# Patient Record
Sex: FEMALE | Race: BLACK OR AFRICAN AMERICAN | NOT HISPANIC OR LATINO | Employment: OTHER | URBAN - METROPOLITAN AREA
[De-identification: names, ages, dates, MRNs, and addresses within clinical notes are randomized per-mention and may not be internally consistent; named-entity substitution may affect disease eponyms.]

---

## 2017-02-10 ENCOUNTER — HOSPITAL ENCOUNTER (EMERGENCY)
Facility: HOSPITAL | Age: 42
Discharge: HOME | End: 2017-02-11
Attending: EMERGENCY MEDICINE

## 2017-02-10 ENCOUNTER — APPOINTMENT (EMERGENCY)
Dept: CT IMAGING | Facility: HOSPITAL | Age: 42
End: 2017-02-10

## 2017-02-10 DIAGNOSIS — I10 UNCONTROLLED HYPERTENSION: Primary | ICD-10-CM

## 2017-02-10 DIAGNOSIS — E03.9 HYPOTHYROID: ICD-10-CM

## 2017-02-10 LAB
ALBUMIN SERPL BCP-MCNC: 3.4 G/DL (ref 3.5–5)
ALP SERPL-CCNC: 131 U/L (ref 46–116)
ALT SERPL W P-5'-P-CCNC: 40 U/L (ref 12–78)
ANION GAP SERPL CALCULATED.3IONS-SCNC: 8 MMOL/L (ref 4–13)
AST SERPL W P-5'-P-CCNC: 55 U/L (ref 5–45)
BASOPHILS # BLD AUTO: 0.03 THOUSANDS/ΜL (ref 0–0.1)
BASOPHILS NFR BLD AUTO: 0 % (ref 0–1)
BILIRUB SERPL-MCNC: 0.3 MG/DL (ref 0.2–1)
BUN SERPL-MCNC: 8 MG/DL (ref 5–25)
CALCIUM SERPL-MCNC: 9.3 MG/DL (ref 8.3–10.1)
CHLORIDE SERPL-SCNC: 105 MMOL/L (ref 100–108)
CO2 SERPL-SCNC: 26 MMOL/L (ref 21–32)
CREAT SERPL-MCNC: 0.8 MG/DL (ref 0.6–1.3)
EOSINOPHIL # BLD AUTO: 0.18 THOUSAND/ΜL (ref 0–0.61)
EOSINOPHIL NFR BLD AUTO: 1 % (ref 0–6)
ERYTHROCYTE [DISTWIDTH] IN BLOOD BY AUTOMATED COUNT: 13.6 % (ref 11.6–15.1)
GFR SERPL CREATININE-BSD FRML MDRD: >60 ML/MIN/1.73SQ M
GLUCOSE SERPL-MCNC: 96 MG/DL (ref 65–140)
HCT VFR BLD AUTO: 43.1 % (ref 34.8–46.1)
HGB BLD-MCNC: 13.8 G/DL (ref 11.5–15.4)
INR PPP: 0.97 (ref 0.86–1.16)
LYMPHOCYTES # BLD AUTO: 2.24 THOUSANDS/ΜL (ref 0.6–4.47)
LYMPHOCYTES NFR BLD AUTO: 15 % (ref 14–44)
MCH RBC QN AUTO: 27.3 PG (ref 26.8–34.3)
MCHC RBC AUTO-ENTMCNC: 32 G/DL (ref 31.4–37.4)
MCV RBC AUTO: 85 FL (ref 82–98)
MONOCYTES # BLD AUTO: 0.58 THOUSAND/ΜL (ref 0.17–1.22)
MONOCYTES NFR BLD AUTO: 4 % (ref 4–12)
NEUTROPHILS # BLD AUTO: 11.55 THOUSANDS/ΜL (ref 1.85–7.62)
NEUTS SEG NFR BLD AUTO: 79 % (ref 43–75)
NRBC BLD AUTO-RTO: 0 /100 WBCS
PLATELET # BLD AUTO: 359 THOUSANDS/UL (ref 149–390)
PMV BLD AUTO: 10.5 FL (ref 8.9–12.7)
POTASSIUM SERPL-SCNC: 4 MMOL/L (ref 3.5–5.3)
PROT SERPL-MCNC: 8.4 G/DL (ref 6.4–8.2)
PROTHROMBIN TIME: 12.8 SECONDS (ref 12–14.3)
RBC # BLD AUTO: 5.05 MILLION/UL (ref 3.81–5.12)
SODIUM SERPL-SCNC: 139 MMOL/L (ref 136–145)
SPECIMEN SOURCE: NORMAL
TROPONIN I BLD-MCNC: 0.02 NG/ML (ref 0–0.08)
TSH SERPL DL<=0.05 MIU/L-ACNC: 67.31 UIU/ML (ref 0.36–3.74)
WBC # BLD AUTO: 14.66 THOUSAND/UL (ref 4.31–10.16)

## 2017-02-10 PROCEDURE — 85610 PROTHROMBIN TIME: CPT | Performed by: EMERGENCY MEDICINE

## 2017-02-10 PROCEDURE — 84484 ASSAY OF TROPONIN QUANT: CPT

## 2017-02-10 PROCEDURE — 93005 ELECTROCARDIOGRAM TRACING: CPT | Performed by: EMERGENCY MEDICINE

## 2017-02-10 PROCEDURE — 84439 ASSAY OF FREE THYROXINE: CPT | Performed by: EMERGENCY MEDICINE

## 2017-02-10 PROCEDURE — 80053 COMPREHEN METABOLIC PANEL: CPT | Performed by: EMERGENCY MEDICINE

## 2017-02-10 PROCEDURE — 70496 CT ANGIOGRAPHY HEAD: CPT

## 2017-02-10 PROCEDURE — 85025 COMPLETE CBC W/AUTO DIFF WBC: CPT | Performed by: EMERGENCY MEDICINE

## 2017-02-10 PROCEDURE — 84443 ASSAY THYROID STIM HORMONE: CPT | Performed by: EMERGENCY MEDICINE

## 2017-02-10 PROCEDURE — 36415 COLL VENOUS BLD VENIPUNCTURE: CPT | Performed by: EMERGENCY MEDICINE

## 2017-02-10 PROCEDURE — 70498 CT ANGIOGRAPHY NECK: CPT

## 2017-02-10 RX ORDER — DIPHENHYDRAMINE HYDROCHLORIDE 50 MG/ML
INJECTION INTRAMUSCULAR; INTRAVENOUS
Status: COMPLETED
Start: 2017-02-10 | End: 2017-02-11

## 2017-02-10 RX ORDER — LABETALOL HYDROCHLORIDE 5 MG/ML
10 INJECTION, SOLUTION INTRAVENOUS ONCE
Status: DISCONTINUED | OUTPATIENT
Start: 2017-02-11 | End: 2017-02-11 | Stop reason: HOSPADM

## 2017-02-10 RX ORDER — DIAZEPAM 5 MG/ML
INJECTION, SOLUTION INTRAMUSCULAR; INTRAVENOUS
Status: COMPLETED
Start: 2017-02-10 | End: 2017-02-11

## 2017-02-10 RX ORDER — KETOROLAC TROMETHAMINE 30 MG/ML
INJECTION, SOLUTION INTRAMUSCULAR; INTRAVENOUS
Status: DISCONTINUED
Start: 2017-02-10 | End: 2017-02-10 | Stop reason: WASHOUT

## 2017-02-10 RX ORDER — METOCLOPRAMIDE HYDROCHLORIDE 5 MG/ML
INJECTION INTRAMUSCULAR; INTRAVENOUS
Status: COMPLETED
Start: 2017-02-10 | End: 2017-02-11

## 2017-02-10 RX ADMIN — IOHEXOL 85 ML: 350 INJECTION, SOLUTION INTRAVENOUS at 23:35

## 2017-02-11 VITALS
RESPIRATION RATE: 20 BRPM | HEIGHT: 67 IN | TEMPERATURE: 98.7 F | SYSTOLIC BLOOD PRESSURE: 188 MMHG | DIASTOLIC BLOOD PRESSURE: 77 MMHG | HEART RATE: 87 BPM | BODY MASS INDEX: 32.96 KG/M2 | OXYGEN SATURATION: 100 % | WEIGHT: 210 LBS

## 2017-02-11 LAB — T4 FREE SERPL-MCNC: 0.7 NG/DL (ref 0.76–1.46)

## 2017-02-11 PROCEDURE — 99284 EMERGENCY DEPT VISIT MOD MDM: CPT

## 2017-02-11 PROCEDURE — 96375 TX/PRO/DX INJ NEW DRUG ADDON: CPT

## 2017-02-11 PROCEDURE — 96374 THER/PROPH/DIAG INJ IV PUSH: CPT

## 2017-02-11 RX ORDER — DIAZEPAM 5 MG/ML
5 INJECTION, SOLUTION INTRAMUSCULAR; INTRAVENOUS ONCE
Status: COMPLETED | OUTPATIENT
Start: 2017-02-11 | End: 2017-02-11

## 2017-02-11 RX ORDER — AMLODIPINE BESYLATE 5 MG/1
5 TABLET ORAL ONCE
Status: COMPLETED | OUTPATIENT
Start: 2017-02-11 | End: 2017-02-11

## 2017-02-11 RX ORDER — LEVOTHYROXINE SODIUM 0.1 MG/1
100 TABLET ORAL DAILY
Qty: 30 TABLET | Refills: 0 | Status: SHIPPED | OUTPATIENT
Start: 2017-02-11 | End: 2019-09-29 | Stop reason: ALTCHOICE

## 2017-02-11 RX ORDER — METOCLOPRAMIDE HYDROCHLORIDE 5 MG/ML
10 INJECTION INTRAMUSCULAR; INTRAVENOUS ONCE
Status: COMPLETED | OUTPATIENT
Start: 2017-02-11 | End: 2017-02-11

## 2017-02-11 RX ORDER — AMLODIPINE BESYLATE 5 MG/1
5 TABLET ORAL DAILY
Qty: 30 TABLET | Refills: 0 | Status: SHIPPED | OUTPATIENT
Start: 2017-02-11 | End: 2017-03-13

## 2017-02-11 RX ORDER — DIPHENHYDRAMINE HYDROCHLORIDE 50 MG/ML
50 INJECTION INTRAMUSCULAR; INTRAVENOUS ONCE
Status: COMPLETED | OUTPATIENT
Start: 2017-02-11 | End: 2017-02-11

## 2017-02-11 RX ADMIN — DIAZEPAM 5 MG: 5 INJECTION, SOLUTION INTRAMUSCULAR; INTRAVENOUS at 00:07

## 2017-02-11 RX ADMIN — METOCLOPRAMIDE HYDROCHLORIDE 10 MG: 5 INJECTION INTRAMUSCULAR; INTRAVENOUS at 00:05

## 2017-02-11 RX ADMIN — AMLODIPINE BESYLATE 5 MG: 5 TABLET ORAL at 01:46

## 2017-02-11 RX ADMIN — METOCLOPRAMIDE 10 MG: 5 INJECTION, SOLUTION INTRAMUSCULAR; INTRAVENOUS at 00:05

## 2017-02-11 RX ADMIN — DIPHENHYDRAMINE HYDROCHLORIDE 50 MG: 50 INJECTION INTRAMUSCULAR; INTRAVENOUS at 00:05

## 2017-02-11 RX ADMIN — DIPHENHYDRAMINE HYDROCHLORIDE 50 MG: 50 INJECTION, SOLUTION INTRAMUSCULAR; INTRAVENOUS at 00:05

## 2017-02-13 LAB
ATRIAL RATE: 91 BPM
P AXIS: 61 DEGREES
PR INTERVAL: 192 MS
QRS AXIS: 11 DEGREES
QRSD INTERVAL: 84 MS
QT INTERVAL: 378 MS
QTC INTERVAL: 464 MS
T WAVE AXIS: 58 DEGREES
VENTRICULAR RATE: 91 BPM

## 2017-06-06 ENCOUNTER — APPOINTMENT (EMERGENCY)
Dept: CT IMAGING | Facility: HOSPITAL | Age: 42
End: 2017-06-06

## 2017-06-06 ENCOUNTER — HOSPITAL ENCOUNTER (EMERGENCY)
Facility: HOSPITAL | Age: 42
Discharge: HOME | End: 2017-06-06
Attending: EMERGENCY MEDICINE | Admitting: EMERGENCY MEDICINE

## 2017-06-06 VITALS
TEMPERATURE: 98.7 F | DIASTOLIC BLOOD PRESSURE: 99 MMHG | WEIGHT: 271 LBS | HEART RATE: 84 BPM | SYSTOLIC BLOOD PRESSURE: 210 MMHG | OXYGEN SATURATION: 95 % | BODY MASS INDEX: 42.44 KG/M2 | RESPIRATION RATE: 17 BRPM

## 2017-06-06 DIAGNOSIS — I10 HYPERTENSION: ICD-10-CM

## 2017-06-06 DIAGNOSIS — J32.9 SINUSITIS: Primary | ICD-10-CM

## 2017-06-06 LAB
ALBUMIN SERPL BCP-MCNC: 3.5 G/DL (ref 3.5–5)
ALP SERPL-CCNC: 131 U/L (ref 46–116)
ALT SERPL W P-5'-P-CCNC: 39 U/L (ref 12–78)
ANION GAP SERPL CALCULATED.3IONS-SCNC: 9 MMOL/L (ref 4–13)
AST SERPL W P-5'-P-CCNC: 51 U/L (ref 5–45)
ATRIAL RATE: 83 BPM
BASOPHILS # BLD AUTO: 0.02 THOUSANDS/ΜL (ref 0–0.1)
BASOPHILS NFR BLD AUTO: 0 % (ref 0–1)
BILIRUB DIRECT SERPL-MCNC: 0.11 MG/DL (ref 0–0.2)
BILIRUB SERPL-MCNC: 0.4 MG/DL (ref 0.2–1)
BUN SERPL-MCNC: 7 MG/DL (ref 5–25)
CALCIUM SERPL-MCNC: 9.2 MG/DL (ref 8.3–10.1)
CHLORIDE SERPL-SCNC: 101 MMOL/L (ref 100–108)
CO2 SERPL-SCNC: 27 MMOL/L (ref 21–32)
CREAT SERPL-MCNC: 0.75 MG/DL (ref 0.6–1.3)
EOSINOPHIL # BLD AUTO: 0.34 THOUSAND/ΜL (ref 0–0.61)
EOSINOPHIL NFR BLD AUTO: 3 % (ref 0–6)
ERYTHROCYTE [DISTWIDTH] IN BLOOD BY AUTOMATED COUNT: 13.7 % (ref 11.6–15.1)
GFR SERPL CREATININE-BSD FRML MDRD: >60 ML/MIN/1.73SQ M
GLUCOSE SERPL-MCNC: 97 MG/DL (ref 65–140)
HCT VFR BLD AUTO: 44.5 % (ref 34.8–46.1)
HGB BLD-MCNC: 14.3 G/DL (ref 11.5–15.4)
LYMPHOCYTES # BLD AUTO: 2.38 THOUSANDS/ΜL (ref 0.6–4.47)
LYMPHOCYTES NFR BLD AUTO: 23 % (ref 14–44)
MCH RBC QN AUTO: 27.7 PG (ref 26.8–34.3)
MCHC RBC AUTO-ENTMCNC: 32.1 G/DL (ref 31.4–37.4)
MCV RBC AUTO: 86 FL (ref 82–98)
MONOCYTES # BLD AUTO: 0.47 THOUSAND/ΜL (ref 0.17–1.22)
MONOCYTES NFR BLD AUTO: 5 % (ref 4–12)
NEUTROPHILS # BLD AUTO: 6.95 THOUSANDS/ΜL (ref 1.85–7.62)
NEUTS SEG NFR BLD AUTO: 68 % (ref 43–75)
NRBC BLD AUTO-RTO: 0 /100 WBCS
P AXIS: 28 DEGREES
PLATELET # BLD AUTO: 332 THOUSANDS/UL (ref 149–390)
PMV BLD AUTO: 10.5 FL (ref 8.9–12.7)
POTASSIUM SERPL-SCNC: 3.9 MMOL/L (ref 3.5–5.3)
PR INTERVAL: 146 MS
PROT SERPL-MCNC: 8.3 G/DL (ref 6.4–8.2)
QRS AXIS: -1 DEGREES
QRSD INTERVAL: 80 MS
QT INTERVAL: 392 MS
QTC INTERVAL: 460 MS
RBC # BLD AUTO: 5.17 MILLION/UL (ref 3.81–5.12)
SODIUM SERPL-SCNC: 137 MMOL/L (ref 136–145)
T WAVE AXIS: 65 DEGREES
TROPONIN I SERPL-MCNC: <0.02 NG/ML
VENTRICULAR RATE: 83 BPM
WBC # BLD AUTO: 10.22 THOUSAND/UL (ref 4.31–10.16)

## 2017-06-06 PROCEDURE — 93005 ELECTROCARDIOGRAM TRACING: CPT | Performed by: EMERGENCY MEDICINE

## 2017-06-06 PROCEDURE — 36415 COLL VENOUS BLD VENIPUNCTURE: CPT | Performed by: EMERGENCY MEDICINE

## 2017-06-06 PROCEDURE — 84484 ASSAY OF TROPONIN QUANT: CPT | Performed by: EMERGENCY MEDICINE

## 2017-06-06 PROCEDURE — 80048 BASIC METABOLIC PNL TOTAL CA: CPT | Performed by: EMERGENCY MEDICINE

## 2017-06-06 PROCEDURE — 70450 CT HEAD/BRAIN W/O DYE: CPT

## 2017-06-06 PROCEDURE — 99284 EMERGENCY DEPT VISIT MOD MDM: CPT

## 2017-06-06 PROCEDURE — 85025 COMPLETE CBC W/AUTO DIFF WBC: CPT | Performed by: EMERGENCY MEDICINE

## 2017-06-06 PROCEDURE — 80076 HEPATIC FUNCTION PANEL: CPT | Performed by: EMERGENCY MEDICINE

## 2017-06-06 PROCEDURE — 70486 CT MAXILLOFACIAL W/O DYE: CPT

## 2017-06-06 RX ORDER — CEPHALEXIN 500 MG/1
500 CAPSULE ORAL 4 TIMES DAILY
Qty: 40 CAPSULE | Refills: 0 | Status: SHIPPED | OUTPATIENT
Start: 2017-06-06 | End: 2017-06-16

## 2017-06-06 RX ORDER — AMLODIPINE BESYLATE 10 MG/1
10 TABLET ORAL DAILY
Qty: 30 TABLET | Refills: 0 | Status: SHIPPED | OUTPATIENT
Start: 2017-06-06 | End: 2019-04-16 | Stop reason: SDUPTHER

## 2017-06-06 RX ORDER — CEPHALEXIN 250 MG/1
500 CAPSULE ORAL ONCE
Status: COMPLETED | OUTPATIENT
Start: 2017-06-06 | End: 2017-06-06

## 2017-06-06 RX ORDER — AMLODIPINE BESYLATE 5 MG/1
5 TABLET ORAL ONCE
Status: COMPLETED | OUTPATIENT
Start: 2017-06-06 | End: 2017-06-06

## 2017-06-06 RX ADMIN — CEPHALEXIN 500 MG: 250 CAPSULE ORAL at 14:05

## 2017-06-06 RX ADMIN — AMLODIPINE BESYLATE 5 MG: 5 TABLET ORAL at 14:13

## 2017-12-04 ENCOUNTER — HOSPITAL ENCOUNTER (EMERGENCY)
Facility: HOSPITAL | Age: 42
Discharge: HOME | End: 2017-12-04
Attending: EMERGENCY MEDICINE | Admitting: EMERGENCY MEDICINE
Payer: COMMERCIAL

## 2017-12-04 ENCOUNTER — APPOINTMENT (EMERGENCY)
Dept: RADIOLOGY | Facility: HOSPITAL | Age: 42
End: 2017-12-04
Payer: COMMERCIAL

## 2017-12-04 VITALS
OXYGEN SATURATION: 100 % | WEIGHT: 140 LBS | DIASTOLIC BLOOD PRESSURE: 94 MMHG | SYSTOLIC BLOOD PRESSURE: 219 MMHG | HEART RATE: 95 BPM | BODY MASS INDEX: 21.93 KG/M2 | RESPIRATION RATE: 16 BRPM | TEMPERATURE: 98.4 F

## 2017-12-04 DIAGNOSIS — I10 HTN (HYPERTENSION): ICD-10-CM

## 2017-12-04 DIAGNOSIS — S60.221A CONTUSION OF RIGHT HAND, INITIAL ENCOUNTER: Primary | ICD-10-CM

## 2017-12-04 LAB
BILIRUB UR QL STRIP: NEGATIVE
CLARITY UR: CLEAR
COLOR UR: YELLOW
EXT PREG TEST URINE: NEGATIVE
GLUCOSE UR STRIP-MCNC: NEGATIVE MG/DL
HGB UR QL STRIP.AUTO: NEGATIVE
KETONES UR STRIP-MCNC: NEGATIVE MG/DL
LEUKOCYTE ESTERASE UR QL STRIP: NEGATIVE
NITRITE UR QL STRIP: NEGATIVE
PH UR STRIP.AUTO: 6 [PH] (ref 4.5–8)
PROT UR STRIP-MCNC: NEGATIVE MG/DL
SP GR UR STRIP.AUTO: >=1.03 (ref 1–1.03)
UROBILINOGEN UR QL STRIP.AUTO: 1 E.U./DL

## 2017-12-04 PROCEDURE — 99283 EMERGENCY DEPT VISIT LOW MDM: CPT

## 2017-12-04 PROCEDURE — 73110 X-RAY EXAM OF WRIST: CPT

## 2017-12-04 PROCEDURE — 81025 URINE PREGNANCY TEST: CPT | Performed by: EMERGENCY MEDICINE

## 2017-12-04 PROCEDURE — 73130 X-RAY EXAM OF HAND: CPT

## 2017-12-04 PROCEDURE — 81003 URINALYSIS AUTO W/O SCOPE: CPT | Performed by: EMERGENCY MEDICINE

## 2017-12-04 RX ORDER — OXYCODONE HYDROCHLORIDE AND ACETAMINOPHEN 5; 325 MG/1; MG/1
1 TABLET ORAL EVERY 8 HOURS PRN
Qty: 5 TABLET | Refills: 0 | Status: SHIPPED | OUTPATIENT
Start: 2017-12-04 | End: 2017-12-06

## 2017-12-04 RX ORDER — AMLODIPINE BESYLATE 2.5 MG/1
10 TABLET ORAL DAILY
Qty: 40 TABLET | Refills: 0 | Status: SHIPPED | OUTPATIENT
Start: 2017-12-04 | End: 2018-04-12 | Stop reason: HOSPADM

## 2017-12-04 RX ORDER — AMLODIPINE BESYLATE 5 MG/1
5 TABLET ORAL ONCE
Status: COMPLETED | OUTPATIENT
Start: 2017-12-04 | End: 2017-12-04

## 2017-12-04 RX ORDER — OXYCODONE HYDROCHLORIDE AND ACETAMINOPHEN 5; 325 MG/1; MG/1
1 TABLET ORAL ONCE
Status: COMPLETED | OUTPATIENT
Start: 2017-12-04 | End: 2017-12-04

## 2017-12-04 RX ADMIN — OXYCODONE AND ACETAMINOPHEN 1 TABLET: 5; 325 TABLET ORAL at 19:46

## 2017-12-04 RX ADMIN — AMLODIPINE BESYLATE 5 MG: 5 TABLET ORAL at 20:14

## 2017-12-05 NOTE — ED PROVIDER NOTES
History  Chief Complaint   Patient presents with    Hand Injury     Pt reports right hand injury after stricking door  Pt reports hx of hand surgery with screw placement  Patient is a 61-year-old female with a history of hypertension noncompliant on her medications for the past month coming in today after an injury to her right hand  Patient is predominantly right-handed and states that she had surgery to her right hand in the late 1990s  Patient struck her hand in a door  This was not a crush injury  Patient did not take anything for the pain  This injury occurred approximately 0 5 hour prior  History provided by:  Patient   used: No        Prior to Admission Medications   Prescriptions Last Dose Informant Patient Reported? Taking? amLODIPine (NORVASC) 10 mg tablet   No No   Sig: Take 1 tablet by mouth daily   amLODIPine (NORVASC) 5 mg tablet   No No   Sig: Take 1 tablet by mouth daily for 30 days   amLODIPine (NORVASC) 5 mg tablet   No No   Sig: Take 1 tablet by mouth daily for 30 days   aspirin 81 MG tablet   Yes No   Sig: Take 81 mg by mouth daily   levothyroxine 100 mcg tablet   No No   Sig: Take 1 tablet by mouth daily for 30 days   levothyroxine 100 mcg tablet   No No   Sig: Take 1 tablet by mouth daily for 30 days      Facility-Administered Medications: None       Past Medical History:   Diagnosis Date    Disease of thyroid gland     hypothyroidism    History of MI (myocardial infarction)     History of TIA (transient ischemic attack)     Hypertension     TIA (transient ischemic attack)        History reviewed  No pertinent surgical history  History reviewed  No pertinent family history  I have reviewed and agree with the history as documented      Social History   Substance Use Topics    Smoking status: Current Every Day Smoker     Packs/day: 0 20     Types: Cigarettes    Smokeless tobacco: Never Used    Alcohol use No        Review of Systems   Respiratory: Negative for cough, chest tightness and shortness of breath  Cardiovascular: Negative for chest pain, palpitations and leg swelling  Musculoskeletal: Negative for arthralgias, back pain, gait problem, joint swelling, myalgias and neck pain  Physical Exam  ED Triage Vitals   Temperature Pulse Respirations Blood Pressure SpO2   12/04/17 1909 12/04/17 1909 12/04/17 1909 12/04/17 1911 12/04/17 1909   98 4 °F (36 9 °C) 95 16 (!) 212/101 100 %      Temp Source Heart Rate Source Patient Position - Orthostatic VS BP Location FiO2 (%)   12/04/17 1909 12/04/17 1909 12/04/17 1909 12/04/17 1909 --   Oral Monitor Lying Right arm       Pain Score       12/04/17 1909       9           Orthostatic Vital Signs  Vitals:    12/04/17 1909 12/04/17 1911 12/04/17 2014 12/04/17 2100   BP:  (!) 212/101 (!) 206/100 (!) 219/94   Pulse: 95      Patient Position - Orthostatic VS: Lying Sitting Lying        Physical Exam   Constitutional: She is oriented to person, place, and time  She appears well-developed and well-nourished  No distress  HENT:   Head: Normocephalic and atraumatic  Mouth/Throat: Oropharynx is clear and moist    Eyes: Conjunctivae and EOM are normal  Pupils are equal, round, and reactive to light  Neck: Normal range of motion  Neck supple  Cardiovascular:   No murmur heard  Pulmonary/Chest: No stridor  No respiratory distress  Musculoskeletal: Normal range of motion  She exhibits no edema  Arms:  Patient has full active range of motion of the right shoulder, elbow, and wrist which is pain-free  Patient has tenderness to palpation along the dorsal surface of the mid carpal bones  Patient does have full active range of motion of the right wrist this is pain-free  Patient does have full active range of motion of all 5 digits and complains of pain throughout the 3rd digit only  Patient is neurovascular intact with 2+ radial pulses bilaterally and equally  There is no crepitus or deformity    There is no effusion or swelling  There is no laceration  Neurological: She is alert and oriented to person, place, and time  She displays normal reflexes  No cranial nerve deficit  Coordination normal    Skin: Skin is warm  Capillary refill takes less than 2 seconds  She is not diaphoretic  Nursing note and vitals reviewed  ED Medications  Medications   oxyCODONE-acetaminophen (PERCOCET) 5-325 mg per tablet 1 tablet (1 tablet Oral Given 12/4/17 1946)   amLODIPine (NORVASC) tablet 5 mg (5 mg Oral Given 12/4/17 2014)       Diagnostic Studies  Results Reviewed     Procedure Component Value Units Date/Time    UA w Reflex to Microscopic [[id number]]  (Normal) Collected:  12/04/17 1946    Lab Status:  Final result Specimen:  Urine from Urine, Clean Catch Updated:  12/04/17 1955     Color, UA Yellow     Clarity, UA Clear     Specific Gravity, UA >=1 030     pH, UA 6 0     Leukocytes, UA Negative     Nitrite, UA Negative     Protein, UA Negative mg/dl      Glucose, UA Negative mg/dl      Ketones, UA Negative mg/dl      Urobilinogen, UA 1 0 E U /dl      Bilirubin, UA Negative     Blood, UA Negative    POCT pregnancy, urine [[id number]]  (Normal) Resulted:  12/04/17 1950    Lab Status:  Final result Updated:  12/04/17 1950     EXT PREG TEST UR (Ref: Negative) negative                 XR hand 3+ views RIGHT   Final Result by Olegario Homans, MD (12/04 2050)      No acute osseous abnormality  Workstation performed: [id number]         XR wrist 3+ views RIGHT   Final Result by Olegario Homans, MD (12/04 2048)      No acute osseous abnormality  Workstation performed: [id number]                    Procedures  Procedures       Phone Contacts  ED Phone Contact    ED Course  ED Course                                MDM  Number of Diagnoses or Management Options  Contusion of right hand, initial encounter:   Diagnosis management comments:  It is noted patient is hypertensive on initial exam states she has not taken her medications in over a month due to insurance reasons  Will give 1st dose of Norvasc your as well as pain control and x-rays  Patient denies any chest pain, shortness of breath, and only complains of right hand pain  8:39 PM  Patient updated on my x-ray read which revealed no acute fracture  Pins in and place  Patient remains neurovascularly intact  Patient complains of pain at the 3rd digit on the right hand only  Patient states her pain is improved  Discussed with patient regarding her blood pressure  Patient has no headache, visual changes, chest pain, shortness of breath  Urine is negative  I discussed with patient that she needs to restart her blood pressure medication and follow up with her PCP  Will give a prescription for her anti hypertensive at home and stressed the importance of taking this at home  Patient understands that she is high risk for stroke, mi, end-organ damage if she does not restart her blood pressure medications  Amount and/or Complexity of Data Reviewed  Clinical lab tests: reviewed and ordered  Tests in the radiology section of CPT®: ordered and reviewed  Tests in the medicine section of CPT®: ordered and reviewed  Independent visualization of images, tracings, or specimens: yes      CritCare Time    Disposition  Final diagnoses:   Contusion of right hand, initial encounter   HTN (hypertension)     Time reflects when diagnosis was documented in both MDM as applicable and the Disposition within this note     Time User Action Codes Description Comment    12/4/2017  8:42 PM Edwin Juares Add [R51 030V] Contusion of right hand, initial encounter     12/4/2017  8:43 PM Jimmy Juares Add [I10] HTN (hypertension)       ED Disposition     ED Disposition Condition Comment    Discharge  [facility] discharge to home/self care      Condition at discharge: Stable        Follow-up Information    None       Discharge Medication List as of 12/4/2017  8:46 PM      START taking these medications    Details   oxyCODONE-acetaminophen (PERCOCET) 5-325 mg per tablet Take 1 tablet by mouth every 8 (eight) hours as needed for moderate pain for up to 2 days Max Daily Amount: 3 tablets, Starting Mon 12/4/2017, Until Wed 12/6/2017, Print         CONTINUE these medications which have CHANGED    Details   !! amLODIPine (NORVASC) 2 5 mg tablet Take 4 tablets by mouth daily for 10 days, Starting Mon 12/4/2017, Until Thu 12/14/2017, Print       !! - Potential duplicate medications found  Please discuss with provider  CONTINUE these medications which have NOT CHANGED    Details   !! amLODIPine (NORVASC) 10 mg tablet Take 1 tablet by mouth daily, Starting 6/6/2017, Until Discontinued, Print      aspirin 81 MG tablet Take 81 mg by mouth daily, Until Discontinued, Historical Med      levothyroxine 100 mcg tablet Take 1 tablet by mouth daily for 30 days, Starting 12/13/2016, Until Thu 1/12/17, Print      levothyroxine 100 mcg tablet Take 1 tablet by mouth daily for 30 days, Starting 2/11/2017, Until Mon 3/13/17, Print       !! - Potential duplicate medications found  Please discuss with provider  No discharge procedures on file      ED Provider  Electronically Signed by           Vila Bosworth,   12/05/17 7958

## 2017-12-05 NOTE — DISCHARGE INSTRUCTIONS
You must follow up with your family doctor and restart her blood pressure medications  You blood pressure was elevated in the emergency department  If you do not restart your blood pressure medications he is a high risk of stroke, heart attack, end-organ damage  If you have any symptoms such as chest pain, shortness of breath, headache, visual changes, or any other concerning symptoms return to the ER immediately  Chronic Hypertension   WHAT YOU NEED TO KNOW:   Hypertension is high blood pressure (BP)  Your BP is the force of your blood moving against the walls of your arteries  Normal BP is less than 120/80  Prehypertension is between 120/80 and 139/89  Hypertension is 140/90 or higher  Hypertension causes your BP to get so high that your heart has to work much harder than normal  This can damage your heart  Chronic hypertension is a long-term condition that you can control with a healthy lifestyle or medicines  A controlled blood pressure helps protect your organs, such as your heart, lungs, brain, and kidneys  DISCHARGE INSTRUCTIONS:   Call 911 for any of the following:   · You have discomfort in your chest that feels like squeezing, pressure, fullness, or pain  · You become confused or have difficulty speaking  · You suddenly feel lightheaded or have trouble breathing  · You have pain or discomfort in your back, neck, jaw, stomach, or arm  Seek care immediately if:   · You have a severe headache or vision loss  · You have weakness in an arm or leg  Contact your healthcare provider if:   · You feel faint, dizzy, confused, or drowsy  · You have been taking your BP medicine and your BP is still higher than your healthcare provider says it should be  · You have questions or concerns about your condition or care  Medicines: You may need any of the following:  · Medicine  may be used to help lower your BP  You may need more than one type of medicine   Take the medicine exactly as directed  · Diuretics  help decrease extra fluid that collects in your body  This will help lower your BP  You may urinate more often while you take this medicine  · Cholesterol medicine  helps lower your cholesterol level  A low cholesterol level helps prevent heart disease and makes it easier to control your blood pressure  · Take your medicine as directed  Contact your healthcare provider if you think your medicine is not helping or if you have side effects  Tell him or her if you are allergic to any medicine  Keep a list of the medicines, vitamins, and herbs you take  Include the amounts, and when and why you take them  Bring the list or the pill bottles to follow-up visits  Carry your medicine list with you in case of an emergency  Follow up with your healthcare provider as directed: You will need to return to have your blood pressure checked and to have other lab tests done  Write down your questions so you remember to ask them during your visits  Manage chronic hypertension:  Talk with your healthcare provider about these and other ways to manage hypertension:  · Take your BP at home  Sit and rest for 5 minutes before you take your BP  Extend your arm and support it on a flat surface  Your arm should be at the same level as your heart  Follow the directions that came with your BP monitor  If possible, take at least 2 BP readings each time  Take your BP at least twice a day at the same times each day, such as morning and evening  Keep a record of your BP readings and bring it to your follow-up visits  Ask your healthcare provider what your blood pressure should be  · Limit sodium (salt) as directed  Too much sodium can affect your fluid balance  Check labels to find low-sodium or no-salt-added foods  Some low-sodium foods use potassium salts for flavor  Too much potassium can also cause health problems   Your healthcare provider will tell you how much sodium and potassium are safe for you to have in a day  He or she may recommend that you limit sodium to 2,300 mg a day  · Follow the meal plan recommended by your healthcare provider  A dietitian or your provider can give you more information on low-sodium plans or the DASH (Dietary Approaches to Stop Hypertension) eating plan  The DASH plan is low in sodium, unhealthy fats, and total fat  It is high in potassium, calcium, and fiber  · Exercise to maintain a healthy weight  Exercise at least 30 minutes per day, on most days of the week  This will help decrease your blood pressure  Ask about the best exercise plan for you  · Decrease stress  This may help lower your BP  Learn ways to relax, such as deep breathing or listening to music  · Limit alcohol  Women should limit alcohol to 1 drink a day  Men should limit alcohol to 2 drinks a day  A drink of alcohol is 12 ounces of beer, 5 ounces of wine, or 1½ ounces of liquor  · Do not smoke  Nicotine and other chemicals in cigarettes and cigars can increase your BP and also cause lung damage  Ask your healthcare provider for information if you currently smoke and need help to quit  E-cigarettes or smokeless tobacco still contain nicotine  Talk to your healthcare provider before you use these products  © 2017 [address] [facility] Information is for End User's use only and may not be sold, redistributed or otherwise used for commercial purposes  All illustrations and images included in CareNotes® are the copyrighted property of [employer]  or Gerardo Blackman  The above information is an  only  It is not intended as medical advice for individual conditions or treatments  Talk to your doctor, nurse or pharmacist before following any medical regimen to see if it is safe and effective for you  Hand Sprain, Ambulatory Care   GENERAL INFORMATION:   A hand sprain  is when a ligament in your hand is stretched or torn   Ligaments are the strong tissues that connect bones  A hand sprain is usually caused by a fall onto your outstretched arm  You may have bruising, pain, and swelling of your injured hand  Seek immediate care for the following symptoms:   · Cold or numbness below the injury, such as in your fingers    · Increased pain, even after taking pain medicine    · New or increased trouble moving and using your hand, fingers, or wrist  Treatment for a hand sprain  may include a support device, such as a brace or splint  These devices limit movement and protect your joint  Treatment may also include pain medicine  Care for a hand sprain:   · Rest  your hand for 1 to 2 days after your injury  This will help decrease the risk of more damage to your hand  Avoid activities that cause pain  Return to normal activities as directed  · Apply ice  on your hand for 15 to 20 minutes every hour or as directed  Use an ice pack, or put crushed ice in a plastic bag  Cover it with a towel  Ice helps prevent tissue damage and decreases swelling and pain  · Use an elastic bandage as directed  An elastic bandage supports your hand and decreases swelling so it can heal  The elastic bandage should be snug but not tight  · Elevate  your hand above the level of your heart as often as you can  This will help decrease swelling and pain  Prop your hand on pillows or blankets to keep it elevated comfortably  · Exercise  your hand as directed to decrease stiffness and improve strength  You may be directed to exercise once you are able to move your hand without pain  Follow up with your healthcare provider as directed:  Write down your questions so you remember to ask them during your visits  CARE AGREEMENT:   You have the right to help plan your care  Learn about your health condition and how it may be treated  Discuss treatment options with your caregivers to decide what care you want to receive  You always have the right to refuse treatment   The above information is an  only  It is not intended as medical advice for individual conditions or treatments  Talk to your doctor, nurse or pharmacist before following any medical regimen to see if it is safe and effective for you  © 2014 [address] is for End User's use only and may not be sold, redistributed or otherwise used for commercial purposes  All illustrations and images included in CareNotes® are the copyrighted property of [employer]  or Gerardo Blackman

## 2018-04-08 ENCOUNTER — APPOINTMENT (EMERGENCY)
Dept: CT IMAGING | Facility: HOSPITAL | Age: 43
DRG: 054 | End: 2018-04-08
Payer: COMMERCIAL

## 2018-04-08 ENCOUNTER — APPOINTMENT (EMERGENCY)
Dept: RADIOLOGY | Facility: HOSPITAL | Age: 43
DRG: 054 | End: 2018-04-08
Payer: COMMERCIAL

## 2018-04-08 ENCOUNTER — HOSPITAL ENCOUNTER (INPATIENT)
Facility: HOSPITAL | Age: 43
LOS: 1 days | Discharge: HOME | DRG: 054 | End: 2018-04-12
Attending: EMERGENCY MEDICINE | Admitting: INTERNAL MEDICINE
Payer: COMMERCIAL

## 2018-04-08 DIAGNOSIS — I16.0 HYPERTENSIVE URGENCY: Primary | ICD-10-CM

## 2018-04-08 DIAGNOSIS — Z72.0 TOBACCO ABUSE: ICD-10-CM

## 2018-04-08 DIAGNOSIS — R51.9 HEADACHE: ICD-10-CM

## 2018-04-08 DIAGNOSIS — G43.911 INTRACTABLE MIGRAINE WITH STATUS MIGRAINOSUS, UNSPECIFIED MIGRAINE TYPE: ICD-10-CM

## 2018-04-08 DIAGNOSIS — R07.9 CHEST PAIN: ICD-10-CM

## 2018-04-08 LAB
ALBUMIN SERPL BCP-MCNC: 3.5 G/DL (ref 3.5–5)
ALP SERPL-CCNC: 128 U/L (ref 46–116)
ALT SERPL W P-5'-P-CCNC: 34 U/L (ref 12–78)
ANION GAP SERPL CALCULATED.3IONS-SCNC: 8 MMOL/L (ref 4–13)
AST SERPL W P-5'-P-CCNC: 43 U/L (ref 5–45)
BASOPHILS # BLD AUTO: 0.05 THOUSANDS/ΜL (ref 0–0.1)
BASOPHILS NFR BLD AUTO: 0 % (ref 0–1)
BILIRUB SERPL-MCNC: 0.2 MG/DL (ref 0.2–1)
BUN SERPL-MCNC: 6 MG/DL (ref 5–25)
CALCIUM SERPL-MCNC: 8.4 MG/DL (ref 8.3–10.1)
CHLORIDE SERPL-SCNC: 102 MMOL/L (ref 100–108)
CO2 SERPL-SCNC: 27 MMOL/L (ref 21–32)
CREAT SERPL-MCNC: 0.91 MG/DL (ref 0.6–1.3)
DEPRECATED D DIMER PPP: <270 NG/ML (FEU) (ref 0–424)
EOSINOPHIL # BLD AUTO: 0.38 THOUSAND/ΜL (ref 0–0.61)
EOSINOPHIL NFR BLD AUTO: 3 % (ref 0–6)
ERYTHROCYTE [DISTWIDTH] IN BLOOD BY AUTOMATED COUNT: 13.5 % (ref 11.6–15.1)
GFR SERPL CREATININE-BSD FRML MDRD: 91 ML/MIN/1.73SQ M
GLUCOSE SERPL-MCNC: 100 MG/DL (ref 65–140)
HCT VFR BLD AUTO: 43.6 % (ref 34.8–46.1)
HGB BLD-MCNC: 14 G/DL (ref 11.5–15.4)
LYMPHOCYTES # BLD AUTO: 2.75 THOUSANDS/ΜL (ref 0.6–4.47)
LYMPHOCYTES NFR BLD AUTO: 22 % (ref 14–44)
MCH RBC QN AUTO: 28 PG (ref 26.8–34.3)
MCHC RBC AUTO-ENTMCNC: 32.1 G/DL (ref 31.4–37.4)
MCV RBC AUTO: 87 FL (ref 82–98)
MONOCYTES # BLD AUTO: 0.5 THOUSAND/ΜL (ref 0.17–1.22)
MONOCYTES NFR BLD AUTO: 4 % (ref 4–12)
NEUTROPHILS # BLD AUTO: 8.54 THOUSANDS/ΜL (ref 1.85–7.62)
NEUTS SEG NFR BLD AUTO: 70 % (ref 43–75)
NRBC BLD AUTO-RTO: 0 /100 WBCS
NT-PROBNP SERPL-MCNC: 88 PG/ML
PLATELET # BLD AUTO: 336 THOUSANDS/UL (ref 149–390)
PMV BLD AUTO: 10 FL (ref 8.9–12.7)
POTASSIUM SERPL-SCNC: 3.6 MMOL/L (ref 3.5–5.3)
PROT SERPL-MCNC: 8.4 G/DL (ref 6.4–8.2)
RBC # BLD AUTO: 5 MILLION/UL (ref 3.81–5.12)
SODIUM SERPL-SCNC: 137 MMOL/L (ref 136–145)
TROPONIN I SERPL-MCNC: <0.02 NG/ML
TROPONIN I SERPL-MCNC: <0.02 NG/ML
WBC # BLD AUTO: 12.27 THOUSAND/UL (ref 4.31–10.16)

## 2018-04-08 PROCEDURE — 85049 AUTOMATED PLATELET COUNT: CPT | Performed by: NURSE PRACTITIONER

## 2018-04-08 PROCEDURE — 96376 TX/PRO/DX INJ SAME DRUG ADON: CPT

## 2018-04-08 PROCEDURE — 93005 ELECTROCARDIOGRAM TRACING: CPT

## 2018-04-08 PROCEDURE — 84484 ASSAY OF TROPONIN QUANT: CPT | Performed by: NURSE PRACTITIONER

## 2018-04-08 PROCEDURE — 85379 FIBRIN DEGRADATION QUANT: CPT | Performed by: EMERGENCY MEDICINE

## 2018-04-08 PROCEDURE — 96374 THER/PROPH/DIAG INJ IV PUSH: CPT

## 2018-04-08 PROCEDURE — 99285 EMERGENCY DEPT VISIT HI MDM: CPT

## 2018-04-08 PROCEDURE — 70496 CT ANGIOGRAPHY HEAD: CPT

## 2018-04-08 PROCEDURE — 36415 COLL VENOUS BLD VENIPUNCTURE: CPT | Performed by: EMERGENCY MEDICINE

## 2018-04-08 PROCEDURE — 70498 CT ANGIOGRAPHY NECK: CPT

## 2018-04-08 PROCEDURE — 84443 ASSAY THYROID STIM HORMONE: CPT | Performed by: NURSE PRACTITIONER

## 2018-04-08 PROCEDURE — 96361 HYDRATE IV INFUSION ADD-ON: CPT

## 2018-04-08 PROCEDURE — 83880 ASSAY OF NATRIURETIC PEPTIDE: CPT | Performed by: EMERGENCY MEDICINE

## 2018-04-08 PROCEDURE — 84484 ASSAY OF TROPONIN QUANT: CPT | Performed by: EMERGENCY MEDICINE

## 2018-04-08 PROCEDURE — 96375 TX/PRO/DX INJ NEW DRUG ADDON: CPT

## 2018-04-08 PROCEDURE — 80053 COMPREHEN METABOLIC PANEL: CPT | Performed by: EMERGENCY MEDICINE

## 2018-04-08 PROCEDURE — 85025 COMPLETE CBC W/AUTO DIFF WBC: CPT | Performed by: EMERGENCY MEDICINE

## 2018-04-08 PROCEDURE — 71046 X-RAY EXAM CHEST 2 VIEWS: CPT

## 2018-04-08 RX ORDER — MORPHINE SULFATE 4 MG/ML
4 INJECTION, SOLUTION INTRAMUSCULAR; INTRAVENOUS ONCE
Status: COMPLETED | OUTPATIENT
Start: 2018-04-08 | End: 2018-04-08

## 2018-04-08 RX ORDER — SODIUM CHLORIDE 9 MG/ML
125 INJECTION, SOLUTION INTRAVENOUS ONCE
Status: COMPLETED | OUTPATIENT
Start: 2018-04-08 | End: 2018-04-08

## 2018-04-08 RX ORDER — DIPHENHYDRAMINE HYDROCHLORIDE 50 MG/ML
25 INJECTION INTRAMUSCULAR; INTRAVENOUS ONCE
Status: COMPLETED | OUTPATIENT
Start: 2018-04-08 | End: 2018-04-09

## 2018-04-08 RX ORDER — ASPIRIN 325 MG
325 TABLET ORAL ONCE
Status: COMPLETED | OUTPATIENT
Start: 2018-04-08 | End: 2018-04-08

## 2018-04-08 RX ORDER — MAGNESIUM SULFATE HEPTAHYDRATE 40 MG/ML
2 INJECTION, SOLUTION INTRAVENOUS
Status: COMPLETED | OUTPATIENT
Start: 2018-04-09 | End: 2018-04-11

## 2018-04-08 RX ORDER — TRAMADOL HYDROCHLORIDE 50 MG/1
50 TABLET ORAL ONCE
Status: COMPLETED | OUTPATIENT
Start: 2018-04-08 | End: 2018-04-09

## 2018-04-08 RX ORDER — METOCLOPRAMIDE HYDROCHLORIDE 5 MG/ML
10 INJECTION INTRAMUSCULAR; INTRAVENOUS ONCE
Status: COMPLETED | OUTPATIENT
Start: 2018-04-08 | End: 2018-04-08

## 2018-04-08 RX ORDER — NICOTINE 21 MG/24HR
1 PATCH, TRANSDERMAL 24 HOURS TRANSDERMAL DAILY
Status: DISCONTINUED | OUTPATIENT
Start: 2018-04-09 | End: 2018-04-12 | Stop reason: HOSPADM

## 2018-04-08 RX ORDER — TRAMADOL HYDROCHLORIDE 50 MG/1
50 TABLET ORAL ONCE
Status: DISCONTINUED | OUTPATIENT
Start: 2018-04-08 | End: 2018-04-08 | Stop reason: SDUPTHER

## 2018-04-08 RX ORDER — LABETALOL HYDROCHLORIDE 5 MG/ML
10 INJECTION, SOLUTION INTRAVENOUS ONCE
Status: COMPLETED | OUTPATIENT
Start: 2018-04-08 | End: 2018-04-08

## 2018-04-08 RX ORDER — CLONIDINE HYDROCHLORIDE 0.1 MG/1
0.2 TABLET ORAL EVERY 12 HOURS SCHEDULED
Status: DISCONTINUED | OUTPATIENT
Start: 2018-04-08 | End: 2018-04-09

## 2018-04-08 RX ORDER — SODIUM CHLORIDE 9 MG/ML
125 INJECTION, SOLUTION INTRAVENOUS CONTINUOUS
Status: DISCONTINUED | OUTPATIENT
Start: 2018-04-08 | End: 2018-04-09

## 2018-04-08 RX ORDER — ACETAMINOPHEN 325 MG/1
650 TABLET ORAL ONCE
Status: COMPLETED | OUTPATIENT
Start: 2018-04-08 | End: 2018-04-08

## 2018-04-08 RX ADMIN — ACETAMINOPHEN 650 MG: 325 TABLET, FILM COATED ORAL at 18:48

## 2018-04-08 RX ADMIN — ASPIRIN 325 MG: 325 TABLET ORAL at 17:10

## 2018-04-08 RX ADMIN — SODIUM CHLORIDE 125 ML/HR: 0.9 INJECTION, SOLUTION INTRAVENOUS at 22:35

## 2018-04-08 RX ADMIN — IOHEXOL 100 ML: 350 INJECTION, SOLUTION INTRAVENOUS at 18:55

## 2018-04-08 RX ADMIN — MORPHINE SULFATE 4 MG: 4 INJECTION, SOLUTION INTRAMUSCULAR; INTRAVENOUS at 19:59

## 2018-04-08 RX ADMIN — SODIUM CHLORIDE 1000 ML: 0.9 INJECTION, SOLUTION INTRAVENOUS at 18:48

## 2018-04-08 RX ADMIN — LABETALOL 20 MG/4 ML (5 MG/ML) INTRAVENOUS SYRINGE 10 MG: at 17:06

## 2018-04-08 RX ADMIN — CLONIDINE HYDROCHLORIDE 0.2 MG: 0.1 TABLET ORAL at 22:34

## 2018-04-08 RX ADMIN — METOCLOPRAMIDE 10 MG: 5 INJECTION, SOLUTION INTRAMUSCULAR; INTRAVENOUS at 22:34

## 2018-04-08 RX ADMIN — LABETALOL 20 MG/4 ML (5 MG/ML) INTRAVENOUS SYRINGE 10 MG: at 18:38

## 2018-04-08 NOTE — ED PROVIDER NOTES
History  Chief Complaint   Patient presents with    Chest Pain     pt presents via hospital wheelchair with c/o L side chest pain radiating down L upper arm  +nausea neg SOB     38 y o  F presents for eval of CP that started 2 hours PTA  Patient has a PMHx concerning for an MI approx 1 5 years ago  She states that earlier in the day she had headache which was worsening since onset and she associated w headaches she usually gets w elevated BP, and her BP was elevated at the time  States that her BP is usually approx 170/80, presents w BP elevated 218/102  She states that 2 hours PTA she started getting chest pain, central/L sided w radiation down L arm  No SOB, but attempted smoking a cigarette and it didn't feel right so she stopped smoking it  She also admits to nausea without vomit  No recent illness, no fever or chills  This is different than her other MI (which she calls a "silent MI" because she had no symptoms to indicate it was occurring)  Prior to Admission Medications   Prescriptions Last Dose Informant Patient Reported? Taking? amLODIPine (NORVASC) 10 mg tablet   No No   Sig: Take 1 tablet by mouth daily   amLODIPine (NORVASC) 2 5 mg tablet   No No   Sig: Take 4 tablets by mouth daily for 10 days   aspirin 81 MG tablet   Yes No   Sig: Take 81 mg by mouth daily   levothyroxine 100 mcg tablet   No No   Sig: Take 1 tablet by mouth daily for 30 days      Facility-Administered Medications: None       Past Medical History:   Diagnosis Date    Disease of thyroid gland     hypothyroidism    History of MI (myocardial infarction)     History of TIA (transient ischemic attack)     Hypertension     TIA (transient ischemic attack)        History reviewed  No pertinent surgical history  History reviewed  No pertinent family history  I have reviewed and agree with the history as documented      Social History   Substance Use Topics    Smoking status: Current Every Day Smoker Packs/day: 0 50     Types: Cigarettes    Smokeless tobacco: Never Used    Alcohol use No        Review of Systems   Constitutional: Negative for chills, fatigue and fever  HENT: Negative for congestion and sore throat  Respiratory: Negative for cough, chest tightness and shortness of breath  Cardiovascular: Positive for chest pain  Negative for palpitations and leg swelling  Gastrointestinal: Positive for nausea  Negative for abdominal pain, constipation, diarrhea and vomiting  Genitourinary: Negative for dysuria and flank pain  Musculoskeletal: Negative for back pain and neck pain  Skin: Negative for color change and rash  Allergic/Immunologic: Negative for immunocompromised state  Neurological: Positive for headaches  Negative for dizziness, syncope, weakness, light-headedness and numbness  Psychiatric/Behavioral: Negative for confusion  Physical Exam  ED Triage Vitals [04/08/18 1646]   Temperature Pulse Respirations Blood Pressure SpO2   98 2 °F (36 8 °C) 88 20 (!) 210/98 100 %      Temp Source Heart Rate Source Patient Position - Orthostatic VS BP Location FiO2 (%)   Oral Monitor Sitting Right arm --      Pain Score       8           Orthostatic Vital Signs  Vitals:    04/08/18 1650 04/08/18 1717 04/08/18 1800 04/08/18 1957   BP: (!) 218/102 (!) 186/91 (!) 195/93 (!) 191/79   Pulse:  83 82 86   Patient Position - Orthostatic VS: Sitting  Lying        Physical Exam   Constitutional: She is oriented to person, place, and time  She appears well-developed and well-nourished  No distress  VS notable for elevated BP  Obese  Appears uncomfortable but in NAD   HENT:   Head: Normocephalic and atraumatic  Mouth/Throat: Oropharynx is clear and moist    Eyes: Conjunctivae and EOM are normal  Pupils are equal, round, and reactive to light  Right eye exhibits no discharge  Left eye exhibits no discharge  No scleral icterus  Neck: Normal range of motion  Neck supple  No JVD present  Cardiovascular: Normal rate, regular rhythm, normal heart sounds and intact distal pulses  Exam reveals no gallop and no friction rub  No murmur heard  Pulmonary/Chest: Effort normal and breath sounds normal  No respiratory distress  She has no wheezes  She has no rales  She exhibits tenderness  Abdominal: Soft  Bowel sounds are normal  She exhibits no distension  There is tenderness (epigastric)  There is no guarding  Musculoskeletal: Normal range of motion  She exhibits no edema, tenderness or deformity  Neurological: She is alert and oriented to person, place, and time  No cranial nerve deficit  Skin: Skin is warm and dry  No rash noted  She is not diaphoretic  No erythema  No pallor  Psychiatric: She has a normal mood and affect  Her behavior is normal    Vitals reviewed        ED Medications  Medications   aspirin tablet 325 mg (325 mg Oral Given 4/8/18 1710)   labetalol (NORMODYNE) injection 10 mg (10 mg Intravenous Given 4/8/18 1706)   labetalol (NORMODYNE) injection 10 mg (10 mg Intravenous Given 4/8/18 1838)   acetaminophen (TYLENOL) tablet 650 mg (650 mg Oral Given 4/8/18 1848)   sodium chloride 0 9 % bolus 1,000 mL (1,000 mL Intravenous New Bag 4/8/18 1848)   iohexol (OMNIPAQUE) 350 MG/ML injection (MULTI-DOSE) 100 mL (100 mL Intravenous Given 4/8/18 1855)   morphine (PF) 4 mg/mL injection 4 mg (4 mg Intravenous Given 4/8/18 1959)       Diagnostic Studies  Results Reviewed     Procedure Component Value Units Date/Time    Troponin I [[id number]]  (Normal) Collected:  04/08/18 2017    Lab Status:  Final result Specimen:  Blood from Arm, Left Updated:  04/08/18 2043     Troponin I <0 02 ng/mL     Narrative:         [employer] Chemistry analyzer 99% cutoff is > 0 04 ng/mL in network labs    o cTnI 99% cutoff is useful only when applied to patients in the clinical setting of myocardial ischemia  o cTnI 99% cutoff should be interpreted in the context of clinical history, ECG findings and possibly cardiac imaging to establish correct diagnosis  o cTnI 99% cutoff may be suggestive but clearly not indicative of a coronary event without the clinical setting of myocardial ischemia  NT-BNP PRO [[id number]]  (Normal) Collected:  04/08/18 1651    Lab Status:  Final result Specimen:  Blood from Arm, Right Updated:  04/08/18 1718     NT-proBNP 88 pg/mL     Troponin I [[id number]]  (Normal) Collected:  04/08/18 1651    Lab Status:  Final result Specimen:  Blood from Arm, Right Updated:  04/08/18 1715     Troponin I <0 02 ng/mL     Narrative:         [employer] Chemistry analyzer 99% cutoff is > 0 04 ng/mL in network labs    o cTnI 99% cutoff is useful only when applied to patients in the clinical setting of myocardial ischemia  o cTnI 99% cutoff should be interpreted in the context of clinical history, ECG findings and possibly cardiac imaging to establish correct diagnosis  o cTnI 99% cutoff may be suggestive but clearly not indicative of a coronary event without the clinical setting of myocardial ischemia  Comprehensive metabolic panel [[id number]]  (Abnormal) Collected:  04/08/18 1651    Lab Status:  Final result Specimen:  Blood from Arm, Right Updated:  04/08/18 1713     Sodium 137 mmol/L      Potassium 3 6 mmol/L      Chloride 102 mmol/L      CO2 27 mmol/L      Anion Gap 8 mmol/L      BUN 6 mg/dL      Creatinine 0 91 mg/dL      Glucose 100 mg/dL      Calcium 8 4 mg/dL      AST 43 U/L      ALT 34 U/L      Alkaline Phosphatase 128 (H) U/L      Total Protein 8 4 (H) g/dL      Albumin 3 5 g/dL      Total Bilirubin 0 20 mg/dL      eGFR 91 ml/min/1 73sq m     Narrative:         National Kidney Disease Education Program recommendations are as follows:  GFR calculation is accurate only with a steady state creatinine  Chronic Kidney disease less than 60 ml/min/1 73 sq  meters  Kidney failure less than 15 ml/min/1 73 sq  meters      D-dimer, quantitative [[id number]]  (Normal) Collected:  04/08/18 1651    Lab Status:  Final result Specimen:  Blood from Arm, Right Updated:  04/08/18 1709     D-Dimer, Quant <270 ng/ml (FEU)     CBC and differential [[id number]]  (Abnormal) Collected:  04/08/18 1651    Lab Status:  Final result Specimen:  Blood from Arm, Right Updated:  04/08/18 1655     WBC 12 27 (H) Thousand/uL      RBC 5 00 Million/uL      Hemoglobin 14 0 g/dL      Hematocrit 43 6 %      MCV 87 fL      MCH 28 0 pg      MCHC 32 1 g/dL      RDW 13 5 %      MPV 10 0 fL      Platelets 466 Thousands/uL      nRBC 0 /100 WBCs      Neutrophils Relative 70 %      Lymphocytes Relative 22 %      Monocytes Relative 4 %      Eosinophils Relative 3 %      Basophils Relative 0 %      Neutrophils Absolute 8 54 (H) Thousands/µL      Lymphocytes Absolute 2 75 Thousands/µL      Monocytes Absolute 0 50 Thousand/µL      Eosinophils Absolute 0 38 Thousand/µL      Basophils Absolute 0 05 Thousands/µL                  CTA head and neck with and without contrast   Final Result by Arjun Hurley MD (04/08 2034)         1  No evidence of acute intracranial hemorrhage, infarct or mass effect  2   No evidence of carotid or vertebral artery hemodynamically significant stenosis, dissection or occlusion  3   No hemodynamically significant stenosis or occlusion of the major vessels of the [place]  No visualized intracranial aneurysm                        Workstation performed: [id number]         X-ray chest 2 views   ED Interpretation by Hyun Villeda DO (04/08 1720)   No acute cardiopulmonary abnormalities noted  No acute change from prior 12/2016      Final Result by Krunal Ewing MD (04/08 1944)      No acute cardiopulmonary disease              Workstation performed: [id number]                    Procedures  ECG 12 Lead Documentation  Date/Time: 4/8/2018 4:48 PM  Performed by: Alfonso Guerrero  Authorized by: Alfonso Guerrero     Indications / Diagnosis:  CP - Hx of MI  ECG reviewed by me, the ED Provider: yes    Patient location:  ED  Previous ECG:     Previous ECG:  Compared to current    Similarity:  No change    Comparison to cardiac monitor: Yes    Interpretation:     Interpretation: normal    Rate:     ECG rate:  88    ECG rate assessment: normal    Rhythm:     Rhythm: sinus rhythm    Ectopy:     Ectopy: none    QRS:     QRS axis:  Normal    QRS intervals:  Normal  Conduction:     Conduction: normal    ST segments:     ST segments:  Normal  T waves:     T waves: flattening      Flattening:  AVL    ECG 12 Lead Documentation  Date/Time: 4/8/2018 8:09 PM  Performed by: Kasia Meza  Authorized by: Kasia Meza     Indications / Diagnosis:  CP - repeat EKG  ECG reviewed by me, the ED Provider: yes    Patient location:  ED  Previous ECG:     Previous ECG:  Compared to current    Comparison ECG info:  Earlier today    Similarity:  No change    Comparison to cardiac monitor: Yes    Interpretation:     Interpretation: normal    Rate:     ECG rate:  83    ECG rate assessment: normal    Rhythm:     Rhythm: sinus rhythm    Ectopy:     Ectopy: none    QRS:     QRS axis:  Normal    QRS intervals:  Normal  Conduction:     Conduction: normal    ST segments:     ST segments:  Normal  T waves:     T waves: flattening      Flattening:  AVL           Phone Contacts  ED Phone Contact    ED Course  ED Course as of Apr 08 2128   Sun Apr 08, 2018   1758 Troponin I: <0 02   1758 NT-proBNP: 80   1758 D-DIMER QUANTITATIVE: <270   1840 Patient is feeling somewhat improved after medications here - states the labetolol started to deminish her HA, but then it started to come back - will give more BP medications and scan head    1954 Patient states that her CP returned and it is much worse now - will admit for cardiac obs - patient is agreeable to this       2115 Troponin I: <0 02   2121 Paged SLIM for admission          HEART Risk Score    Flowsheet Row Most Recent Value   History  1 Filed at: 04/08/2018 1836   ECG  0 Filed at: 04/08/2018 1836   Age  0 Filed at: 04/08/2018 1836   Risk Factors  2 Filed at: 04/08/2018 1836   Troponin  0 Filed at: 04/08/2018 1836   Heart Score Risk Calculator   History  1 Filed at: 04/08/2018 1836   ECG  0 Filed at: 04/08/2018 1836   Age  0 Filed at: 04/08/2018 1836   Risk Factors  2 Filed at: 04/08/2018 1836   Troponin  0 Filed at: 04/08/2018 1836   HEART Score  3 Filed at: 04/08/2018 1836   HEART Score  3 Filed at: 04/08/2018 1836                            MDM  Number of Diagnoses or Management Options  Diagnosis management comments: CP - HA - hx of MI  Will do a ACS work up, treat BP  Patient will likely require admission  HA is most likely from elevated BP and was increasing in nature - feels like other HAs patient has had from the elevated BP  At this time will not CT head  Amount and/or Complexity of Data Reviewed  Clinical lab tests: ordered and reviewed  Tests in the radiology section of CPT®: reviewed and ordered      CritCare Time    Disposition  Final diagnoses:   Hypertensive urgency   Chest pain     Time reflects when diagnosis was documented in both MDM as applicable and the Disposition within this note     Time User Action Codes Description Comment    4/8/2018  9:26 PM Roger Bassettri Sha HURLEY Add [R73 09] Uncontrolled blood glucose     4/8/2018  9:26 PM Roger Bassettri Gums L Remove [R73 09] Uncontrolled blood glucose     4/8/2018  9:26 PM Randa Donis [I16 0] Hypertensive urgency     4/8/2018  9:27 PM Quentin Bassett Add [R07 9] Chest pain       ED Disposition     ED Disposition Condition Comment    Admit  Case was discussed with Harjit (JAYESH AP) and the patient's admission status was agreed to be Admission Status: observation status to the service of Dr Caden Seaman (Juliana Bullard)   Follow-up Information    None       Patient's Medications   Discharge Prescriptions    No medications on file     No discharge procedures on file      ED Provider  Electronically Signed by           [facility], DO  04/08/18 5514

## 2018-04-09 ENCOUNTER — APPOINTMENT (OUTPATIENT)
Dept: NON INVASIVE DIAGNOSTICS | Facility: HOSPITAL | Age: 43
DRG: 054 | End: 2018-04-09
Payer: COMMERCIAL

## 2018-04-09 PROBLEM — F17.210 DEPENDENCE ON NICOTINE FROM CIGARETTES: Status: ACTIVE | Noted: 2018-04-09

## 2018-04-09 PROBLEM — E07.9 THYROID DISEASE: Status: ACTIVE | Noted: 2018-04-09

## 2018-04-09 PROBLEM — E66.9 OBESITY: Status: ACTIVE | Noted: 2018-04-09

## 2018-04-09 LAB
ANION GAP SERPL CALCULATED.3IONS-SCNC: 8 MMOL/L (ref 4–13)
BACTERIA UR QL AUTO: ABNORMAL /HPF
BILIRUB UR QL STRIP: ABNORMAL
BUN SERPL-MCNC: 7 MG/DL (ref 5–25)
CALCIUM SERPL-MCNC: 8.2 MG/DL (ref 8.3–10.1)
CHLORIDE SERPL-SCNC: 106 MMOL/L (ref 100–108)
CHOLEST SERPL-MCNC: 161 MG/DL (ref 50–200)
CLARITY UR: ABNORMAL
CO2 SERPL-SCNC: 24 MMOL/L (ref 21–32)
COLOR UR: ABNORMAL
CREAT SERPL-MCNC: 0.77 MG/DL (ref 0.6–1.3)
CRP SERPL QL: 8.7 MG/L
ERYTHROCYTE [DISTWIDTH] IN BLOOD BY AUTOMATED COUNT: 13.5 % (ref 11.6–15.1)
ERYTHROCYTE [SEDIMENTATION RATE] IN BLOOD: 34 MM/HOUR (ref 0–20)
EST. AVERAGE GLUCOSE BLD GHB EST-MCNC: 114 MG/DL
GFR SERPL CREATININE-BSD FRML MDRD: 112 ML/MIN/1.73SQ M
GLUCOSE P FAST SERPL-MCNC: 105 MG/DL (ref 65–99)
GLUCOSE SERPL-MCNC: 105 MG/DL (ref 65–140)
GLUCOSE UR STRIP-MCNC: NEGATIVE MG/DL
HBA1C MFR BLD: 5.6 % (ref 4.2–6.3)
HCG SERPL QL: NEGATIVE
HCT VFR BLD AUTO: 38.3 % (ref 34.8–46.1)
HDLC SERPL-MCNC: 27 MG/DL (ref 40–60)
HGB BLD-MCNC: 12.3 G/DL (ref 11.5–15.4)
HGB UR QL STRIP.AUTO: ABNORMAL
KETONES UR STRIP-MCNC: NEGATIVE MG/DL
LDLC SERPL CALC-MCNC: 114 MG/DL (ref 0–100)
LEUKOCYTE ESTERASE UR QL STRIP: ABNORMAL
MAGNESIUM SERPL-MCNC: 1.8 MG/DL (ref 1.6–2.6)
MCH RBC QN AUTO: 28.1 PG (ref 26.8–34.3)
MCHC RBC AUTO-ENTMCNC: 32.1 G/DL (ref 31.4–37.4)
MCV RBC AUTO: 87 FL (ref 82–98)
NITRITE UR QL STRIP: POSITIVE
NON-SQ EPI CELLS URNS QL MICRO: ABNORMAL /HPF
PH UR STRIP.AUTO: 5 [PH] (ref 4.5–8)
PLATELET # BLD AUTO: 292 THOUSANDS/UL (ref 149–390)
PLATELET # BLD AUTO: 300 THOUSANDS/UL (ref 149–390)
PMV BLD AUTO: 10.1 FL (ref 8.9–12.7)
PMV BLD AUTO: 10.1 FL (ref 8.9–12.7)
POTASSIUM SERPL-SCNC: 3.7 MMOL/L (ref 3.5–5.3)
PROT UR STRIP-MCNC: >=300 MG/DL
RBC # BLD AUTO: 4.38 MILLION/UL (ref 3.81–5.12)
RBC #/AREA URNS AUTO: ABNORMAL /HPF
SODIUM SERPL-SCNC: 138 MMOL/L (ref 136–145)
SP GR UR STRIP.AUTO: 1.02 (ref 1–1.03)
TRIGL SERPL-MCNC: 100 MG/DL
TROPONIN I SERPL-MCNC: <0.02 NG/ML
TROPONIN I SERPL-MCNC: <0.02 NG/ML
TSH SERPL DL<=0.05 MIU/L-ACNC: 51.55 UIU/ML (ref 0.36–3.74)
TSH SERPL DL<=0.05 MIU/L-ACNC: 93.89 UIU/ML (ref 0.36–3.74)
UROBILINOGEN UR QL STRIP.AUTO: 0.2 E.U./DL
WBC # BLD AUTO: 9.61 THOUSAND/UL (ref 4.31–10.16)
WBC #/AREA URNS AUTO: ABNORMAL /HPF

## 2018-04-09 PROCEDURE — 80048 BASIC METABOLIC PNL TOTAL CA: CPT | Performed by: INTERNAL MEDICINE

## 2018-04-09 PROCEDURE — 80061 LIPID PANEL: CPT | Performed by: NURSE PRACTITIONER

## 2018-04-09 PROCEDURE — 99220 PR INITIAL OBSERVATION CARE/DAY 70 MINUTES: CPT | Performed by: GENERAL PRACTICE

## 2018-04-09 PROCEDURE — 84703 CHORIONIC GONADOTROPIN ASSAY: CPT | Performed by: PHYSICIAN ASSISTANT

## 2018-04-09 PROCEDURE — 83036 HEMOGLOBIN GLYCOSYLATED A1C: CPT | Performed by: NURSE PRACTITIONER

## 2018-04-09 PROCEDURE — 84703 CHORIONIC GONADOTROPIN ASSAY: CPT | Performed by: GENERAL PRACTICE

## 2018-04-09 PROCEDURE — 87086 URINE CULTURE/COLONY COUNT: CPT | Performed by: GENERAL PRACTICE

## 2018-04-09 PROCEDURE — 85652 RBC SED RATE AUTOMATED: CPT | Performed by: NURSE PRACTITIONER

## 2018-04-09 PROCEDURE — 84439 ASSAY OF FREE THYROXINE: CPT | Performed by: PHYSICIAN ASSISTANT

## 2018-04-09 PROCEDURE — 81001 URINALYSIS AUTO W/SCOPE: CPT | Performed by: NURSE PRACTITIONER

## 2018-04-09 PROCEDURE — 85027 COMPLETE CBC AUTOMATED: CPT | Performed by: INTERNAL MEDICINE

## 2018-04-09 PROCEDURE — 84484 ASSAY OF TROPONIN QUANT: CPT | Performed by: NURSE PRACTITIONER

## 2018-04-09 PROCEDURE — 83735 ASSAY OF MAGNESIUM: CPT | Performed by: INTERNAL MEDICINE

## 2018-04-09 PROCEDURE — 99254 IP/OBS CNSLTJ NEW/EST MOD 60: CPT | Performed by: PSYCHIATRY & NEUROLOGY

## 2018-04-09 PROCEDURE — 84443 ASSAY THYROID STIM HORMONE: CPT | Performed by: GENERAL PRACTICE

## 2018-04-09 PROCEDURE — 86140 C-REACTIVE PROTEIN: CPT | Performed by: NURSE PRACTITIONER

## 2018-04-09 PROCEDURE — 99244 OFF/OP CNSLTJ NEW/EST MOD 40: CPT | Performed by: INTERNAL MEDICINE

## 2018-04-09 PROCEDURE — 93306 TTE W/DOPPLER COMPLETE: CPT

## 2018-04-09 PROCEDURE — 93306 TTE W/DOPPLER COMPLETE: CPT | Performed by: INTERNAL MEDICINE

## 2018-04-09 RX ORDER — CLONIDINE HYDROCHLORIDE 0.1 MG/1
0.2 TABLET ORAL EVERY 8 HOURS SCHEDULED
Status: DISCONTINUED | OUTPATIENT
Start: 2018-04-09 | End: 2018-04-09

## 2018-04-09 RX ORDER — BUTALBITAL, ACETAMINOPHEN AND CAFFEINE 50; 325; 40 MG/1; MG/1; MG/1
1 TABLET ORAL 2 TIMES DAILY
Status: DISCONTINUED | OUTPATIENT
Start: 2018-04-09 | End: 2018-04-09

## 2018-04-09 RX ORDER — CLONIDINE HYDROCHLORIDE 0.1 MG/1
0.3 TABLET ORAL EVERY 8 HOURS SCHEDULED
Status: DISCONTINUED | OUTPATIENT
Start: 2018-04-09 | End: 2018-04-12 | Stop reason: HOSPADM

## 2018-04-09 RX ORDER — AMLODIPINE BESYLATE 10 MG/1
10 TABLET ORAL DAILY
Status: DISCONTINUED | OUTPATIENT
Start: 2018-04-09 | End: 2018-04-12 | Stop reason: HOSPADM

## 2018-04-09 RX ORDER — ATENOLOL 25 MG/1
25 TABLET ORAL DAILY
Status: DISCONTINUED | OUTPATIENT
Start: 2018-04-09 | End: 2018-04-12 | Stop reason: HOSPADM

## 2018-04-09 RX ORDER — MORPHINE SULFATE 4 MG/ML
4 INJECTION, SOLUTION INTRAMUSCULAR; INTRAVENOUS ONCE
Status: COMPLETED | OUTPATIENT
Start: 2018-04-09 | End: 2018-04-09

## 2018-04-09 RX ORDER — ASPIRIN 81 MG/1
81 TABLET, CHEWABLE ORAL DAILY
Status: DISCONTINUED | OUTPATIENT
Start: 2018-04-09 | End: 2018-04-12 | Stop reason: HOSPADM

## 2018-04-09 RX ORDER — BUTALBITAL, ACETAMINOPHEN AND CAFFEINE 50; 325; 40 MG/1; MG/1; MG/1
1 TABLET ORAL EVERY 4 HOURS PRN
Status: DISCONTINUED | OUTPATIENT
Start: 2018-04-09 | End: 2018-04-10

## 2018-04-09 RX ORDER — LEVOTHYROXINE SODIUM 0.1 MG/1
100 TABLET ORAL
Status: DISCONTINUED | OUTPATIENT
Start: 2018-04-09 | End: 2018-04-12 | Stop reason: HOSPADM

## 2018-04-09 RX ADMIN — DIPHENHYDRAMINE HYDROCHLORIDE 25 MG: 50 INJECTION, SOLUTION INTRAMUSCULAR; INTRAVENOUS at 00:25

## 2018-04-09 RX ADMIN — BUTALBITAL, ACETAMINOPHEN, AND CAFFEINE 1 TABLET: 50; 325; 40 TABLET ORAL at 10:14

## 2018-04-09 RX ADMIN — BUTALBITAL, ACETAMINOPHEN, AND CAFFEINE 1 TABLET: 50; 325; 40 TABLET ORAL at 20:29

## 2018-04-09 RX ADMIN — ASPIRIN 81 MG 81 MG: 81 TABLET ORAL at 09:19

## 2018-04-09 RX ADMIN — CLONIDINE HYDROCHLORIDE 0.3 MG: 0.1 TABLET ORAL at 21:10

## 2018-04-09 RX ADMIN — ENOXAPARIN SODIUM 40 MG: 40 INJECTION SUBCUTANEOUS at 09:19

## 2018-04-09 RX ADMIN — CLONIDINE HYDROCHLORIDE 0.2 MG: 0.1 TABLET ORAL at 09:19

## 2018-04-09 RX ADMIN — SODIUM CHLORIDE 125 ML/HR: 0.9 INJECTION, SOLUTION INTRAVENOUS at 04:01

## 2018-04-09 RX ADMIN — MAGNESIUM SULFATE HEPTAHYDRATE 2 G: 40 INJECTION, SOLUTION INTRAVENOUS at 09:19

## 2018-04-09 RX ADMIN — MORPHINE SULFATE 4 MG: 4 INJECTION, SOLUTION INTRAMUSCULAR; INTRAVENOUS at 03:55

## 2018-04-09 RX ADMIN — LEVOTHYROXINE SODIUM 100 MCG: 100 TABLET ORAL at 06:35

## 2018-04-09 RX ADMIN — ATENOLOL 25 MG: 25 TABLET ORAL at 11:44

## 2018-04-09 RX ADMIN — TRAMADOL HYDROCHLORIDE 50 MG: 50 TABLET, COATED ORAL at 00:25

## 2018-04-09 RX ADMIN — SODIUM CHLORIDE 125 ML/HR: 0.9 INJECTION, SOLUTION INTRAVENOUS at 00:00

## 2018-04-09 RX ADMIN — AMLODIPINE BESYLATE 10 MG: 10 TABLET ORAL at 09:19

## 2018-04-09 NOTE — CONSULTS
Consultation - [facility] MR[facility] [id number]  Unit/Bed#: -01 Encounter: [id number]      Physician Requesting Consult: Monica Sprague MD  Reason for Consult:  Headaches  Hx and PE limited by:  None    HPI: [facility] LAWANDA is a right handed  38 y o  female who  was admitted last night with acute chest pain, and severe headache left-sided noted to have hypertensive crisis and admitted for further management  Patient is a 31-year-old right-handed very pleasant lady who has a history of thyroid disease hypertension, obesity, does suffer from headaches for the last 1 to 1-1/2 years, and claims she was seen at work [address] [facility] and was told to have a TIA  1 year ago  Patient also claims she has a history of renal calculus and admits that she is not very compliant with her blood pressure medications  Since then she complains of a headache at least 2 times a month which can be severe lasting for over 24 hours, mostly left-sided associated with photophobia, nausea, blurry vision and denies any motor or sensory symptoms in the upper lower extremities or any speech or gait difficulty  She does experience lightheadedness and claims every time she has a headache her blood pressure is generally high  Patient also describes symptoms of tingling numbness in both her feet at other times generally preventing her from sleeping and claims she has to rub her feet for a period of time before she falls of to sleep  1 year ago she did have an MRI of the brain and was told to have a TIA  Patient did have a CTA of the head and neck upon admission which was unremarkable  Review of Systems:  See history of present illness for pertinent neurological symptoms  All other systems are negative      Historical Information   Past Medical History:   Diagnosis Date    Disease of thyroid gland     hypothyroidism    History of MI (myocardial infarction)     History of TIA (transient ischemic attack)  Hypertension     TIA (transient ischemic attack)      History reviewed  No pertinent surgical history  Social History   History   Smoking Status    Current Every Day Smoker    Packs/day: 0 50    Types: Cigarettes   Smokeless Tobacco    Never Used     History   Alcohol Use No     History   Drug Use No       Family History:   Family History   Problem Relation Age of Onset    Heart disease Mother     Hypertension Mother     Diabetes Father        Allergies   Allergen Reactions    Toradol [Ketorolac Tromethamine] Anxiety       Meds:  All current active meds have been reviewed    Scheduled Meds:  Current Facility-Administered Medications:  amLODIPine 10 mg Oral Daily ALVIN Pruitt    aspirin 81 mg [facility]    cloNIDine 0 2 mg Oral Q12H [facility] ALVIN Pruitt    enoxaparin 40 mg Subcutaneous Daily ALVIN Pruitt    levothyroxine 100 mcg Oral Early Morning ALVIN Pruitt    magnesium sulfate 2 g Intravenous Q24H [facility] ALVIN Pruitt    nicotine 1 patch Transdermal Daily ALVIN Campos    sodium chloride 125 mL/hr Intravenous Continuous ALVIN Pruitt Last Rate: 125 mL/hr (04/09/18 0401)     PRN Meds:     Physical Exam:   Objective   Vitals:   Vitals:    04/09/18 0700   BP: (!) 187/98   Pulse: 76   Resp: 18   Temp: 97 7 °F (36 5 °C)   SpO2: 100%   ,Body mass index is 44 79 kg/m²  Patient was examined in bed  General appearance: Cooperative in no acute distress  Head & neck head is atraumatic and normocephalic  Neck is supple with full range of motion  Cardiovascular: Carotid arteriesno carotid bruits  Neurologic:   Patient is alert awake oriented, high functions are intact, speech is fluent  No evidence of any aphasia or dysarthria    Cranial nerve examination reveals visual fields are full to threat, pupils equal and reactive, extraocular movements intact, fundi showed sharp disc margins, sensation in the V1 V2 V3 distribution is symmetric, no obvious facial asymmetry noted, tongue is midline and gag is adequate  Motor examination reveals normal tone and bulk, no evidence of any drift to the outstretched extremities, strength is 5/5 preserved bilaterally in both upper and lower extremities, deep tendon reflexes are intact, toes are downgoing  Sensory examination to pinprick light touch proprioception and vibration is preserved bilaterally, patient does not extinguish double simultaneous stimuli  Coordination no evidence of any finger-to-nose dysmetria  Gait could not be evaluated  Lab Results:Lab Results:   I have personally reviewed pertinent reports  , CBC:   Results from last 7 days  Lab Units 04/09/18  0324 04/08/18  2355 04/08/18  1651   WBC Thousand/uL 9 61  --  12 27*   RBC Million/uL 4 38  --  5 00   HEMOGLOBIN g/dL 12 3  --  14 0   HEMATOCRIT % 38 3  --  43 6   MCV fL 87  --  87   PLATELETS Thousands/uL 292 300 336   , BMP/CMP:   Results from last 7 days  Lab Units 04/09/18  0324 04/08/18  1651   SODIUM mmol/L 138 137   POTASSIUM mmol/L 3 7 3 6   CHLORIDE mmol/L 106 102   CO2 mmol/L 24 27   ANION GAP mmol/L 8 8   BUN mg/dL 7 6   CREATININE mg/dL 0 77 0 91   GLUCOSE RANDOM mg/dL 105 100   CALCIUM mg/dL 8 2* 8 4   AST U/L  --  43   ALT U/L  --  34   ALK PHOS U/L  --  128*   TOTAL PROTEIN g/dL  --  8 4*   BILIRUBIN TOTAL mg/dL  --  0 20   EGFR ml/min/1 73sq m 112 91     I have personally reviewed pertinent reports  EEG, Echo, Pathology, and Other Studies: I have personally reviewed pertinent films in PACS    Family, was not present at the bedside for history and examination  Assessment:  1  Chronic migraine headaches,  2  Accelerated hypertension  3  Possible UTI  4  History of TIA  5  Chest pain    Plan:   At this time a lengthy discussion occurred with the patient, CT scan of the brain was reviewed by myself was unremarkable, CT of the head was also unremarkable patient did have an MRI of the brain done 1 year ago, will treat her for chronic migraine headaches with migraine prophylactic agents, will avoid Triptan and until her chest pain is ruled out as not being cardiac in origin, patient also morbidly obese, doubt headache pattern is consistent with pseudotumor cerebri  Will follow up this patient with you  Her neuro exam is nonfocal and I do not see any indication for a repeat MRI at this time  Tight blood pressure control is recommended, patient also to undergo cardiac workup        Osorio Butler MD  4/2/8223,0:20 AM    "This note has been constructed using a voice recognition system "

## 2018-04-09 NOTE — CASE MANAGEMENT
Initial Clinical Review    Admission: Date/Time/Statement: 4/8 @ 2128    Orders Placed This Encounter   Procedures    Place in Observation (expected length of stay for this patient is less than two midnights)     Standing Status:   Standing     Number of Occurrences:   1     Order Specific Question:   Admitting Physician     Answer:   Jt Gray [[id number]]     Order Specific Question:   Level of Care     Answer:   Med Surg [16]     Order Specific Question:   Bed request comments     Answer:   tele       ED: Date/Time/Mode of Arrival:   ED Arrival Information     Expected Arrival 70 Berto Espino of Arrival Escorted By Service Admission Type    - 4/8/2018 16:36 Urgent Wheelchair Family Member General Medicine Urgent    Arrival Complaint    CHEST PAIN          Chief Complaint:   Chief Complaint   Patient presents with    Chest Pain     pt presents via hospital wheelchair with c/o L side chest pain radiating down L upper arm  +nausea neg SOB       History of Illness:   Haim Puckett is a 38 y o  female history of TIA, hypertension, previous MI, thyroid disease nicotine dependence, who presents with chief complaint of chest pain, pressure and stabbing left side of the chest radiating to left upper arm associated with nausea, headache and photophobia  Patient does have history previous MI about a year and half ago and is concerned of repeated episode  Patient also concerned about elevated blood pressure  She does report current menstrual cycle  ED Vital Signs:   ED Triage Vitals [04/08/18 1646]   Temperature Pulse Respirations Blood Pressure SpO2   98 2 °F (36 8 °C) 88 20 (!) 210/98 100 %      Temp Source Heart Rate Source Patient Position - Orthostatic VS BP Location FiO2 (%)   Oral Monitor Sitting Right arm --      Pain Score       8        Wt Readings from Last 1 Encounters:   04/08/18 130 kg (286 lb)       Vital Signs (abnormal):      Abnormal Labs/Diagnostic Test Results:   Troponin     Lab Units 04/09/18  0324 04/08/18  1651   WBC Thousand/uL 9 61 12 27*   HEMOGLOBIN g/dL 12 3 14 0   HEMATOCRIT % 38 3 43 6   PLATELETS Thousands/uL 292 336     Lab Units 04/09/18  0324 04/08/18  1651   SODIUM mmol/L 138 137   POTASSIUM mmol/L 3 7 3 6   CHLORIDE mmol/L 106 102   CO2 mmol/L 24 27   BUN mg/dL 7 6   CREATININE mg/dL 0 77 0 91   CALCIUM mg/dL 8 2* 8 4   TOTAL PROTEIN g/dL  --  8 4*   BILIRUBIN TOTAL mg/dL  --  0 20   ALK PHOS U/L  --  128*   ALT U/L  --  34   AST U/L  --  43   GLUCOSE RANDOM mg/dL 105 100     CTA head/neck  1  No evidence of acute intracranial hemorrhage, infarct or mass effect  2   No evidence of carotid or vertebral artery hemodynamically significant stenosis, dissection or occlusion  3   No hemodynamically significant stenosis or occlusion of the major vessels of the [place]  No visualized intracranial aneurysm       EKG SR     Sed rate 34    Troponin <0 02, <0 02    TSH 51 552    CRP 8 7    UA  WBC 4-10, occasional bacteria, positive nitrite, trace leukocytes, protein >=300, large blood    ED Treatment:   Medication Administration from 04/08/2018 1635 to 04/08/2018 2208       Date/Time Order Dose Route Action     04/08/2018 1710 aspirin tablet 325 mg 325 mg Oral Given     04/08/2018 1706 labetalol (NORMODYNE) injection 10 mg 10 mg Intravenous Given     04/08/2018 1838 labetalol (NORMODYNE) injection 10 mg 10 mg Intravenous Given     04/08/2018 1848 acetaminophen (TYLENOL) tablet 650 mg 650 mg Oral Given     04/08/2018 1848 sodium chloride 0 9 % bolus 1,000 mL 1,000 mL Intravenous New Bag     04/08/2018 1855 iohexol (OMNIPAQUE) 350 MG/ML injection (MULTI-DOSE) 100 mL 100 mL Intravenous Given     04/08/2018 1959 morphine (PF) 4 mg/mL injection 4 mg 4 mg Intravenous Given          Past Medical/Surgical History:    Active Ambulatory Problems     Diagnosis Date Noted    Chest pain 12/12/2016    Hypertensive urgency 12/12/2016    Headache 12/12/2016     Resolved Ambulatory Problems Diagnosis Date Noted    No Resolved Ambulatory Problems     Past Medical History:   Diagnosis Date    Disease of thyroid gland     History of MI (myocardial infarction)     History of TIA (transient ischemic attack)     Hypertension     TIA (transient ischemic attack)        Admitting Diagnosis: Chest pain [R07 9]  Hypertensive urgency [I16 0]    Age/Sex: 38 y o  female    Assessment/Plan:   Chest pain   Assessment & Plan     · Stabbing with pressure  8/10  Nonradiating  · Admit rule out ACS patient has associated risk factors, previous MI, TIA hypertension, obesity, family history and chronic smoker  · Troponin 1-, continue to trend  · Telemetry  · Cardiology consult for further management  Echo           * Headache   Assessment & Plan     · Intractable  Questionable migraine associated with menstruation or due to hypertension or history of TIA  · CTA reviewed, no acute findings  · Headache protocol  · Neurology consult for further management           Thyroid disease   Assessment & Plan     · Monitor TSH  · Continue levothyroxine           Obesity   Assessment & Plan     · Weight loss, lifestyle modification, dietary measures initiated and encourage           Dependence on nicotine from cigarettes   Assessment & Plan     · Smoking cessation  · Nicotine patch           Hypertensive urgency   Assessment & Plan     · History of hypertension  Associated with headaches  · Noncompliant with BP meds  · Continue Norvasc  · P r n  hydralazine  · Cardiology consult for further management                 VTE Prophylaxis: Enoxaparin (Lovenox)  / sequential compression device   Code Status: Full Code     Anticipated Length of Stay:  Patient will be admitted on an Observation basis with an anticipated length of stay of  less than 2 midnights     Justification for Hospital Stay:  ACS workup, intractable headache, TIA       Admission Orders:  Scheduled Meds:   Current Facility-Administered Medications:  amLODIPine 10 mg Oral Daily Jarad Rosado LORENANP    aspirin 81 mg Oral Daily Jarad Rosado LORENANP    cloNIDine 0 2 mg Oral Q12H [address] ALVIN Pruitt    enoxaparin 40 mg Subcutaneous Daily ALVIN Pruitt    levothyroxine 100 mcg Oral Early Morning ALVIN Pruitt    magnesium sulfate 2 g Intravenous Q24H [address] ALVIN Pruitt    nicotine 1 patch Transdermal Daily ALVIN Pruitt    sodium chloride 125 mL/hr Intravenous Continuous Jarad Rosado ALVIN Last Rate: 125 mL/hr (04/09/18 0401)     Continuous Infusions:   sodium chloride 125 mL/hr Last Rate: 125 mL/hr (04/09/18 0401)     PRN Meds: butalbital-acetaminophen-caffeine  PO x 3 doses for c/o headache 9/10 (4/9 @ 2029, 4/10 @ 0034, 4/10 @ 5808)       Cardiac diet   Consult cardiology  TSH  Consult neurology  Telemetry  SCD      ===========================================================================  Progress notes 4/9 2020     Pt tearful, stating she has 9/10 pain from headache  Pt stated she went to bathroom earlier in afternoon, got dizzy and fell on her right hip  Pt stating she is not having any pain from fall  /86, taken manually  On call SLIM made aware  Fiorecet offered to patient, pt refusing stating it did not work for her before and she wants to leave  On call SLIM on phone, offered tramadol to patient, pt refused this as well saying it's not strong enough  Pt laying in bed, bed alarm now on  Pt requesting PCA to take BP again  BP taken by dynamap reading 219/95  Pt stating this is her accurate BP and that "the nurse wasn't right"  Requesting PCA to take her BP again  Another nurse on floor called in to take BP manually  Manual reading 160/90  Thank you,  [id number] in the [facility] by Gerardo Blackman for 2017  Network Utilization Review Department  Phone: [phone];  Fax [phone]  ATTENTION: The Network Utilization Review Department is now centralized for our 7 Facilities  Make a note that we have a new phone and fax numbers for our Department  Please call with any questions or concerns to [phone] and carefully follow the prompts so that you are directed to the right person  All voicemails are confidential  Fax any determinations, approvals, denials, and requests for initial or continue stay review clinical to [phone]  Due to HIGH CALL volume, it would be easier if you could please send faxed requests to expedite your requests and in part, help us provide discharge notifications faster

## 2018-04-09 NOTE — ASSESSMENT & PLAN NOTE
· History of hypertension  Associated with headaches  · Noncompliant with BP meds  · Continue Norvasc  · P r n  hydralazine  · Cardiology consult for further management

## 2018-04-09 NOTE — PLAN OF CARE
Problem: DISCHARGE PLANNING - CARE MANAGEMENT  Goal: Discharge to post-acute care or home with appropriate resources  INTERVENTIONS:  - Conduct assessment to determine patient/family and health care team treatment goals, and need for post-acute services based on payer coverage, community resources, and patient preferences, and barriers to discharge  - Address psychosocial, clinical, and financial barriers to discharge as identified in assessment in conjunction with the patient/family and health care team  - Arrange appropriate level of post-acute services according to patients   needs and preference and payer coverage in collaboration with the physician and health care team  - Communicate with and update the patient/family, physician, and health care team regarding progress on the discharge plan  - Arrange appropriate transportation to post-acute venues  Outcome: Progressing  CM met with pt at bedside  Pt lives with family in [facility] in a two story home with stairs and railings  Pt denies hx of DME/Oxygen, Rehab, HHC, Substance abuse  Pt is able to complete ADL's on her own  Pt uses [employer] in [facility]  Pt has hx of Anxiety and Depression  Pt's  is POA/AD  Pt is unemployed and able to drive herself to necessary appointments  Pt's family will transport pt home when ready for discharge  Pt has no other questions at this time  CM department to follow pt through discharge process  CM reviewed OBS with pt  Pt is in agreement  Pt had no questions and signed  CM provided pt with a copy and placed original in pt's medical record for scanning

## 2018-04-09 NOTE — PROGRESS NOTES
Pt called out to [facility] for nurse  Upon entering, pt stated she had been ringing her call bell but no one came  Call bell noted to not be working  Upon unplugging and plugging in call bell, call bell now working  Pt tearful, stating she has 9/10 pain from headache  Pt stated she went to bathroom earlier in afternoon, got dizzy and fell on her right hip  Pt stating she is not having any pain from fall  /86, taken manually  On call SLIM made aware  Fiorecet offered to patient, pt refusing stating it did not work for her before and she wants to leave  On call SLIM on phone, offered tramadol to patient, pt refused this as well saying it's not strong enough  Pt laying in bed, bed alarm now on  Susan Mullins RN  8:14 PM  04/09/18  ===============================================================  Pt requesting PCA to take BP again  BP taken by dynamap reading 219/95  Pt stating this is her accurate BP and that "the nurse wasn't right"  Requesting PCA to take her BP again  Another nurse on floor called in to take BP manually  Manual reading 160/90  Susan Mullins RN  8:20 PM  04/09/18  ==============================================================  Talked to On call SLIM again regarding plan for patient  Upon entering pt's room to inform patient of pain control plan, pt noted to have phone out attempting to take picture of dynamap with BP reading on it  Informed pt she is not allowed to take pictures  Pt stated she already did  Pt noted to be holding phone up, pointed at staff  Pt asked if I was her nurse and what pain meds she was getting  Asked pt if she was recording and she would not answer  Asked pt to please turn her phone around so I could see if it was recording and patient stated "you're not allowed to look at my phone"  Again informed patient she was not allowed to take pictures of staff  Supervisor and security made aware     Susan uMllins RN  8:39 PM  04/09/18  ====================================================================

## 2018-04-09 NOTE — H&P
H&P- Lorraine Pitman 1975, 38 y o  female MRN: [id number]    Unit/Bed#: -01 Encounter: [id number]    Primary Care Provider: Morgan Rush DO   Date and time admitted to hospital: 4/8/2018  4:40 PM        Chest pain   Assessment & Plan    · Stabbing with pressure  8/10  Nonradiating  · Admit rule out ACS patient has associated risk factors, previous MI, TIA hypertension, obesity, family history and chronic smoker  · Troponin 1-, continue to trend  · Telemetry  · Cardiology consult for further management  Echo  * Headache   Assessment & Plan    · Intractable  Questionable migraine associated with menstruation or due to hypertension or history of TIA  · CTA reviewed, no acute findings  · Headache protocol  · Neurology consult for further management  Thyroid disease   Assessment & Plan    · Monitor TSH  · Continue levothyroxine  Obesity   Assessment & Plan    · Weight loss, lifestyle modification, dietary measures initiated and encourage  Dependence on nicotine from cigarettes   Assessment & Plan    · Smoking cessation  · Nicotine patch  Hypertensive urgency   Assessment & Plan    · History of hypertension  Associated with headaches  · Noncompliant with BP meds  · Continue Norvasc  · P r n  hydralazine  · Cardiology consult for further management  VTE Prophylaxis: Enoxaparin (Lovenox)  / sequential compression device   Code Status: Full Code  POLST: There is no POLST form on file for this patient (pre-hospital)  Discussion with family:  No family at bedside  Anticipated Length of Stay:  Patient will be admitted on an Observation basis with an anticipated length of stay of  less than 2 midnights  Justification for Hospital Stay:  ACS workup, intractable headache, TIA    Total Time for Visit, including Counseling / Coordination of Care: 1 hour    Greater than 50% of this total time spent on direct patient counseling and coordination of care     Chief Complaint:   Chest pain, headache    History of Present Illness:    Rosa Anderson is a 38 y o  female history of TIA, hypertension, previous MI, thyroid disease nicotine dependence, who presents with chief complaint of chest pain, pressure and stabbing left side of the chest radiating to left upper arm associated with nausea, headache and photophobia  Patient does have history previous MI about a year and half ago and is concerned of repeated episode  Patient also concerned about elevated blood pressure  Patient denies recent illness of fever or chills, she does report current menstrual cycle  Review of Systems:    Review of Systems   Constitutional: Positive for activity change  Eyes: Positive for photophobia  Respiratory: Positive for chest tightness  Negative for cough and shortness of breath  Cardiovascular: Positive for chest pain  Genitourinary:        Current Mense  Neurological: Positive for light-headedness and headaches  All other systems reviewed and are negative  Past Medical and Surgical History:     Past Medical History:   Diagnosis Date    Disease of thyroid gland     hypothyroidism    History of MI (myocardial infarction)     History of TIA (transient ischemic attack)     Hypertension     TIA (transient ischemic attack)        History reviewed  No pertinent surgical history  Meds/Allergies:    Prior to Admission medications    Medication Sig Start Date End Date Taking?  Authorizing Provider   amLODIPine (NORVASC) 10 mg tablet Take 1 tablet by mouth daily 6/6/17  Yes Alex Vargas MD   aspirin 81 MG tablet Take 81 mg by mouth daily   Yes Historical Provider, MD   amLODIPine (NORVASC) 2 5 mg tablet Take 4 tablets by mouth daily for 10 days 12/4/17 12/14/17  Aliza Juares DO   levothyroxine 100 mcg tablet Take 1 tablet by mouth daily for 30 days 2/11/17 3/13/17  Urban Davis DO     I have reviewed home medications with patient personally  Allergies: Allergies   Allergen Reactions    Toradol [Ketorolac Tromethamine] Anxiety       Social History:     Marital Status: /Civil Union   Occupation: Unknown  Patient Pre-hospital Living Situation:  Home  Patient Pre-hospital Level of Mobility:  Independent  Patient Pre-hospital Diet Restrictions:  None  Substance Use History:   History   Alcohol Use No     History   Smoking Status    Current Every Day Smoker    Packs/day: 0 50    Types: Cigarettes   Smokeless Tobacco    Never Used     History   Drug Use No       Family History:    Strong family history of cardiac disease  Mother    Physical Exam:     Vitals:   Blood Pressure: (!) 187/98 (04/09/18 0700)  Pulse: 76 (04/09/18 0700)  Temperature: 97 7 °F (36 5 °C) (04/09/18 0700)  Temp Source: Oral (04/09/18 0700)  Respirations: 18 (04/09/18 0700)  Height: 5' 7" (170 2 cm) (04/08/18 2210)  Weight - Scale: 130 kg (286 lb) (04/08/18 2210)  SpO2: 100 % (04/09/18 0700)    Physical Exam   Constitutional: She is oriented to person, place, and time  She appears well-developed and well-nourished  HENT:   Head: Normocephalic and atraumatic  Mouth/Throat: Oropharynx is clear and moist    Eyes: Conjunctivae and EOM are normal  Pupils are equal, round, and reactive to light  Neck: Normal range of motion  Neck supple  Cardiovascular: Normal rate, regular rhythm and normal heart sounds  Pulmonary/Chest: Effort normal  No accessory muscle usage  No respiratory distress  She has decreased breath sounds (Clear)  She has no wheezes  She has no rales  Abdominal: Soft  Bowel sounds are normal  She exhibits no distension  There is no tenderness  Musculoskeletal: Normal range of motion  She exhibits no edema or tenderness  Neurological: She is alert and oriented to person, place, and time  She has normal reflexes  No cranial nerve deficit   Coordination normal    Positive for photophobia,   negative for gait disturbance, speech disturbance  No neurological deficit noted  Skin: Skin is warm and dry  Psychiatric: She has a normal mood and affect  Her behavior is normal    Nursing note and vitals reviewed  Additional Data:     Lab Results: I have personally reviewed pertinent reports  Results from last 7 days  Lab Units 04/09/18  0324  04/08/18  1651   WBC Thousand/uL 9 61  --  12 27*   HEMOGLOBIN g/dL 12 3  --  14 0   HEMATOCRIT % 38 3  --  43 6   PLATELETS Thousands/uL 292  < > 336   NEUTROS PCT %  --   --  70   LYMPHS PCT %  --   --  22   MONOS PCT %  --   --  4   EOS PCT %  --   --  3   < > = values in this interval not displayed  Results from last 7 days  Lab Units 04/09/18  0324 04/08/18  1651   SODIUM mmol/L 138 137   POTASSIUM mmol/L 3 7 3 6   CHLORIDE mmol/L 106 102   CO2 mmol/L 24 27   BUN mg/dL 7 6   CREATININE mg/dL 0 77 0 91   CALCIUM mg/dL 8 2* 8 4   TOTAL PROTEIN g/dL  --  8 4*   BILIRUBIN TOTAL mg/dL  --  0 20   ALK PHOS U/L  --  128*   ALT U/L  --  34   AST U/L  --  43   GLUCOSE RANDOM mg/dL 105 100           Imaging: I have personally reviewed pertinent reports  CTA head and neck with and without contrast   Final Result by Darling Perez MD (04/08 2034)         1  No evidence of acute intracranial hemorrhage, infarct or mass effect  2   No evidence of carotid or vertebral artery hemodynamically significant stenosis, dissection or occlusion  3   No hemodynamically significant stenosis or occlusion of the major vessels of the [place]  No visualized intracranial aneurysm                        Workstation performed: [id number]         X-ray chest 2 views   ED Interpretation by Kurt Nelson DO (04/08 1720)   No acute cardiopulmonary abnormalities noted  No acute change from prior 12/2016      Final Result by Selin Rod MD (04/08 1944)      No acute cardiopulmonary disease              Workstation performed: [id number]             EKG, Pathology, and Other Studies Reviewed on Admission:   · EKG:  Non STEMI    Allscripts / Epic Records Reviewed: Yes     ** Please Note: This note has been constructed using a voice recognition system   **

## 2018-04-09 NOTE — PLAN OF CARE
CARDIOVASCULAR - ADULT     Maintains optimal cardiac output and hemodynamic stability Progressing     Absence of cardiac dysrhythmias or at baseline rhythm Progressing        DISCHARGE PLANNING     Discharge to home or other facility with appropriate resources Progressing        Knowledge Deficit     Patient/family/caregiver demonstrates understanding of disease process, treatment plan, medications, and discharge instructions Progressing        PAIN - ADULT     Verbalizes/displays adequate comfort level or baseline comfort level Progressing        Potential for Falls     Patient will remain free of falls Progressing        SAFETY ADULT     Maintain or return to baseline ADL function Progressing     Maintain or return mobility status to optimal level Progressing

## 2018-04-09 NOTE — SOCIAL WORK
CM met with pt at bedside  Pt lives with family in [facility] in a two story home with stairs and railings  Pt denies hx of DME/Oxygen, Rehab, HHC, Substance abuse  Pt is able to complete ADL's on her own  Pt uses [employer] in [facility]  Pt has hx of Anxiety and Depression  Pt's  is POA/AD  Pt is unemployed and able to drive herself to necessary appointments  Pt's family will transport pt home when ready for discharge  Pt has no other questions at this time  CM department to follow pt through discharge process  CM reviewed OBS with pt  Pt and family are in agreement  Pt had no questions and signed  CM provided pt with a copy and placed original in pt's medical record for scanning

## 2018-04-09 NOTE — ASSESSMENT & PLAN NOTE
· Stabbing with pressure  8/10  Nonradiating  · Admit rule out ACS patient has associated risk factors, previous MI, TIA hypertension, obesity, family history and chronic smoker  · Troponin 1-, continue to trend  · Telemetry  · Cardiology consult for further management  Echo

## 2018-04-09 NOTE — CONSULTS
Consultation - Cardiology    [facility] MR[facility]: [id number]  Unit/Bed#: -01 Encounter: [id number]    Physician Requesting Consult: Jorden Mcdermott MD    Reason for Consult / Principal Problem: chest pain     [facility] 38[facility] o  [facility] with a past medical history significant for hypertension, hypothyroidism, TIA  Patient presented with complaints of intermittent chest pain which she describes as sharp and pressure-like  She admits to associated HA and nausea  Her symptoms will come and go randomly  She does not relate  strict exertional component  She denies SOB, diaphoresis  She states she was told she had "silent MI" in the past as seen on EKG  She has had stress tests in the past, but no cardiac catheterization  She does admit to family history of CAD, though is uncertain of the details  She admits to being current smoker  Historical Information   Past Medical History:   Diagnosis Date    Disease of thyroid gland     hypothyroidism    History of MI (myocardial infarction)     History of TIA (transient ischemic attack)     Hypertension     TIA (transient ischemic attack)      History reviewed  No pertinent surgical history    History   Alcohol Use No     History   Drug Use No     History   Smoking Status    Current Every Day Smoker    Packs/day: 0 50    Types: Cigarettes   Smokeless Tobacco    Never Used       FAMILY HISTORY:  Family History   Problem Relation Age of Onset    Heart disease Mother     Hypertension Mother     Diabetes Father        MEDS & ALLERGIES:  all current active meds have been reviewed and current meds:   Current Facility-Administered Medications   Medication Dose Route Frequency    amLODIPine (NORVASC) tablet 10 mg  10 mg Oral Daily    aspirin chewable tablet 81 mg  81 mg Oral Daily    atenolol (TENORMIN) tablet 25 mg  25 mg Oral Daily    butalbital-acetaminophen-caffeine (FIORICET,ESGIC) -40 mg per tablet 1 tablet  1 tablet Oral BID    cloNIDine (CATAPRES) tablet 0 2 mg  0 2 mg Oral Q12H [facility]    enoxaparin (LOVENOX) subcutaneous injection 40 mg  40 mg Subcutaneous Daily    levothyroxine tablet 100 mcg  100 mcg Oral Early Morning    magnesium sulfate 2 g/50 mL IVPB (premix) 2 g  2 g Intravenous Q24H [facility]    nicotine (NICODERM CQ) 21 mg/24 hr TD 24 hr patch 1 patch  1 patch Transdermal Daily    sodium chloride 0 9 % infusion  125 mL/hr Intravenous Continuous       sodium chloride 125 mL/hr Last Rate: 125 mL/hr (04/09/18 0401)     Allergies   Allergen Reactions    Toradol [Ketorolac Tromethamine] Anxiety         REVIEW OF SYSTEMS:  Constitutional: Denies fever or chills  Eyes: Denies visual disturbance change in visual acuity   HENT: Denies nasal congestion or sore throat   Respiratory: Denies cough, expectoration or shortness of breath   Cardiovascular: +chest pain Denies  palpitations or lower extremity swelling   GI: Denies abdominal pain, nausea, vomiting, bloody stools or diarrhea   : Denies dysuria, frequency, hematuria  Musculoskeletal: Denies back pain or joint pain   Neurologic: +HA Denies lightheadedness, syncope, focal weakness or sensory changes   Psychiatric: +anxiety      PHYSICAL EXAM:  Vitals:   Vitals:    04/09/18 0700   BP: (!) 187/98   Pulse: 76   Resp: 18   Temp: 97 7 °F (36 5 °C)   SpO2: 100%     Body mass index is 44 79 kg/m²  Systolic (57ATH), KDE:544 , Min:146 , PSA:411     Diastolic (00SVR), UCS:27, Min:70, Max:102      Intake/Output Summary (Last 24 hours) at 04/09/18 0851  Last data filed at 04/09/18 0824   Gross per 24 hour   Intake             1225 ml   Output                0 ml   Net             1225 ml     Weight (last 2 days)     Date/Time   Weight    04/08/18 2210  130 (286)    04/08/18 1646  132 (291 01)            Invasive Devices     Peripheral Intravenous Line            Peripheral IV 04/08/18 Right Antecubital less than 1 day                General: Patient is not in acute distress  Laying comfortably in the bed  Head: Normocephalic  Atraumatic  HEENT: Both pupils normal sized, round and reactive to light  Nonicteric  Neck: JVP not elevated  Trachea central    Respiratory: Bilateral bronchovascular breath sounds with no crackles or rhonchi  Cardiovascular: RRR  S1 and S2 normal  No murmur, rub or gallop  GI: Abdomen soft, nontender  No guarding or rigidity  Neurologic: Patient is awake, alert, responding to commands  Well-oriented to time, place and person   Moving all extremities  Extremities: No clubbing, cyanosis or edema  Integument: No skin rashes or ulceration      LABORATORY RESULTS:    Results from last 7 days  Lab Units 04/09/18 0323 04/08/18 2355 04/08/18 2017   TROPONIN I ng/mL <0 02 <0 02 <0 02       CBC with diff:   Results from last 7 days  Lab Units 04/09/18 0324 04/08/18 2355 04/08/18  1651   WBC Thousand/uL 9 61  --  12 27*   HEMOGLOBIN g/dL 12 3  --  14 0   HEMATOCRIT % 38 3  --  43 6   MCV fL 87  --  87   PLATELETS Thousands/uL 292 300 336   MCH pg 28 1  --  28 0   MCHC g/dL 32 1  --  32 1   RDW % 13 5  --  13 5   MPV fL 10 1 10 1 10 0   NRBC AUTO /100 WBCs  --   --  0       CMP:  Results from last 7 days  Lab Units 04/09/18 0324 04/08/18  1651   SODIUM mmol/L 138 137   POTASSIUM mmol/L 3 7 3 6   CHLORIDE mmol/L 106 102   CO2 mmol/L 24 27   ANION GAP mmol/L 8 8   BUN mg/dL 7 6   CREATININE mg/dL 0 77 0 91   GLUCOSE RANDOM mg/dL 105 100   CALCIUM mg/dL 8 2* 8 4   AST U/L  --  43   ALT U/L  --  34   ALK PHOS U/L  --  128*   TOTAL PROTEIN g/dL  --  8 4*   BILIRUBIN TOTAL mg/dL  --  0 20   EGFR ml/min/1 73sq m 112 91       BMP:  Results from last 7 days  Lab Units 04/09/18 0324 04/08/18  1651   SODIUM mmol/L 138 137   POTASSIUM mmol/L 3 7 3 6   CHLORIDE mmol/L 106 102   CO2 mmol/L 24 27   BUN mg/dL 7 6   CREATININE mg/dL 0 77 0 91   GLUCOSE RANDOM mg/dL 105 100   CALCIUM mg/dL 8 2* 8 4         Results from last 7 days  Lab Units 04/08/18  1651   NT-PRO BNP pg/mL 88 Results from last 7 days  Lab Units 18  0324   MAGNESIUM mg/dL 1 8           Results from last 7 days  Lab Units 18  1651   TSH 3RD GENERATON uIU/mL 51 552*           Lipid Profile:   Lab Results   Component Value Date    CHOL 161 2018     Lab Results   Component Value Date    HDL 27 (L) 2018     Lab Results   Component Value Date    LDLCALC 114 (H) 2018     Lab Results   Component Value Date    TRIG 100 2018       Cardiac testing:   Results for orders placed during the hospital encounter of 16   Echo complete with contrast if indicated    Narrative [address] [place] [id number]  [phone]    Transthoracic Echocardiogram  2D, M-mode, Doppler, and Color Doppler    Study date:  13-Dec-2016    Patient: Alysha Ball  MR number: [id number]  Account number: [de-identified]  : 1975  Age: 46 years  Gender: Female  Status: Outpatient  Location: Bedside  Height: 67 in  Weight: 287 5 lb  BP: 163/ 75 mmHg    Indications: Evaluate for coronary artery disease  Diagnoses: R07 9 - Chest pain, unspecified    Sonographer:  SISI Schaffer  Interpreting Physician:  Danica Murguia MD  Primary Physician:  Brendan Eddy PA-C  Group:  [facility]    SUMMARY    PROCEDURE INFORMATION:  This was a technically difficult study  Echocardiographic views were limited due to poor acoustic window availability,  decreased penetration, and lung interference  LEFT VENTRICLE:  Systolic function was normal  Ejection fraction was estimated to be 60 %  There were no regional wall motion abnormalities  Doppler parameters were consistent with abnormal left ventricular relaxation  (grade 1 diastolic dysfunction)  RIGHT VENTRICLE:  The size was normal   Systolic function was normal     MITRAL VALVE:  There was mild regurgitation  AORTIC VALVE:  There was mild regurgitation      TRICUSPID VALVE:  There was trace regurgitation  Pulmonary artery systolic pressure was within the normal range  PULMONIC VALVE:  There was trace regurgitation  HISTORY: Symptoms: chest pain  Syncope  Consistent with transient ischemic  attack or stroke  PRIOR HISTORY: Previous (remote) myocardial infarction  PROCEDURE: The procedure was performed at the bedside  This was a routine  study  The transthoracic approach was used  The study included complete 2D  imaging, M-mode, complete spectral Doppler, and color Doppler  The heart rate  was 77 bpm, at the start of the study  Images were obtained from the  parasternal, apical, subcostal, and suprasternal notch acoustic windows  Echocardiographic views were limited due to poor acoustic window availability,  decreased penetration, and lung interference  This was a technically difficult  study  LEFT VENTRICLE: Size was normal  Systolic function was normal  Ejection  fraction was estimated to be 60 %  There were no regional wall motion  abnormalities  Wall thickness was normal  DOPPLER: Doppler parameters were  consistent with abnormal left ventricular relaxation (grade 1 diastolic  dysfunction)  RIGHT VENTRICLE: The size was normal  Systolic function was normal  Wall  thickness was normal     LEFT ATRIUM: Size was normal     RIGHT ATRIUM: Size was normal     MITRAL VALVE: Valve structure was normal  There was normal leaflet separation  DOPPLER: The transmitral velocity was within the normal range  There was no  evidence for stenosis  There was mild regurgitation  AORTIC VALVE: The valve was trileaflet  Leaflets exhibited normal thickness and  normal cuspal separation  DOPPLER: Transaortic velocity was within the normal  range  There was no evidence for stenosis  There was mild regurgitation  TRICUSPID VALVE: The valve structure was normal  There was normal leaflet  separation  DOPPLER: The transtricuspid velocity was within the normal range  There was no evidence for stenosis  There was trace regurgitation  Pulmonary  artery systolic pressure was within the normal range  PULMONIC VALVE: Leaflets exhibited normal thickness, no calcification, and  normal cuspal separation  DOPPLER: The transpulmonic velocity was within the  normal range  There was trace regurgitation  PERICARDIUM: There was no pericardial effusion  The pericardium was normal in  appearance  AORTA: The root exhibited normal size  SYSTEMIC VEINS: IVC: The inferior vena cava was not well visualized  SYSTEM MEASUREMENT TABLES    2D  %FS: 39 3 %  Ao Diam: 2 7 cm  EDV(Teich): 96 9 ml  EF(Teich): 69 9 %  ESV(Teich): 29 1 ml  IVSd: 1 cm  LA Area: 17 4 cm2  LA Diam: 3 2 cm  LVEDV MOD A4C: 126 5 ml  LVEF MOD A4C: 77 4 %  LVESV MOD A4C: 28 6 ml  LVIDd: 4 6 cm  LVIDs: 2 8 cm  LVLd A4C: 7 4 cm  LVLs A4C: 5 1 cm  LVPWd: 1 cm  RA Area: 19 8 cm2  RVIDd: 3 9 cm  SV MOD A4C: 97 9 ml  SV(Teich): 67 7 ml    CW  AR Dec [place]: 2 9 m/s2  AR Dec Time: 1395 2 ms  AR PHT: 404 6 ms  AR Vmax: 4 1 m/s  AR maxP 6 mmHg  AV Vmax: 1 7 m/s  AV maxP 3 mmHg  TR Vmax: 2 1 m/s  TR maxP 8 mmHg    MM  TAPSE: 2 1 cm    PW  E': 0 1 m/s  E/E': 8 9  LVOT Env  Ti: 319 7 ms  LVOT VTI: 26 3 cm  LVOT Vmax: 1 3 m/s  LVOT Vmean: 0 8 m/s  LVOT maxP 7 mmHg  LVOT meanPG: 3 2 mmHg  MV A Talat: 1 m/s  MV Dec [place]: 6 1 m/s2  MV DecT: 133 1 ms  MV E Talat: 0 8 m/s  MV E/A Ratio: 0 8  MV PHT: 38 6 ms  MVA By PHT: 5 7 cm2    [employer] Accredited Echocardiography Laboratory    Prepared and electronically signed by    Hollie Diaz MD  Signed 13-Dec-2016 15:02:10           Imaging: I have personally reviewed pertinent reports  Assessment and Plan:  Chest pain  -troponin negative x 3-- EKG unavailable to me  However prior EKG shows NSR with septal infarct-- likely what caused patient to think she has had MI in the past  -Previous echo 2016 Ef 60%, no RWMA, grade 1 dd, mild AI, mild MR   Await repeat   -suspect that patient's symptoms are primarily due to elevated BP, however patient does have multiple risk factors for CAD  Consider stress test tomorrow vs outpatient once BP better controlled     Hypertension- Elevated  Atenolol added this morning, clonidine added yesterday  Continue to monitor  Await echo for evidence of hypertensive heart disease    Obesity-weight loss encouraged to improve cardiovascular risk profile    Tobacco abuse-cardiovascular risk associated with continued tobacco use discussed  Smoking cessation strongly advised          Code Status: Level 1 - Full Code      Thank you for allowing us to participate in this patient's care  Counseling / Coordination of Care  Total floor / unit time spent today 35 minutes  Greater than 50% of total time was spent with the patient and / or family counseling and / or coordination of care  A description of the counseling / coordination of care: Review of history, current assessment, development of a plan         Linette Pabon PA-C  4/9/2018,8:51 AM

## 2018-04-09 NOTE — ASSESSMENT & PLAN NOTE
· Intractable  Questionable migraine associated with menstruation or due to hypertension or history of TIA  · CTA reviewed, no acute findings  · Headache protocol  · Neurology consult for further management

## 2018-04-10 LAB
ATRIAL RATE: 83 BPM
ATRIAL RATE: 88 BPM
HCG SERPL QL: NEGATIVE
P AXIS: 48 DEGREES
P AXIS: 54 DEGREES
PR INTERVAL: 180 MS
PR INTERVAL: 190 MS
QRS AXIS: -16 DEGREES
QRS AXIS: -5 DEGREES
QRSD INTERVAL: 84 MS
QRSD INTERVAL: 84 MS
QT INTERVAL: 386 MS
QT INTERVAL: 412 MS
QTC INTERVAL: 467 MS
QTC INTERVAL: 484 MS
T WAVE AXIS: 63 DEGREES
T WAVE AXIS: 69 DEGREES
T4 FREE SERPL-MCNC: 0.7 NG/DL (ref 0.76–1.46)
VENTRICULAR RATE: 83 BPM
VENTRICULAR RATE: 88 BPM

## 2018-04-10 PROCEDURE — 99232 SBSQ HOSP IP/OBS MODERATE 35: CPT | Performed by: INTERNAL MEDICINE

## 2018-04-10 PROCEDURE — 93010 ELECTROCARDIOGRAM REPORT: CPT | Performed by: INTERNAL MEDICINE

## 2018-04-10 PROCEDURE — 99233 SBSQ HOSP IP/OBS HIGH 50: CPT | Performed by: PSYCHIATRY & NEUROLOGY

## 2018-04-10 PROCEDURE — 99226 PR SBSQ OBSERVATION CARE/DAY 35 MINUTES: CPT | Performed by: PHYSICIAN ASSISTANT

## 2018-04-10 RX ORDER — TOPIRAMATE 25 MG/1
25 TABLET ORAL
Status: DISCONTINUED | OUTPATIENT
Start: 2018-04-10 | End: 2018-04-10

## 2018-04-10 RX ORDER — SUMATRIPTAN 6 MG/.5ML
6 INJECTION, SOLUTION SUBCUTANEOUS ONCE
Status: COMPLETED | OUTPATIENT
Start: 2018-04-10 | End: 2018-04-10

## 2018-04-10 RX ORDER — HYDRALAZINE HYDROCHLORIDE 20 MG/ML
5 INJECTION INTRAMUSCULAR; INTRAVENOUS EVERY 6 HOURS PRN
Status: DISCONTINUED | OUTPATIENT
Start: 2018-04-10 | End: 2018-04-12 | Stop reason: HOSPADM

## 2018-04-10 RX ORDER — TOPIRAMATE 25 MG/1
50 TABLET ORAL
Status: DISCONTINUED | OUTPATIENT
Start: 2018-04-10 | End: 2018-04-12 | Stop reason: HOSPADM

## 2018-04-10 RX ORDER — CHLORPROMAZINE HYDROCHLORIDE 25 MG/ML
50 INJECTION INTRAMUSCULAR ONCE
Status: COMPLETED | OUTPATIENT
Start: 2018-04-10 | End: 2018-04-10

## 2018-04-10 RX ADMIN — BUTALBITAL, ACETAMINOPHEN, AND CAFFEINE 1 TABLET: 50; 325; 40 TABLET ORAL at 08:36

## 2018-04-10 RX ADMIN — BUTALBITAL, ACETAMINOPHEN, AND CAFFEINE 1 TABLET: 50; 325; 40 TABLET ORAL at 00:34

## 2018-04-10 RX ADMIN — LEVOTHYROXINE SODIUM 100 MCG: 100 TABLET ORAL at 06:12

## 2018-04-10 RX ADMIN — ATENOLOL 25 MG: 25 TABLET ORAL at 08:36

## 2018-04-10 RX ADMIN — ENOXAPARIN SODIUM 40 MG: 40 INJECTION SUBCUTANEOUS at 08:36

## 2018-04-10 RX ADMIN — CLONIDINE HYDROCHLORIDE 0.3 MG: 0.1 TABLET ORAL at 06:11

## 2018-04-10 RX ADMIN — MAGNESIUM SULFATE HEPTAHYDRATE 2 G: 40 INJECTION, SOLUTION INTRAVENOUS at 08:35

## 2018-04-10 RX ADMIN — CLONIDINE HYDROCHLORIDE 0.3 MG: 0.1 TABLET ORAL at 22:34

## 2018-04-10 RX ADMIN — CHLORPROMAZINE HYDROCHLORIDE 50 MG: 25 INJECTION INTRAMUSCULAR at 19:27

## 2018-04-10 RX ADMIN — HYDRALAZINE HYDROCHLORIDE 5 MG: 20 INJECTION INTRAMUSCULAR; INTRAVENOUS at 15:47

## 2018-04-10 RX ADMIN — ASPIRIN 81 MG 81 MG: 81 TABLET ORAL at 08:36

## 2018-04-10 RX ADMIN — AMLODIPINE BESYLATE 10 MG: 10 TABLET ORAL at 08:36

## 2018-04-10 RX ADMIN — BUTALBITAL, ACETAMINOPHEN, AND CAFFEINE 1 TABLET: 50; 325; 40 TABLET ORAL at 04:31

## 2018-04-10 RX ADMIN — VALPROATE SODIUM 500 MG: 100 INJECTION, SOLUTION INTRAVENOUS at 11:49

## 2018-04-10 RX ADMIN — CLONIDINE HYDROCHLORIDE 0.3 MG: 0.1 TABLET ORAL at 13:03

## 2018-04-10 RX ADMIN — SUMATRIPTAN 6 MG: 6 INJECTION, SOLUTION SUBCUTANEOUS at 19:26

## 2018-04-10 RX ADMIN — TOPIRAMATE 50 MG: 25 TABLET, FILM COATED ORAL at 22:38

## 2018-04-10 NOTE — ASSESSMENT & PLAN NOTE
Stabbing with pressure  8/10  Nonradiating  · Has associated risk factors, previous MI, TIA hypertension, obesity, family history and chronic smoker    · Troponin (-), continue telemetry  · For stress test tomorrow, echo

## 2018-04-10 NOTE — ASSESSMENT & PLAN NOTE
History of hypertension  Associated with headaches  · Noncompliant with BP meds  Clonidine increased to 0 3 mg t i d   · Continue Norvasc  · P r n  hydralazine    · Cardiology consult appreciated, for stress test tomorrow

## 2018-04-10 NOTE — PROGRESS NOTES
Progress Note - Cardiology     [facility] - MARIANNE WEBBER 38 y o  female MRN: [id number]  Unit/Bed#: [place] Encounter: [id number]      Subjective:   Patient continues to report HA despite blood pressure being better controlled  She denies CP  Objective:   Vitals:  Vitals:    04/10/18 0700   BP: 148/71   Pulse: 65   Resp: 18   Temp: 98 2 °F (36 8 °C)   SpO2: 95%       Body mass index is 44 79 kg/m²  Systolic (91OJF), JNM:694 , Min:144 , TCM:458     Diastolic (63UTJ), CDW:19, Min:67, Max:92      Intake/Output Summary (Last 24 hours) at 04/10/18 0912  Last data filed at 04/10/18 0835   Gross per 24 hour   Intake              910 ml   Output                0 ml   Net              910 ml     Weight (last 2 days)     Date/Time   Weight    04/08/18 2210  130 (286)    04/08/18 1646  132 (291 01)              PHYSICAL EXAM:  General: Patient is not in acute distress  Laying comfortably in the bed  Head: Normocephalic  Atraumatic  HEENT: Both pupils normal sized, round and reactive to light  Nonicteric  Neck: JVP not raised  Trachea central    Respiratory: Bilateral bronchovascular breath sounds with no crackles or rhonchi  Cardiovascular: RRR  S1 and S2 normal  No murmur, rub or gallop  GI: Abdomen soft, nontender  No guarding or rigidity  Neurologic: Patient is awake, alert, responding to command   Moving all extremities  Integumentary:  No skin rash  Extremities: No clubbing, cyanosis or edema    LABORATORY RESULTS:    Results from last 7 days  Lab Units 04/09/18 0323 04/08/18 2355 04/08/18 2017   TROPONIN I ng/mL <0 02 <0 02 <0 02     CBC with diff:   Results from last 7 days  Lab Units 04/09/18  0324 04/08/18 2355 04/08/18  1651   WBC Thousand/uL 9 61  --  12 27*   HEMOGLOBIN g/dL 12 3  --  14 0   HEMATOCRIT % 38 3  --  43 6   MCV fL 87  --  87   PLATELETS Thousands/uL 292 300 336   MCH pg 28 1  --  28 0   MCHC g/dL 32 1  --  32 1   RDW % 13 5  --  13 5   MPV fL 10 1 10 1 10 0   NRBC AUTO /100 WBCs  --   --  0 CMP:  Results from last 7 days  Lab Units 18  0324 18  1651   SODIUM mmol/L 138 137   POTASSIUM mmol/L 3 7 3 6   CHLORIDE mmol/L 106 102   CO2 mmol/L 24 27   ANION GAP mmol/L 8 8   BUN mg/dL 7 6   CREATININE mg/dL 0 77 0 91   GLUCOSE RANDOM mg/dL 105 100   CALCIUM mg/dL 8 2* 8 4   AST U/L  --  43   ALT U/L  --  34   ALK PHOS U/L  --  128*   TOTAL PROTEIN g/dL  --  8 4*   BILIRUBIN TOTAL mg/dL  --  0 20   EGFR ml/min/1 73sq m 112 91       BMP:  Results from last 7 days  Lab Units 18  0324 18  1651   SODIUM mmol/L 138 137   POTASSIUM mmol/L 3 7 3 6   CHLORIDE mmol/L 106 102   CO2 mmol/L 24 27   BUN mg/dL 7 6   CREATININE mg/dL 0 77 0 91   GLUCOSE RANDOM mg/dL 105 100   CALCIUM mg/dL 8 2* 8 4         Results from last 7 days  Lab Units 18  1651   NT-PRO BNP pg/mL 88          Results from last 7 days  Lab Units 18  0324   MAGNESIUM mg/dL 1 8         Results from last 7 days  Lab Units 18  0323   HEMOGLOBIN A1C % 5 6         Results from last 7 days  Lab Units 18  0324   TSH 3RD GENERATON uIU/mL 93 888*             Lipid Profile:   Lab Results   Component Value Date    CHOL 161 2018     Lab Results   Component Value Date    HDL 27 (L) 2018     Lab Results   Component Value Date    LDLCALC 114 (H) 2018     Lab Results   Component Value Date    TRIG 100 2018       Cardiac testing:   Results for orders placed during the hospital encounter of 18   Echo complete with contrast if indicated    [address] [place], [place] [address] [phone]    Transthoracic Echocardiogram  2D, M-mode, Doppler, and Color Doppler    Study date:  2018    Patient: Lauren FORBES  MR number: [id number]  Account number: [de-identified]  : 1975  Age: 42 years  Gender: Female  Status: Outpatient  Location: Bedside  Height: 67 in  Weight: 286 lb  BP: 187/ 98 mmHg    Indications: Chest Pain    Diagnoses: R07 9 - Chest pain, unspecified    Sonographer:  MOR Iglesias,RDCS  Interpreting Physician:  Moni Cabrera MD  Referring Physician:  ALVIN Schmidt  Group:  [facility]    SUMMARY    PROCEDURE INFORMATION:  This was a technically difficult study  Echocardiographic views were limited due to poor acoustic window availability, decreased penetration, and lung interference  LEFT VENTRICLE:  Systolic function was normal  Ejection fraction was estimated to be 60 %  There were no regional wall motion abnormalities  Wall thickness was moderately increased  There was moderate concentric hypertrophy  Features were consistent with a pseudonormal left ventricular filling pattern, with concomitant abnormal relaxation and increased filling pressure (grade 2 diastolic dysfunction)  RIGHT VENTRICLE:  The size was normal   Systolic function was normal     MITRAL VALVE:  There was trace regurgitation  AORTIC VALVE:  There was mild regurgitation  TRICUSPID VALVE:  There was trace regurgitation  Pulmonary artery systolic pressure was within the normal range  PULMONIC VALVE:  There was trace regurgitation  HISTORY: PRIOR HISTORY: Chest pain, Hypertensive urgency, Headache, Smoker, Obesity, Myocardial Infarction, Transient ischemic Attack, Hypertension    PROCEDURE: The procedure was performed at the bedside  This was a routine study  The transthoracic approach was used  The study included complete 2D imaging, M-mode, complete spectral Doppler, and color Doppler  The heart rate was 69 bpm,  at the start of the study  Images were obtained from the parasternal, apical, subcostal, and suprasternal notch acoustic windows  Echocardiographic views were limited due to poor acoustic window availability, decreased penetration, and  lung interference  This was a technically difficult study      LEFT VENTRICLE: Size was normal  Systolic function was normal  Ejection fraction was estimated to be 60 %  There were no regional wall motion abnormalities  Wall thickness was moderately increased  There was moderate concentric  hypertrophy  No evidence of apical thrombus  DOPPLER: Features were consistent with a pseudonormal left ventricular filling pattern, with concomitant abnormal relaxation and increased filling pressure (grade 2 diastolic dysfunction)  RIGHT VENTRICLE: The size was normal  Systolic function was normal  Wall thickness was normal     LEFT ATRIUM: Size was normal     RIGHT ATRIUM: Size was normal     MITRAL VALVE: Valve structure was normal  There was normal leaflet separation  DOPPLER: The transmitral velocity was within the normal range  There was no evidence for stenosis  There was trace regurgitation  AORTIC VALVE: The valve was trileaflet  Leaflets exhibited normal thickness and normal cuspal separation  DOPPLER: Transaortic velocity was within the normal range  There was no evidence for stenosis  There was mild regurgitation  TRICUSPID VALVE: The valve structure was normal  There was normal leaflet separation  DOPPLER: The transtricuspid velocity was within the normal range  There was no evidence for stenosis  There was trace regurgitation  Pulmonary artery  systolic pressure was within the normal range  PULMONIC VALVE: Leaflets exhibited normal thickness, no calcification, and normal cuspal separation  DOPPLER: The transpulmonic velocity was within the normal range  There was trace regurgitation  PERICARDIUM: There was no pericardial effusion  The pericardium was normal in appearance  AORTA: The root exhibited normal size  SYSTEMIC VEINS: IVC: The inferior vena cava was normal in size      SYSTEM MEASUREMENT TABLES    2D  %FS: 37 3 %  Ao Diam: 2 6 cm  EDV(Teich): 86 3 ml  EF(Teich): 67 5 %  ESV(Teich): 28 ml  IVSd: 1 5 cm  LA Area: 19 5 cm2  LA Diam: 3 6 cm  LVEDV MOD A4C: 100 9 ml  LVEF MOD A4C: 65 2 %  LVESV MOD A4C: 35 1 ml  LVIDd: 4 4 cm  LVIDs: 2 7 cm  LVLd A4C: 7 7 cm  LVLs A4C: 7 cm  LVPWd: 1 5 cm  RA Area: 19 2 cm2  RVIDd: 3 6 cm  SV MOD A4C: 65 8 ml  SV(Teich): 58 3 ml    CW  AR Dec [place]: 3 6 m/s2  AR Dec Time: 1246 9 ms  AR PHT: 361 6 ms  AR Vmax: 4 5 m/s  AR maxP 8 mmHg  TR Vmax: 2 7 m/s  TR maxP 7 mmHg    MM  TAPSE: 2 8 cm    PW  E': 0 1 m/s  E/E': 11 7  MV A Talat: 1 m/s  MV Dec [place]: 4 1 m/s2  MV DecT: 265 9 ms  MV E [place]: 1 1 m/s  MV E/A Ratio: 1 2  MV PHT: 77 1 ms  MVA By PHT: 2 9 cm2    [facility] Accredited Echocardiography Laboratory    Prepared and electronically signed by    Solitario Santoor MD  Signed 2018 19:42:48         Meds/Allergies   all current active meds have been reviewed  Prescriptions Prior to Admission   Medication    amLODIPine (NORVASC) 10 mg tablet    aspirin 81 MG tablet    amLODIPine (NORVASC) 2 5 mg tablet    levothyroxine 100 mcg tablet              Assessment and Plan:  Chest pain  -troponin negative x 3, EKG unremarkable   -Previous echo 2016 EF 60%, no RWMA, grade 1 diastolic dysfunction, mild AI, mild MR    -repeat this admission EF 60% without regional wall motion abnormality, moderate concentric hypertrophy with grade 2 diastolic dysfunction, mild AI  -suspect that chest pain is primarily due to elevated BP, however patient does have multiple risk factors for CAD  Stress test was initially planned for today, however could not be done as patient had Fioricet this morning  She continues to have HA and will be staying overnight  Will plan for stress tomorrow       Hypertension-  pressure better controlled today  Echocardiogram showed moderate LVH     Obesity-weight loss encouraged to improve cardiovascular risk profile     Tobacco abuse-cardiovascular risk associated with continued tobacco use discussed  Smoking cessation strongly advised      ** Please Note: Dragon 360 Dictation voice to text software may have been used in the creation of this document   **

## 2018-04-10 NOTE — ASSESSMENT & PLAN NOTE
Intractable  Questionable migraine associated with menstruation or due to hypertension or history of TIA  · CTA reviewed, no acute findings  · Headache protocol  · Neurology consult for further management - appreciate recommendations  I discussed with Dr Irene Mack this morning, will give IV Depacon 500 mg x1 dose, start Topamax 25 mg daily  She has already received 2 doses of IV magnesium    · Discontinue Fioricet as this is not helping and she is for stress test  · Blood pressure is better, without improvement of the headache

## 2018-04-10 NOTE — PLAN OF CARE
CARDIOVASCULAR - ADULT     Maintains optimal cardiac output and hemodynamic stability Progressing     Absence of cardiac dysrhythmias or at baseline rhythm Progressing        DISCHARGE PLANNING     Discharge to home or other facility with appropriate resources Progressing        DISCHARGE PLANNING - CARE MANAGEMENT     Discharge to post-acute care or home with appropriate resources Progressing        Knowledge Deficit     Patient/family/caregiver demonstrates understanding of disease process, treatment plan, medications, and discharge instructions Progressing        PAIN - ADULT     Verbalizes/displays adequate comfort level or baseline comfort level Progressing        Potential for Falls     Patient will remain free of falls Progressing        SAFETY ADULT     Maintain or return to baseline ADL function Progressing     Maintain or return mobility status to optimal level Progressing

## 2018-04-10 NOTE — PROGRESS NOTES
Progress Note - Neurology   [facility] - MARIANNE WEBBER 38 y o  female MRN: [id number]  Unit/Bed#: -01 Encounter: [id number]      Subjective:   Patient seen at bedside, crying since she has not had any relief from the headache  She did receive 1 dose of IV Depacon which dulls the headache but no significant relief  She denies any blurred vision and describes the headache as holocephalic, with a throbbing quality, was nauseous earlier and is not known to have migraines in the past   She denies any focal motor or sensory symptoms in the upper lower extremities  ROS: 12 system cued query was unchanged from org  consult note  Vitals:   Vitals:    04/10/18 1626   BP: 146/81   Pulse:    Resp:    Temp:    SpO2:    ,Body mass index is 44 79 kg/m²      MEDS:    Current Facility-Administered Medications:     amLODIPine (NORVASC) tablet 10 mg, 10 mg, Oral, Daily, ALVIN Pruitt, 10 mg at 04/10/18 0836    aspirin chewable tablet 81 mg, 81 mg, Oral, Daily, ALVIN Puritt, 81 mg at 04/10/18 0836    atenolol (TENORMIN) tablet 25 mg, 25 mg, Oral, Daily, Philip Mcmillan MD, 25 mg at 04/10/18 0836    cloNIDine (CATAPRES) tablet 0 3 mg, 0 3 mg, Oral, Q8H [address], Rossi Sprague MD, 0 3 mg at 04/10/18 1303    enoxaparin (LOVENOX) subcutaneous injection 40 mg, 40 mg, Subcutaneous, Daily, ALVIN Pruitt, 40 mg at 04/10/18 0836    hydrALAZINE (APRESOLINE) injection 5 mg, 5 mg, Intravenous, Q6H PRN, Deena Amaro PA-C, 5 mg at 04/10/18 1547    levothyroxine tablet 100 mcg, 100 mcg, Oral, Early Morning, ALVIN Pruitt, 100 mcg at 04/10/18 0612    magnesium sulfate 2 g/50 mL IVPB (premix) 2 g, 2 g, Intravenous, Q24H [address], ALVIN Pruitt, Stopped at 04/10/18 0935    nicotine (NICODERM CQ) 21 mg/24 hr TD 24 hr patch 1 patch, 1 patch, Transdermal, Daily, ALVIN Pruitt    topiramate (TOPAMAX) tablet 25 mg, 25 mg, Oral, HS, Concha Hampton, PA-C    Physical Exam:  General appearance: alert, appears stated age and cooperative  Head: Normocephalic, without obvious abnormality, atraumatic    Neurologic:  On examination there is no evidence of any field cut, pupils equal and reactive, extraocular movements are intact with no evidence of any nystagmus, sensation in the V1 V2 V3 distribution is symmetric with no obvious facial asymmetry, tongue is midline gag is good  On motor and sensory examination there is no evidence of any drift, strength is adequate bilaterally, deep tendon reflexes are hypoactive  No dysmetria was noted on finger-to-nose exam     Lab Results: I have personally reviewed pertinent reports  Imaging Studies: I have personally reviewed pertinent reports  Assessment:  1  Refractory headaches , with migrainous features, most likely status migrainosus, other etiologies should be ruled out such as pituitary adenoma and pseudotumor cerebri  Plan: Will recommend MRI of the brain be done tonight with and without contrast, serum prolactin level will be requested, patient also has a significantly elevated TSH level in spite of being on levothyroxine, and if her workup is negative will get a spinal tap tomorrow for pseudotumor cerebri  In the meanwhile will try thorazine 50 mg IM for the headache to night  Patient will be started on Topamax to night  4/10/2018,6:39 PM    Dictation voice to text software has been used in the creation of this document  Please consider this in light of any contextual or grammatical errors

## 2018-04-10 NOTE — ASSESSMENT & PLAN NOTE
TSH significantly elevated > 90, will assess free T4, though patient is already on Synthroid  She freely admits that she is noncompliant with her medication in terms of taking it regularly or taking it as prescribed on an empty stomach    · Continue current dose levothyroxine  · Counseling given to the patient

## 2018-04-11 ENCOUNTER — APPOINTMENT (INPATIENT)
Dept: MRI IMAGING | Facility: HOSPITAL | Age: 43
DRG: 054 | End: 2018-04-11
Payer: COMMERCIAL

## 2018-04-11 ENCOUNTER — APPOINTMENT (INPATIENT)
Dept: NUCLEAR MEDICINE | Facility: HOSPITAL | Age: 43
DRG: 054 | End: 2018-04-11
Payer: COMMERCIAL

## 2018-04-11 ENCOUNTER — APPOINTMENT (INPATIENT)
Dept: NON INVASIVE DIAGNOSTICS | Facility: HOSPITAL | Age: 43
DRG: 054 | End: 2018-04-11
Payer: COMMERCIAL

## 2018-04-11 LAB
ARRHY DURING EX: NORMAL
BACTERIA UR CULT: NORMAL
CHEST PAIN STATEMENT: NORMAL
MAX DIASTOLIC BP: 82 MMHG
MAX HEART RATE: 86 BPM
MAX PREDICTED HEART RATE: 180 BPM
MAX. SYSTOLIC BP: 179 MMHG
PROTOCOL NAME: NORMAL
REASON FOR TERMINATION: NORMAL
TARGET HR FORMULA: NORMAL
TEST INDICATION: NORMAL
TIME IN EXERCISE PHASE: NORMAL

## 2018-04-11 PROCEDURE — 99232 SBSQ HOSP IP/OBS MODERATE 35: CPT | Performed by: PHYSICIAN ASSISTANT

## 2018-04-11 PROCEDURE — A9585 GADOBUTROL INJECTION: HCPCS | Performed by: PHYSICIAN ASSISTANT

## 2018-04-11 PROCEDURE — 99232 SBSQ HOSP IP/OBS MODERATE 35: CPT | Performed by: INTERNAL MEDICINE

## 2018-04-11 PROCEDURE — 93017 CV STRESS TEST TRACING ONLY: CPT

## 2018-04-11 PROCEDURE — A9502 TC99M TETROFOSMIN: HCPCS

## 2018-04-11 PROCEDURE — 78452 HT MUSCLE IMAGE SPECT MULT: CPT

## 2018-04-11 PROCEDURE — 70553 MRI BRAIN STEM W/O & W/DYE: CPT

## 2018-04-11 RX ORDER — LORAZEPAM 2 MG/ML
1 INJECTION INTRAMUSCULAR ONCE
Status: COMPLETED | OUTPATIENT
Start: 2018-04-11 | End: 2018-04-11

## 2018-04-11 RX ADMIN — ENOXAPARIN SODIUM 40 MG: 40 INJECTION SUBCUTANEOUS at 08:15

## 2018-04-11 RX ADMIN — MAGNESIUM SULFATE HEPTAHYDRATE 2 G: 40 INJECTION, SOLUTION INTRAVENOUS at 08:15

## 2018-04-11 RX ADMIN — ATENOLOL 25 MG: 25 TABLET ORAL at 08:14

## 2018-04-11 RX ADMIN — REGADENOSON 0.4 MG: 0.08 INJECTION, SOLUTION INTRAVENOUS at 12:54

## 2018-04-11 RX ADMIN — AMLODIPINE BESYLATE 10 MG: 10 TABLET ORAL at 08:14

## 2018-04-11 RX ADMIN — CLONIDINE HYDROCHLORIDE 0.3 MG: 0.1 TABLET ORAL at 14:19

## 2018-04-11 RX ADMIN — LEVOTHYROXINE SODIUM 100 MCG: 100 TABLET ORAL at 05:45

## 2018-04-11 RX ADMIN — GADOBUTROL 13 ML: 604.72 INJECTION INTRAVENOUS at 11:43

## 2018-04-11 RX ADMIN — LORAZEPAM 1 MG: 2 INJECTION INTRAMUSCULAR; INTRAVENOUS at 10:44

## 2018-04-11 RX ADMIN — CLONIDINE HYDROCHLORIDE 0.3 MG: 0.1 TABLET ORAL at 21:23

## 2018-04-11 RX ADMIN — ASPIRIN 81 MG 81 MG: 81 TABLET ORAL at 08:14

## 2018-04-11 RX ADMIN — TOPIRAMATE 50 MG: 25 TABLET, FILM COATED ORAL at 21:23

## 2018-04-11 RX ADMIN — CLONIDINE HYDROCHLORIDE 0.3 MG: 0.1 TABLET ORAL at 05:45

## 2018-04-11 NOTE — ASSESSMENT & PLAN NOTE
History of hypertension  Associated with headaches in the past, though despite BP improvement her headache persists  · Noncompliant with BP meds  Clonidine increased to 0 3 mg t i d   · Continue Norvasc  · P r n  hydralazine    · Cardiology consult appreciated, for stress test today

## 2018-04-11 NOTE — PROGRESS NOTES
Upon shift change, patient expressed she has headache rating 7/10 pain  New medications ordered, two of which given at 1927  Other new medication to be given at 2200  Nurse called JAYESH to see if any type of pain relief can be given at this time to reduce headache  SLIM recommended waiting until new medication have affect and try heat/cold therapy at this time  Other relief medications have been stopped due to anticipated stress test tomorrow (4/11/18)  Will continue to monitor       Susanne Boxer, RN  04/10/18  8:10 PM

## 2018-04-11 NOTE — ASSESSMENT & PLAN NOTE
TSH significantly elevated > 90, low T4, though patient is already on Synthroid  She freely admits that she is noncompliant with her medication in terms of taking it regularly or taking it as prescribed on an empty stomach    · Continue current dose levothyroxine - she needs repeat TFTs in 4-6 weeks, outpatient  · Counseling given to the patient on proper administration of medication

## 2018-04-11 NOTE — ASSESSMENT & PLAN NOTE
Stabbing with pressure  8/10  Nonradiating  · Has associated risk factors, previous MI, TIA hypertension, obesity, family history and chronic smoker    · Troponin (-), continue telemetry  · Echo with preserved EF and grade 2 diastolic dysfunction  · Stress test pending

## 2018-04-11 NOTE — ASSESSMENT & PLAN NOTE
Intractable on admission  · CTA with no acute findings  MRI brain pending w & wo contrast  · Neurology consult for further management - appreciate recommendations      · Received IV depacon with mild improvement, trial thorazine and topamax per further recs  · Discontinue Fioricet as this is not helping   · Blood pressure is better 120s SBP today, without improvement of the headache

## 2018-04-11 NOTE — PROGRESS NOTES
Progress Note - Cardiology     [facility] - MARIANNE WEBBER 38 y o  female MRN: [id number]  Unit/Bed#: -01 Encounter: [id number]      Subjective:   Patient continues to report HA which is minimally improved   She does admit to associated chest pain today  She is awaiting stress test      Objective:   Vitals:  Vitals:    04/11/18 0700   BP: 128/64   Pulse: 66   Resp: 17   Temp: 97 5 °F (36 4 °C)   SpO2: 100%       Body mass index is 44 79 kg/m²  Systolic (65FVS), ONK:990 , Min:128 , JKT:783     Diastolic (18RST), MWH:82, Min:64, Max:96    No intake or output data in the 24 hours ending 04/11/18 0858  Weight (last 2 days)     None          PHYSICAL EXAM:  General: Patient is not in acute distress  Head: Normocephalic  Atraumatic  HEENT: Both pupils normal sized, round and reactive to light  Nonicteric  Neck: JVP not raised  Trachea central   Respiratory: Bilateral bronchovascular breath sounds with no crackles or rhonchi  Cardiovascular: RRR  S1 and S2 normal  No murmur, rub or gallop  GI: Abdomen soft, nontender  No guarding or rigidity  Neurologic: Patient is awake, alert, responding to command   Moving all extremities  Integumentary:  No skin rash  Extremities: No clubbing, cyanosis or edema    LABORATORY RESULTS:    Results from last 7 days  Lab Units 04/09/18 0323 04/08/18 2355 04/08/18 2017   TROPONIN I ng/mL <0 02 <0 02 <0 02     CBC with diff:   Results from last 7 days  Lab Units 04/09/18  0324 04/08/18 2355 04/08/18  1651   WBC Thousand/uL 9 61  --  12 27*   HEMOGLOBIN g/dL 12 3  --  14 0   HEMATOCRIT % 38 3  --  43 6   MCV fL 87  --  87   PLATELETS Thousands/uL 292 300 336   MCH pg 28 1  --  28 0   MCHC g/dL 32 1  --  32 1   RDW % 13 5  --  13 5   MPV fL 10 1 10 1 10 0   NRBC AUTO /100 WBCs  --   --  0       CMP:  Results from last 7 days  Lab Units 04/09/18  0324 04/08/18  1651   SODIUM mmol/L 138 137   POTASSIUM mmol/L 3 7 3 6   CHLORIDE mmol/L 106 102   CO2 mmol/L 24 27   ANION GAP mmol/L 8 8   BUN mg/dL 7 6   CREATININE mg/dL 0 77 0 91   GLUCOSE RANDOM mg/dL 105 100   CALCIUM mg/dL 8 2* 8 4   AST U/L  --  43   ALT U/L  --  34   ALK PHOS U/L  --  128*   TOTAL PROTEIN g/dL  --  8 4*   BILIRUBIN TOTAL mg/dL  --  0 20   EGFR ml/min/1 73sq m 112 91       BMP:  Results from last 7 days  Lab Units 18  0324 18  1651   SODIUM mmol/L 138 137   POTASSIUM mmol/L 3 7 3 6   CHLORIDE mmol/L 106 102   CO2 mmol/L 24 27   BUN mg/dL 7 6   CREATININE mg/dL 0 77 0 91   GLUCOSE RANDOM mg/dL 105 100   CALCIUM mg/dL 8 2* 8 4         Results from last 7 days  Lab Units 18  1651   NT-PRO BNP pg/mL 88          Results from last 7 days  Lab Units 18  0324   MAGNESIUM mg/dL 1 8         Results from last 7 days  Lab Units 18  0323   HEMOGLOBIN A1C % 5 6         Results from last 7 days  Lab Units 18  1633 18  0324   TSH 3RD GENERATON uIU/mL  --  93 888*   FREE T4 ng/dL 0 70*  --              Lipid Profile:   Lab Results   Component Value Date    CHOL 161 2018     Lab Results   Component Value Date    HDL 27 (L) 2018     Lab Results   Component Value Date    LDLCALC 114 (H) 2018     Lab Results   Component Value Date    TRIG 100 2018       Cardiac testing:   Results for orders placed during the hospital encounter of 18   Echo complete with contrast if indicated    Narrative [address] [place], [place] [address] [phone]    Transthoracic Echocardiogram  2D, M-mode, Doppler, and Color Doppler    Study date:  2018    Patient: Luciano FORBES  MR number: [id number]  Account number: [de-identified]  : 1975  Age: 38 years  Gender: Female  Status: Outpatient  Location: Bedside  Height: 67 in  Weight: 286 lb  BP: 187/ 98 mmHg    Indications: Chest Pain    Diagnoses: R07 9 - Chest pain, unspecified    Sonographer:  MOR Schrader,RDCS  Interpreting Physician:  Diana Joiner MD  Referring Physician:  Uzma Unger CRNP  Group:  [facility]    SUMMARY    PROCEDURE INFORMATION:  This was a technically difficult study  Echocardiographic views were limited due to poor acoustic window availability, decreased penetration, and lung interference  LEFT VENTRICLE:  Systolic function was normal  Ejection fraction was estimated to be 60 %  There were no regional wall motion abnormalities  Wall thickness was moderately increased  There was moderate concentric hypertrophy  Features were consistent with a pseudonormal left ventricular filling pattern, with concomitant abnormal relaxation and increased filling pressure (grade 2 diastolic dysfunction)  RIGHT VENTRICLE:  The size was normal   Systolic function was normal     MITRAL VALVE:  There was trace regurgitation  AORTIC VALVE:  There was mild regurgitation  TRICUSPID VALVE:  There was trace regurgitation  Pulmonary artery systolic pressure was within the normal range  PULMONIC VALVE:  There was trace regurgitation  HISTORY: PRIOR HISTORY: Chest pain, Hypertensive urgency, Headache, Smoker, Obesity, Myocardial Infarction, Transient ischemic Attack, Hypertension    PROCEDURE: The procedure was performed at the bedside  This was a routine study  The transthoracic approach was used  The study included complete 2D imaging, M-mode, complete spectral Doppler, and color Doppler  The heart rate was 69 bpm,  at the start of the study  Images were obtained from the parasternal, apical, subcostal, and suprasternal notch acoustic windows  Echocardiographic views were limited due to poor acoustic window availability, decreased penetration, and  lung interference  This was a technically difficult study  LEFT VENTRICLE: Size was normal  Systolic function was normal  Ejection fraction was estimated to be 60 %  There were no regional wall motion abnormalities  Wall thickness was moderately increased  There was moderate concentric  hypertrophy   No evidence of apical thrombus  DOPPLER: Features were consistent with a pseudonormal left ventricular filling pattern, with concomitant abnormal relaxation and increased filling pressure (grade 2 diastolic dysfunction)  RIGHT VENTRICLE: The size was normal  Systolic function was normal  Wall thickness was normal     LEFT ATRIUM: Size was normal     RIGHT ATRIUM: Size was normal     MITRAL VALVE: Valve structure was normal  There was normal leaflet separation  DOPPLER: The transmitral velocity was within the normal range  There was no evidence for stenosis  There was trace regurgitation  AORTIC VALVE: The valve was trileaflet  Leaflets exhibited normal thickness and normal cuspal separation  DOPPLER: Transaortic velocity was within the normal range  There was no evidence for stenosis  There was mild regurgitation  TRICUSPID VALVE: The valve structure was normal  There was normal leaflet separation  DOPPLER: The transtricuspid velocity was within the normal range  There was no evidence for stenosis  There was trace regurgitation  Pulmonary artery  systolic pressure was within the normal range  PULMONIC VALVE: Leaflets exhibited normal thickness, no calcification, and normal cuspal separation  DOPPLER: The transpulmonic velocity was within the normal range  There was trace regurgitation  PERICARDIUM: There was no pericardial effusion  The pericardium was normal in appearance  AORTA: The root exhibited normal size  SYSTEMIC VEINS: IVC: The inferior vena cava was normal in size      SYSTEM MEASUREMENT TABLES    2D  %FS: 37 3 %  Ao Diam: 2 6 cm  EDV(Teich): 86 3 ml  EF(Teich): 67 5 %  ESV(Teich): 28 ml  IVSd: 1 5 cm  LA Area: 19 5 cm2  LA Diam: 3 6 cm  LVEDV MOD A4C: 100 9 ml  LVEF MOD A4C: 65 2 %  LVESV MOD A4C: 35 1 ml  LVIDd: 4 4 cm  LVIDs: 2 7 cm  LVLd A4C: 7 7 cm  LVLs A4C: 7 cm  LVPWd: 1 5 cm  RA Area: 19 2 cm2  RVIDd: 3 6 cm  SV MOD A4C: 65 8 ml  SV(Teich): 58 3 ml    CW  AR Dec [place]: 3 6 m/s2  AR Dec Time: 1246 9 ms  AR PHT: 361 6 ms  AR Vmax: 4 5 m/s  AR maxP 8 mmHg  TR Vmax: 2 7 m/s  TR maxP 7 mmHg    MM  TAPSE: 2 8 cm    PW  E': 0 1 m/s  E/E': 11 7  MV A [place]: 1 m/s  MV Dec [place]: 4 1 m/s2  MV DecT: 265 9 ms  MV E [place]: 1 1 m/s  MV E/A Ratio: 1 2  MV PHT: 77 1 ms  MVA By PHT: 2 9 cm2    [facility]    Prepared and electronically signed by    Moni Cabrera MD  Signed 2018 19:42:48         Meds/Allergies   all current active meds have been reviewed  Prescriptions Prior to Admission   Medication    amLODIPine (NORVASC) 10 mg tablet    aspirin 81 MG tablet    amLODIPine (NORVASC) 2 5 mg tablet    levothyroxine 100 mcg tablet              Assessment and Plan:  Chest pain  -troponin negative x 3, EKG unremarkable   -Previous echo 2016 EF 60%, no RWMA, grade 1 diastolic dysfunction, mild AI, mild MR    -repeat this admission EF 60% without regional wall motion abnormality, moderate concentric hypertrophy with grade 2 diastolic dysfunction, mild AI  -suspect that chest pain is primarily due to elevated BP, however patient does have multiple risk factors for CAD  Stress test pending     Hypertension-  pressure better controlled today  Echocardiogram showed moderate LVH     Obesity-weight loss encouraged to improve cardiovascular risk profile     Tobacco abuse-cardiovascular risk associated with continued tobacco use discussed   Smoking cessation strongly advised    ** Please Note: Dragon 360 Dictation voice to text software may have been used in the creation of this document   **

## 2018-04-11 NOTE — CASE MANAGEMENT
[address] [facility] in the [facility] by Gerardo Blackman for 2017  Network Utilization Review Department  Phone: [phone]; Fax [phone]  ATTENTION: The Network Utilization Review Department is now centralized for our 7 Facilities  Please call with any questions or concerns to [phone] and carefully follow the prompts so that you are directed to the right person  All voicemails are confidential  Fax any determinations, approvals, denials, and requests for initial or continue stay review clinical to [phone]  Due to HIGH CALL volume, it would be easier if you could please send faxed requests to expedite your requests and in part, help us provide discharge notifications faster   /////////////////////////////////////////////////////////////////////////////////////////////////////////////////      Continued Stay Review   For DOS  4/8   thru 4/10    ADMIT OBS ON 4/8/18  @  2128  CHANGED TO INPT ON 4/11/18 @  0856  - due to IV meds required for HA relief and bp elevation + ongoing adjustments in daily meds for bp/HA control     TSH   51 552     93 888   Free T4   0 70  C-reactive protein  8 7  ESR  34   HCL  27  LDL  114      4/8/2018  Bp's:   210/98     218/102    Ferol Bj Ferol Bj 186/91   Ferol Bj Ferol Bj 154/71   Tele = SR   Tylenol, ASA, catapres 0 2 mg x1  labetalol 10 MG IV x2;    Reglan IV x1  MS IV x1  IVF NS bolus then continuous @ 125      4/9/2018  Bp's   187/98   Ferol Bj    177/78        195/89     IVF @ 125  MS IV x1  fioricet prn given x2  Daily ASA, norvasc, atenolol   Ultram po @ 0025  Catapres changed to 0 3 mg po q 8 hrs  Synthroid po daily  Benadryl IV X1  Mag sulfate IV q 24 hrs initiated  Synthroid 100 mcg po daily initiated      4/9/2018  NEUROLOGY  38 y o  female who  was admitted last night with acute chest pain, and severe headache left-sided noted to have hypertensive crisis and admitted for further management    H/o thyroid disease hypertension, obesity, does suffer from headaches for the last 1 to 1-1/2 years,  TIA ,  renal calculus and admits that she is not very compliant with her blood pressure medications   1 year ago she did have an MRI of the brain and was told to have a TIA  Patient did have a CTA of the head and neck upon admission 4/8  which was unremarkable  Assessment:  1  Chronic migraine headaches,  2  Accelerated hypertension  3  Possible UTI  4  History of TIA  5  Chest pain   Plan: At this time a lengthy discussion occurred with the patient, CT scan of the brain was reviewed by myself was unremarkable, CT of the head was also unremarkable patient did have an MRI of the brain done 1 year ago, will treat her for chronic migraine headaches with migraine prophylactic agents, will avoid Triptan and until her chest pain is ruled out as not being cardiac in origin, patient also morbidly obese, doubt headache pattern is consistent with pseudotumor cerebri  Will follow up this patient with you  Her neuro exam is nonfocal and I do not see any indication for a repeat MRI at this time  Tight blood pressure control is recommended, patient also to undergo cardiac workup      4/10/2018  bp's   170/87    Landaverde Elsi Landaverde Elsi 181/98    Landaverde Elsi Landaverde Elsi    177/94   Landaverde Elsi     165/85   IVF dc'ed    topamax 50 @ hs initiated  fioricet (PRN)  Given x3  hydralizine IV @ 8391  Depacon IV @ 1149  Imitrex sq @ 1926    4/10/2018   CARDIOLOGY  Chest pain  -troponin negative x 3, EKG unremarkable   -Previous echo 12/2016 EF 60%, no RWMA, grade 1 diastolic dysfunction, mild AI, mild MR    -repeat this admission EF 60% without regional wall motion abnormality, moderate concentric hypertrophy with grade 2 diastolic dysfunction, mild AI  -suspect that chest pain is primarily due to elevated BP, however patient does have multiple risk factors for CAD  Stress test to be done tomorrow    Hypertension-  pressure better controlled today  Echocardiogram showed moderate LVH   Obesity-weight loss encouraged to improve cardiovascular risk profile   Tobacco abuse-cardiovascular risk associated with continued tobacco use discussed   Smoking cessation strongly advised       4/10/2018  INTERNAL MEDICINE  Headache   Assessment & Plan     Intractable  Questionable migraine associated with menstruation or due to hypertension or history of TIA  ? CTA reviewed, no acute findings  ? Headache protocol  ? Neurology consult for further management - appreciate recommendations  I discussed with Dr Renée Bowser this morning, will give IV Depacon 500 mg x1 dose, start Topamax 25 mg daily  She has already received 2 doses of IV magnesium  ? Discontinue Fioricet as this is not helping and she is for stress test  ? Blood pressure is better, without improvement of the headache             Hypertensive urgency   Assessment & Plan     History of hypertension  Associated with headaches  ? Noncompliant with BP meds  Clonidine increased to 0 3 mg t i d   ? Continue Norvasc   ? P r n  hydralazine  ? Cardiology consult appreciated, for stress test tomorrow          Chest pain   Assessment & Plan     Stabbing with pressure  8/10  Nonradiating  ? Has associated risk factors, previous MI, TIA hypertension, obesity, family history and chronic smoker  ? Troponin (-), continue telemetry  ? For stress test tomorrow, echo          Dependence on nicotine from cigarettes   Assessment & Plan     Smoking cessation    Nicotine patch           Obesity   Assessment & Plan     Weight loss, lifestyle modification, dietary measures initiated and encourage           Thyroid disease   Assessment & Plan     TSH significantly elevated > 90, will assess free T4, though patient is already on Synthroid  She freely admits that she is noncompliant with her medication in terms of taking it regularly or taking it as prescribed on an empty stomach  ? Continue current dose levothyroxine  ? Counseling given to the patient          4/10  NEUROLOGY  Assessment:  1  Refractory headaches , with migrainous features, most likely status migrainosus, other etiologies should be ruled out such as pituitary adenoma and pseudotumor cerebri    Plan: Will recommend MRI of the brain be done tonight with and without contrast, serum prolactin level will be requested, patient also has a significantly elevated TSH level in spite of being on levothyroxine, and if her workup is negative will get a spinal tap tomorrow for pseudotumor cerebri  In the meanwhile will try thorazine 50 mg IM for the headache to night  Patient will be started on Topamax to night         4/11/2018  bp's    162/84        128/64       Medications:   Scheduled Meds:   Current Facility-Administered Medications:  amLODIPine 10 mg Oral Daily ALVIN Pruitt    aspirin 81 mg Oral Daily ALVIN Pruitt    atenolol 25 mg Oral Daily Tierra Landers MD    cloNIDine 0 3 mg Oral Q8H [address] Diana Joiner MD    enoxaparin 40 mg Subcutaneous Daily ALVIN Pruitt    hydrALAZINE 5 mg Intravenous Q6H PRN Milton Beth PA-C    levothyroxine 100 mcg Oral Early Morning ALVIN Pruitt    magnesium sulfate 2 g Intravenous Q24H [address] ALVIN Pruitt Last Rate: Stopped (04/10/18 0935)   nicotine 1 patch Transdermal Daily ALVIN Pruitt    topiramate 50 mg Oral HS Tierra Landers MD          Discharge Plan: tbd

## 2018-04-12 VITALS
RESPIRATION RATE: 18 BRPM | DIASTOLIC BLOOD PRESSURE: 60 MMHG | BODY MASS INDEX: 44.89 KG/M2 | HEART RATE: 65 BPM | SYSTOLIC BLOOD PRESSURE: 125 MMHG | OXYGEN SATURATION: 100 % | WEIGHT: 286 LBS | TEMPERATURE: 98.4 F | HEIGHT: 67 IN

## 2018-04-12 PROCEDURE — 99233 SBSQ HOSP IP/OBS HIGH 50: CPT | Performed by: PSYCHIATRY & NEUROLOGY

## 2018-04-12 PROCEDURE — 99239 HOSP IP/OBS DSCHRG MGMT >30: CPT | Performed by: PHYSICIAN ASSISTANT

## 2018-04-12 PROCEDURE — 99232 SBSQ HOSP IP/OBS MODERATE 35: CPT | Performed by: INTERNAL MEDICINE

## 2018-04-12 RX ORDER — BUTALBITAL, ACETAMINOPHEN AND CAFFEINE 50; 325; 40 MG/1; MG/1; MG/1
1 TABLET ORAL ONCE
Status: COMPLETED | OUTPATIENT
Start: 2018-04-12 | End: 2018-04-12

## 2018-04-12 RX ORDER — ATENOLOL 25 MG/1
25 TABLET ORAL DAILY
Qty: 30 TABLET | Refills: 0 | Status: SHIPPED | OUTPATIENT
Start: 2018-04-13 | End: 2019-04-16 | Stop reason: SDUPTHER

## 2018-04-12 RX ORDER — DIPHENHYDRAMINE HYDROCHLORIDE 50 MG/ML
25 INJECTION INTRAMUSCULAR; INTRAVENOUS EVERY 6 HOURS PRN
Status: DISCONTINUED | OUTPATIENT
Start: 2018-04-12 | End: 2018-04-12

## 2018-04-12 RX ORDER — SUMATRIPTAN 100 MG/1
100 TABLET, FILM COATED ORAL ONCE AS NEEDED
Qty: 9 TABLET | Refills: 0 | Status: SHIPPED | OUTPATIENT
Start: 2018-04-12 | End: 2019-09-29 | Stop reason: ALTCHOICE

## 2018-04-12 RX ORDER — CLONIDINE HYDROCHLORIDE 0.3 MG/1
0.3 TABLET ORAL EVERY 8 HOURS SCHEDULED
Qty: 90 TABLET | Refills: 0 | Status: SHIPPED | OUTPATIENT
Start: 2018-04-12 | End: 2019-04-16 | Stop reason: SDUPTHER

## 2018-04-12 RX ORDER — NICOTINE 21 MG/24HR
1 PATCH, TRANSDERMAL 24 HOURS TRANSDERMAL DAILY
Qty: 28 PATCH | Refills: 0 | Status: SHIPPED | OUTPATIENT
Start: 2018-04-13 | End: 2019-09-29 | Stop reason: ALTCHOICE

## 2018-04-12 RX ORDER — DIPHENHYDRAMINE HYDROCHLORIDE 50 MG/ML
25 INJECTION INTRAMUSCULAR; INTRAVENOUS ONCE
Status: COMPLETED | OUTPATIENT
Start: 2018-04-12 | End: 2018-04-12

## 2018-04-12 RX ORDER — TOPIRAMATE 50 MG/1
50 TABLET, FILM COATED ORAL
Qty: 30 TABLET | Refills: 0 | Status: SHIPPED | OUTPATIENT
Start: 2018-04-12 | End: 2019-09-29 | Stop reason: ALTCHOICE

## 2018-04-12 RX ORDER — SUMATRIPTAN 25 MG/1
25 TABLET, FILM COATED ORAL ONCE AS NEEDED
Qty: 9 TABLET | Refills: 0 | Status: SHIPPED | OUTPATIENT
Start: 2018-04-12 | End: 2018-04-12

## 2018-04-12 RX ADMIN — ATENOLOL 25 MG: 25 TABLET ORAL at 09:05

## 2018-04-12 RX ADMIN — DIPHENHYDRAMINE HYDROCHLORIDE 25 MG: 50 INJECTION, SOLUTION INTRAMUSCULAR; INTRAVENOUS at 00:30

## 2018-04-12 RX ADMIN — AMLODIPINE BESYLATE 10 MG: 10 TABLET ORAL at 09:05

## 2018-04-12 RX ADMIN — ASPIRIN 81 MG 81 MG: 81 TABLET ORAL at 09:06

## 2018-04-12 RX ADMIN — BUTALBITAL, ACETAMINOPHEN, AND CAFFEINE 1 TABLET: 50; 325; 40 TABLET ORAL at 00:30

## 2018-04-12 RX ADMIN — ENOXAPARIN SODIUM 40 MG: 40 INJECTION SUBCUTANEOUS at 09:06

## 2018-04-12 RX ADMIN — CLONIDINE HYDROCHLORIDE 0.3 MG: 0.1 TABLET ORAL at 05:44

## 2018-04-12 RX ADMIN — LEVOTHYROXINE SODIUM 100 MCG: 100 TABLET ORAL at 05:44

## 2018-04-12 NOTE — PROGRESS NOTES
Patient refusing to have bed alarm on at this time and is refusing to sign a paper for It  Patient pushes call bell and gets up immediately to the bathroom without waiting for help  Patient educated on the importance of waiting for help because we do not want her to fall and get hurt  Patient stated that "i'm not going to pee on myself to wait for you "  Will continue to monitor and encourage patient to use call bell at this time

## 2018-04-12 NOTE — PROGRESS NOTES
Progress Note - Neurology   [facility] - MARIANNE WEBBER 38 y o  female MRN: [id number]  Unit/Bed#: -01 Encounter: [id number]      Subjective:   Patient seen at bedside, alert awake oriented, with significant resolution of her headaches  She denies any other neurological symptoms, and denies any headaches nausea vomiting at this time  Patient was started on topiramate and has been titrated up to 50 mg at bedtime and also saw some degree of relief with the help of sumatriptan injections  MRI of the brain showed evidence of chronic T2 white matter changes, with no evidence of any pituitary adenoma, she was scheduled to also have a spinal tap done today with since the headache is resolved visual cancel the spinal tap  ROS: 12 system cued query was unchanged from org  consult note  Vitals:   Vitals:    04/12/18 0700   BP: 125/60   Pulse: 65   Resp: 18   Temp: 98 4 °F (36 9 °C)   SpO2: 100%   ,Body mass index is 44 79 kg/m²      MEDS:    Current Facility-Administered Medications:     amLODIPine (NORVASC) tablet 10 mg, 10 mg, Oral, Daily, ALVIN Pruitt, 10 mg at 04/11/18 1339    aspirin chewable tablet 81 mg, 81 mg, Oral, Daily, ALVIN Pruitt, 81 mg at 04/11/18 0814    atenolol (TENORMIN) tablet 25 mg, 25 mg, Oral, Daily, Zandra Sawyer MD, 25 mg at 04/11/18 0814    cloNIDine (CATAPRES) tablet 0 3 mg, 0 3 mg, Oral, Q8H [address], Mora Ward MD, 0 3 mg at 04/12/18 0544    enoxaparin (LOVENOX) subcutaneous injection 40 mg, 40 mg, Subcutaneous, Daily, ALVIN Pruitt, 40 mg at 04/11/18 0815    hydrALAZINE (APRESOLINE) injection 5 mg, 5 mg, Intravenous, Q6H PRN, Deena Amaro PA-C, 5 mg at 04/10/18 1547    levothyroxine tablet 100 mcg, 100 mcg, Oral, Early Morning, ALVIN Pruitt, 100 mcg at 04/12/18 0544    nicotine (NICODERM CQ) 21 mg/24 hr TD 24 hr patch 1 patch, 1 patch, Transdermal, Daily, ALVIN Pruitt    topiramate (TOPAMAX) tablet 50 mg, 50 mg, Oral, HS, Oscar Hall, MD, 50 mg at 04/11/18 2122    Physical Exam:  General appearance: alert, appears stated age and cooperative  Head: Normocephalic, without obvious abnormality, atraumatic    Neurologic:  Patient is alert awake or did, higher functions are intact she is quite cheerful this morning and no evidence of any cranial nerves motor or sensory deficits were noted  Lab Results: I have personally reviewed pertinent reports  Imaging Studies: I have personally reviewed pertinent reports  Assessment:  1  Chronic migraine headaches with status migrainosus which is resolved at this time  Plan:  Patient may go home today on Topamax 50 mg at bedtime, sumatriptan 100 mg to be taken up to 2 times a day, tight blood pressure control, and will follow up with me as an outpatient in the next 3-4 weeks  At this time a lengthy discussion occurred regarding migraines, possible triggers, advised to avoid triggers  4/12/2018,9:06 AM    Dictation voice to text software has been used in the creation of this document  Please consider this in light of any contextual or grammatical errors

## 2018-04-12 NOTE — DISCHARGE SUMMARY
Discharge- Yosef Ferrer 1975, 38 y o  female MRN: [id number]    Unit/Bed#: -01 Encounter: [id number]    Primary Care Provider: Jessee Mazariegos DO   Date and time admitted to hospital: 4/8/2018  4:40 PM        * Headache   Assessment & Plan    Intractable on admission, status migrainous  · CTA with no acute findings  MRI brain without acute findings, no pituitary adenoma  · Neurology consulted for further management - appreciate recommendations  · As migraine has now resolved, will continue with Topamax 50 mg at night, Imitrex 100 mg as needed, tight blood pressure control, and weight loss  · Fioricet was not helpful, will not prescribe  · Follow-up in the clinic with Dr Patrice Shea in 3-4 weeks        Hypertensive urgency   Assessment & Plan    History of hypertension  Associated with headaches in the past  · Noncompliant with BP meds  Clonidine increased to 0 3 mg t i d , atenolol 25 mg started, continue Norvasc 10 mg  · Stress test (-)  · Blood pressures are controlled, patient was counseled on need to continue medications as prescribed, DASH diet, and weight loss  · Smoking cessation strongly advised        Chest pain   Assessment & Plan    Stabbing with pressure  8/10  Nonradiating  · Has associated risk factors, previous MI, TIA hypertension, obesity, family history and chronic smoker  · Troponin (-), telemetry unremarkable  · Echo with preserved EF and grade 2 diastolic dysfunction  · Stress test normal  · As above, continue with aggressive hypertension control, medication adherence, diet and weight loss        Dependence on nicotine from cigarettes   Assessment & Plan    Smoking cessation  Nicotine patch ordered on discharge        Obesity   Assessment & Plan    Weight loss, lifestyle modification, dietary measures initiated and encourage  Thyroid disease   Assessment & Plan    TSH significantly elevated > 90, low T4, though patient is already on Synthroid    She freely admits that she is noncompliant with her medication in terms of taking it regularly or taking it as prescribed on an empty stomach  · Continue current dose levothyroxine - she needs repeat TFTs in 4-6 weeks, outpatient  · Counseling given to the patient on proper administration of medication              Resolved Problems  Date Reviewed: 4/12/2018    None          Consultations During Hospital Stay:  · Neurology  · Cardiology    Procedures Performed:     · CXR no acute findings  · CTA head neck no acute findings  · MRI brain with pituitary slices no acute findings  · Echocardiogram preserved EF 22%, grade 2 diastolic dysfunction, moderate LVH  · Nuclear medicine stress test normal    Significant Findings / Test Results:     · Total cholesterol 61, , HDL 27, triglycerides 100  · BMP essentially within normal limits  · Troponin (-) x4  · CBC within limits  · A1c 5 6%  · TSH 93 888, free T4 slightly suppressed 0 7  · Urine pregnancy test (-)  · UA (+), culture (-)    Incidental Findings:   · None     Test Results Pending at Discharge (will require follow up): · None     Outpatient Tests Requested:  · Follow-up Neurology, consider LP as an outpatient if headaches persist or worsen again  · Follow-up PCP  · Follow-up Cardiology for blood pressure management    Complications:  Migraine    Reason for Admission:  Chest pain, migraine    Hospital Course:     Donal Urrutia is a 38 y o  female patient who originally presented to the hospital on 4/8/2018 due to chest pain and migraine  Patient's past medical history significant for obesity, hypertension, TIA, hypothyroidism, nicotine dependence, family history CAD  Patient was brought in for further risk stratification, ACS rule out  Cardiology consulted, she underwent echo and stress test which showed no acute findings  She had hypertensive urgency throughout her hospitalization, partly secondary to noncompliance with medications prior to admission    Her medications were titrated and she is sent home on clonidine 0 3 mg t i d , atenolol 25 mg daily, Norvasc 10 mg daily  Blood pressure is now well controlled  She will follow up outpatient with Cardiology  Patient also presented with headache, neurology consulted for status migrainous  CT head and MRI brain did not show any acute findings  Headache did improve after initiation of Topamax, she did receive IV Depacon x2 as well as Thorazine, Reglan, magnesium, , and Imitrex x1  With all these measures, her headache did finally improved  Initially were going to plan for a lumbar puncture, however with improvement of the symptoms, she will go home with continued Topamax and Imitrex as needed, follow up with Dr Abel Lujan in 3-4 weeks  If persistent headaches at that time, they will re-evaluate the need for lumbar puncture  Please see above list of diagnoses and related plan for additional information  Condition at Discharge: good     Discharge Day Visit / Exam:     Subjective:  Patient seen examined, her headache is much better  She denies any focal neurologic changes  No chest pain, palpitations or shortness of breath  She is feeling well, she understands that she needs to be on her medications as prescribed and taking them routinely for them to benefit  She reports that she will not smoke with nicotine patch  Vitals: Blood Pressure: 125/60 (04/12/18 0700)  Pulse: 65 (04/12/18 0700)  Temperature: 98 4 °F (36 9 °C) (04/12/18 0700)  Temp Source: Oral (04/12/18 0700)  Respirations: 18 (04/12/18 0700)  Height: 5' 7" (170 2 cm) (04/08/18 2210)  Weight - Scale: 130 kg (286 lb) (04/08/18 2210)  SpO2: 100 % (04/12/18 0700)  Exam:   Physical Exam   Constitutional: She is oriented to person, place, and time  She appears well-developed  No distress     Morbidly obese  female in no apparent distress, seated comfortably, conversive   Eyes: EOM are normal    Cardiovascular: Normal rate, regular rhythm, S1 normal, S2 normal, normal heart sounds and intact distal pulses  No murmur heard  Pulmonary/Chest: Effort normal and breath sounds normal  No respiratory distress  She has no wheezes  She has no rales  Abdominal: Soft  Bowel sounds are normal  She exhibits no distension  Obese   Musculoskeletal: She exhibits no edema  Neurological: She is alert and oriented to person, place, and time  No cranial nerve deficit  Coordination normal    Psychiatric: She has a normal mood and affect  Her behavior is normal    Nursing note and vitals reviewed  Discussion with Family:  No family members present, patient comfortable with the plan of care    Discharge instructions/Information to patient and family:   See after visit summary for information provided to patient and family  Provisions for Follow-Up Care:  See after visit summary for information related to follow-up care and any pertinent home health orders  Disposition:     Home    For Discharges to [facility] SNF:   · Not Applicable to this Patient - Not Applicable to this Patient    Planned Readmission:  None     Discharge Statement:  I spent approximately 30 minutes discharging the patient  This time was spent on the day of discharge  I had direct contact with the patient on the day of discharge  Greater than 50% of the total time was spent examining patient, answering all patient questions, arranging and discussing plan of care with patient as well as directly providing post-discharge instructions  Additional time then spent on discharge activities  Discharge Medications:  See after visit summary for reconciled discharge medications provided to patient and family        ** Please Note: This note has been constructed using a voice recognition system **

## 2018-04-12 NOTE — ASSESSMENT & PLAN NOTE
Intractable on admission, status migrainous  · CTA with no acute findings  MRI brain without acute findings, no pituitary adenoma  · Neurology consulted for further management - appreciate recommendations      · As migraine has now resolved, will continue with Topamax 50 mg at night, Imitrex 100 mg as needed, tight blood pressure control, and weight loss  · Fioricet was not helpful, will not prescribe  · Follow-up in the clinic with Dr Heather Anaya in 3-4 weeks

## 2018-04-12 NOTE — MALNUTRITION/BMI
This medical record reflects one or more clinical indicators suggestive of morbid obesity  BMI Findings:  BMI Classifications: Morbid Obesity 45-49 9 (related to energy intake exceeding energy output over time as evidenced by BMI)     Body mass index is 44 79 kg/m²  See Nutrition note dated 4/12/18 for additional details  Completed nutrition assessment is viewable in the nutrition documentation

## 2018-04-12 NOTE — PROGRESS NOTES
Progress Note - Cardiology     [facility] - MARIANNE WEBBER 38 y o  female MRN: [id number]  Unit/Bed#: [facility] [place] Encounter: [id number]      Subjective:   Headache resolved  No complaints of chest pain  Objective:   Vitals:  Vitals:    04/12/18 0700   BP: 125/60   Pulse: 65   Resp: 18   Temp: 98 4 °F (36 9 °C)   SpO2: 100%       Body mass index is 44 79 kg/m²  Systolic (12VHL), SHARRON:060 , Min:102 , CKO:747     Diastolic (78UVF), YMN:21, Min:60, Max:92    No intake or output data in the 24 hours ending 04/12/18 1251  Weight (last 2 days) before discharge     None          PHYSICAL EXAM:  General: Patient is not in acute distress  Laying comfortably in the bed  Head: Normocephalic  Atraumatic  HEENT: Both pupils normal sized, round and reactive to light  Nonicteric  Neck: JVP not raised  Trachea central  Respiratory: Bilateral bronchovascular breath sounds with no crackles or rhonchi  Cardiovascular: RRR  S1 and S2 normal  No murmur, rub or gallop  GI: Abdomen soft, nontender  No guarding or rigidity  Liver and spleen not palpable  Neurologic: Patient is awake, alert, responding to command   Moving all extremities  Integumentary:  No skin rash  Extremities: No clubbing, cyanosis or edema    LABORATORY RESULTS:    Results from last 7 days  Lab Units 04/09/18 0323 04/08/18 2355 04/08/18 2017   TROPONIN I ng/mL <0 02 <0 02 <0 02     CBC with diff:   Results from last 7 days  Lab Units 04/09/18  0324 04/08/18  2355 04/08/18  1651   WBC Thousand/uL 9 61  --  12 27*   HEMOGLOBIN g/dL 12 3  --  14 0   HEMATOCRIT % 38 3  --  43 6   MCV fL 87  --  87   PLATELETS Thousands/uL 292 300 336   MCH pg 28 1  --  28 0   MCHC g/dL 32 1  --  32 1   RDW % 13 5  --  13 5   MPV fL 10 1 10 1 10 0   NRBC AUTO /100 WBCs  --   --  0       CMP:  Results from last 7 days  Lab Units 04/09/18 0324 04/08/18  1651   SODIUM mmol/L 138 137   POTASSIUM mmol/L 3 7 3 6   CHLORIDE mmol/L 106 102   CO2 mmol/L 24 27   ANION GAP mmol/L 8 8   BUN mg/dL 7 6 CREATININE mg/dL 0 77 0 91   GLUCOSE RANDOM mg/dL 105 100   CALCIUM mg/dL 8 2* 8 4   AST U/L  --  43   ALT U/L  --  34   ALK PHOS U/L  --  128*   TOTAL PROTEIN g/dL  --  8 4*   BILIRUBIN TOTAL mg/dL  --  0 20   EGFR ml/min/1 73sq m 112 91       BMP:  Results from last 7 days  Lab Units 18  0324 18  1651   SODIUM mmol/L 138 137   POTASSIUM mmol/L 3 7 3 6   CHLORIDE mmol/L 106 102   CO2 mmol/L 24 27   BUN mg/dL 7 6   CREATININE mg/dL 0 77 0 91   GLUCOSE RANDOM mg/dL 105 100   CALCIUM mg/dL 8 2* 8 4         Results from last 7 days  Lab Units 18  1651   NT-PRO BNP pg/mL 88          Results from last 7 days  Lab Units 18  0324   MAGNESIUM mg/dL 1 8         Results from last 7 days  Lab Units 18  0323   HEMOGLOBIN A1C % 5 6         Results from last 7 days  Lab Units 18  1633 18  0324   TSH 3RD GENERATON uIU/mL  --  93 888*   FREE T4 ng/dL 0 70*  --              Lipid Profile:   Lab Results   Component Value Date    CHOL 161 2018     Lab Results   Component Value Date    HDL 27 (L) 2018     Lab Results   Component Value Date    LDLCALC 114 (H) 2018     Lab Results   Component Value Date    TRIG 100 2018       Cardiac testing:   Results for orders placed during the hospital encounter of 18   Echo complete with contrast if indicated    Narrative [address] [place] [id number] [phone]    Transthoracic Echocardiogram  2D, M-mode, Doppler, and Color Doppler    Study date:  2018    Patient: Sung FORBES  MR number: [id number]  Account number: [de-identified]  : 1975  Age: 38 years  Gender: Female  Status: Outpatient  Location: Bedside  Height: 67 in  Weight: 286 lb  BP: 187/ 98 mmHg    Indications: Chest Pain    Diagnoses: R07 9 - Chest pain, unspecified    Sonographer:  MOR Ventura,RDCS  Interpreting Physician:  Lia Sen MD  Referring Physician:  Swathi Graves CRNP  Group:  [facility]    SUMMARY    PROCEDURE INFORMATION:  This was a technically difficult study  Echocardiographic views were limited due to poor acoustic window availability, decreased penetration, and lung interference  LEFT VENTRICLE:  Systolic function was normal  Ejection fraction was estimated to be 60 %  There were no regional wall motion abnormalities  Wall thickness was moderately increased  There was moderate concentric hypertrophy  Features were consistent with a pseudonormal left ventricular filling pattern, with concomitant abnormal relaxation and increased filling pressure (grade 2 diastolic dysfunction)  RIGHT VENTRICLE:  The size was normal   Systolic function was normal     MITRAL VALVE:  There was trace regurgitation  AORTIC VALVE:  There was mild regurgitation  TRICUSPID VALVE:  There was trace regurgitation  Pulmonary artery systolic pressure was within the normal range  PULMONIC VALVE:  There was trace regurgitation  HISTORY: PRIOR HISTORY: Chest pain, Hypertensive urgency, Headache, Smoker, Obesity, Myocardial Infarction, Transient ischemic Attack, Hypertension    PROCEDURE: The procedure was performed at the bedside  This was a routine study  The transthoracic approach was used  The study included complete 2D imaging, M-mode, complete spectral Doppler, and color Doppler  The heart rate was 69 bpm,  at the start of the study  Images were obtained from the parasternal, apical, subcostal, and suprasternal notch acoustic windows  Echocardiographic views were limited due to poor acoustic window availability, decreased penetration, and  lung interference  This was a technically difficult study  LEFT VENTRICLE: Size was normal  Systolic function was normal  Ejection fraction was estimated to be 60 %  There were no regional wall motion abnormalities  Wall thickness was moderately increased  There was moderate concentric  hypertrophy   No evidence of apical thrombus  DOPPLER: Features were consistent with a pseudonormal left ventricular filling pattern, with concomitant abnormal relaxation and increased filling pressure (grade 2 diastolic dysfunction)  RIGHT VENTRICLE: The size was normal  Systolic function was normal  Wall thickness was normal     LEFT ATRIUM: Size was normal     RIGHT ATRIUM: Size was normal     MITRAL VALVE: Valve structure was normal  There was normal leaflet separation  DOPPLER: The transmitral velocity was within the normal range  There was no evidence for stenosis  There was trace regurgitation  AORTIC VALVE: The valve was trileaflet  Leaflets exhibited normal thickness and normal cuspal separation  DOPPLER: Transaortic velocity was within the normal range  There was no evidence for stenosis  There was mild regurgitation  TRICUSPID VALVE: The valve structure was normal  There was normal leaflet separation  DOPPLER: The transtricuspid velocity was within the normal range  There was no evidence for stenosis  There was trace regurgitation  Pulmonary artery  systolic pressure was within the normal range  PULMONIC VALVE: Leaflets exhibited normal thickness, no calcification, and normal cuspal separation  DOPPLER: The transpulmonic velocity was within the normal range  There was trace regurgitation  PERICARDIUM: There was no pericardial effusion  The pericardium was normal in appearance  AORTA: The root exhibited normal size  SYSTEMIC VEINS: IVC: The inferior vena cava was normal in size      SYSTEM MEASUREMENT TABLES    2D  %FS: 37 3 %  Ao Diam: 2 6 cm  EDV(Teich): 86 3 ml  EF(Teich): 67 5 %  ESV(Teich): 28 ml  IVSd: 1 5 cm  LA Area: 19 5 cm2  LA Diam: 3 6 cm  LVEDV MOD A4C: 100 9 ml  LVEF MOD A4C: 65 2 %  LVESV MOD A4C: 35 1 ml  LVIDd: 4 4 cm  LVIDs: 2 7 cm  LVLd A4C: 7 7 cm  LVLs A4C: 7 cm  LVPWd: 1 5 cm  RA Area: 19 2 cm2  RVIDd: 3 6 cm  SV MOD A4C: 65 8 ml  SV(Teich): 58 3 ml    CW  AR Dec [place]: 3 6 m/s2  AR Dec Time: 1246 9 ms  AR PHT: 361 6 ms  AR Vmax: 4 5 m/s  AR maxP 8 mmHg  TR Vmax: 2 7 m/s  TR maxP 7 mmHg    MM  TAPSE: 2 8 cm    PW  E': 0 1 m/s  E/E': 11 7  MV A [place]: 1 m/s  MV Dec [place]: 4 1 m/s2  MV DecT: 265 9 ms  MV E [place]: 1 1 m/s  MV E/A Ratio: 1 2  MV PHT: 77 1 ms  MVA By PHT: 2 9 cm2    [facility]    Prepared and electronically signed by    Moni Cabrera MD  Signed 2018 19:42:48         Meds/Allergies   all current active meds have been reviewed  No prescriptions prior to admission  Assessment and Plan:  Chest pain  -troponin negative x 3, EKG unremarkable   -Previous echo 2016 EF 60%, no RWMA, grade 1 diastolic dysfunction, mild AI, mild MR    -repeat this admission EF 60% without regional wall motion abnormality, moderate concentric hypertrophy with grade 2 diastolic dysfunction, mild AI  -stress test was without significant perfusion abnormality     Hypertension-  now stable  Echocardiogram showed moderate LVH     Obesity-weight loss encouraged to improve cardiovascular risk profile     Tobacco abuse-cardiovascular risk associated with continued tobacco use discussed   Smoking cessation strongly advised    Patient may be discharged from cardiac standpoint      ** Please Note: Dragon 360 Dictation voice to text software may have been used in the creation of this document   **

## 2018-04-12 NOTE — ASSESSMENT & PLAN NOTE
Stabbing with pressure  8/10  Nonradiating  · Has associated risk factors, previous MI, TIA hypertension, obesity, family history and chronic smoker    · Troponin (-), telemetry unremarkable  · Echo with preserved EF and grade 2 diastolic dysfunction  · Stress test normal  · As above, continue with aggressive hypertension control, medication adherence, diet and weight loss

## 2018-04-12 NOTE — PLAN OF CARE
CARDIOVASCULAR - ADULT     Maintains optimal cardiac output and hemodynamic stability Adequate for Discharge     Absence of cardiac dysrhythmias or at baseline rhythm Adequate for Discharge        DISCHARGE PLANNING     Discharge to home or other facility with appropriate resources Adequate for Discharge        DISCHARGE PLANNING - CARE MANAGEMENT     Discharge to post-acute care or home with appropriate resources Adequate for Discharge        Knowledge Deficit     Patient/family/caregiver demonstrates understanding of disease process, treatment plan, medications, and discharge instructions Adequate for Discharge        PAIN - ADULT     Verbalizes/displays adequate comfort level or baseline comfort level Adequate for Discharge        Potential for Falls     Patient will remain free of falls Adequate for Discharge        SAFETY ADULT     Maintain or return to baseline ADL function Adequate for Discharge     Maintain or return mobility status to optimal level Adequate for Discharge

## 2018-04-12 NOTE — DISCHARGE INSTRUCTIONS
Migraine Headache   WHAT YOU SHOULD KNOW:   A migraine is a severe headache  The pain can be so severe that it interferes with your daily activities  A migraine can last a few hours up to several days  The exact cause of migraines is not known  It may be caused by changes in your body chemicals and extra sensitive nerves in your brain  AFTER YOU LEAVE:   Medicines:  Take medicine as soon as you feel a migraine begin  · Pain medicine: You may need medicine to take away or decrease pain  You may need a doctor's order for this medicine  Do not wait until the pain is severe before you take your medicine  · Migraine medicines: These are used to help prevent a migraine or stop it once it starts  · Antinausea medicine: This medicine may be given to calm your stomach and to help prevent vomiting  They can also help relieve pain  · Take your medicine as directed  Call your healthcare provider if you think your medicine is not helping or if you have side effects  Tell him if you are allergic to any medicine  Keep a list of the medicines, vitamins, and herbs you take  Include the amounts, and when and why you take them  Bring the list or the pill bottles to follow-up visits  Carry your medicine list with you in case of an emergency  Manage your symptoms:   · Rest:  Rest in a dark, quiet room  This will help decrease your pain  · Ice:  Ice helps decrease pain  Use an ice pack or put crushed ice in a plastic bag  Cover the ice pack with a towel and place it on your head where it hurts for 15 to 20 minutes every hour  · Heat:  Heat helps decrease pain and muscle spasms  Use a small towel dampened with warm water or a heating pad, or sit in a warm bath  Apply heat on the area for 20 to 30 minutes every 2 hours  You may alternate heat and ice  Keep a headache diary:  Write down when your migraines start and stop  Include your symptoms and what you were doing when a migraine began   Record what you ate or drank for 24 hours before the migraine started  Describe the pain and where it hurts  Keep track of what you did to treat your migraine and whether it worked  Follow up with your primary healthcare provider or neurologist as directed:  Bring your headache diary with you when you see your primary healthcare provider  Write down your questions so you remember to ask them during your visits  Prevent another migraine:   · Do not smoke: If you smoke, it is never too late to quit  Tobacco smoke can trigger a migraine  It can also cause heart disease, lung disease, cancer, and other health problems  Quitting smoking will improve your health and the health of those around you  If you smoke, ask for information about how to stop  · Do not drink alcohol:  Alcohol can trigger a migraine  It can also interfere with the medicines used to treat your migraine  · Get regular exercise:  Exercise may help prevent migraines  Talk to your primary healthcare provider about the best exercise plan for you  · Manage stress:  Stress may trigger a migraine  Learn new ways to relax, such as deep breathing  · Stick to a sleep schedule:  Go to bed and get up at the same time each day  · Eat regular meals:  Include healthy foods such as include fruit, vegetables, whole-grain breads, low-fat dairy products, beans, lean meat, and fish  Avoid trigger foods like chocolate, hard cheese, and red wine  Foods that contain gluten, nitrates, MSG, or artificial sweeteners may also trigger migraines  Caffeine, which is often used to treat migraines, can also trigger them  Contact your primary healthcare provider or neurologist if:   · You have a fever  · Your migraines interfere with your daily activities  · Your medicines or treatments stop working  · You have questions about your condition or care    Seek care immediately or call 911 if:   · You have a headache that seems different or much worse than your usual migraine headache  · You have a severe headache with a fever or a stiff neck  · You have new problems with speech, vision, balance, or movement  · You feel like you are going to faint, you become confused, or you have a seizure  © 2014 [address] is for End User's use only and may not be sold, redistributed or otherwise used for commercial purposes  All illustrations and images included in CareNotes® are the copyrighted property of [employer]  or Gerardo Blackman  The above information is an  only  It is not intended as medical advice for individual conditions or treatments  Talk to your doctor, nurse or pharmacist before following any medical regimen to see if it is safe and effective for you  Sumatriptan (By mouth)   Sumatriptan (michelle-ma-TRIP-tan)  Treats migraine headaches  Brand Name(s): Imitrex, Migraine Pack, Migranow   There may be other brand names for this medicine  When This Medicine Should Not Be Used: This medicine is not right for everyone  Do not use if you had an allergic reaction to sumatriptan  Tell your doctor if you have heart or blood vessel problems, such as a history of heart attack, stroke, or heart rhythm problems  How to Use This Medicine:   Tablet  · Your doctor will tell you how much medicine to use  Do not use more than directed  · Swallow the tablet whole with water or other liquids  Do not crush, break, or chew it  · If this medicine does not help your headache at all, do not take more medicine  Call your doctor  · If your headache comes back or you do not get complete relief, wait at least 2 hours before you use another dose  If you feel you need to use the medicine more than 2 times in a day, call your doctor  · Read and follow the patient instructions that come with this medicine  Talk to your doctor or pharmacist if you have any questions  · Use sumatriptan only when you have a migraine   This medicine is not used on a regular schedule  · Store the medicine in a closed container at room temperature, away from heat, moisture, and direct light  Drugs and Foods to Avoid:   Ask your doctor or pharmacist before using any other medicine, including over-the-counter medicines, vitamins, and herbal products  · Do not use this medicine if you have taken another migraine headache medicine in the past 24 hours, such as another triptan or an ergot medicine  These medicines include almotriptan, dihydroergotamine, eletriptan, ergotamine, frovatriptan, methysergide, rizatriptan, or zolmitriptan  · Do not use this medicine if you have taken an MAO inhibitor in the past 14 days  · Tell your doctor if you are using medicine to treat depression  Warnings While Using This Medicine:   · Tell your doctor if you are pregnant, or if you have kidney disease, liver disease, diabetes, high blood pressure, high cholesterol, or a history of seizures  Tell your doctor if you have a family history of heart disease, a history of blood circulation problems (such as peripheral vascular disease), or if you smoke  · Do not breastfeed for at least 12 hours after you take this medicine  · This medicine should be used only for classic or common migraine headaches  It will not work for any other kind of headache or pain  · This medicine may cause the following problems:  ¨ Higher risk for abnormal heart rhythm, heart attack, or stroke  ¨ Tightness or discomfort in your chest, neck, or jaw  ¨ Spasms in the blood vessels, including Raynaud syndrome  ¨ Serotonin syndrome (more likely if used with medicine to treat depression)  ¨ High blood pressure  · Your headaches may become worse if you use this medicine for 10 or more days per month  Keep a journal and write down how often your headaches occur and how often you take this medicine  · This medicine may make you dizzy or drowsy   Do not drive or do anything else that could be dangerous until you know how this medicine affects you  · Call your doctor if your symptoms do not improve or if they get worse  · Keep all medicine out of the reach of children  Never share your medicine with anyone  Possible Side Effects While Using This Medicine:   Call your doctor right away if you notice any of these side effects:  · Allergic reaction: Itching or hives, swelling in your face or hands, swelling or tingling in your mouth or throat, chest tightness, trouble breathing  · Anxiety, restlessness, fever, sweating, muscle spasms, twitching, diarrhea, seeing or hearing things that are not there  · Chest pain, especially if it spreads to your arms, jaw, back, or neck, trouble breathing, unusual sweating, faintness  · Fast, pounding, or uneven heartbeat, dizziness  · Numbness, tingling, cramps, unexplained pain in your hands, arms, legs, or feet, color changes in your fingers or toes  · Seizure  · Severe stomach pain, bloody diarrhea, nausea, or vomiting  · Sudden severe headache (other than the one being treated)  · Tightness or discomfort in your chest, neck, or jaw  · Vision loss or vision changes that are not part of a usual migraine  If you notice other side effects that you think are caused by this medicine, tell your doctor  Call your doctor for medical advice about side effects  You may report side effects to FDA at 3-355-FDA-2152  © 2017 [address] Information is for End User's use only and may not be sold, redistributed or otherwise used for commercial purposes  The above information is an  only  It is not intended as medical advice for individual conditions or treatments  Talk to your doctor, nurse or pharmacist before following any medical regimen to see if it is safe and effective for you  DASH Eating Plan   WHAT YOU NEED TO KNOW:   The DASH (Dietary Approaches to Stop Hypertension) Eating Plan is designed to help prevent or lower high blood pressure   It can also help to lower LDL (bad) cholesterol and decrease your risk of heart disease  The plan is low in sodium, sugar, unhealthy fats, and total fat  It is high in potassium, calcium, magnesium, and fiber  These nutrients are added when you eat more fruits, vegetables, and whole grains  DISCHARGE INSTRUCTIONS:   Your sodium limit each day: Your dietitian will tell you how much sodium is safe for you to have each day  People with high blood pressure should have no more than 1,500 to 2,300 mg of sodium in a day  A teaspoon (tsp) of salt has 2,300 mg of sodium  This may seem like a difficult goal, but small changes to the foods you eat can make a big difference  Your healthcare provider or dietitian can help you create a meal plan that follows your sodium limit  How to limit sodium:   · Read food labels  Food labels can help you choose foods that are low in sodium  The amount of sodium is listed in milligrams (mg)  The % Daily Value (DV) column tells you how much of your daily needs are met by 1 serving of the food for each nutrient listed  Choose foods that have less than 5% of the DV of sodium  These foods are considered low in sodium  Foods that have 20% or more of the DV of sodium are considered high in sodium  Avoid foods that have more than 300 mg of sodium in each serving  Choose foods that say low-sodium, reduced-sodium, or no salt added on the food label  · Avoid salt  Do not salt food at the table, and add very little salt to foods during cooking  Use herbs and spices, such as onions, garlic, and salt-free seasonings to add flavor to foods  Try lemon or lime juice or vinegar to give foods a tart flavor  Use hot peppers or a small amount of hot pepper sauce to add a spicy flavor to foods  · Ask about salt substitutes  Ask your healthcare provider if you may use salt substitutes  Some salt substitutes have ingredients that can be harmful if you have certain health conditions      · Choose foods carefully at restaurants  Meals from restaurants, especially fast food restaurants, are often high in sodium  Some restaurants have nutrition information that tells you the amount of sodium in their foods  Ask to have your food prepared with less, or no salt  What you need to know about fats:   · Include healthy fats  Examples are unsaturated fats and omega-3 fatty acids  Unsaturated fats are found in soybean, canola, olive, or sunflower oil, and liquid and soft tub margarines  Omega-3 fatty acids are found in fatty fish, such as salmon, tuna, mackerel, and sardines  It is also found in flaxseed oil and ground flaxseed  · Avoid unhealthy fats  Do not eat unhealthy fats, such as saturated fats and trans fats  Saturated fats are found in foods that contain fat from animals  Examples are fatty meats, whole milk, butter, cream, and other dairy foods  It is also found in shortening, stick margarine, palm oil, and coconut oil  Trans fats are found in fried foods, crackers, chips, and baked goods made with margarine or shortening  Foods to include: With the DASH eating plan, you need to eat a certain number of servings from each food group  This will help you get enough of certain nutrients and limit others  The amount of servings you should eat depends on how many calories you need  Your dietitian can tell you how many calories you need  The number of servings listed next to the food groups below are for people who need about 2,000 calories each day    · Grains:  6 to 8 servings (3 of these servings should be whole-grain foods)    ¨ 1 slice of whole-grain bread     ¨ 1 ounce of dry cereal    ¨ ½ cup of cooked cereal, pasta, or brown rice    · Vegetables and fruits:  4 to 5 servings of fruits and 4 to 5 servings of vegetables    ¨ 1 medium fruit    ¨ ½ cup of frozen, canned (no added salt), or chopped fresh vegetables     ¨ ½ cup of fresh, frozen, dried, or canned fruit (canned in light syrup or fruit juice)    ¨ ½ cup of vegetable or fruit juice    · Dairy:  2 to 3 servings    ¨ 1 cup of nonfat (skim) or 1% milk    ¨ 1½ ounces of fat-free or low-fat cheese    ¨ 6 ounces of nonfat or low-fat yogurt    · Lean meat, poultry, and fish:  6 ounces or less    [employer] (chicken, turkey) with no skin    ¨ Fish (especially fatty fish, such as salmon, fresh tuna, or mackerel)    ¨ Lean beef and pork (loin, round, extra lean hamburger)    ¨ Egg whites and egg substitutes    · Nuts, seeds, and legumes:  4 to 5 servings each week    ¨ ½ cup of cooked beans and peas    ¨ 1½ ounces of unsalted nuts    ¨ 2 tablespoons of peanut butter or seeds    · Sweets and added sugars:  5 or less each week    ¨ 1 tablespoon of sugar, jelly, or jam    ¨ ½ cup of sorbet or gelatin    ¨ 1 cup of lemonade    · Fats:  2 to 3 servings each week    ¨ 1 teaspoon of soft margarine or vegetable oil    ¨ 1 tablespoon of mayonnaise    ¨ 2 tablespoons of salad dressing  Foods to avoid:   · Grains:      [employer], such as doughnuts, pastries, cookies, and biscuits (high in fat and sugar)    ¨ Mixes for cornbread and biscuits, packaged foods, such as bread stuffing, rice and pasta mixes, macaroni and cheese, and instant cereals (high in sodium)    · Fruits and vegetables:      ¨ Regular, canned vegetables (high in sodium)    ¨ Sauerkraut, pickled vegetables, and other foods prepared in brine (high in sodium)    ¨ Fried vegetables or vegetables in butter or high-fat sauces    ¨ Fruit in cream or butter sauce (high in fat)    · Dairy:      ¨ Whole milk, 2% milk, and cream (high in fat)    ¨ Regular cheese and processed cheese (high in fat and sodium)    · Meats and protein foods:      ¨ Smoked or cured meat, such as corned beef, schafer, ham, hot dogs, and sausage (high in fat and sodium)    ¨ Canned beans and canned meats or spreads, such as potted meats, sardines, anchovies, and imitation seafood (high in sodium)    ¨ Deli or lunch meats, such as bologna, ham, turkey, and roast beef (high in sodium)    ¨ High-fat meat (T-bone steak, regular hamburger, and ribs)    ¨ Whole eggs and egg yolks (high in fat)    · Other:      ¨ Seasonings made with salt, such as garlic salt, celery salt, onion salt, seasoned salt, meat tenderizers, and monosodium glutamate (MSG)    ¨ Miso soup and canned or dried soup mixes (high in sodium)    ¨ Regular soy sauce, barbecue sauce, teriyaki sauce, steak sauce, Worcestershire sauce, and most flavored vinegars (high in sodium)    ¨ Regular condiments, such as mustard, ketchup, and salad dressings (high in sodium)    ¨ Gravy and sauces, such as Phil or cheese sauces (high in sodium and fat)    ¨ Drinks high in sugar, such as soda or fruit drinks    [employer] foods, such as salted chips, popcorn, pretzels, pork rinds, salted crackers, and salted nuts    ¨ Frozen foods, such as dinners, entrees, vegetables with sauces, and breaded meats (high in sodium)  Other guidelines to follow:   · Maintain a healthy weight  Your risk for heart disease is higher if you are overweight  Your healthcare provider may suggest that you lose weight if you are overweight  You can lose weight by eating fewer calories and foods that have added sugars and fat  The DASH meal plan can help you do this  Decrease calories by eating smaller portions at each meal and fewer snacks  Ask your healthcare provider for more information about how to lose weight  · Exercise regularly  Regular exercise can help you reach or maintain a healthy weight  Regular exercise can also help decrease your blood pressure and improve your cholesterol levels  Get 30 minutes or more of moderate exercise each day of the week  To lose weight, get at least 60 minutes of exercise  Talk to your healthcare provider about the best exercise program for you  · Limit alcohol  Women should limit alcohol to 1 drink a day  Men should limit alcohol to 2 drinks a day   A drink of alcohol is 12 ounces of beer, 5 ounces of wine, or 1½ ounces of liquor  Where can I find more information? · [facility] and [address]  [id number]  [place] [address]  Phone: [phone]  Web Address: [facility]  © 2017 [address] Information is for End User's use only and may not be sold, redistributed or otherwise used for commercial purposes  All illustrations and images included in CareNotes® are the copyrighted property of A D A M [employer] Inc  or Gerardo Blackman  The above information is an  only  It is not intended as medical advice for individual conditions or treatments  Talk to your doctor, nurse or pharmacist before following any medical regimen to see if it is safe and effective for you  Hypertension   WHAT YOU NEED TO KNOW:   Hypertension is high blood pressure (BP)  Your BP is the force of your blood moving against the walls of your arteries  Normal BP is less than 120/80  Prehypertension is between 120/80 and 139/89  Hypertension is 140/90 or higher  Hypertension causes your BP to get so high that your heart has to work much harder than normal  This can damage your heart  You can control hypertension with a healthy lifestyle or medicines  A controlled blood pressure helps protect your organs, such as your heart, lungs, brain, and kidneys  DISCHARGE INSTRUCTIONS:   Call 911 for any of the following:   · You have discomfort in your chest that feels like squeezing, pressure, fullness, or pain  · You become confused or have difficulty speaking  · You suddenly feel lightheaded or have trouble breathing  · You have pain or discomfort in your back, neck, jaw, stomach, or arm  Seek care immediately if:   · You have a severe headache or vision loss  · You have weakness in an arm or leg    Contact your healthcare provider if:   · You feel faint, dizzy, confused, or drowsy  · You have been taking your BP medicine and your BP is still higher than your healthcare provider says it should be  · You have questions or concerns about your condition or care  Medicines: You may  need any of the following:  · Medicine  may be used to help lower your BP  You may need more than one type of medicine  Take the medicine exactly as directed  · Diuretics  help decrease extra fluid that collects in your body  This will help lower your BP  You may urinate more often while you take this medicine  · Cholesterol medicine  helps lower your cholesterol level  A low cholesterol level helps prevent heart disease and makes it easier to control your blood pressure  · Take your medicine as directed  Contact your healthcare provider if you think your medicine is not helping or if you have side effects  Tell him or her if you are allergic to any medicine  Keep a list of the medicines, vitamins, and herbs you take  Include the amounts, and when and why you take them  Bring the list or the pill bottles to follow-up visits  Carry your medicine list with you in case of an emergency  Follow up with your healthcare provider as directed: You will need to return to have your BP checked and to have other lab tests done  Write down your questions so you remember to ask them during your visits  Manage hypertension:  Talk with your healthcare provider about these and other ways to manage hypertension:  · Check your BP at home  Sit and rest for 5 minutes before you take your BP  Extend your arm and support it on a flat surface  Your arm should be at the same level as your heart  Follow the directions that came with your BP monitor  If possible, take at least 2 BP readings each time  Take your BP at least twice a day at the same times each day, such as morning and evening  Keep a record of your BP readings and bring it to your follow-up visits   Ask your healthcare provider what your BP should be            · Limit sodium (salt) as directed  Too much sodium can affect your fluid balance  Check labels to find low-sodium or no-salt-added foods  Some low-sodium foods use potassium salts for flavor  Too much potassium can also cause health problems  Your healthcare provider will tell you how much sodium and potassium are safe for you to have in a day  He or she may recommend that you limit sodium to 2,300 mg a day  · Follow the meal plan recommended by your healthcare provider  A dietitian or your provider can give you more information on low-sodium plans or the DASH (Dietary Approaches to Stop Hypertension) eating plan  The DASH plan is low in sodium, unhealthy fats, and total fat  It is high in potassium, calcium, and fiber  · Exercise to maintain a healthy weight  Exercise at least 30 minutes per day, on most days of the week  This will help decrease your blood pressure  Ask your healthcare provider about the best exercise plan for you  · Decrease stress  This may help lower your BP  Learn ways to relax, such as deep breathing or listening to music  · Limit alcohol  Women should limit alcohol to 1 drink a day  Men should limit alcohol to 2 drinks a day  A drink of alcohol is 12 ounces of beer, 5 ounces of wine, or 1½ ounces of liquor  · Do not smoke  Nicotine and other chemicals in cigarettes and cigars can increase your BP and also cause lung damage  Ask your healthcare provider for information if you currently smoke and need help to quit  E-cigarettes or smokeless tobacco still contain nicotine  Talk to your healthcare provider before you use these products  · Manage any other health conditions you have  Health conditions such as diabetes can increase your risk for hypertension  Follow your healthcare provider's instructions and take all your medicines as directed    © 2017 [address] Information is for End User's use only and may not be sold, redistributed or otherwise used for commercial purposes  All illustrations and images included in CareNotes® are the copyrighted property of A D A M [employer] Inc  or Gerardo Blackman  The above information is an  only  It is not intended as medical advice for individual conditions or treatments  Talk to your doctor, nurse or pharmacist before following any medical regimen to see if it is safe and effective for you

## 2018-04-12 NOTE — ASSESSMENT & PLAN NOTE
History of hypertension  Associated with headaches in the past  · Noncompliant with BP meds    Clonidine increased to 0 3 mg t i d , atenolol 25 mg started, continue Norvasc 10 mg  · Stress test (-)  · Blood pressures are controlled, patient was counseled on need to continue medications as prescribed, DASH diet, and weight loss  · Smoking cessation strongly advised

## 2018-10-10 ENCOUNTER — HOSPITAL ENCOUNTER (EMERGENCY)
Facility: HOSPITAL | Age: 43
Discharge: HOME | End: 2018-10-10
Attending: EMERGENCY MEDICINE | Admitting: EMERGENCY MEDICINE
Payer: COMMERCIAL

## 2018-10-10 ENCOUNTER — APPOINTMENT (EMERGENCY)
Dept: RADIOLOGY | Facility: HOSPITAL | Age: 43
End: 2018-10-10
Payer: COMMERCIAL

## 2018-10-10 VITALS
TEMPERATURE: 98 F | HEART RATE: 92 BPM | WEIGHT: 293 LBS | OXYGEN SATURATION: 97 % | SYSTOLIC BLOOD PRESSURE: 171 MMHG | DIASTOLIC BLOOD PRESSURE: 76 MMHG | BODY MASS INDEX: 47.09 KG/M2 | HEIGHT: 66 IN | RESPIRATION RATE: 20 BRPM
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
## 2018-10-10 DIAGNOSIS — S89.91XA RIGHT KNEE INJURY: ICD-10-CM

## 2018-10-10 DIAGNOSIS — S93.402A LEFT ANKLE SPRAIN: Primary | ICD-10-CM

## 2018-10-10 PROCEDURE — 96374 THER/PROPH/DIAG INJ IV PUSH: CPT

## 2018-10-10 PROCEDURE — 99284 EMERGENCY DEPT VISIT MOD MDM: CPT

## 2018-10-10 PROCEDURE — 96375 TX/PRO/DX INJ NEW DRUG ADDON: CPT

## 2018-10-10 PROCEDURE — 73564 X-RAY EXAM KNEE 4 OR MORE: CPT

## 2018-10-10 PROCEDURE — 73590 X-RAY EXAM OF LOWER LEG: CPT

## 2018-10-10 PROCEDURE — 73610 X-RAY EXAM OF ANKLE: CPT

## 2018-10-10 RX ORDER — MORPHINE SULFATE 10 MG/ML
6 INJECTION, SOLUTION INTRAMUSCULAR; INTRAVENOUS ONCE
Status: COMPLETED | OUTPATIENT
Start: 2018-10-10 | End: 2018-10-10

## 2018-10-10 RX ORDER — OXYCODONE HYDROCHLORIDE AND ACETAMINOPHEN 5; 325 MG/1; MG/1
1-2 TABLET ORAL EVERY 4 HOURS PRN
Qty: 15 TABLET | Refills: 0 | Status: SHIPPED | OUTPATIENT
Start: 2018-10-10 | End: 2018-10-15

## 2018-10-10 RX ORDER — FENTANYL CITRATE 50 UG/ML
50 INJECTION, SOLUTION INTRAMUSCULAR; INTRAVENOUS ONCE
Status: COMPLETED | OUTPATIENT
Start: 2018-10-10 | End: 2018-10-10

## 2018-10-10 RX ADMIN — MORPHINE SULFATE 6 MG: 10 INJECTION INTRAVENOUS at 21:34

## 2018-10-10 RX ADMIN — FENTANYL CITRATE 50 MCG: 50 INJECTION INTRAMUSCULAR; INTRAVENOUS at 20:00

## 2018-10-10 NOTE — ED PROVIDER NOTES
History  Chief Complaint   Patient presents with    Fall     Per EMS " Pt was walking in [employer] when slip and fell forward  Denies hit to head  c/o L ankle pain and R knee pain  73-year-old female presents for evaluation and treatment of injuries after fall  This patient was ambulating to get food after her son's football game, she fell, likely slipped on some water, fell forward injuring her left ankle, right knee, then bracing her fall with bilateral hands  She states that her hands are not injured, but her left ankle and right knee are injured  She is having difficulty bearing weight secondary to pain  Was unable to get up and she waited for the ambulance crew to assist her into the ambulance  Has been unable to stand since the incident  She denies prior injury to these areas  She presents neurovascularly intact distal to the injury  She has left-sided tib-fib pain, left lateral malleolus pain, posterior left-sided distal tib-fib pain, and right knee pain to medial lateral meniscus  Mild swelling but no discoloration to the skin overlying the injury sites  Patient did have elevated blood pressure in the ambulance, it improves to 168/108 by the time she arrives the Emergency Department  She does have hypertension, however it is usually under control with medications  She did not hit her head, did not lose consciousness, no focal neurologic deficits  Prior to Admission Medications   Prescriptions Last Dose Informant Patient Reported? Taking?    SUMAtriptan (IMITREX) 100 mg tablet   No No   Sig: Take 1 tablet (100 mg total) by mouth once as needed for migraine for up to 1 dose   amLODIPine (NORVASC) 10 mg tablet   No No   Sig: Take 1 tablet by mouth daily   aspirin 81 MG tablet   Yes No   Sig: Take 81 mg by mouth daily   atenolol (TENORMIN) 25 mg tablet   No No   Sig: Take 1 tablet (25 mg total) by mouth daily   cloNIDine (CATAPRES) 0 3 mg tablet   No No   Sig: Take 1 tablet (0 3 mg total) by mouth every 8 (eight) hours   levothyroxine 100 mcg tablet   No No   Sig: Take 1 tablet by mouth daily for 30 days   nicotine (NICODERM CQ) 21 mg/24 hr TD 24 hr patch   No No   Sig: Place 1 patch on the skin daily   topiramate (TOPAMAX) 50 MG tablet   No No   Sig: Take 1 tablet (50 mg total) by mouth daily at bedtime      Facility-Administered Medications: None       Past Medical History:   Diagnosis Date    Disease of thyroid gland     hypothyroidism    History of MI (myocardial infarction)     History of TIA (transient ischemic attack)     Hypertension     TIA (transient ischemic attack)        History reviewed  No pertinent surgical history  Family History   Problem Relation Age of Onset    Heart disease Mother     Hypertension Mother     Diabetes Father      I have reviewed and agree with the history as documented  Social History   Substance Use Topics    Smoking status: Current Every Day Smoker     Packs/day: 0 50     Types: Cigarettes    Smokeless tobacco: Never Used    Alcohol use No        Review of Systems   Constitutional: Negative for chills and fever  HENT: Negative for congestion and rhinorrhea  Eyes: Negative for visual disturbance  Respiratory: Negative for chest tightness and shortness of breath  Cardiovascular: Negative for chest pain and leg swelling  Gastrointestinal: Negative for abdominal pain, nausea and vomiting  Musculoskeletal: Negative for back pain and neck pain  Isolated right knee, left ankle, left tib fib pain  Skin: Negative for color change, rash and wound  Neurological: Negative for dizziness, syncope, weakness, light-headedness, numbness and headaches  Physical Exam  Physical Exam   Constitutional: She is oriented to person, place, and time  She appears well-developed and well-nourished  She appears distressed (Secondary to pain)  HENT:   Head: Normocephalic and atraumatic     Right Ear: External ear normal    Left Ear: External ear normal    Mouth/Throat: Oropharynx is clear and moist    No hemotympanum    Eyes: Pupils are equal, round, and reactive to light  Conjunctivae and EOM are normal  Right eye exhibits no discharge  Left eye exhibits no discharge  Neck: Normal range of motion  Neck supple  No tracheal deviation present  No midline tenderness, no step offs   Cardiovascular: Normal rate, regular rhythm, normal heart sounds and intact distal pulses  Pulmonary/Chest: Effort normal and breath sounds normal  No stridor  No respiratory distress  She has no wheezes  She exhibits no tenderness  CTA-BL   Abdominal: Soft  She exhibits no distension  There is no tenderness  There is no guarding  Stable pelvis   Musculoskeletal: She exhibits tenderness (L ankle, L tib/fib, R knee)  She exhibits no deformity  Back is non-tender, no step offs   Neurological: She is alert and oriented to person, place, and time  No cranial nerve deficit or sensory deficit  GCS = 15   Skin: Skin is warm and dry  No rash noted  She is not diaphoretic  No pallor  No abrasions, no lacerations, no contusions  Psychiatric: She has a normal mood and affect  Her behavior is normal    Vitals reviewed        Vital Signs  ED Triage Vitals [10/10/18 1952]   Temperature Pulse Respirations Blood Pressure SpO2   98 °F (36 7 °C) 91 20 (!) 168/108 99 %      Temp Source Heart Rate Source Patient Position - Orthostatic VS BP Location FiO2 (%)   Oral Monitor Lying Right arm --      Pain Score       Worst Possible Pain           Vitals:    10/10/18 1952 10/10/18 2100   BP: (!) 168/108 (!) 171/76   Pulse: 91 92   Patient Position - Orthostatic VS: Lying Lying       Visual Acuity      ED Medications  Medications   fentanyl citrate (PF) 100 MCG/2ML 50 mcg (50 mcg Intravenous Given 10/10/18 2000)   morphine (PF) 10 mg/mL injection 6 mg (6 mg Intravenous Given 10/10/18 2134)       Diagnostic Studies  Results Reviewed     None                 XR tibia fibula 2 views LEFT   ED Interpretation by Shan Perez DO (10/10 2124)   No acute osseous abnormality      XR knee 4+ vw right injury   ED Interpretation by Shan Perez DO (10/10 2124)   No acute osseous abnormality      XR ankle 3+ views LEFT   ED Interpretation by Shan Perez DO (10/10 2124)   No acute osseous abnormality                 Procedures  Procedures       Phone Contacts  ED Phone Contact    ED Course  ED Course as of Oct 11 0450   Wed Oct 10, 2018   9563 Patient states only minimal pain control with pain medications here  Explained that we have given her in the strongest pain medications that we have and that we will liekly unable to get her pain under control  Patient understands and feels comfortable for discharge                                MDM  Number of Diagnoses or Management Options  Fall from standing:   Left ankle sprain:   Right knee injury:   Diagnosis management comments: Trauma from a mechanical fall  Injured the left ankle, left tib-fib, right knee  Will attempt pain control, imaging, reassess  Patient is still having pain  She is given morphine  This further assess her pain  She is advised that there are no osseous injuries  Likely sprained ankle to the left, likely soft tissue injury on the right knee  Patient is advised follow-up with Sports Medicine, advised weight-bearing as tolerated for her strain ankle  She is given Percocet for outpatient pain control  Advised close follow-up in good return precautions in case of any worsening condition         Amount and/or Complexity of Data Reviewed  Tests in the radiology section of CPT®: ordered and reviewed      CritCare Time    Disposition  Final diagnoses:   Left ankle sprain   Right knee injury   Fall from standing     Time reflects when diagnosis was documented in both MDM as applicable and the Disposition within this note     Time User Action Codes Description Comment    10/10/2018  9:42 PM Nadia Andrade Add [F99 779X] Left ankle sprain     10/10/2018  9:43 PM Nadia Andrade Add [K86 [id number]] Right knee injury     10/10/2018  9:43 PM Josette Bassett Add [[id number]  [id number]] Fall from standing       ED Disposition     ED Disposition Condition Comment    Discharge  [facility] discharge to home/self care  Condition at discharge: Good        Follow-up Information     Follow up With Specialties Details Why Contact Info Additional [address] [facility] Emergency Department Emergency Medicine  If symptoms worsen:  Worsening pain, discoloration of extremities, unable to walk, numbness, new symptoms, etc  [address] [facility] [place] ED, [address], [place], [address], DO Family Medicine  For follow-up [facility] [address]  [phone]       Kayy Barfield, DO Sports Medicine Schedule an appointment as soon as possible for a visit For follow-up for further evaluation of knee injury, further imaging if needed, and for evaluation of ankle sprain   [address] [facility]  [address] [address]  [phone]             Discharge Medication List as of 10/10/2018 10:08 PM      START taking these medications    Details   oxyCODONE-acetaminophen (PERCOCET) 5-325 mg per tablet Take 1-2 tablets by mouth every 4 (four) hours as needed for severe pain for up to 5 days Max Daily Amount: 12 tablets, Starting Wed 10/10/2018, Until Mon 10/15/2018, Print         CONTINUE these medications which have NOT CHANGED    Details   amLODIPine (NORVASC) 10 mg tablet Take 1 tablet by mouth daily, Starting 6/6/2017, Until Discontinued, Print      aspirin 81 MG tablet Take 81 mg by mouth daily, Until Discontinued, Historical Med      atenolol (TENORMIN) 25 mg tablet Take 1 tablet (25 mg total) by mouth daily, Starting Fri 4/13/2018, Print      cloNIDine (CATAPRES) 0 3 mg tablet Take 1 tablet (0 3 mg total) by mouth every 8 (eight) hours, Starting Thu 4/12/2018, Print      levothyroxine 100 mcg tablet Take 1 tablet by mouth daily for 30 days, Starting 2/11/2017, Until Mon 3/13/17, Print      nicotine (NICODERM CQ) 21 mg/24 hr TD 24 hr patch Place 1 patch on the skin daily, Starting Fri 4/13/2018, Print      SUMAtriptan (IMITREX) 100 mg tablet Take 1 tablet (100 mg total) by mouth once as needed for migraine for up to 1 dose, Starting Thu 4/12/2018, Print      topiramate (TOPAMAX) 50 MG tablet Take 1 tablet (50 mg total) by mouth daily at bedtime, Starting Thu 4/12/2018, Print           No discharge procedures on file      ED Provider  Electronically Signed by           Chato Chu DO  10/11/18 0761

## 2018-10-11 NOTE — DISCHARGE INSTRUCTIONS
Weightbear as tolerated  Use the crutches to keep weight off the injured extremity as needed  Please follow up with sports medicine  Ankle Sprain   WHAT YOU NEED TO KNOW:   What is an ankle sprain? An ankle sprain happens when 1 or more ligaments in your ankle joint stretch or tear  Ligaments are tough tissues that connect bones  Ligaments support your joints and keep your bones in place  What causes an ankle sprain? An ankle sprain is usually caused by a direct injury or sudden twisting of the joint  This may happen while playing sports, or may be due to a fall  If you have problems with balance, or have weak muscles or ligaments, you are more likely to sprain your ankle  What are the signs and symptoms of an ankle sprain? · Trouble moving your ankle or foot    · Pain when you touch or put weight on your ankle    · Bruised, swollen, or misshapen ankle  How is an ankle sprain diagnosed? Your healthcare provider will ask you about your injury and examine you  Tell him if you heard a snap or pop when you were injured  Your healthcare provider will check the movement and strength of your joint  You may be asked to move the joint yourself  Tell a healthcare provider if you have ever had an allergic reaction to contrast liquid  You may need any of the following:  · A joint x-ray  is a picture of the bones and tissues in your joints  You may be given contrast liquid as a shot into your joint before the x-ray  This contrast liquid will help your joint show up better on the x-ray  A joint x-ray with contrast liquid is called an arthrogram     · An MRI  may show the sprain  You may be given contrast liquid to help the pictures show up better  Do not enter the MRI room with anything metal  Metal can cause serious injury  Tell a healthcare provider if you have any metal in or on your body  How is an ankle sprain treated?    · Support devices,  such as a brace, cast, or splint, may be needed to limit your movement and protect your joint  You may need to use crutches to decrease your pain as you move around  · Medicines      ¨ NSAIDs , such as ibuprofen, help decrease swelling, pain, and fever  This medicine is available with or without a doctor's order  NSAIDs can cause stomach bleeding or kidney problems in certain people  If you take blood thinner medicine, always ask your healthcare provider if NSAIDs are safe for you  Always read the medicine label and follow directions  ¨ Acetaminophen  decreases pain  It is available without a doctor's order  Ask how much to take and how often to take it  Follow directions  Acetaminophen can cause liver damage if not taken correctly  ¨ Prescription pain medicine  may be given  Ask how to take this medicine safely  · Physical therapy  may be recommended  A physical therapist teaches you exercises to help improve movement and strength, and to decrease pain  · Surgery  may be needed to repair or replace a torn ligament if your sprain does not heal with other treatments  Your healthcare provider may use screws to attach the bones in your ankle together  The screws may help support your ankle and make it stable  Ask your healthcare provider for more information about surgery to treat your ankle sprain  How can I manage my ankle sprain? · Rest  your ankle so that it can heal  Return to normal activities as directed  · Apply ice on your ankle for 15 to 20 minutes every hour or as directed  Use an ice pack, or put crushed ice in a plastic bag  Cover it with a towel  Ice helps prevent tissue damage and decreases swelling and pain  · Compress  your ankle  Ask if you should wrap an elastic bandage around your injured ligament  An elastic bandage provides support and helps decrease swelling and movement so your joint can heal  Wear as long as directed  · Elevate  your ankle above the level of your heart as often as you can  This will help decrease swelling and pain   Prop your ankle on pillows or blankets to keep it elevated comfortably  How can I prevent another ankle sprain? · Let your ankle heal   Find out how long your ligament needs to heal  Do not do any physical activity until your healthcare provider says it is okay  If you start activity too soon, you may develop a more serious injury  · Always warm up and stretch  before you exercise or play sports  · Use the right equipment  Always wear shoes that fit well and are made for the activity that you are doing  You may also need ankle supports, elbow and knee pads, or braces  When should I seek immediate care? · You have severe pain in your ankle  · Your foot or toes are cold or numb  · Your ankle becomes more weak or unstable (wobbly)  · You are unable to put any weight on your ankle or foot  · Your swelling has increased or returned  When should I contact my healthcare provider? · Your pain does not go away, even after treatment  · You have questions or concerns abo  ·   · ut your condition or care  CARE AGREEMENT:   You have the right to help plan your care  Learn about your health condition and how it may be treated  Discuss treatment options with your caregivers to decide what care you want to receive  You always have the right to refuse treatment  The above information is an  only  It is not intended as medical advice for individual conditions or treatments  Talk to your doctor, nurse or pharmacist before following any medical regimen to see if it is safe and effective for you  © 2017 [address] Information is for End User's use only and may not be sold, redistributed or otherwise used for commercial purposes  All illustrations and images included in CareNotes® are the copyrighted property of [employer]  or Gerardo Blackman        Knee Sprain, Ambulatory Care   GENERAL INFORMATION:   A knee sprain  is caused by a stretched or torn ligament in your knee  Ligaments are tough tissues that connect bones  A knee sprain usually occurs during exercise or sports  Common symptoms include the following:   · Bruising or changes in skin color    · Inability to put weight on your leg    · Pain, tenderness, and swelling    · Stiffness and decreased knee and leg movement  Seek immediate care for the following symptoms:   · Cold or numbness below the injury, such as in your toes    · Decreased or loss of movement in your injured leg    · Increased pain, even after taking pain medicine  Treatment for a knee sprain  may include a support device, such as a brace  A brace helps limit movement and protect your knee  Treatment may also include pain medicine, physical therapy, or surgery if the ligament does not heal   Care for a knee sprain:   · Rest  your knee and limit movement for as long as directed  Use crutches as directed to take weight off your knee while it heals  · Apply ice  on your injured knee for 15 to 20 minutes every hour or as directed  Use an ice pack, or put crushed ice in a plastic bag  Cover it with a towel  Ice helps prevent tissue damage and decreases swelling and pain  · Compress  your injured knee as directed with an elastic bandage or brace to support your foot  Wear your brace for as many days as directed  · Elevate  your knee by lying down and resting it on pillows that are higher than your heart  This should be done as often as you can to help reduce swelling  · Exercise  your knee and leg as directed to improve your strength and help decrease stiffness  The exercises and physical therapy can help restore strength and increase the range of motion in your leg  Ask your healthcare provider when you can return to your normal activities or play sports  Prevent another knee sprain:   · Warm up and stretch before you exercise  · Do not exercise when you are tired or in pain      · Wear shoes that fit well and run on flat surfaces to prevent falls  · Wear equipment to protect yourself when you play sports  Follow up with your healthcare provider as directed:  Write down your questions so you remember to ask them during your visits  CARE AGREEMENT:   You have the right to help plan your care  Learn about your health condition and how it may be treated  Discuss treatment options with your caregivers to decide what care you want to receive  You always have the right to refuse treatment  The above information is an  only  It is not intended as medical advice for individual conditions or treatments  Talk to your doctor, nurse or pharmacist before following any medical regimen to see if it is safe and effective for you  © 2014 [address] is for End User's use only and may not be sold, redistributed or otherwise used for commercial purposes  All illustrations and images included in CareNotes® are the copyrighted property of [employer]  or Gerardo Blackman  RICE Therapy   WHAT YOU NEED TO KNOW:   What is RICE therapy? RICE therapy is a 4-step process used to reduce swelling and pain from an injury  RICE stands for rest, ice, compression, and elevation  RICE should be done within the first 24 to 48 hours after an injury  How do I use RICE therapy? Rest  the injured area so that it can heal  You may need to avoid putting any weight on your injury for at least 48 hours  Return to normal activities as directed  Ice  the injury for 20 minutes every 4 hours, or as directed  Use an ice pack, or put crushed ice in a plastic bag  Cover it with a towel to protect your skin  Ice helps prevent tissue damage and decreases swelling and pain  Compress  the injury with an elastic bandage, air cast, special boot, or splint to reduce swelling  Ask your healthcare provider which compression device to use, and how tight it should be      Elevate  the injured area above the level of your heart as often as you can  This will help decrease swelling and pain  If possible, prop the injured area on pillows or blankets to keep it elevated comfortably  When should I contact my healthcare provider? Your pain does not decrease, even after treatment  You have questions or concerns about your condition or care  When should I seek immediate care? You have severe pain in the injured area  You have numbness in the injured area  You cannot put any weight on or move the injured area  CARE AGREEMENT:   You have the right to help plan your care  Learn about your health condition and how it may be treated  Discuss treatment options with your caregivers to decide what care you want to receive  You always have the right to refuse treatment  The above information is an  only  It is not intended as medical advice for individual conditions or treatments  Talk to your doctor, nurse or pharmacist before following any medical regimen to see if it is safe and effective for you  © 2017 [address]  Information is for End User's use only and may not be sold, redistributed or otherwise used for commercial purposes  All illustrations and images included in CareNotes® are the copyrighted property of [employer] Inc  or Gerardo Blackman

## 2018-11-06 ENCOUNTER — APPOINTMENT (EMERGENCY)
Dept: CT IMAGING | Facility: HOSPITAL | Age: 43
End: 2018-11-06
Payer: COMMERCIAL

## 2018-11-06 ENCOUNTER — HOSPITAL ENCOUNTER (OUTPATIENT)
Facility: HOSPITAL | Age: 43
Setting detail: OBSERVATION
Discharge: HOME | End: 2018-11-07
Attending: EMERGENCY MEDICINE | Admitting: EMERGENCY MEDICINE
Payer: COMMERCIAL

## 2018-11-06 DIAGNOSIS — I10 ACCELERATED HYPERTENSION: Primary | ICD-10-CM

## 2018-11-06 DIAGNOSIS — R51.9 INTRACTABLE HEADACHE: ICD-10-CM

## 2018-11-06 LAB
ALBUMIN SERPL BCP-MCNC: 3.5 G/DL (ref 3.5–5)
ALP SERPL-CCNC: 159 U/L (ref 46–116)
ALT SERPL W P-5'-P-CCNC: 75 U/L (ref 12–78)
ANION GAP SERPL CALCULATED.3IONS-SCNC: 9 MMOL/L (ref 4–13)
APTT PPP: 27 SECONDS (ref 24–36)
AST SERPL W P-5'-P-CCNC: 84 U/L (ref 5–45)
BACTERIA UR QL AUTO: NORMAL /HPF
BASOPHILS # BLD AUTO: 0.04 THOUSANDS/ΜL (ref 0–0.1)
BASOPHILS NFR BLD AUTO: 0 % (ref 0–1)
BILIRUB SERPL-MCNC: 0.2 MG/DL (ref 0.2–1)
BILIRUB UR QL STRIP: NEGATIVE
BUN SERPL-MCNC: 5 MG/DL (ref 5–25)
CALCIUM SERPL-MCNC: 8.9 MG/DL (ref 8.3–10.1)
CHLORIDE SERPL-SCNC: 101 MMOL/L (ref 100–108)
CLARITY UR: CLEAR
CO2 SERPL-SCNC: 26 MMOL/L (ref 21–32)
COLOR UR: YELLOW
CREAT SERPL-MCNC: 0.94 MG/DL (ref 0.6–1.3)
EOSINOPHIL # BLD AUTO: 0.07 THOUSAND/ΜL (ref 0–0.61)
EOSINOPHIL NFR BLD AUTO: 1 % (ref 0–6)
ERYTHROCYTE [DISTWIDTH] IN BLOOD BY AUTOMATED COUNT: 13.2 % (ref 11.6–15.1)
EXT PREG TEST URINE: NEGATIVE
GFR SERPL CREATININE-BSD FRML MDRD: 87 ML/MIN/1.73SQ M
GLUCOSE SERPL-MCNC: 107 MG/DL (ref 65–140)
GLUCOSE UR STRIP-MCNC: NEGATIVE MG/DL
HCT VFR BLD AUTO: 44.5 % (ref 34.8–46.1)
HGB BLD-MCNC: 14.3 G/DL (ref 11.5–15.4)
HGB UR QL STRIP.AUTO: NEGATIVE
IMM GRANULOCYTES # BLD AUTO: 0.07 THOUSAND/UL (ref 0–0.2)
IMM GRANULOCYTES NFR BLD AUTO: 1 % (ref 0–2)
INR PPP: 0.93 (ref 0.86–1.17)
KETONES UR STRIP-MCNC: NEGATIVE MG/DL
LEUKOCYTE ESTERASE UR QL STRIP: NEGATIVE
LIPASE SERPL-CCNC: 194 U/L (ref 73–393)
LYMPHOCYTES # BLD AUTO: 1.97 THOUSANDS/ΜL (ref 0.6–4.47)
LYMPHOCYTES NFR BLD AUTO: 14 % (ref 14–44)
MAGNESIUM SERPL-MCNC: 1.7 MG/DL (ref 1.6–2.6)
MCH RBC QN AUTO: 28.1 PG (ref 26.8–34.3)
MCHC RBC AUTO-ENTMCNC: 32.1 G/DL (ref 31.4–37.4)
MCV RBC AUTO: 88 FL (ref 82–98)
MONOCYTES # BLD AUTO: 0.51 THOUSAND/ΜL (ref 0.17–1.22)
MONOCYTES NFR BLD AUTO: 4 % (ref 4–12)
NEUTROPHILS # BLD AUTO: 11.43 THOUSANDS/ΜL (ref 1.85–7.62)
NEUTS SEG NFR BLD AUTO: 80 % (ref 43–75)
NITRITE UR QL STRIP: NEGATIVE
NON-SQ EPI CELLS URNS QL MICRO: NORMAL /HPF
NRBC BLD AUTO-RTO: 0 /100 WBCS
PH UR STRIP.AUTO: 6 [PH] (ref 4.5–8)
PLATELET # BLD AUTO: 366 THOUSANDS/UL (ref 149–390)
PMV BLD AUTO: 10.5 FL (ref 8.9–12.7)
POTASSIUM SERPL-SCNC: 3.4 MMOL/L (ref 3.5–5.3)
PROT SERPL-MCNC: 8.6 G/DL (ref 6.4–8.2)
PROT UR STRIP-MCNC: ABNORMAL MG/DL
PROTHROMBIN TIME: 12.4 SECONDS (ref 11.8–14.2)
RBC # BLD AUTO: 5.08 MILLION/UL (ref 3.81–5.12)
RBC #/AREA URNS AUTO: NORMAL /HPF
SODIUM SERPL-SCNC: 136 MMOL/L (ref 136–145)
SP GR UR STRIP.AUTO: 1.01 (ref 1–1.03)
TROPONIN I SERPL-MCNC: 0.03 NG/ML
TROPONIN I SERPL-MCNC: 0.05 NG/ML
UROBILINOGEN UR QL STRIP.AUTO: 0.2 E.U./DL
WBC # BLD AUTO: 14.09 THOUSAND/UL (ref 4.31–10.16)
WBC #/AREA URNS AUTO: NORMAL /HPF

## 2018-11-06 PROCEDURE — 99285 EMERGENCY DEPT VISIT HI MDM: CPT

## 2018-11-06 PROCEDURE — 96361 HYDRATE IV INFUSION ADD-ON: CPT

## 2018-11-06 PROCEDURE — 99220 PR INITIAL OBSERVATION CARE/DAY 70 MINUTES: CPT | Performed by: HOSPITALIST

## 2018-11-06 PROCEDURE — 84484 ASSAY OF TROPONIN QUANT: CPT | Performed by: EMERGENCY MEDICINE

## 2018-11-06 PROCEDURE — 85610 PROTHROMBIN TIME: CPT | Performed by: EMERGENCY MEDICINE

## 2018-11-06 PROCEDURE — 84484 ASSAY OF TROPONIN QUANT: CPT | Performed by: HOSPITALIST

## 2018-11-06 PROCEDURE — 81025 URINE PREGNANCY TEST: CPT | Performed by: EMERGENCY MEDICINE

## 2018-11-06 PROCEDURE — 96375 TX/PRO/DX INJ NEW DRUG ADDON: CPT

## 2018-11-06 PROCEDURE — 83690 ASSAY OF LIPASE: CPT | Performed by: EMERGENCY MEDICINE

## 2018-11-06 PROCEDURE — 81001 URINALYSIS AUTO W/SCOPE: CPT | Performed by: EMERGENCY MEDICINE

## 2018-11-06 PROCEDURE — 96374 THER/PROPH/DIAG INJ IV PUSH: CPT

## 2018-11-06 PROCEDURE — 36415 COLL VENOUS BLD VENIPUNCTURE: CPT | Performed by: EMERGENCY MEDICINE

## 2018-11-06 PROCEDURE — 93005 ELECTROCARDIOGRAM TRACING: CPT

## 2018-11-06 PROCEDURE — 70450 CT HEAD/BRAIN W/O DYE: CPT

## 2018-11-06 PROCEDURE — 83735 ASSAY OF MAGNESIUM: CPT | Performed by: EMERGENCY MEDICINE

## 2018-11-06 PROCEDURE — 85025 COMPLETE CBC W/AUTO DIFF WBC: CPT | Performed by: EMERGENCY MEDICINE

## 2018-11-06 PROCEDURE — 80053 COMPREHEN METABOLIC PANEL: CPT | Performed by: EMERGENCY MEDICINE

## 2018-11-06 PROCEDURE — 85730 THROMBOPLASTIN TIME PARTIAL: CPT | Performed by: EMERGENCY MEDICINE

## 2018-11-06 RX ORDER — LABETALOL HYDROCHLORIDE 5 MG/ML
10 INJECTION, SOLUTION INTRAVENOUS ONCE
Status: COMPLETED | OUTPATIENT
Start: 2018-11-06 | End: 2018-11-06

## 2018-11-06 RX ORDER — ATENOLOL 25 MG/1
25 TABLET ORAL DAILY
Status: DISCONTINUED | OUTPATIENT
Start: 2018-11-07 | End: 2018-11-07 | Stop reason: HOSPADM

## 2018-11-06 RX ORDER — IBUPROFEN 600 MG/1
600 TABLET ORAL ONCE
Status: COMPLETED | OUTPATIENT
Start: 2018-11-06 | End: 2018-11-06

## 2018-11-06 RX ORDER — HYDRALAZINE HYDROCHLORIDE 20 MG/ML
10 INJECTION INTRAMUSCULAR; INTRAVENOUS EVERY 6 HOURS PRN
Status: DISCONTINUED | OUTPATIENT
Start: 2018-11-06 | End: 2018-11-07 | Stop reason: HOSPADM

## 2018-11-06 RX ORDER — LEVOTHYROXINE SODIUM 0.1 MG/1
100 TABLET ORAL DAILY
Status: DISCONTINUED | OUTPATIENT
Start: 2018-11-07 | End: 2018-11-07 | Stop reason: HOSPADM

## 2018-11-06 RX ORDER — CLONIDINE HYDROCHLORIDE 0.1 MG/1
0.3 TABLET ORAL EVERY 8 HOURS SCHEDULED
Status: DISCONTINUED | OUTPATIENT
Start: 2018-11-06 | End: 2018-11-07 | Stop reason: HOSPADM

## 2018-11-06 RX ORDER — ONDANSETRON 2 MG/ML
4 INJECTION INTRAMUSCULAR; INTRAVENOUS ONCE
Status: COMPLETED | OUTPATIENT
Start: 2018-11-06 | End: 2018-11-06

## 2018-11-06 RX ORDER — NICOTINE 21 MG/24HR
1 PATCH, TRANSDERMAL 24 HOURS TRANSDERMAL DAILY
Status: DISCONTINUED | OUTPATIENT
Start: 2018-11-07 | End: 2018-11-07 | Stop reason: HOSPADM

## 2018-11-06 RX ORDER — AMLODIPINE BESYLATE 10 MG/1
10 TABLET ORAL DAILY
Status: DISCONTINUED | OUTPATIENT
Start: 2018-11-06 | End: 2018-11-07 | Stop reason: HOSPADM

## 2018-11-06 RX ORDER — METOCLOPRAMIDE HYDROCHLORIDE 5 MG/ML
10 INJECTION INTRAMUSCULAR; INTRAVENOUS ONCE
Status: COMPLETED | OUTPATIENT
Start: 2018-11-06 | End: 2018-11-06

## 2018-11-06 RX ORDER — ASPIRIN 81 MG/1
81 TABLET, CHEWABLE ORAL DAILY
Status: DISCONTINUED | OUTPATIENT
Start: 2018-11-07 | End: 2018-11-07 | Stop reason: HOSPADM

## 2018-11-06 RX ORDER — DIPHENHYDRAMINE HYDROCHLORIDE 50 MG/ML
12.5 INJECTION INTRAMUSCULAR; INTRAVENOUS ONCE
Status: COMPLETED | OUTPATIENT
Start: 2018-11-06 | End: 2018-11-06

## 2018-11-06 RX ORDER — TOPIRAMATE 25 MG/1
50 TABLET ORAL
Status: DISCONTINUED | OUTPATIENT
Start: 2018-11-06 | End: 2018-11-07 | Stop reason: HOSPADM

## 2018-11-06 RX ORDER — POTASSIUM CHLORIDE 20 MEQ/1
40 TABLET, EXTENDED RELEASE ORAL ONCE
Status: COMPLETED | OUTPATIENT
Start: 2018-11-06 | End: 2018-11-06

## 2018-11-06 RX ORDER — ACETAMINOPHEN 325 MG/1
650 TABLET ORAL EVERY 6 HOURS PRN
Status: DISCONTINUED | OUTPATIENT
Start: 2018-11-06 | End: 2018-11-07 | Stop reason: HOSPADM

## 2018-11-06 RX ORDER — ONDANSETRON 2 MG/ML
4 INJECTION INTRAMUSCULAR; INTRAVENOUS EVERY 6 HOURS PRN
Status: DISCONTINUED | OUTPATIENT
Start: 2018-11-06 | End: 2018-11-07 | Stop reason: HOSPADM

## 2018-11-06 RX ORDER — ACETAMINOPHEN 325 MG/1
650 TABLET ORAL ONCE
Status: COMPLETED | OUTPATIENT
Start: 2018-11-06 | End: 2018-11-06

## 2018-11-06 RX ORDER — SUMATRIPTAN 50 MG/1
100 TABLET, FILM COATED ORAL ONCE AS NEEDED
Status: DISCONTINUED | OUTPATIENT
Start: 2018-11-06 | End: 2018-11-07 | Stop reason: HOSPADM

## 2018-11-06 RX ADMIN — ONDANSETRON HYDROCHLORIDE 4 MG: 2 SOLUTION INTRAMUSCULAR; INTRAVENOUS at 19:15

## 2018-11-06 RX ADMIN — IBUPROFEN 600 MG: 600 TABLET ORAL at 19:22

## 2018-11-06 RX ADMIN — CLONIDINE HYDROCHLORIDE 0.3 MG: 0.1 TABLET ORAL at 22:16

## 2018-11-06 RX ADMIN — SODIUM CHLORIDE 1000 ML: 0.9 INJECTION, SOLUTION INTRAVENOUS at 19:11

## 2018-11-06 RX ADMIN — ACETAMINOPHEN 650 MG: 325 TABLET, FILM COATED ORAL at 18:38

## 2018-11-06 RX ADMIN — DIPHENHYDRAMINE HYDROCHLORIDE 12.5 MG: 50 INJECTION, SOLUTION INTRAMUSCULAR; INTRAVENOUS at 20:27

## 2018-11-06 RX ADMIN — LABETALOL 20 MG/4 ML (5 MG/ML) INTRAVENOUS SYRINGE 10 MG: at 19:23

## 2018-11-06 RX ADMIN — AMLODIPINE BESYLATE 10 MG: 10 TABLET ORAL at 21:14

## 2018-11-06 RX ADMIN — POTASSIUM CHLORIDE 40 MEQ: 1500 TABLET, EXTENDED RELEASE ORAL at 21:14

## 2018-11-06 RX ADMIN — METOCLOPRAMIDE 10 MG: 5 INJECTION, SOLUTION INTRAMUSCULAR; INTRAVENOUS at 20:25

## 2018-11-06 RX ADMIN — TOPIRAMATE 50 MG: 25 TABLET, FILM COATED ORAL at 22:16
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
## 2018-11-07 VITALS
DIASTOLIC BLOOD PRESSURE: 82 MMHG | SYSTOLIC BLOOD PRESSURE: 150 MMHG | RESPIRATION RATE: 18 BRPM | BODY MASS INDEX: 45.99 KG/M2 | OXYGEN SATURATION: 97 % | HEIGHT: 67 IN | WEIGHT: 293 LBS | HEART RATE: 71 BPM | TEMPERATURE: 98.3 F

## 2018-11-07 LAB
ANION GAP SERPL CALCULATED.3IONS-SCNC: 9 MMOL/L (ref 4–13)
ATRIAL RATE: 109 BPM
ATRIAL RATE: 81 BPM
BASOPHILS # BLD AUTO: 0.03 THOUSANDS/ΜL (ref 0–0.1)
BASOPHILS NFR BLD AUTO: 0 % (ref 0–1)
BUN SERPL-MCNC: 4 MG/DL (ref 5–25)
CALCIUM SERPL-MCNC: 8.7 MG/DL (ref 8.3–10.1)
CHLORIDE SERPL-SCNC: 105 MMOL/L (ref 100–108)
CO2 SERPL-SCNC: 24 MMOL/L (ref 21–32)
CREAT SERPL-MCNC: 0.83 MG/DL (ref 0.6–1.3)
EOSINOPHIL # BLD AUTO: 0.28 THOUSAND/ΜL (ref 0–0.61)
EOSINOPHIL NFR BLD AUTO: 3 % (ref 0–6)
ERYTHROCYTE [DISTWIDTH] IN BLOOD BY AUTOMATED COUNT: 13.6 % (ref 11.6–15.1)
GFR SERPL CREATININE-BSD FRML MDRD: 101 ML/MIN/1.73SQ M
GLUCOSE P FAST SERPL-MCNC: 114 MG/DL (ref 65–99)
GLUCOSE SERPL-MCNC: 114 MG/DL (ref 65–140)
HCT VFR BLD AUTO: 39.2 % (ref 34.8–46.1)
HGB BLD-MCNC: 12.6 G/DL (ref 11.5–15.4)
IMM GRANULOCYTES # BLD AUTO: 0.04 THOUSAND/UL (ref 0–0.2)
IMM GRANULOCYTES NFR BLD AUTO: 1 % (ref 0–2)
LYMPHOCYTES # BLD AUTO: 2.36 THOUSANDS/ΜL (ref 0.6–4.47)
LYMPHOCYTES NFR BLD AUTO: 27 % (ref 14–44)
MCH RBC QN AUTO: 28.4 PG (ref 26.8–34.3)
MCHC RBC AUTO-ENTMCNC: 32.1 G/DL (ref 31.4–37.4)
MCV RBC AUTO: 89 FL (ref 82–98)
MONOCYTES # BLD AUTO: 0.47 THOUSAND/ΜL (ref 0.17–1.22)
MONOCYTES NFR BLD AUTO: 5 % (ref 4–12)
NEUTROPHILS # BLD AUTO: 5.64 THOUSANDS/ΜL (ref 1.85–7.62)
NEUTS SEG NFR BLD AUTO: 64 % (ref 43–75)
NRBC BLD AUTO-RTO: 0 /100 WBCS
P AXIS: 59 DEGREES
P AXIS: 59 DEGREES
PLATELET # BLD AUTO: 304 THOUSANDS/UL (ref 149–390)
PMV BLD AUTO: 10.4 FL (ref 8.9–12.7)
POTASSIUM SERPL-SCNC: 3.9 MMOL/L (ref 3.5–5.3)
PR INTERVAL: 164 MS
PR INTERVAL: 200 MS
QRS AXIS: 1 DEGREES
QRS AXIS: 19 DEGREES
QRSD INTERVAL: 80 MS
QRSD INTERVAL: 82 MS
QT INTERVAL: 368 MS
QT INTERVAL: 426 MS
QTC INTERVAL: 494 MS
QTC INTERVAL: 495 MS
RBC # BLD AUTO: 4.43 MILLION/UL (ref 3.81–5.12)
SODIUM SERPL-SCNC: 138 MMOL/L (ref 136–145)
T WAVE AXIS: 62 DEGREES
T WAVE AXIS: 73 DEGREES
TROPONIN I SERPL-MCNC: 0.05 NG/ML
VENTRICULAR RATE: 109 BPM
VENTRICULAR RATE: 81 BPM
WBC # BLD AUTO: 8.82 THOUSAND/UL (ref 4.31–10.16)

## 2018-11-07 PROCEDURE — 93005 ELECTROCARDIOGRAM TRACING: CPT

## 2018-11-07 PROCEDURE — 80048 BASIC METABOLIC PNL TOTAL CA: CPT | Performed by: HOSPITALIST

## 2018-11-07 PROCEDURE — 93010 ELECTROCARDIOGRAM REPORT: CPT | Performed by: INTERNAL MEDICINE

## 2018-11-07 PROCEDURE — 84484 ASSAY OF TROPONIN QUANT: CPT | Performed by: HOSPITALIST

## 2018-11-07 PROCEDURE — 99217 PR OBSERVATION CARE DISCHARGE MANAGEMENT: CPT | Performed by: STUDENT IN AN ORGANIZED HEALTH CARE EDUCATION/TRAINING PROGRAM

## 2018-11-07 PROCEDURE — 85025 COMPLETE CBC W/AUTO DIFF WBC: CPT | Performed by: HOSPITALIST

## 2018-11-07 RX ORDER — TRAMADOL HYDROCHLORIDE 50 MG/1
50 TABLET ORAL ONCE
Status: COMPLETED | OUTPATIENT
Start: 2018-11-07 | End: 2018-11-07

## 2018-11-07 RX ADMIN — AMLODIPINE BESYLATE 10 MG: 10 TABLET ORAL at 10:14

## 2018-11-07 RX ADMIN — CLONIDINE HYDROCHLORIDE 0.3 MG: 0.1 TABLET ORAL at 15:25

## 2018-11-07 RX ADMIN — CLONIDINE HYDROCHLORIDE 0.3 MG: 0.1 TABLET ORAL at 06:52

## 2018-11-07 RX ADMIN — TRAMADOL HYDROCHLORIDE 50 MG: 50 TABLET, COATED ORAL at 12:24

## 2018-11-07 RX ADMIN — ATENOLOL 25 MG: 25 TABLET ORAL at 10:13

## 2018-11-07 RX ADMIN — ASPIRIN 81 MG: 81 TABLET, CHEWABLE ORAL at 10:13

## 2018-11-07 RX ADMIN — SUMATRIPTAN SUCCINATE 100 MG: 50 TABLET ORAL at 06:59

## 2018-11-07 RX ADMIN — LEVOTHYROXINE SODIUM 100 MCG: 100 TABLET ORAL at 06:52

## 2018-11-07 NOTE — ASSESSMENT & PLAN NOTE
Patient states that every time her blood pressure is elevated she gets a headache  Patient is allergic to Toradol  And states that Tylenol, any other medication does not help  Requesting for opioids  Counseled on appropriate use of opiates  Patient states that tramadol also helps with her headache  Gave 1 time dose of tramadol resulting in complete resolution of her headache  Currently asymptomatic  And hemodynamically stable
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
## 2018-11-07 NOTE — DISCHARGE INSTR - AVS FIRST PAGE
Recommended to follow up with your primary care doctor within 3 days of discharge from the hospital   Also recommended to follow up with Cardiology for better control of blood pressure and possible outpatient stress test   Recommended follow up with bariatric surgery for possibility of bariatric surgery  Strongly recommended smoking cessation

## 2018-11-07 NOTE — ASSESSMENT & PLAN NOTE
Patient reports that her chest discomfort has completely resolved  Troponin noted to be 0 05 x2  No significant deviation noted  Possibly due to hypertensive urgency  Currently patient is completely asymptomatic and hemodynamically stable  Patient is requesting to go home    Recommended to follow up outpatient with primary care provider and Cardiology for better control of her blood pressure and or may need outpatient stress test

## 2018-11-07 NOTE — ASSESSMENT & PLAN NOTE
The pressure is much better  Patient denies chest pain, dyspnea, nausea, diaphoresis, abdominal pain, back pain, headache, visual disturbances  Denies any other problems  Strongly encouraged to continue to follow up with primary care provider on a regular basis  Counseled on importance of taking her medications on time  Patient is hemodynamically stable  Will discharge home to continue her home medications
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
## 2018-11-07 NOTE — PLAN OF CARE
DISCHARGE PLANNING     Discharge to home or other facility with appropriate resources Adequate for Discharge        INFECTION - ADULT     Absence or prevention of progression during hospitalization Adequate for Discharge     Absence of fever/infection during neutropenic period Adequate for Discharge        Knowledge Deficit     Patient/family/caregiver demonstrates understanding of disease process, treatment plan, medications, and discharge instructions Adequate for Discharge        PAIN - ADULT     Verbalizes/displays adequate comfort level or baseline comfort level Adequate for Discharge        Potential for Falls     Patient will remain free of falls Adequate for Discharge

## 2018-11-07 NOTE — ASSESSMENT & PLAN NOTE
Morbid obesity noted  Counseled on healthy diet and exercise  Patient may benefit from outpatient follow up with bariatric surgery

## 2018-11-07 NOTE — PLAN OF CARE
Problem: Potential for Falls  Goal: Patient will remain free of falls  INTERVENTIONS:  - Assess patient frequently for physical needs  -  Identify cognitive and physical deficits and behaviors that affect risk of falls    -  Leroy fall precautions as indicated by assessment   - Educate patient/family on patient safety including physical limitations  - Instruct patient to call for assistance with activity based on assessment  - Modify environment to reduce risk of injury  - Consider OT/PT consult to assist with strengthening/mobility   Outcome: Progressing      Problem: PAIN - ADULT  Goal: Verbalizes/displays adequate comfort level or baseline comfort level  Interventions:  - Encourage patient to monitor pain and request assistance  - Assess pain using appropriate pain scale  - Administer analgesics based on type and severity of pain and evaluate response  - Implement non-pharmacological measures as appropriate and evaluate response  - Consider cultural and social influences on pain and pain management  - Notify physician/advanced practitioner if interventions unsuccessful or patient reports new pain  Outcome: Progressing      Problem: INFECTION - ADULT  Goal: Absence or prevention of progression during hospitalization  INTERVENTIONS:  - Assess and monitor for signs and symptoms of infection  - Monitor lab/diagnostic results  - Monitor all insertion sites, i e  indwelling lines, tubes, and drains  - Monitor endotracheal (as able) and nasal secretions for changes in amount and color  - Leroy appropriate cooling/warming therapies per order  - Administer medications as ordered  - Instruct and encourage patient and family to use good hand hygiene technique  - Identify and instruct in appropriate isolation precautions for identified infection/condition  Outcome: Progressing    Goal: Absence of fever/infection during neutropenic period  INTERVENTIONS:  - Monitor WBC  - Implement neutropenic guidelines  Outcome: Progressing      Problem: DISCHARGE PLANNING  Goal: Discharge to home or other facility with appropriate resources  INTERVENTIONS:  - Identify barriers to discharge w/patient and caregiver  - Arrange for needed discharge resources and transportation as appropriate  - Identify discharge learning needs (meds, wound care, etc )  - Arrange for interpretive services to assist at discharge as needed  - Refer to Case Management Department for coordinating discharge planning if the patient needs post-hospital services based on physician/advanced practitioner order or complex needs related to functional status, cognitive ability, or social support system  Outcome: Progressing      Problem: Knowledge Deficit  Goal: Patient/family/caregiver demonstrates understanding of disease process, treatment plan, medications, and discharge instructions  Complete learning assessment and assess knowledge base    Interventions:  - Provide teaching at level of understanding  - Provide teaching via preferred learning methods  Outcome: Progressing
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
## 2018-11-08 LAB
ATRIAL RATE: 0 BPM
QRS AXIS: 0 DEGREES
QRSD INTERVAL: 0 MS
QT INTERVAL: 0 MS
QTC INTERVAL: 0 MS
T WAVE AXIS: 0 DEGREES
VENTRICULAR RATE: 0 BPM

## 2018-11-08 PROCEDURE — 93010 ELECTROCARDIOGRAM REPORT: CPT | Performed by: INTERNAL MEDICINE

## 2019-02-16 ENCOUNTER — APPOINTMENT (EMERGENCY)
Dept: RADIOLOGY | Facility: HOSPITAL | Age: 44
End: 2019-02-16
Payer: COMMERCIAL

## 2019-02-16 ENCOUNTER — HOSPITAL ENCOUNTER (EMERGENCY)
Facility: HOSPITAL | Age: 44
Discharge: HOME | End: 2019-02-17
Attending: EMERGENCY MEDICINE | Admitting: EMERGENCY MEDICINE
Payer: COMMERCIAL

## 2019-02-16 ENCOUNTER — APPOINTMENT (EMERGENCY)
Dept: CT IMAGING | Facility: HOSPITAL | Age: 44
End: 2019-02-16
Payer: COMMERCIAL

## 2019-02-16 DIAGNOSIS — R07.9 CHEST PAIN: Primary | ICD-10-CM

## 2019-02-16 DIAGNOSIS — R51.9 HEADACHE: ICD-10-CM

## 2019-02-16 LAB
ANION GAP SERPL CALCULATED.3IONS-SCNC: 8 MMOL/L (ref 4–13)
BASOPHILS # BLD AUTO: 0.02 THOUSANDS/ΜL (ref 0–0.1)
BASOPHILS NFR BLD AUTO: 0 % (ref 0–1)
BUN SERPL-MCNC: 8 MG/DL (ref 5–25)
CALCIUM SERPL-MCNC: 9.1 MG/DL (ref 8.3–10.1)
CHLORIDE SERPL-SCNC: 101 MMOL/L (ref 100–108)
CO2 SERPL-SCNC: 28 MMOL/L (ref 21–32)
CREAT SERPL-MCNC: 0.96 MG/DL (ref 0.6–1.3)
EOSINOPHIL # BLD AUTO: 0.34 THOUSAND/ΜL (ref 0–0.61)
EOSINOPHIL NFR BLD AUTO: 3 % (ref 0–6)
ERYTHROCYTE [DISTWIDTH] IN BLOOD BY AUTOMATED COUNT: 14.2 % (ref 11.6–15.1)
GFR SERPL CREATININE-BSD FRML MDRD: 85 ML/MIN/1.73SQ M
GLUCOSE SERPL-MCNC: 106 MG/DL (ref 65–140)
HCT VFR BLD AUTO: 40.9 % (ref 34.8–46.1)
HGB BLD-MCNC: 13.2 G/DL (ref 11.5–15.4)
LYMPHOCYTES # BLD AUTO: 2.82 THOUSANDS/ΜL (ref 0.6–4.47)
LYMPHOCYTES NFR BLD AUTO: 26 % (ref 14–44)
MCH RBC QN AUTO: 27.5 PG (ref 26.8–34.3)
MCHC RBC AUTO-ENTMCNC: 32.3 G/DL (ref 31.4–37.4)
MCV RBC AUTO: 85 FL (ref 82–98)
MONOCYTES # BLD AUTO: 0.45 THOUSAND/ΜL (ref 0.17–1.22)
MONOCYTES NFR BLD AUTO: 4 % (ref 4–12)
NEUTROPHILS # BLD AUTO: 7.32 THOUSANDS/ΜL (ref 1.85–7.62)
NEUTS SEG NFR BLD AUTO: 67 % (ref 43–75)
PLATELET # BLD AUTO: 353 THOUSANDS/UL (ref 149–390)
PMV BLD AUTO: 10.4 FL (ref 8.9–12.7)
POTASSIUM SERPL-SCNC: 3.8 MMOL/L (ref 3.5–5.3)
RBC # BLD AUTO: 4.8 MILLION/UL (ref 3.81–5.12)
SODIUM SERPL-SCNC: 137 MMOL/L (ref 136–145)
TROPONIN I SERPL-MCNC: <0.02 NG/ML
WBC # BLD AUTO: 10.95 THOUSAND/UL (ref 4.31–10.16)

## 2019-02-16 PROCEDURE — 93005 ELECTROCARDIOGRAM TRACING: CPT

## 2019-02-16 PROCEDURE — 85025 COMPLETE CBC W/AUTO DIFF WBC: CPT | Performed by: PHYSICIAN ASSISTANT

## 2019-02-16 PROCEDURE — 99285 EMERGENCY DEPT VISIT HI MDM: CPT

## 2019-02-16 PROCEDURE — 36415 COLL VENOUS BLD VENIPUNCTURE: CPT | Performed by: PHYSICIAN ASSISTANT

## 2019-02-16 PROCEDURE — 70450 CT HEAD/BRAIN W/O DYE: CPT

## 2019-02-16 PROCEDURE — 96361 HYDRATE IV INFUSION ADD-ON: CPT

## 2019-02-16 PROCEDURE — 71046 X-RAY EXAM CHEST 2 VIEWS: CPT

## 2019-02-16 PROCEDURE — 84484 ASSAY OF TROPONIN QUANT: CPT | Performed by: PHYSICIAN ASSISTANT

## 2019-02-16 PROCEDURE — 80048 BASIC METABOLIC PNL TOTAL CA: CPT | Performed by: PHYSICIAN ASSISTANT

## 2019-02-16 RX ORDER — ACETAMINOPHEN 325 MG/1
650 TABLET ORAL ONCE
Status: COMPLETED | OUTPATIENT
Start: 2019-02-17 | End: 2019-02-17

## 2019-02-16 RX ORDER — 0.9 % SODIUM CHLORIDE 0.9 %
3 VIAL (ML) INJECTION AS NEEDED
Status: DISCONTINUED | OUTPATIENT
Start: 2019-02-16 | End: 2019-02-17 | Stop reason: HOSPADM

## 2019-02-16 RX ADMIN — SODIUM CHLORIDE 1000 ML: 0.9 INJECTION, SOLUTION INTRAVENOUS at 23:04

## 2019-02-17 VITALS
BODY MASS INDEX: 45.99 KG/M2 | DIASTOLIC BLOOD PRESSURE: 92 MMHG | RESPIRATION RATE: 18 BRPM | WEIGHT: 293 LBS | HEIGHT: 67 IN | TEMPERATURE: 98.2 F | OXYGEN SATURATION: 99 % | HEART RATE: 88 BPM | SYSTOLIC BLOOD PRESSURE: 176 MMHG

## 2019-02-17 PROCEDURE — 96366 THER/PROPH/DIAG IV INF ADDON: CPT

## 2019-02-17 PROCEDURE — 96365 THER/PROPH/DIAG IV INF INIT: CPT

## 2019-02-17 PROCEDURE — 96375 TX/PRO/DX INJ NEW DRUG ADDON: CPT

## 2019-02-17 RX ORDER — DEXAMETHASONE SODIUM PHOSPHATE 4 MG/ML
8 INJECTION, SOLUTION INTRA-ARTICULAR; INTRALESIONAL; INTRAMUSCULAR; INTRAVENOUS; SOFT TISSUE ONCE
Status: COMPLETED | OUTPATIENT
Start: 2019-02-17 | End: 2019-02-17

## 2019-02-17 RX ORDER — MAGNESIUM SULFATE HEPTAHYDRATE 40 MG/ML
2 INJECTION, SOLUTION INTRAVENOUS ONCE
Status: COMPLETED | OUTPATIENT
Start: 2019-02-17 | End: 2019-02-17

## 2019-02-17 RX ORDER — METOCLOPRAMIDE HYDROCHLORIDE 5 MG/ML
10 INJECTION INTRAMUSCULAR; INTRAVENOUS ONCE
Status: COMPLETED | OUTPATIENT
Start: 2019-02-17 | End: 2019-02-17

## 2019-02-17 RX ORDER — DIPHENHYDRAMINE HYDROCHLORIDE 50 MG/ML
25 INJECTION INTRAMUSCULAR; INTRAVENOUS ONCE
Status: COMPLETED | OUTPATIENT
Start: 2019-02-17 | End: 2019-02-17

## 2019-02-17 RX ADMIN — DEXAMETHASONE SODIUM PHOSPHATE 8 MG: 4 INJECTION, SOLUTION INTRAMUSCULAR; INTRAVENOUS at 00:20

## 2019-02-17 RX ADMIN — METOCLOPRAMIDE 10 MG: 5 INJECTION, SOLUTION INTRAMUSCULAR; INTRAVENOUS at 00:20

## 2019-02-17 RX ADMIN — MAGNESIUM SULFATE HEPTAHYDRATE 2 G: 40 INJECTION, SOLUTION INTRAVENOUS at 00:21

## 2019-02-17 RX ADMIN — DIPHENHYDRAMINE HYDROCHLORIDE 25 MG: 50 INJECTION, SOLUTION INTRAMUSCULAR; INTRAVENOUS at 00:21

## 2019-02-17 RX ADMIN — ACETAMINOPHEN 650 MG: 325 TABLET, FILM COATED ORAL at 00:14

## 2019-02-17 NOTE — ED PROVIDER NOTES
History  Chief Complaint   Patient presents with    Chest Pain     pt states " i started with chest pain about an hour ago, im getting a burning sensation with right lower back pain  i also started with a headache today" -n/v/d    Headache     Patient is a 61-year-old female presents emergency department with complaints of chest pain that began approximately 1 hour ago  Patient states that she also has headache in the posterior aspect of her head  She states that feels like a pressure on the back of her head  She denies any nausea, vomiting, diarrhea, fever  Patient denies any dizziness, visual changes  Patient denies previous cardiac history  Patient has history of hypertension as well as migraines  Patient states the pain is in the center aspect of her chest is nonradiating  Patient states the pain comes and goes and is not associated with exertion or breathing  Chest Pain   Associated symptoms: headache    Associated symptoms: no fever and no shortness of breath    Headache   Associated symptoms: no fever        Prior to Admission Medications   Prescriptions Last Dose Informant Patient Reported? Taking?    SUMAtriptan (IMITREX) 100 mg tablet   No No   Sig: Take 1 tablet (100 mg total) by mouth once as needed for migraine for up to 1 dose   amLODIPine (NORVASC) 10 mg tablet   No No   Sig: Take 1 tablet by mouth daily   aspirin 81 MG tablet   Yes No   Sig: Take 81 mg by mouth daily   atenolol (TENORMIN) 25 mg tablet   No No   Sig: Take 1 tablet (25 mg total) by mouth daily   cloNIDine (CATAPRES) 0 3 mg tablet   No No   Sig: Take 1 tablet (0 3 mg total) by mouth every 8 (eight) hours   levothyroxine 100 mcg tablet   No No   Sig: Take 1 tablet by mouth daily for 30 days   nicotine (NICODERM CQ) 21 mg/24 hr TD 24 hr patch   No No   Sig: Place 1 patch on the skin daily   topiramate (TOPAMAX) 50 MG tablet   No No   Sig: Take 1 tablet (50 mg total) by mouth daily at bedtime      Facility-Administered Medications: None       Past Medical History:   Diagnosis Date    Disease of thyroid gland     hypothyroidism    History of MI (myocardial infarction)     History of TIA (transient ischemic attack)     Hypertension     TIA (transient ischemic attack)        History reviewed  No pertinent surgical history  Family History   Problem Relation Age of Onset    Heart disease Mother     Hypertension Mother     Diabetes Father      I have reviewed and agree with the history as documented  Social History     Tobacco Use    Smoking status: Current Every Day Smoker     Packs/day: 0 50     Types: Cigarettes    Smokeless tobacco: Never Used   Substance Use Topics    Alcohol use: No    Drug use: No        Review of Systems   Constitutional: Negative for fever  Respiratory: Negative for shortness of breath  Cardiovascular: Positive for chest pain  Neurological: Positive for headaches  All other systems reviewed and are negative  Physical Exam  Physical Exam   Constitutional: She is oriented to person, place, and time  She appears well-developed and well-nourished  HENT:   Head: Normocephalic and atraumatic  Eyes: Pupils are equal, round, and reactive to light  EOM are normal    Neck: Normal range of motion  Neck supple  Cardiovascular: Normal rate, regular rhythm and normal pulses  Pulmonary/Chest: Effort normal and breath sounds normal    Musculoskeletal: Normal range of motion  Neurological: She is alert and oriented to person, place, and time  No cranial nerve deficit  Skin: Skin is warm  Psychiatric: She has a normal mood and affect  Her behavior is normal    Vitals reviewed        Vital Signs  ED Triage Vitals [02/16/19 2241]   Temperature Pulse Respirations Blood Pressure SpO2   98 2 °F (36 8 °C) 96 17 (!) 174/113 98 %      Temp Source Heart Rate Source Patient Position - Orthostatic VS BP Location FiO2 (%)   Oral Monitor -- Right arm --      Pain Score       9           Vitals: 02/16/19 2241 02/16/19 2244   BP: (!) 174/113 (!) 174/113   Pulse: 96 95       Visual Acuity      ED Medications  Medications   sodium chloride (PF) 0 9 % injection 3 mL (has no administration in time range)   sodium chloride 0 9 % bolus 1,000 mL (1,000 mL Intravenous New Bag 2/16/19 2304)       Diagnostic Studies  Results Reviewed     Procedure Component Value Units Date/Time    Troponin I [[id number]]  (Normal) Collected:  02/16/19 2302    Lab Status:  Final result Specimen:  Blood from Arm, Left Updated:  02/16/19 2328     Troponin I <0 02 ng/mL     Basic metabolic panel [[id number]] Collected:  02/16/19 2302    Lab Status:  Final result Specimen:  Blood from Arm, Left Updated:  02/16/19 2319     Sodium 137 mmol/L      Potassium 3 8 mmol/L      Chloride 101 mmol/L      CO2 28 mmol/L      ANION GAP 8 mmol/L      BUN 8 mg/dL      Creatinine 0 96 mg/dL      Glucose 106 mg/dL      Calcium 9 1 mg/dL      eGFR 85 ml/min/1 73sq m     Narrative:       National Kidney Disease Education Program recommendations are as follows:  GFR calculation is accurate only with a steady state creatinine  Chronic Kidney disease less than 60 ml/min/1 73 sq  meters  Kidney failure less than 15 ml/min/1 73 sq  meters      CBC and differential [[id number]]  (Abnormal) Collected:  02/16/19 2302    Lab Status:  Final result Specimen:  Blood from Arm, Left Updated:  02/16/19 2309     WBC 10 95 Thousand/uL      RBC 4 80 Million/uL      Hemoglobin 13 2 g/dL      Hematocrit 40 9 %      MCV 85 fL      MCH 27 5 pg      MCHC 32 3 g/dL      RDW 14 2 %      MPV 10 4 fL      Platelets 781 Thousands/uL      Neutrophils Relative 67 %      Lymphocytes Relative 26 %      Monocytes Relative 4 %      Eosinophils Relative 3 %      Basophils Relative 0 %      Neutrophils Absolute 7 32 Thousands/µL      Lymphocytes Absolute 2 82 Thousands/µL      Monocytes Absolute 0 45 Thousand/µL      Eosinophils Absolute 0 34 Thousand/µL      Basophils Absolute 0 02 Thousands/µL CT head wo contrast   Final Result by Kailey Gil DO (02/16 2335)      No acute intracranial abnormality  Workstation performed: [id number]         X-ray chest 2 views   ED Interpretation by Selina Palma PA-C (02/16 2325)   No acute disease                 Procedures  Procedures       Phone Contacts  ED Phone Contact    ED Course  ED Course as of Feb 17 0154   Sun Feb 17, 2019   0024 EKG Interpretation    Rate: 92 BPM  Rhythm: NSR  Axis: normal  Intervals: Normal, no blocks, QTc 474ms  Q waves: no acute changes  T waves: no acute changes  ST segments: no acute changes    Impression: normal EKG  EKG for comparison: 11/7/18  EKG interpreted by me  [facility]  Number of Diagnoses or Management Options  Chest pain:   Headache:   Diagnosis management comments: Patient is a 47-year-old female with complaints of chest pain and headache  ACS workup was complete and does not show any evidence of acute coronary event  CT scan of the head was complete is not show any acute cranial abnormality  Patient has no neurological defect  Patient has history of migraines  Patient pain improved with dexamethasone, Benadryl, Reglan, magnesium, IV fluids  Patient's chest pain has also improved while in the emergency department  Work with full up with cardiologist for further evaluation  Return parameters were discussed at length  Patient stable discharge         Amount and/or Complexity of Data Reviewed  Clinical lab tests: ordered and reviewed  Tests in the radiology section of CPT®: ordered and reviewed  Review and summarize past medical records: yes  Independent visualization of images, tracings, or specimens: yes    Risk of Complications, Morbidity, and/or Mortality  Presenting problems: moderate  Diagnostic procedures: moderate  Management options: moderate        Disposition  Final diagnoses:   None     ED Disposition     None      Follow-up Information None         Patient's Medications   Discharge Prescriptions    No medications on file     No discharge procedures on file      ED Provider  Electronically Signed by           Ever Gonzalez PA-C  02/17/19 5876

## 2019-02-18 LAB
ATRIAL RATE: 92 BPM
P AXIS: 11 DEGREES
PR INTERVAL: 162 MS
QRS AXIS: 12 DEGREES
QRSD INTERVAL: 82 MS
QT INTERVAL: 384 MS
QTC INTERVAL: 474 MS
T WAVE AXIS: 68 DEGREES
VENTRICULAR RATE: 92 BPM

## 2019-02-18 PROCEDURE — 93010 ELECTROCARDIOGRAM REPORT: CPT | Performed by: INTERNAL MEDICINE

## 2019-04-16 ENCOUNTER — HOSPITAL ENCOUNTER (EMERGENCY)
Facility: HOSPITAL | Age: 44
Discharge: HOME | End: 2019-04-16
Attending: EMERGENCY MEDICINE
Payer: COMMERCIAL

## 2019-04-16 ENCOUNTER — APPOINTMENT (EMERGENCY)
Dept: RADIOLOGY | Facility: HOSPITAL | Age: 44
End: 2019-04-16
Payer: COMMERCIAL

## 2019-04-16 VITALS
OXYGEN SATURATION: 97 % | HEIGHT: 67 IN | SYSTOLIC BLOOD PRESSURE: 183 MMHG | TEMPERATURE: 97.8 F | RESPIRATION RATE: 18 BRPM | WEIGHT: 293 LBS | BODY MASS INDEX: 45.99 KG/M2 | HEART RATE: 85 BPM | DIASTOLIC BLOOD PRESSURE: 93 MMHG

## 2019-04-16 DIAGNOSIS — N93.8 DYSFUNCTIONAL UTERINE BLEEDING: ICD-10-CM

## 2019-04-16 DIAGNOSIS — I16.0 HYPERTENSIVE URGENCY: ICD-10-CM

## 2019-04-16 DIAGNOSIS — R07.89 ATYPICAL CHEST PAIN: ICD-10-CM

## 2019-04-16 DIAGNOSIS — I10 ESSENTIAL HYPERTENSION: Primary | ICD-10-CM

## 2019-04-16 DIAGNOSIS — R51.9 HEADACHE: ICD-10-CM

## 2019-04-16 LAB
ALBUMIN SERPL BCP-MCNC: 3.3 G/DL (ref 3.5–5)
ALP SERPL-CCNC: 145 U/L (ref 46–116)
ALT SERPL W P-5'-P-CCNC: 39 U/L (ref 12–78)
ANION GAP SERPL CALCULATED.3IONS-SCNC: 7 MMOL/L (ref 4–13)
AST SERPL W P-5'-P-CCNC: 50 U/L (ref 5–45)
ATRIAL RATE: 88 BPM
BASOPHILS # BLD AUTO: 0.03 THOUSANDS/ΜL (ref 0–0.1)
BASOPHILS NFR BLD AUTO: 0 % (ref 0–1)
BILIRUB SERPL-MCNC: 0.3 MG/DL (ref 0.2–1)
BUN SERPL-MCNC: 7 MG/DL (ref 5–25)
CALCIUM SERPL-MCNC: 8.7 MG/DL (ref 8.3–10.1)
CHLORIDE SERPL-SCNC: 103 MMOL/L (ref 100–108)
CO2 SERPL-SCNC: 26 MMOL/L (ref 21–32)
CREAT SERPL-MCNC: 0.91 MG/DL (ref 0.6–1.3)
EOSINOPHIL # BLD AUTO: 0.22 THOUSAND/ΜL (ref 0–0.61)
EOSINOPHIL NFR BLD AUTO: 3 % (ref 0–6)
ERYTHROCYTE [DISTWIDTH] IN BLOOD BY AUTOMATED COUNT: 14.2 % (ref 11.6–15.1)
GFR SERPL CREATININE-BSD FRML MDRD: 91 ML/MIN/1.73SQ M
GLUCOSE SERPL-MCNC: 129 MG/DL (ref 65–140)
HCT VFR BLD AUTO: 41 % (ref 34.8–46.1)
HGB BLD-MCNC: 12.9 G/DL (ref 11.5–15.4)
IMM GRANULOCYTES # BLD AUTO: 0.03 THOUSAND/UL (ref 0–0.2)
IMM GRANULOCYTES NFR BLD AUTO: 0 % (ref 0–2)
LYMPHOCYTES # BLD AUTO: 2.18 THOUSANDS/ΜL (ref 0.6–4.47)
LYMPHOCYTES NFR BLD AUTO: 25 % (ref 14–44)
MCH RBC QN AUTO: 27.3 PG (ref 26.8–34.3)
MCHC RBC AUTO-ENTMCNC: 31.5 G/DL (ref 31.4–37.4)
MCV RBC AUTO: 87 FL (ref 82–98)
MONOCYTES # BLD AUTO: 0.37 THOUSAND/ΜL (ref 0.17–1.22)
MONOCYTES NFR BLD AUTO: 4 % (ref 4–12)
NEUTROPHILS # BLD AUTO: 5.87 THOUSANDS/ΜL (ref 1.85–7.62)
NEUTS SEG NFR BLD AUTO: 68 % (ref 43–75)
NRBC BLD AUTO-RTO: 0 /100 WBCS
P AXIS: 66 DEGREES
PLATELET # BLD AUTO: 336 THOUSANDS/UL (ref 149–390)
PMV BLD AUTO: 10.9 FL (ref 8.9–12.7)
POTASSIUM SERPL-SCNC: 3.6 MMOL/L (ref 3.5–5.3)
PR INTERVAL: 180 MS
PROT SERPL-MCNC: 8 G/DL (ref 6.4–8.2)
QRS AXIS: 26 DEGREES
QRSD INTERVAL: 84 MS
QT INTERVAL: 402 MS
QTC INTERVAL: 486 MS
RBC # BLD AUTO: 4.72 MILLION/UL (ref 3.81–5.12)
SODIUM SERPL-SCNC: 136 MMOL/L (ref 136–145)
T WAVE AXIS: 91 DEGREES
TROPONIN I SERPL-MCNC: 0.02 NG/ML
TROPONIN I SERPL-MCNC: <0.02 NG/ML
VENTRICULAR RATE: 88 BPM
WBC # BLD AUTO: 8.7 THOUSAND/UL (ref 4.31–10.16)

## 2019-04-16 PROCEDURE — 93010 ELECTROCARDIOGRAM REPORT: CPT | Performed by: INTERNAL MEDICINE

## 2019-04-16 PROCEDURE — 84484 ASSAY OF TROPONIN QUANT: CPT | Performed by: EMERGENCY MEDICINE

## 2019-04-16 PROCEDURE — 99284 EMERGENCY DEPT VISIT MOD MDM: CPT | Performed by: EMERGENCY MEDICINE

## 2019-04-16 PROCEDURE — 80053 COMPREHEN METABOLIC PANEL: CPT | Performed by: EMERGENCY MEDICINE

## 2019-04-16 PROCEDURE — 36415 COLL VENOUS BLD VENIPUNCTURE: CPT

## 2019-04-16 PROCEDURE — 99285 EMERGENCY DEPT VISIT HI MDM: CPT

## 2019-04-16 PROCEDURE — 96375 TX/PRO/DX INJ NEW DRUG ADDON: CPT

## 2019-04-16 PROCEDURE — 93005 ELECTROCARDIOGRAM TRACING: CPT

## 2019-04-16 PROCEDURE — 96365 THER/PROPH/DIAG IV INF INIT: CPT

## 2019-04-16 PROCEDURE — 96361 HYDRATE IV INFUSION ADD-ON: CPT

## 2019-04-16 PROCEDURE — 71046 X-RAY EXAM CHEST 2 VIEWS: CPT

## 2019-04-16 PROCEDURE — 85025 COMPLETE CBC W/AUTO DIFF WBC: CPT | Performed by: EMERGENCY MEDICINE

## 2019-04-16 RX ORDER — DIPHENHYDRAMINE HYDROCHLORIDE 50 MG/ML
50 INJECTION INTRAMUSCULAR; INTRAVENOUS ONCE
Status: COMPLETED | OUTPATIENT
Start: 2019-04-16 | End: 2019-04-16

## 2019-04-16 RX ORDER — METOCLOPRAMIDE HYDROCHLORIDE 5 MG/ML
10 INJECTION INTRAMUSCULAR; INTRAVENOUS ONCE
Status: COMPLETED | OUTPATIENT
Start: 2019-04-16 | End: 2019-04-16

## 2019-04-16 RX ORDER — ATENOLOL 25 MG/1
25 TABLET ORAL DAILY
Status: DISCONTINUED | OUTPATIENT
Start: 2019-04-16 | End: 2019-04-16 | Stop reason: HOSPADM

## 2019-04-16 RX ORDER — CLONIDINE HYDROCHLORIDE 0.3 MG/1
0.3 TABLET ORAL EVERY 8 HOURS SCHEDULED
Qty: 90 TABLET | Refills: 0 | Status: SHIPPED | OUTPATIENT
Start: 2019-04-16 | End: 2019-09-29 | Stop reason: ALTCHOICE

## 2019-04-16 RX ORDER — TRAMADOL HYDROCHLORIDE 50 MG/1
50 TABLET ORAL ONCE
Status: COMPLETED | OUTPATIENT
Start: 2019-04-16 | End: 2019-04-16

## 2019-04-16 RX ORDER — LABETALOL 20 MG/4 ML (5 MG/ML) INTRAVENOUS SYRINGE
10 ONCE
Status: COMPLETED | OUTPATIENT
Start: 2019-04-16 | End: 2019-04-16

## 2019-04-16 RX ORDER — CLONIDINE HYDROCHLORIDE 0.1 MG/1
0.2 TABLET ORAL ONCE
Status: COMPLETED | OUTPATIENT
Start: 2019-04-16 | End: 2019-04-16

## 2019-04-16 RX ORDER — AMLODIPINE BESYLATE 10 MG/1
10 TABLET ORAL DAILY
Qty: 30 TABLET | Refills: 0 | Status: ON HOLD | OUTPATIENT
Start: 2019-04-16 | End: 2021-07-16 | Stop reason: SDUPTHER

## 2019-04-16 RX ORDER — CLONIDINE HYDROCHLORIDE 0.1 MG/1
0.1 TABLET ORAL ONCE
Status: COMPLETED | OUTPATIENT
Start: 2019-04-16 | End: 2019-04-16

## 2019-04-16 RX ORDER — ATENOLOL 25 MG/1
25 TABLET ORAL DAILY
Qty: 30 TABLET | Refills: 0 | Status: SHIPPED | OUTPATIENT
Start: 2019-04-16 | End: 2019-09-29 | Stop reason: ALTCHOICE

## 2019-04-16 RX ORDER — AMLODIPINE BESYLATE 10 MG/1
10 TABLET ORAL ONCE
Status: COMPLETED | OUTPATIENT
Start: 2019-04-16 | End: 2019-04-16

## 2019-04-16 RX ORDER — MAGNESIUM SULFATE 1 G/100ML
1 INJECTION INTRAVENOUS ONCE
Status: COMPLETED | OUTPATIENT
Start: 2019-04-16 | End: 2019-04-16

## 2019-04-16 RX ADMIN — LABETALOL 20 MG/4 ML (5 MG/ML) INTRAVENOUS SYRINGE 10 MG: at 12:18

## 2019-04-16 RX ADMIN — ATENOLOL 25 MG: 25 TABLET ORAL at 15:34

## 2019-04-16 RX ADMIN — TRAMADOL HYDROCHLORIDE 50 MG: 50 TABLET, COATED ORAL at 15:34

## 2019-04-16 RX ADMIN — AMLODIPINE BESYLATE 10 MG: 10 TABLET ORAL at 14:25

## 2019-04-16 RX ADMIN — METOCLOPRAMIDE 10 MG: 5 INJECTION, SOLUTION INTRAMUSCULAR; INTRAVENOUS at 12:19

## 2019-04-16 RX ADMIN — MAGNESIUM SULFATE HEPTAHYDRATE 1 G: 1 INJECTION, SOLUTION INTRAVENOUS at 12:36

## 2019-04-16 RX ADMIN — CLONIDINE HYDROCHLORIDE 0.2 MG: 0.1 TABLET ORAL at 15:34

## 2019-04-16 RX ADMIN — SODIUM CHLORIDE 1000 ML: 0.9 INJECTION, SOLUTION INTRAVENOUS at 12:17

## 2019-04-16 RX ADMIN — CLONIDINE HYDROCHLORIDE 0.1 MG: 0.1 TABLET ORAL at 13:54

## 2019-04-16 RX ADMIN — DIPHENHYDRAMINE HYDROCHLORIDE 50 MG: 50 INJECTION INTRAMUSCULAR; INTRAVENOUS at 12:20

## 2019-05-05 ENCOUNTER — HOSPITAL ENCOUNTER (EMERGENCY)
Facility: HOSPITAL | Age: 44
Discharge: HOME | End: 2019-05-05
Attending: EMERGENCY MEDICINE | Admitting: EMERGENCY MEDICINE
Payer: COMMERCIAL

## 2019-05-05 VITALS
BODY MASS INDEX: 43.95 KG/M2 | HEART RATE: 91 BPM | SYSTOLIC BLOOD PRESSURE: 203 MMHG | HEIGHT: 68 IN | RESPIRATION RATE: 20 BRPM | OXYGEN SATURATION: 100 % | DIASTOLIC BLOOD PRESSURE: 102 MMHG | TEMPERATURE: 98.3 F | WEIGHT: 290 LBS

## 2019-05-05 DIAGNOSIS — N61.1 LEFT BREAST ABSCESS: Primary | ICD-10-CM

## 2019-05-05 PROCEDURE — 99283 EMERGENCY DEPT VISIT LOW MDM: CPT | Performed by: PHYSICIAN ASSISTANT

## 2019-05-05 PROCEDURE — 99283 EMERGENCY DEPT VISIT LOW MDM: CPT

## 2019-05-05 RX ORDER — AMLODIPINE BESYLATE 10 MG/1
10 TABLET ORAL ONCE
Status: COMPLETED | OUTPATIENT
Start: 2019-05-05 | End: 2019-05-05

## 2019-05-05 RX ORDER — CLINDAMYCIN HYDROCHLORIDE 150 MG/1
300 CAPSULE ORAL ONCE
Status: COMPLETED | OUTPATIENT
Start: 2019-05-05 | End: 2019-05-05

## 2019-05-05 RX ORDER — CLINDAMYCIN HYDROCHLORIDE 150 MG/1
300 CAPSULE ORAL 4 TIMES DAILY
Qty: 56 CAPSULE | Refills: 0 | Status: SHIPPED | OUTPATIENT
Start: 2019-05-05 | End: 2019-05-12

## 2019-05-05 RX ORDER — OXYCODONE HYDROCHLORIDE AND ACETAMINOPHEN 5; 325 MG/1; MG/1
1 TABLET ORAL 3 TIMES DAILY PRN
Qty: 9 TABLET | Refills: 0 | Status: SHIPPED | OUTPATIENT
Start: 2019-05-05 | End: 2019-09-29 | Stop reason: ALTCHOICE

## 2019-05-05 RX ORDER — OXYCODONE HYDROCHLORIDE AND ACETAMINOPHEN 5; 325 MG/1; MG/1
1 TABLET ORAL ONCE
Status: COMPLETED | OUTPATIENT
Start: 2019-05-05 | End: 2019-05-05

## 2019-05-05 RX ADMIN — CLINDAMYCIN HYDROCHLORIDE 300 MG: 150 CAPSULE ORAL at 16:22

## 2019-05-05 RX ADMIN — AMLODIPINE BESYLATE 10 MG: 10 TABLET ORAL at 16:32

## 2019-05-05 RX ADMIN — OXYCODONE HYDROCHLORIDE AND ACETAMINOPHEN 1 TABLET: 5; 325 TABLET ORAL at 16:21

## 2019-05-08 ENCOUNTER — OFFICE VISIT (OUTPATIENT)
Dept: OBGYN CLINIC | Facility: CLINIC | Age: 44
End: 2019-05-08
Payer: COMMERCIAL

## 2019-05-08 VITALS
DIASTOLIC BLOOD PRESSURE: 92 MMHG | HEIGHT: 67 IN | WEIGHT: 293 LBS | BODY MASS INDEX: 45.99 KG/M2 | SYSTOLIC BLOOD PRESSURE: 164 MMHG

## 2019-05-08 DIAGNOSIS — N61.1 BREAST ABSCESS: Primary | ICD-10-CM

## 2019-05-08 DIAGNOSIS — Z72.0 TOBACCO ABUSE: ICD-10-CM

## 2019-05-08 DIAGNOSIS — Z12.31 ENCOUNTER FOR SCREENING MAMMOGRAM FOR MALIGNANT NEOPLASM OF BREAST: ICD-10-CM

## 2019-05-08 PROCEDURE — 99203 OFFICE O/P NEW LOW 30 MIN: CPT | Performed by: NURSE PRACTITIONER

## 2019-05-08 RX ORDER — DICLOFENAC POTASSIUM 50 MG/1
50 TABLET, FILM COATED ORAL 3 TIMES DAILY
Qty: 20 TABLET | Refills: 0 | Status: SHIPPED | OUTPATIENT
Start: 2019-05-08 | End: 2019-09-29 | Stop reason: ALTCHOICE

## 2019-05-13 ENCOUNTER — TELEPHONE (OUTPATIENT)
Dept: SURGICAL ONCOLOGY | Facility: CLINIC | Age: 44
End: 2019-05-13

## 2019-07-02 ENCOUNTER — APPOINTMENT (EMERGENCY)
Dept: RADIOLOGY | Facility: HOSPITAL | Age: 44
End: 2019-07-02
Payer: COMMERCIAL

## 2019-07-02 ENCOUNTER — HOSPITAL ENCOUNTER (EMERGENCY)
Facility: HOSPITAL | Age: 44
Discharge: HOME | End: 2019-07-02
Attending: EMERGENCY MEDICINE | Admitting: EMERGENCY MEDICINE
Payer: COMMERCIAL

## 2019-07-02 VITALS
BODY MASS INDEX: 45.99 KG/M2 | SYSTOLIC BLOOD PRESSURE: 187 MMHG | OXYGEN SATURATION: 98 % | HEART RATE: 93 BPM | RESPIRATION RATE: 18 BRPM | WEIGHT: 293 LBS | HEIGHT: 67 IN | DIASTOLIC BLOOD PRESSURE: 98 MMHG

## 2019-07-02 DIAGNOSIS — R07.89 CHEST WALL PAIN: Primary | ICD-10-CM

## 2019-07-02 LAB
ALBUMIN SERPL BCP-MCNC: 3.6 G/DL (ref 3.5–5)
ALP SERPL-CCNC: 152 U/L (ref 46–116)
ALT SERPL W P-5'-P-CCNC: 44 U/L (ref 12–78)
ANION GAP SERPL CALCULATED.3IONS-SCNC: 12 MMOL/L (ref 4–13)
AST SERPL W P-5'-P-CCNC: 60 U/L (ref 5–45)
BASOPHILS # BLD AUTO: 0.02 THOUSANDS/ΜL (ref 0–0.1)
BASOPHILS NFR BLD AUTO: 0 % (ref 0–1)
BILIRUB SERPL-MCNC: 0.4 MG/DL (ref 0.2–1)
BUN SERPL-MCNC: 6 MG/DL (ref 5–25)
CALCIUM SERPL-MCNC: 8.8 MG/DL (ref 8.3–10.1)
CHLORIDE SERPL-SCNC: 103 MMOL/L (ref 100–108)
CO2 SERPL-SCNC: 24 MMOL/L (ref 21–32)
CREAT SERPL-MCNC: 0.82 MG/DL (ref 0.6–1.3)
EOSINOPHIL # BLD AUTO: 0.29 THOUSAND/ΜL (ref 0–0.61)
EOSINOPHIL NFR BLD AUTO: 3 % (ref 0–6)
ERYTHROCYTE [DISTWIDTH] IN BLOOD BY AUTOMATED COUNT: 13.9 % (ref 11.6–15.1)
GFR SERPL CREATININE-BSD FRML MDRD: 103 ML/MIN/1.73SQ M
GLUCOSE SERPL-MCNC: 93 MG/DL (ref 65–140)
HCT VFR BLD AUTO: 41.6 % (ref 34.8–46.1)
HGB BLD-MCNC: 13.3 G/DL (ref 11.5–15.4)
IMM GRANULOCYTES # BLD AUTO: 0.05 THOUSAND/UL (ref 0–0.2)
IMM GRANULOCYTES NFR BLD AUTO: 1 % (ref 0–2)
LYMPHOCYTES # BLD AUTO: 2.62 THOUSANDS/ΜL (ref 0.6–4.47)
LYMPHOCYTES NFR BLD AUTO: 26 % (ref 14–44)
MCH RBC QN AUTO: 27.4 PG (ref 26.8–34.3)
MCHC RBC AUTO-ENTMCNC: 32 G/DL (ref 31.4–37.4)
MCV RBC AUTO: 86 FL (ref 82–98)
MONOCYTES # BLD AUTO: 0.4 THOUSAND/ΜL (ref 0.17–1.22)
MONOCYTES NFR BLD AUTO: 4 % (ref 4–12)
NEUTROPHILS # BLD AUTO: 6.78 THOUSANDS/ΜL (ref 1.85–7.62)
NEUTS SEG NFR BLD AUTO: 66 % (ref 43–75)
NRBC BLD AUTO-RTO: 0 /100 WBCS
PLATELET # BLD AUTO: 324 THOUSANDS/UL (ref 149–390)
PMV BLD AUTO: 10.8 FL (ref 8.9–12.7)
POTASSIUM SERPL-SCNC: 3.8 MMOL/L (ref 3.5–5.3)
PROT SERPL-MCNC: 8.3 G/DL (ref 6.4–8.2)
RBC # BLD AUTO: 4.85 MILLION/UL (ref 3.81–5.12)
SODIUM SERPL-SCNC: 139 MMOL/L (ref 136–145)
TROPONIN I SERPL-MCNC: <0.02 NG/ML
TROPONIN I SERPL-MCNC: <0.02 NG/ML
WBC # BLD AUTO: 10.16 THOUSAND/UL (ref 4.31–10.16)

## 2019-07-02 PROCEDURE — 93005 ELECTROCARDIOGRAM TRACING: CPT

## 2019-07-02 PROCEDURE — 80053 COMPREHEN METABOLIC PANEL: CPT | Performed by: EMERGENCY MEDICINE

## 2019-07-02 PROCEDURE — 85025 COMPLETE CBC W/AUTO DIFF WBC: CPT | Performed by: EMERGENCY MEDICINE

## 2019-07-02 PROCEDURE — 99284 EMERGENCY DEPT VISIT MOD MDM: CPT | Performed by: EMERGENCY MEDICINE

## 2019-07-02 PROCEDURE — 36415 COLL VENOUS BLD VENIPUNCTURE: CPT

## 2019-07-02 PROCEDURE — 99285 EMERGENCY DEPT VISIT HI MDM: CPT

## 2019-07-02 PROCEDURE — 71046 X-RAY EXAM CHEST 2 VIEWS: CPT

## 2019-07-02 PROCEDURE — 84484 ASSAY OF TROPONIN QUANT: CPT | Performed by: EMERGENCY MEDICINE

## 2019-07-02 RX ORDER — ACETAMINOPHEN 325 MG/1
975 TABLET ORAL ONCE
Status: COMPLETED | OUTPATIENT
Start: 2019-07-02 | End: 2019-07-02

## 2019-07-02 RX ADMIN — ACETAMINOPHEN 975 MG: 325 TABLET, FILM COATED ORAL at 19:13

## 2019-07-02 NOTE — DISCHARGE INSTRUCTIONS
Rest  Heat to the area  Motrin Tylenol if needed  Follow up with your doctor for further testing or return if worse

## 2019-07-02 NOTE — ED PROVIDER NOTES
History  Chief Complaint   Patient presents with    Chest Pain     mid chest pressure for 2 hours, pain worsens with movement  HPI patient is a 21-year-old female presents emergency department with 2 hours of anterior chest pain  Pain is fairly sharp sensation worse with movement and palpation of her anterior chest   She denies any shortness of breath  There is no diaphoresis  The pain does not radiate  She denies any chest trauma  Denies any difficulty breathing  Denies difficulty using her arms  Denies any focal weakness  Patient reports the pain started somewhat suddenly  Past medical history of TIA, hypertension, silent myocardial infarction patient reports that she has an EKG that shows decreased forces  Family history noncontributory  Social history, smoker, denies drug abuse    Prior to Admission Medications   Prescriptions Last Dose Informant Patient Reported? Taking?    SUMAtriptan (IMITREX) 100 mg tablet   No No   Sig: Take 1 tablet (100 mg total) by mouth once as needed for migraine for up to 1 dose   amLODIPine (NORVASC) 10 mg tablet   No No   Sig: Take 1 tablet (10 mg total) by mouth daily   aspirin 81 MG tablet   Yes No   Sig: Take 81 mg by mouth daily   atenolol (TENORMIN) 25 mg tablet   No No   Sig: Take 1 tablet (25 mg total) by mouth daily   cloNIDine (CATAPRES) 0 3 mg tablet   No No   Sig: Take 1 tablet (0 3 mg total) by mouth every 8 (eight) hours   diclofenac potassium (CATAFLAM) 50 mg tablet   No No   Sig: Take 1 tablet (50 mg total) by mouth 3 (three) times a day   levothyroxine 100 mcg tablet   No No   Sig: Take 1 tablet by mouth daily for 30 days   nicotine (NICODERM CQ) 21 mg/24 hr TD 24 hr patch   No No   Sig: Place 1 patch on the skin daily   oxyCODONE-acetaminophen (PERCOCET) 5-325 mg per tablet   No No   Sig: Take 1 tablet by mouth 3 (three) times a day as needed for moderate painMax Daily Amount: 3 tablets   topiramate (TOPAMAX) 50 MG tablet   No No   Sig: Take 1 tablet (50 mg total) by mouth daily at bedtime      Facility-Administered Medications: None       Past Medical History:   Diagnosis Date    Disease of thyroid gland     hypothyroidism    History of MI (myocardial infarction)     History of TIA (transient ischemic attack)     Hypertension     TIA (transient ischemic attack)        Past Surgical History:   Procedure Laterality Date    BREAST SURGERY Left     TUBAL LIGATION         Family History   Problem Relation Age of Onset    Heart disease Mother     Hypertension Mother     Diabetes Father     Breast cancer Neg Hx     Colon cancer Neg Hx     Ovarian cancer Neg Hx     Uterine cancer Neg Hx     Cervical cancer Neg Hx      I have reviewed and agree with the history as documented  Social History     Tobacco Use    Smoking status: Current Every Day Smoker     Packs/day: 0 50     Types: Cigarettes    Smokeless tobacco: Never Used   Substance Use Topics    Alcohol use: No    Drug use: No        Review of Systems   Constitutional: Negative for diaphoresis, fatigue and fever  HENT: Negative for congestion, ear pain, nosebleeds and sore throat  Eyes: Negative for photophobia, pain, discharge and visual disturbance  Respiratory: Negative for cough, choking, chest tightness, shortness of breath and wheezing  Cardiovascular: Positive for chest pain  Negative for palpitations  Gastrointestinal: Negative for abdominal distention, abdominal pain, diarrhea and vomiting  Genitourinary: Negative for dysuria, flank pain and frequency  Musculoskeletal: Negative for back pain, gait problem and joint swelling  Skin: Negative for color change and rash  Neurological: Negative for dizziness, syncope and headaches  Psychiatric/Behavioral: Negative for behavioral problems and confusion  The patient is not nervous/anxious  All other systems reviewed and are negative        Physical Exam  Physical Exam   Constitutional: She is oriented to person, place, and time  She appears well-developed and well-nourished  HENT:   Head: Normocephalic  Right Ear: External ear normal    Left Ear: External ear normal    Nose: Nose normal    Mouth/Throat: Oropharynx is clear and moist    Eyes: Pupils are equal, round, and reactive to light  EOM and lids are normal    Neck: Normal range of motion  Neck supple  Cardiovascular: Normal rate, regular rhythm, normal heart sounds and intact distal pulses  Pulmonary/Chest: Effort normal and breath sounds normal  No respiratory distress  She exhibits tenderness  Left sided chest tenderness, reproduced with palpation   Abdominal: Soft  She exhibits no distension  Musculoskeletal: Normal range of motion  She exhibits no deformity  Neurological: She is alert and oriented to person, place, and time  Skin: Skin is warm and dry  Psychiatric: She has a normal mood and affect  Nursing note and vitals reviewed     pulse oximetry 98% on room air adequate oxygenation there is no hypoxia    Vital Signs  ED Triage Vitals [07/02/19 1620]   Temp Pulse Respirations Blood Pressure SpO2   -- 91 (!) 26 (!) 226/101 100 %      Temp Source Heart Rate Source Patient Position - Orthostatic VS BP Location FiO2 (%)   Oral Monitor Sitting Left arm --      Pain Score       Worst Possible Pain           Vitals:    07/02/19 1620 07/02/19 1858   BP: (!) 226/101 (!) 187/98   Pulse: 91 93   Patient Position - Orthostatic VS: Sitting Lying         Visual Acuity      ED Medications  Medications   acetaminophen (TYLENOL) tablet 975 mg (975 mg Oral Given 7/2/19 1913)       Diagnostic Studies  Results Reviewed     Procedure Component Value Units Date/Time    Troponin I [[id number]]  (Normal) Collected:  07/02/19 1856    Lab Status:  Final result Specimen:  Blood from Arm, Right Updated:  07/02/19 1923     Troponin I <0 02 ng/mL     Troponin I [[id number]]  (Normal) Collected:  07/02/19 1631    Lab Status:  Final result Specimen:  Blood from Arm, Left Updated: 07/02/19 1700     Troponin I <0 02 ng/mL     Comprehensive metabolic panel [[id number]]  (Abnormal) Collected:  07/02/19 1631    Lab Status:  Final result Specimen:  Blood from Arm, Left Updated:  07/02/19 1656     Sodium 139 mmol/L      Potassium 3 8 mmol/L      Chloride 103 mmol/L      CO2 24 mmol/L      ANION GAP 12 mmol/L      BUN 6 mg/dL      Creatinine 0 82 mg/dL      Glucose 93 mg/dL      Calcium 8 8 mg/dL      AST 60 U/L      ALT 44 U/L      Alkaline Phosphatase 152 U/L      Total Protein 8 3 g/dL      Albumin 3 6 g/dL      Total Bilirubin 0 40 mg/dL      eGFR 103 ml/min/1 73sq m     Narrative:       National Kidney Disease Foundation guidelines for Chronic Kidney Disease (CKD):     Stage 1 with normal or high GFR (GFR > 90 mL/min/1 73 square meters)    Stage 2 Mild CKD (GFR = 60-89 mL/min/1 73 square meters)    Stage 3A Moderate CKD (GFR = 45-59 mL/min/1 73 square meters)    Stage 3B Moderate CKD (GFR = 30-44 mL/min/1 73 square meters)    Stage 4 Severe CKD (GFR = 15-29 mL/min/1 73 square meters)    Stage 5 End Stage CKD (GFR <15 mL/min/1 73 square meters)  Note: GFR calculation is accurate only with a steady state creatinine    CBC and differential [[id number]] Collected:  07/02/19 1631    Lab Status:  Final result Specimen:  Blood from Arm, Left Updated:  07/02/19 1637     WBC 10 16 Thousand/uL      RBC 4 85 Million/uL      Hemoglobin 13 3 g/dL      Hematocrit 41 6 %      MCV 86 fL      MCH 27 4 pg      MCHC 32 0 g/dL      RDW 13 9 %      MPV 10 8 fL      Platelets 286 Thousands/uL      nRBC 0 /100 WBCs      Neutrophils Relative 66 %      Immat GRANS % 1 %      Lymphocytes Relative 26 %      Monocytes Relative 4 %      Eosinophils Relative 3 %      Basophils Relative 0 %      Neutrophils Absolute 6 78 Thousands/µL      Immature Grans Absolute 0 05 Thousand/uL      Lymphocytes Absolute 2 62 Thousands/µL      Monocytes Absolute 0 40 Thousand/µL      Eosinophils Absolute 0 29 Thousand/µL      Basophils Absolute 0 02 Thousands/µL                  XR chest 2 views    (Results Pending)              Procedures  ECG 12 Lead Documentation Only  Date/Time: 7/2/2019 5:52 PM  Performed by: Darius Martinez MD  Authorized by: Darius Martinez MD     Indications / Diagnosis:  Chest pain  ECG reviewed by me, the ED Provider: yes    Patient location:  ED  Previous ECG:     Previous ECG:  Compared to current    Comparison ECG info:  April 16, 2019    Similarity:  Changes noted  Interpretation:     Interpretation: non-specific    Rate:     ECG rate:  Ninety-one    ECG rate assessment: normal    Rhythm:     Rhythm: sinus rhythm    Ectopy:     Ectopy: none    Comments:      Normal sinus rhythm poor anterior forces, prior EKG read as possible septal infarct, similar today           ED Course         HEART Risk Score      Most Recent Value   History  0 Filed at: 07/02/2019 1931   ECG  0 Filed at: 07/02/2019 1931   Age  0 Filed at: 07/02/2019 1931   Risk Factors  2 Filed at: 07/02/2019 1931   Troponin  0 Filed at: 07/02/2019 1931   Heart Score Risk Calculator   History  0 Filed at: 07/02/2019 1931   ECG  0 Filed at: 07/02/2019 1931   Age  0 Filed at: 07/02/2019 1931   Risk Factors  2 Filed at: 07/02/2019 1931   Troponin  0 Filed at: 07/02/2019 1931   HEART Score  2 Filed at: 07/02/2019 1931   HEART Score  2 Filed at: 07/02/2019 1931         diagnostic testing normal electrolytes normal liver functions no sign of inflammation or liver injury, white count was normal at 10 16, no sign of inflammation, hemoglobin was normal at 13 no sign of anemia        initial troponin and repeat troponin were normal no sign of cardiac ischemia     Chest x-ray: Chest x-ray showed a normal cardiac silhouette, no pneumothorax no infiltrates, No sign of pathology, interpreted by me, I was the primary             MDM medical decision making 60-year-old female presents with anterior chest pain worse with palpation and movement most consistent with chest wall pain, troponin was negative, repeat troponin 3 hours was negative  Patient is low heart score  Low risk for potential ischemic event  Discussed outpatient treatment and follow-up discussed indications to return  Disposition  Final diagnoses:   Chest wall pain     Time reflects when diagnosis was documented in both MDM as applicable and the Disposition within this note     Time User Action Codes Description Comment    7/2/2019  7:30 PM Shani Coles Add [R07 89] Chest wall pain       ED Disposition     ED Disposition Condition Date/Time Comment    Discharge Stable Tue Jul 2, 2019  7:30 PM [facility] discharge to home/self care              Follow-up Information     Follow up With Specialties Details Why [address] [place] [address], [address] [place] [address]   [address] [address] [facility] [address]  [phone]            Discharge Medication List as of 7/2/2019  7:31 PM      CONTINUE these medications which have NOT CHANGED    Details   amLODIPine (NORVASC) 10 mg tablet Take 1 tablet (10 mg total) by mouth daily, Starting Tue 4/16/2019, Print      aspirin 81 MG tablet Take 81 mg by mouth daily, Until Discontinued, Historical Med      atenolol (TENORMIN) 25 mg tablet Take 1 tablet (25 mg total) by mouth daily, Starting Tue 4/16/2019, Print      cloNIDine (CATAPRES) 0 3 mg tablet Take 1 tablet (0 3 mg total) by mouth every 8 (eight) hours, Starting Tue 4/16/2019, Print      diclofenac potassium (CATAFLAM) 50 mg tablet Take 1 tablet (50 mg total) by mouth 3 (three) times a day, Starting Wed 5/8/2019, Normal      levothyroxine 100 mcg tablet Take 1 tablet by mouth daily for 30 days, Starting 2/11/2017, Until Mon 3/13/17, Print      nicotine (NICODERM CQ) 21 mg/24 hr TD 24 hr patch Place 1 patch on the skin daily, Starting Fri 4/13/2018, Print      oxyCODONE-acetaminophen (PERCOCET) 5-325 mg per tablet Take 1 tablet by mouth 3 (three) times a day as needed for moderate painMax Daily Amount: 3 tablets, Starting Sun 5/5/2019, Print      SUMAtriptan (IMITREX) 100 mg tablet Take 1 tablet (100 mg total) by mouth once as needed for migraine for up to 1 dose, Starting u 4/12/2018, Print      topiramate (TOPAMAX) 50 MG tablet Take 1 tablet (50 mg total) by mouth daily at bedtime, Starting u 4/12/2018, Print           No discharge procedures on file      ED Provider  Electronically Signed by           Cathie Ramos MD  07/02/19 4030

## 2019-07-03 LAB
ATRIAL RATE: 91 BPM
P AXIS: 63 DEGREES
PR INTERVAL: 172 MS
QRS AXIS: -9 DEGREES
QRSD INTERVAL: 84 MS
QT INTERVAL: 406 MS
QTC INTERVAL: 499 MS
T WAVE AXIS: 83 DEGREES
VENTRICULAR RATE: 91 BPM

## 2019-07-03 PROCEDURE — 93010 ELECTROCARDIOGRAM REPORT: CPT | Performed by: INTERNAL MEDICINE

## 2019-09-29 ENCOUNTER — APPOINTMENT (EMERGENCY)
Dept: RADIOLOGY | Facility: HOSPITAL | Age: 44
End: 2019-09-29
Payer: COMMERCIAL

## 2019-09-29 ENCOUNTER — HOSPITAL ENCOUNTER (EMERGENCY)
Facility: HOSPITAL | Age: 44
Discharge: HOME | End: 2019-09-30
Attending: EMERGENCY MEDICINE
Payer: COMMERCIAL

## 2019-09-29 DIAGNOSIS — R07.9 CHEST PAIN: ICD-10-CM

## 2019-09-29 DIAGNOSIS — R51.9 HEADACHE: Primary | ICD-10-CM

## 2019-09-29 DIAGNOSIS — I10 HYPERTENSION: ICD-10-CM

## 2019-09-29 LAB
ALBUMIN SERPL BCP-MCNC: 3.6 G/DL (ref 3.5–5)
ALP SERPL-CCNC: 155 U/L (ref 46–116)
ALT SERPL W P-5'-P-CCNC: 51 U/L (ref 12–78)
ANION GAP SERPL CALCULATED.3IONS-SCNC: 10 MMOL/L (ref 4–13)
APTT PPP: 29 SECONDS (ref 23–37)
AST SERPL W P-5'-P-CCNC: 63 U/L (ref 5–45)
BASOPHILS # BLD AUTO: 0.04 THOUSANDS/ΜL (ref 0–0.1)
BASOPHILS NFR BLD AUTO: 0 % (ref 0–1)
BILIRUB SERPL-MCNC: 0.3 MG/DL (ref 0.2–1)
BUN SERPL-MCNC: 7 MG/DL (ref 5–25)
CALCIUM SERPL-MCNC: 9 MG/DL (ref 8.3–10.1)
CHLORIDE SERPL-SCNC: 103 MMOL/L (ref 100–108)
CO2 SERPL-SCNC: 26 MMOL/L (ref 21–32)
CREAT SERPL-MCNC: 0.91 MG/DL (ref 0.6–1.3)
EOSINOPHIL # BLD AUTO: 0.33 THOUSAND/ΜL (ref 0–0.61)
EOSINOPHIL NFR BLD AUTO: 3 % (ref 0–6)
ERYTHROCYTE [DISTWIDTH] IN BLOOD BY AUTOMATED COUNT: 14.5 % (ref 11.6–15.1)
GFR SERPL CREATININE-BSD FRML MDRD: 91 ML/MIN/1.73SQ M
GLUCOSE SERPL-MCNC: 111 MG/DL (ref 65–140)
HCT VFR BLD AUTO: 43 % (ref 34.8–46.1)
HGB BLD-MCNC: 13.4 G/DL (ref 11.5–15.4)
IMM GRANULOCYTES # BLD AUTO: 0.04 THOUSAND/UL (ref 0–0.2)
IMM GRANULOCYTES NFR BLD AUTO: 0 % (ref 0–2)
INR PPP: 0.95 (ref 0.84–1.19)
LYMPHOCYTES # BLD AUTO: 2.92 THOUSANDS/ΜL (ref 0.6–4.47)
LYMPHOCYTES NFR BLD AUTO: 25 % (ref 14–44)
MCH RBC QN AUTO: 27.1 PG (ref 26.8–34.3)
MCHC RBC AUTO-ENTMCNC: 31.2 G/DL (ref 31.4–37.4)
MCV RBC AUTO: 87 FL (ref 82–98)
MONOCYTES # BLD AUTO: 0.48 THOUSAND/ΜL (ref 0.17–1.22)
MONOCYTES NFR BLD AUTO: 4 % (ref 4–12)
NEUTROPHILS # BLD AUTO: 7.81 THOUSANDS/ΜL (ref 1.85–7.62)
NEUTS SEG NFR BLD AUTO: 68 % (ref 43–75)
NRBC BLD AUTO-RTO: 0 /100 WBCS
PLATELET # BLD AUTO: 349 THOUSANDS/UL (ref 149–390)
PMV BLD AUTO: 10.7 FL (ref 8.9–12.7)
POTASSIUM SERPL-SCNC: 3.6 MMOL/L (ref 3.5–5.3)
PROT SERPL-MCNC: 8.4 G/DL (ref 6.4–8.2)
PROTHROMBIN TIME: 12.7 SECONDS (ref 11.6–14.5)
RBC # BLD AUTO: 4.95 MILLION/UL (ref 3.81–5.12)
SODIUM SERPL-SCNC: 139 MMOL/L (ref 136–145)
TROPONIN I SERPL-MCNC: <0.02 NG/ML
WBC # BLD AUTO: 11.62 THOUSAND/UL (ref 4.31–10.16)

## 2019-09-29 PROCEDURE — 96365 THER/PROPH/DIAG IV INF INIT: CPT

## 2019-09-29 PROCEDURE — 96375 TX/PRO/DX INJ NEW DRUG ADDON: CPT

## 2019-09-29 PROCEDURE — 99285 EMERGENCY DEPT VISIT HI MDM: CPT

## 2019-09-29 PROCEDURE — 36415 COLL VENOUS BLD VENIPUNCTURE: CPT | Performed by: EMERGENCY MEDICINE

## 2019-09-29 PROCEDURE — 96366 THER/PROPH/DIAG IV INF ADDON: CPT

## 2019-09-29 PROCEDURE — 96374 THER/PROPH/DIAG INJ IV PUSH: CPT

## 2019-09-29 PROCEDURE — 85025 COMPLETE CBC W/AUTO DIFF WBC: CPT | Performed by: EMERGENCY MEDICINE

## 2019-09-29 PROCEDURE — 71046 X-RAY EXAM CHEST 2 VIEWS: CPT

## 2019-09-29 PROCEDURE — 85610 PROTHROMBIN TIME: CPT | Performed by: EMERGENCY MEDICINE

## 2019-09-29 PROCEDURE — 93005 ELECTROCARDIOGRAM TRACING: CPT

## 2019-09-29 PROCEDURE — 85730 THROMBOPLASTIN TIME PARTIAL: CPT | Performed by: EMERGENCY MEDICINE

## 2019-09-29 PROCEDURE — 84484 ASSAY OF TROPONIN QUANT: CPT | Performed by: EMERGENCY MEDICINE

## 2019-09-29 PROCEDURE — 99285 EMERGENCY DEPT VISIT HI MDM: CPT | Performed by: EMERGENCY MEDICINE

## 2019-09-29 PROCEDURE — 80053 COMPREHEN METABOLIC PANEL: CPT | Performed by: EMERGENCY MEDICINE

## 2019-09-29 RX ORDER — ACETAMINOPHEN 325 MG/1
975 TABLET ORAL ONCE
Status: COMPLETED | OUTPATIENT
Start: 2019-09-29 | End: 2019-09-29

## 2019-09-29 RX ORDER — LABETALOL 20 MG/4 ML (5 MG/ML) INTRAVENOUS SYRINGE
10 ONCE
Status: COMPLETED | OUTPATIENT
Start: 2019-09-29 | End: 2019-09-29

## 2019-09-29 RX ORDER — DIPHENHYDRAMINE HYDROCHLORIDE 50 MG/ML
25 INJECTION INTRAMUSCULAR; INTRAVENOUS ONCE
Status: COMPLETED | OUTPATIENT
Start: 2019-09-29 | End: 2019-09-29

## 2019-09-29 RX ORDER — METOCLOPRAMIDE HYDROCHLORIDE 5 MG/ML
10 INJECTION INTRAMUSCULAR; INTRAVENOUS ONCE
Status: COMPLETED | OUTPATIENT
Start: 2019-09-29 | End: 2019-09-29

## 2019-09-29 RX ORDER — MAGNESIUM SULFATE HEPTAHYDRATE 40 MG/ML
2 INJECTION, SOLUTION INTRAVENOUS ONCE
Status: COMPLETED | OUTPATIENT
Start: 2019-09-29 | End: 2019-09-30

## 2019-09-29 RX ORDER — DEXAMETHASONE SODIUM PHOSPHATE 4 MG/ML
10 INJECTION, SOLUTION INTRA-ARTICULAR; INTRALESIONAL; INTRAMUSCULAR; INTRAVENOUS; SOFT TISSUE ONCE
Status: COMPLETED | OUTPATIENT
Start: 2019-09-29 | End: 2019-09-29

## 2019-09-29 RX ADMIN — DIPHENHYDRAMINE HYDROCHLORIDE 25 MG: 50 INJECTION, SOLUTION INTRAMUSCULAR; INTRAVENOUS at 20:08

## 2019-09-29 RX ADMIN — ACETAMINOPHEN 975 MG: 325 TABLET, FILM COATED ORAL at 20:08

## 2019-09-29 RX ADMIN — MAGNESIUM SULFATE HEPTAHYDRATE 2 G: 40 INJECTION, SOLUTION INTRAVENOUS at 22:31

## 2019-09-29 RX ADMIN — LABETALOL 20 MG/4 ML (5 MG/ML) INTRAVENOUS SYRINGE 10 MG: at 20:08

## 2019-09-29 RX ADMIN — METOCLOPRAMIDE 10 MG: 5 INJECTION, SOLUTION INTRAMUSCULAR; INTRAVENOUS at 20:08

## 2019-09-29 RX ADMIN — DEXAMETHASONE SODIUM PHOSPHATE 10 MG: 4 INJECTION, SOLUTION INTRAMUSCULAR; INTRAVENOUS at 22:27

## 2019-09-29 NOTE — ED PROVIDER NOTES
History  Chief Complaint   Patient presents with    Chest Pain     pt presents to ed with intermittent chest pain that started last night and is now getting worse  History provided by:  Patient   used: No     74-year-old female with history of hypertension presented for evaluation of intermittent chest discomfort associated with some shortness of breath  Also reports constant frontal headache with moderate pain  Symptoms began last night and seemed to have worsened today  Chest pain moderate, central, nonradiating  No associated nausea, vomiting, abdominal pain  Reports she has been taking all her medications as prescribed  Was significantly hypertensive on arrival   Seems uncomfortable  Heart sounds, breath sounds normal   Abdomen soft and nontender  Plan EKG, labs, antihypertensive, symptomatic treatment, and will re-evaluate  Differential diagnosis includes ACS, hypertensive urgency, migraine, chest wall pain  Prior to Admission Medications   Prescriptions Last Dose Informant Patient Reported? Taking? amLODIPine (NORVASC) 10 mg tablet   No Yes   Sig: Take 1 tablet (10 mg total) by mouth daily   aspirin 81 MG tablet   Yes Yes   Sig: Take 81 mg by mouth daily      Facility-Administered Medications: None       Past Medical History:   Diagnosis Date    Disease of thyroid gland     hypothyroidism    History of MI (myocardial infarction)     History of TIA (transient ischemic attack)     Hypertension     TIA (transient ischemic attack)        Past Surgical History:   Procedure Laterality Date    BREAST SURGERY Left     TUBAL LIGATION         Family History   Problem Relation Age of Onset    Heart disease Mother     Hypertension Mother     Diabetes Father     Breast cancer Neg Hx     Colon cancer Neg Hx     Ovarian cancer Neg Hx     Uterine cancer Neg Hx     Cervical cancer Neg Hx      I have reviewed and agree with the history as documented      Social History Tobacco Use    Smoking status: Current Every Day Smoker     Packs/day: 0 50     Types: Cigarettes    Smokeless tobacco: Never Used   Substance Use Topics    Alcohol use: No    Drug use: No        Review of Systems   Constitutional: Negative for activity change, appetite change, fatigue and fever  Respiratory: Positive for shortness of breath  Cardiovascular: Positive for chest pain  Negative for palpitations  Gastrointestinal: Negative for abdominal pain, nausea and vomiting  Musculoskeletal: Negative for back pain and neck pain  Neurological: Positive for headaches  Negative for dizziness, weakness and light-headedness  All other systems reviewed and are negative  Physical Exam  Physical Exam   Constitutional: She is oriented to person, place, and time  She appears well-developed  She does not appear ill  HENT:   Head: Normocephalic and atraumatic  Neck: Normal range of motion  No JVD present  Cardiovascular: Normal rate, regular rhythm, intact distal pulses and normal pulses  Pulmonary/Chest: Effort normal and breath sounds normal    Abdominal: Soft  She exhibits no distension  There is no tenderness  Musculoskeletal: Normal range of motion  Trace nonpitting peripheral edema  Neurological: She is alert and oriented to person, place, and time  Skin: Skin is warm and dry  Capillary refill takes less than 2 seconds  Psychiatric: She has a normal mood and affect  Her behavior is normal    Nursing note and vitals reviewed        Vital Signs  ED Triage Vitals [09/29/19 1946]   Temperature Pulse Respirations Blood Pressure SpO2   98 2 °F (36 8 °C) 93 18 (!) 233/103 97 %      Temp Source Heart Rate Source Patient Position - Orthostatic VS BP Location FiO2 (%)   Oral Monitor -- Right arm --      Pain Score       8           Vitals:    09/29/19 1946 09/29/19 2037 09/29/19 2130 09/29/19 2232   BP: (!) 233/103 138/63 157/83 145/63   Pulse: 93 84 92 91         Visual Acuity      ED Medications  Medications   HYDROmorphone (DILAUDID) injection 1 mg (has no administration in time range)   Labetalol HCl (NORMODYNE) injection 10 mg (10 mg Intravenous Given 9/29/19 2008)   metoclopramide (REGLAN) injection 10 mg (10 mg Intravenous Given 9/29/19 2008)   diphenhydrAMINE (BENADRYL) injection 25 mg (25 mg Intravenous Given 9/29/19 2008)   acetaminophen (TYLENOL) tablet 975 mg (975 mg Oral Given 9/29/19 2008)   magnesium sulfate 2 g/50 mL IVPB (premix) 2 g (2 g Intravenous New Bag 9/29/19 2231)   dexamethasone (DECADRON) injection 10 mg (10 mg Intravenous Given 9/29/19 2227)       Diagnostic Studies  Results Reviewed     Procedure Component Value Units Date/Time    Troponin I [[id number]]  (Normal) Collected:  09/29/19 2346    Lab Status:  Final result Specimen:  Blood from Arm, Left Updated:  09/30/19 0012     Troponin I <0 02 ng/mL     Troponin I [[id number]]  (Normal) Collected:  09/29/19 2001    Lab Status:  Final result Specimen:  Blood from Arm, Right Updated:  09/29/19 2039     Troponin I <0 02 ng/mL     Comprehensive metabolic panel [[id number]]  (Abnormal) Collected:  09/29/19 2001    Lab Status:  Final result Specimen:  Blood from Arm, Right Updated:  09/29/19 2035     Sodium 139 mmol/L      Potassium 3 6 mmol/L      Chloride 103 mmol/L      CO2 26 mmol/L      ANION GAP 10 mmol/L      BUN 7 mg/dL      Creatinine 0 91 mg/dL      Glucose 111 mg/dL      Calcium 9 0 mg/dL      AST 63 U/L      ALT 51 U/L      Alkaline Phosphatase 155 U/L      Total Protein 8 4 g/dL      Albumin 3 6 g/dL      Total Bilirubin 0 30 mg/dL      eGFR 91 ml/min/1 73sq m     Narrative:       Brad guidelines for Chronic Kidney Disease (CKD):     Stage 1 with normal or high GFR (GFR > 90 mL/min/1 73 square meters)    Stage 2 Mild CKD (GFR = 60-89 mL/min/1 73 square meters)    Stage 3A Moderate CKD (GFR = 45-59 mL/min/1 73 square meters)    Stage 3B Moderate CKD (GFR = 30-44 mL/min/1 73 square meters)    Stage 4 Severe CKD (GFR = 15-29 mL/min/1 73 square meters)    Stage 5 End Stage CKD (GFR <15 mL/min/1 73 square meters)  Note: GFR calculation is accurate only with a steady state creatinine    Protime-INR [[id number]]  (Normal) Collected:  09/29/19 2001    Lab Status:  Final result Specimen:  Blood from Arm, Right Updated:  09/29/19 2027     Protime 12 7 seconds      INR 0 95    APTT [[id number]]  (Normal) Collected:  09/29/19 2001    Lab Status:  Final result Specimen:  Blood from Arm, Right Updated:  09/29/19 2027     PTT 29 seconds     CBC and differential [[id number]]  (Abnormal) Collected:  09/29/19 2001    Lab Status:  Final result Specimen:  Blood from Arm, Right Updated:  09/29/19 2011     WBC 11 62 Thousand/uL      RBC 4 95 Million/uL      Hemoglobin 13 4 g/dL      Hematocrit 43 0 %      MCV 87 fL      MCH 27 1 pg      MCHC 31 2 g/dL      RDW 14 5 %      MPV 10 7 fL      Platelets 941 Thousands/uL      nRBC 0 /100 WBCs      Neutrophils Relative 68 %      Immat GRANS % 0 %      Lymphocytes Relative 25 %      Monocytes Relative 4 %      Eosinophils Relative 3 %      Basophils Relative 0 %      Neutrophils Absolute 7 81 Thousands/µL      Immature Grans Absolute 0 04 Thousand/uL      Lymphocytes Absolute 2 92 Thousands/µL      Monocytes Absolute 0 48 Thousand/µL      Eosinophils Absolute 0 33 Thousand/µL      Basophils Absolute 0 04 Thousands/µL                  XR chest 2 views   ED Interpretation by Misbah Delatorre MD (09/29 2127)   Normal                  Procedures  ECG 12 Lead Documentation Only  Date/Time: 9/29/2019 8:00 PM  Performed by: Misbah Delatorre MD  Authorized by: Misbah Delatorre MD     Indications / Diagnosis:  ACS  ECG reviewed by me, the ED Provider: yes    Patient location:  ED  Previous ECG:     Previous ECG:  Compared to current    Comparison ECG info:  7/2/19    Similarity:  No change  Rate:     ECG rate:  88  Rhythm:     Rhythm: sinus rhythm    Ectopy:     Ectopy: none QRS:     QRS axis:  Normal  Conduction:     Conduction: normal    ST segments:     ST segments:  Normal  T waves:     T waves: normal             ED Course  ED Course as of Sep 30 0017   Sun Sep 29, 2019   2200 Reassessed patient  Still reports she has a headache  Blood pressure has since improved  Lab work unremarkable  2303   Resting comfortably at this time  Awaiting 2nd troponin  HEART Risk Score      Most Recent Value   History  0 Filed at: 09/29/2019 2354   ECG  0 Filed at: 09/29/2019 2354   Age  0 Filed at: 09/29/2019 2354   Risk Factors  2 Filed at: 09/29/2019 2354   Troponin  0 Filed at: 09/29/2019 2354   Heart Score Risk Calculator   History  0 Filed at: 09/29/2019 2354   ECG  0 Filed at: 09/29/2019 2354   Age  0 Filed at: 09/29/2019 2354   Risk Factors  2 Filed at: 09/29/2019 2354   Troponin  0 Filed at: 09/29/2019 2354   HEART Score  2 Filed at: 09/29/2019 2354   HEART Score  2 Filed at: 09/29/2019 2354                            MDM  Number of Diagnoses or Management Options  Chest pain: new and requires workup  Headache: new and requires workup  Hypertension: new and requires workup  Diagnosis management comments:   44-year-old female with history of coronary disease, hypertension,  migraines presented with  Headache, significantly hypertensive on arrival and also having some mild chest pain  Her EKG and troponin x2 are unremarkable  Hypertension improved with meds  Headache improved as well but needed to give rescue medication after it worsened just prior to discharge         Amount and/or Complexity of Data Reviewed  Clinical lab tests: ordered and reviewed  Independent visualization of images, tracings, or specimens: yes    Patient Progress  Patient progress: improved      Disposition  Final diagnoses:   Headache   Chest pain   Hypertension     Time reflects when diagnosis was documented in both MDM as applicable and the Disposition within this note     Time User Action Codes Description Comment    9/29/2019 11:53 PM Larry Friar Add [R51] Headache     9/29/2019 11:53 PM Shultz Days J Add [R07 9] Chest pain     9/29/2019 11:53 PM Larry Friar Add [I10] Hypertension       ED Disposition     ED Disposition Condition Date/Time Comment    Discharge Stable Mon Sep 30, 2019 12:13 AM [facility] [place] [facility] discharge to home/self care  Follow-up Information     Follow up With Specialties Details Why Contact Info Additional [address],  Family Medicine   [facility] [address]  [phone]       [facility] Emergency Department Emergency Medicine  If symptoms worsen [address] [facility] [id number]  [address] [place] [address] ED, [address] [place] [address], [facility]          Patient's Medications   Discharge Prescriptions    No medications on file     No discharge procedures on file      ED Provider  Electronically Signed by           Amber Gilbert MD  09/30/19 5504

## 2019-09-30 VITALS
SYSTOLIC BLOOD PRESSURE: 149 MMHG | DIASTOLIC BLOOD PRESSURE: 86 MMHG | OXYGEN SATURATION: 96 % | TEMPERATURE: 98.2 F | BODY MASS INDEX: 43.95 KG/M2 | HEIGHT: 67 IN | WEIGHT: 280 LBS | HEART RATE: 86 BPM | RESPIRATION RATE: 20 BRPM

## 2019-09-30 LAB
ATRIAL RATE: 88 BPM
P AXIS: 58 DEGREES
PR INTERVAL: 176 MS
QRS AXIS: 22 DEGREES
QRSD INTERVAL: 86 MS
QT INTERVAL: 400 MS
QTC INTERVAL: 484 MS
T WAVE AXIS: 95 DEGREES
TROPONIN I SERPL-MCNC: <0.02 NG/ML
VENTRICULAR RATE: 88 BPM

## 2019-09-30 PROCEDURE — 93010 ELECTROCARDIOGRAM REPORT: CPT | Performed by: INTERNAL MEDICINE

## 2019-09-30 PROCEDURE — 96375 TX/PRO/DX INJ NEW DRUG ADDON: CPT

## 2019-09-30 RX ORDER — HYDROMORPHONE HCL/PF 1 MG/ML
1 SYRINGE (ML) INJECTION ONCE
Status: COMPLETED | OUTPATIENT
Start: 2019-09-30 | End: 2019-09-30

## 2019-09-30 RX ADMIN — HYDROMORPHONE HYDROCHLORIDE 1 MG: 1 INJECTION, SOLUTION INTRAMUSCULAR; INTRAVENOUS; SUBCUTANEOUS at 00:25

## 2019-10-05 ENCOUNTER — APPOINTMENT (EMERGENCY)
Dept: CT IMAGING | Facility: HOSPITAL | Age: 44
End: 2019-10-05
Payer: COMMERCIAL

## 2019-10-05 ENCOUNTER — HOSPITAL ENCOUNTER (OUTPATIENT)
Facility: HOSPITAL | Age: 44
Setting detail: OBSERVATION
Discharge: HOME | End: 2019-10-06
Attending: EMERGENCY MEDICINE | Admitting: INTERNAL MEDICINE
Payer: COMMERCIAL

## 2019-10-05 DIAGNOSIS — I16.0 HYPERTENSIVE URGENCY: Primary | ICD-10-CM

## 2019-10-05 DIAGNOSIS — R07.89 OTHER CHEST PAIN: ICD-10-CM

## 2019-10-05 LAB
ALBUMIN SERPL BCP-MCNC: 3.2 G/DL (ref 3.5–5)
ALP SERPL-CCNC: 147 U/L (ref 46–116)
ALT SERPL W P-5'-P-CCNC: 67 U/L (ref 12–78)
ANION GAP SERPL CALCULATED.3IONS-SCNC: 9 MMOL/L (ref 4–13)
APTT PPP: 30 SECONDS (ref 23–37)
AST SERPL W P-5'-P-CCNC: 80 U/L (ref 5–45)
BASOPHILS # BLD AUTO: 0.02 THOUSANDS/ΜL (ref 0–0.1)
BASOPHILS NFR BLD AUTO: 0 % (ref 0–1)
BILIRUB SERPL-MCNC: 0.4 MG/DL (ref 0.2–1)
BUN SERPL-MCNC: 7 MG/DL (ref 5–25)
CALCIUM SERPL-MCNC: 8.7 MG/DL (ref 8.3–10.1)
CHLORIDE SERPL-SCNC: 102 MMOL/L (ref 100–108)
CO2 SERPL-SCNC: 26 MMOL/L (ref 21–32)
CREAT SERPL-MCNC: 0.74 MG/DL (ref 0.6–1.3)
EOSINOPHIL # BLD AUTO: 0.28 THOUSAND/ΜL (ref 0–0.61)
EOSINOPHIL NFR BLD AUTO: 2 % (ref 0–6)
ERYTHROCYTE [DISTWIDTH] IN BLOOD BY AUTOMATED COUNT: 14.6 % (ref 11.6–15.1)
GFR SERPL CREATININE-BSD FRML MDRD: 116 ML/MIN/1.73SQ M
GLUCOSE SERPL-MCNC: 120 MG/DL (ref 65–140)
HCT VFR BLD AUTO: 41.5 % (ref 34.8–46.1)
HGB BLD-MCNC: 13.2 G/DL (ref 11.5–15.4)
IMM GRANULOCYTES # BLD AUTO: 0.08 THOUSAND/UL (ref 0–0.2)
IMM GRANULOCYTES NFR BLD AUTO: 1 % (ref 0–2)
INR PPP: 0.96 (ref 0.84–1.19)
LYMPHOCYTES # BLD AUTO: 2.56 THOUSANDS/ΜL (ref 0.6–4.47)
LYMPHOCYTES NFR BLD AUTO: 21 % (ref 14–44)
MCH RBC QN AUTO: 27.5 PG (ref 26.8–34.3)
MCHC RBC AUTO-ENTMCNC: 31.8 G/DL (ref 31.4–37.4)
MCV RBC AUTO: 87 FL (ref 82–98)
MONOCYTES # BLD AUTO: 0.52 THOUSAND/ΜL (ref 0.17–1.22)
MONOCYTES NFR BLD AUTO: 4 % (ref 4–12)
NEUTROPHILS # BLD AUTO: 8.61 THOUSANDS/ΜL (ref 1.85–7.62)
NEUTS SEG NFR BLD AUTO: 72 % (ref 43–75)
NRBC BLD AUTO-RTO: 0 /100 WBCS
NT-PROBNP SERPL-MCNC: 196 PG/ML
PLATELET # BLD AUTO: 341 THOUSANDS/UL (ref 149–390)
PMV BLD AUTO: 10.8 FL (ref 8.9–12.7)
POTASSIUM SERPL-SCNC: 4.1 MMOL/L (ref 3.5–5.3)
PROT SERPL-MCNC: 7.7 G/DL (ref 6.4–8.2)
PROTHROMBIN TIME: 12.8 SECONDS (ref 11.6–14.5)
RBC # BLD AUTO: 4.8 MILLION/UL (ref 3.81–5.12)
SODIUM SERPL-SCNC: 137 MMOL/L (ref 136–145)
TROPONIN I SERPL-MCNC: <0.02 NG/ML
TROPONIN I SERPL-MCNC: <0.02 NG/ML
TSH SERPL DL<=0.05 MIU/L-ACNC: 76.9 UIU/ML (ref 0.36–3.74)
WBC # BLD AUTO: 12.07 THOUSAND/UL (ref 4.31–10.16)

## 2019-10-05 PROCEDURE — 96374 THER/PROPH/DIAG INJ IV PUSH: CPT

## 2019-10-05 PROCEDURE — 85610 PROTHROMBIN TIME: CPT | Performed by: PHYSICIAN ASSISTANT

## 2019-10-05 PROCEDURE — 85025 COMPLETE CBC W/AUTO DIFF WBC: CPT | Performed by: PHYSICIAN ASSISTANT

## 2019-10-05 PROCEDURE — 93005 ELECTROCARDIOGRAM TRACING: CPT

## 2019-10-05 PROCEDURE — 99285 EMERGENCY DEPT VISIT HI MDM: CPT | Performed by: PHYSICIAN ASSISTANT

## 2019-10-05 PROCEDURE — 99220 PR INITIAL OBSERVATION CARE/DAY 70 MINUTES: CPT | Performed by: INTERNAL MEDICINE

## 2019-10-05 PROCEDURE — 84484 ASSAY OF TROPONIN QUANT: CPT | Performed by: PHYSICIAN ASSISTANT

## 2019-10-05 PROCEDURE — 70450 CT HEAD/BRAIN W/O DYE: CPT

## 2019-10-05 PROCEDURE — 96375 TX/PRO/DX INJ NEW DRUG ADDON: CPT

## 2019-10-05 PROCEDURE — 84484 ASSAY OF TROPONIN QUANT: CPT | Performed by: INTERNAL MEDICINE

## 2019-10-05 PROCEDURE — 84443 ASSAY THYROID STIM HORMONE: CPT | Performed by: INTERNAL MEDICINE

## 2019-10-05 PROCEDURE — 99285 EMERGENCY DEPT VISIT HI MDM: CPT

## 2019-10-05 PROCEDURE — 74175 CTA ABDOMEN W/CONTRAST: CPT

## 2019-10-05 PROCEDURE — 85730 THROMBOPLASTIN TIME PARTIAL: CPT | Performed by: PHYSICIAN ASSISTANT

## 2019-10-05 PROCEDURE — 84439 ASSAY OF FREE THYROXINE: CPT | Performed by: INTERNAL MEDICINE

## 2019-10-05 PROCEDURE — 71275 CT ANGIOGRAPHY CHEST: CPT

## 2019-10-05 PROCEDURE — 36415 COLL VENOUS BLD VENIPUNCTURE: CPT | Performed by: PHYSICIAN ASSISTANT

## 2019-10-05 PROCEDURE — 83880 ASSAY OF NATRIURETIC PEPTIDE: CPT | Performed by: PHYSICIAN ASSISTANT

## 2019-10-05 PROCEDURE — 80053 COMPREHEN METABOLIC PANEL: CPT | Performed by: PHYSICIAN ASSISTANT

## 2019-10-05 RX ORDER — AMLODIPINE BESYLATE 10 MG/1
10 TABLET ORAL DAILY
Status: DISCONTINUED | OUTPATIENT
Start: 2019-10-05 | End: 2019-10-06 | Stop reason: HOSPADM

## 2019-10-05 RX ORDER — METOCLOPRAMIDE HYDROCHLORIDE 5 MG/ML
10 INJECTION INTRAMUSCULAR; INTRAVENOUS ONCE
Status: COMPLETED | OUTPATIENT
Start: 2019-10-05 | End: 2019-10-05

## 2019-10-05 RX ORDER — DIPHENHYDRAMINE HCL 25 MG
25 TABLET ORAL EVERY 6 HOURS PRN
Status: DISCONTINUED | OUTPATIENT
Start: 2019-10-05 | End: 2019-10-06 | Stop reason: HOSPADM

## 2019-10-05 RX ORDER — BUTALBITAL, ACETAMINOPHEN AND CAFFEINE 50; 325; 40 MG/1; MG/1; MG/1
1 TABLET ORAL ONCE
Status: COMPLETED | OUTPATIENT
Start: 2019-10-05 | End: 2019-10-05

## 2019-10-05 RX ORDER — ACETAMINOPHEN 325 MG/1
650 TABLET ORAL ONCE
Status: COMPLETED | OUTPATIENT
Start: 2019-10-05 | End: 2019-10-05

## 2019-10-05 RX ORDER — DOCUSATE SODIUM 100 MG/1
100 CAPSULE, LIQUID FILLED ORAL 2 TIMES DAILY PRN
Status: DISCONTINUED | OUTPATIENT
Start: 2019-10-05 | End: 2019-10-06 | Stop reason: HOSPADM

## 2019-10-05 RX ORDER — LEVOTHYROXINE SODIUM 0.1 MG/1
100 TABLET ORAL
Status: DISCONTINUED | OUTPATIENT
Start: 2019-10-06 | End: 2019-10-06 | Stop reason: HOSPADM

## 2019-10-05 RX ORDER — HYDROCHLOROTHIAZIDE 12.5 MG/1
12.5 TABLET ORAL DAILY
Status: DISCONTINUED | OUTPATIENT
Start: 2019-10-05 | End: 2019-10-06

## 2019-10-05 RX ORDER — ONDANSETRON 2 MG/ML
4 INJECTION INTRAMUSCULAR; INTRAVENOUS EVERY 4 HOURS PRN
Status: DISCONTINUED | OUTPATIENT
Start: 2019-10-05 | End: 2019-10-06 | Stop reason: HOSPADM

## 2019-10-05 RX ORDER — HYDROCODONE BITARTRATE AND ACETAMINOPHEN 5; 325 MG/1; MG/1
1 TABLET ORAL EVERY 4 HOURS PRN
Status: DISCONTINUED | OUTPATIENT
Start: 2019-10-05 | End: 2019-10-06 | Stop reason: HOSPADM

## 2019-10-05 RX ORDER — LABETALOL 20 MG/4 ML (5 MG/ML) INTRAVENOUS SYRINGE
10 ONCE
Status: COMPLETED | OUTPATIENT
Start: 2019-10-05 | End: 2019-10-05

## 2019-10-05 RX ORDER — ACETAMINOPHEN 325 MG/1
650 TABLET ORAL EVERY 6 HOURS PRN
Status: DISCONTINUED | OUTPATIENT
Start: 2019-10-05 | End: 2019-10-06 | Stop reason: HOSPADM

## 2019-10-05 RX ORDER — ASPIRIN 81 MG/1
81 TABLET ORAL DAILY
Status: DISCONTINUED | OUTPATIENT
Start: 2019-10-06 | End: 2019-10-06 | Stop reason: HOSPADM

## 2019-10-05 RX ORDER — LOSARTAN POTASSIUM 50 MG/1
50 TABLET ORAL DAILY
Status: DISCONTINUED | OUTPATIENT
Start: 2019-10-06 | End: 2019-10-06 | Stop reason: HOSPADM

## 2019-10-05 RX ORDER — HYDRALAZINE HYDROCHLORIDE 20 MG/ML
10 INJECTION INTRAMUSCULAR; INTRAVENOUS EVERY 4 HOURS PRN
Status: DISCONTINUED | OUTPATIENT
Start: 2019-10-05 | End: 2019-10-06 | Stop reason: HOSPADM

## 2019-10-05 RX ORDER — OXYCODONE HYDROCHLORIDE 5 MG/1
5 TABLET ORAL ONCE
Status: COMPLETED | OUTPATIENT
Start: 2019-10-05 | End: 2019-10-05

## 2019-10-05 RX ADMIN — ACETAMINOPHEN 650 MG: 325 TABLET ORAL at 17:54

## 2019-10-05 RX ADMIN — LABETALOL 20 MG/4 ML (5 MG/ML) INTRAVENOUS SYRINGE 10 MG: at 16:35

## 2019-10-05 RX ADMIN — BUTALBITAL, ACETAMINOPHEN, AND CAFFEINE 1 TABLET: 50; 325; 40 TABLET ORAL at 20:27

## 2019-10-05 RX ADMIN — HYDROCHLOROTHIAZIDE 12.5 MG: 12.5 TABLET ORAL at 20:27

## 2019-10-05 RX ADMIN — HYDROCODONE BITARTRATE AND ACETAMINOPHEN 1 TABLET: 5; 325 TABLET ORAL at 23:28

## 2019-10-05 RX ADMIN — OXYCODONE HYDROCHLORIDE 5 MG: 5 TABLET ORAL at 19:08

## 2019-10-05 RX ADMIN — IOHEXOL 100 ML: 350 INJECTION, SOLUTION INTRAVENOUS at 17:40

## 2019-10-05 RX ADMIN — AMLODIPINE BESYLATE 10 MG: 10 TABLET ORAL at 20:27

## 2019-10-05 RX ADMIN — ENOXAPARIN SODIUM 40 MG: 40 INJECTION SUBCUTANEOUS at 21:24

## 2019-10-05 RX ADMIN — METOCLOPRAMIDE 10 MG: 5 INJECTION, SOLUTION INTRAMUSCULAR; INTRAVENOUS at 17:56

## 2019-10-05 NOTE — APP STUDENT NOTE
History and Physical    Assessment and Plan  1  Hypertensive urgency: BP 200s/100s, 10 mg of labetalol given with improvement to 's  Consider restarting Norvasc and adding ACE for better control  CT head/chest for R/O dissection- negative  Monitor BP  Consult to cardiology due to hx of CAD and uncontrolled HTN  Pt was previously on lasix and additional medications for BP but not on record  Trend trop, consider ECHO? 2  Headache: PRN tylenol for headache  3  Hypothyroidism: check TSH  Restart levothyroxine  4  Hx of MI/TIA: continue ASA  CT head negative  5  Hepatomegaly: fatty liver on CT abdomen  Trend CMP  VTE Prophylaxis: Enoxaparin (Lovenox)  Code Status: Prior  Anticipated Length of Stay:  Patient will be admitted on an Observation basis with an anticipated length of stay of  less than 2 midnights  Total Time for Visit, including Counseling / Coordination of Care: 20 mins  Greater than 50% of this total time spent on direct patient counseling and coordination of care  Chief Complaint:     Chief Complaint   Patient presents with    Headache     reoccurant-  w left arm tingling and pain     History of Present Illness:    Xiomara Chapman is a 41 y o  female who presents with headache that radiates to the left arm, chest and face  Pt states that headache was pounding and feels like someone is "banging her head"  States that the pain radiates to her chest, front of her face, and left arm  Pt reports feeling short of breath with chest tightness  Unrelieved by tylenol, thus bringing her in to the ED  Pt states that she has pervious MI/TIA and was sent home on medications but never continue taking it due to inability to have it refilled  Pt also denies taking other BP medication other than Norvasc  Denies lightheadedness, dizziness, syncope, N/V or other discomfort at this time       Review of Systems:    History obtained from the patient  General ROS: positive for  - fatigue  Psychological ROS: negative for - anxiety, behavioral disorder or irritability  Ophthalmic ROS: negative for - blurry vision or double vision  Respiratory ROS: positive for - shortness of breath  Cardiovascular ROS: positive for - chest pain, dyspnea on exertion and shortness of breath  Gastrointestinal ROS: no abdominal pain, change in bowel habits, or black or bloody stools  Genito-Urinary ROS: negative for - change in menstrual cycle, change in urinary stream, dysmenorrhea, hematuria, incontinence or irregular/heavy menses  Musculoskeletal ROS: positive for - swelling in leg - bilateral  Neurological ROS: positive for - headaches and numbness/tingling  Otherwise, all other 12 point review of systems normal     Past Medical and Surgical History:   Past Medical History:   Diagnosis Date    Disease of thyroid gland     hypothyroidism    History of MI (myocardial infarction)     History of TIA (transient ischemic attack)     Hypertension     TIA (transient ischemic attack)      Past Surgical History:   Procedure Laterality Date    BREAST SURGERY Left     TUBAL LIGATION       Meds/Allergies: Allergies: Allergies   Allergen Reactions    Trimecaine Hallucinations    Toradol [Ketorolac Tromethamine] Anxiety     Prior to Admission Medications   Prescriptions Last Dose Informant Patient Reported? Taking?    amLODIPine (NORVASC) 10 mg tablet   No No   Sig: Take 1 tablet (10 mg total) by mouth daily   aspirin 81 MG tablet   Yes No   Sig: Take 81 mg by mouth daily      Facility-Administered Medications: None     Social History:     Social History     Socioeconomic History    Marital status: /Civil Union     Spouse name: Not on file    Number of children: Not on file    Years of education: Not on file    Highest education level: Not on file   Occupational History    Not on file   Social Needs    Financial resource strain: Not on file    Food insecurity:     Worry: Not on file     Inability: Not on file   Sheology needs:      Medical: Not on file     Non-medical: Not on file   Tobacco Use    Smoking status: Current Every Day Smoker     Packs/day: 0 50     Types: Cigarettes    Smokeless tobacco: Never Used   Substance and Sexual Activity    Alcohol use: No    Drug use: No    Sexual activity: Yes     Birth control/protection: None   Lifestyle    Physical activity:     Days per week: Not on file     Minutes per session: Not on file    Stress: Not on file   Relationships    Social connections:     Talks on phone: Not on file     Gets together: Not on file     Attends [employer] service: Not on file     Active member of club or organization: Not on file     Attends meetings of clubs or organizations: Not on file     Relationship status: Not on file    Intimate partner violence:     Fear of current or ex partner: Not on file     Emotionally abused: Not on file     Physically abused: Not on file     Forced sexual activity: Not on file   Other Topics Concern    Not on file   Social History Narrative    Not on file         Family History:  Family History   Problem Relation Age of Onset    Heart disease Mother     Hypertension Mother     Diabetes Father     Breast cancer Neg Hx     Colon cancer Neg Hx     Ovarian cancer Neg Hx     Uterine cancer Neg Hx     Cervical cancer Neg Hx      Physical Exam:   Vitals:   Blood Pressure: (!) 188/91 (10/05/19 1821)  Pulse: 84 (10/05/19 1821)  Temperature: 97 7 °F (36 5 °C) (10/05/19 1545)  Temp Source: Oral (10/05/19 1545)  Respirations: 20 (10/05/19 1821)  Weight - Scale: 131 kg (288 lb 9 3 oz) (10/05/19 1809)  SpO2: 97 % (10/05/19 1821)    General appearance: alert and oriented, in no acute distress, alert, appears stated age and cooperative  Skin: Skin color, texture, turgor normal  No rashes or lesions or nails clear without discoloration or sign of infection, nails normal without clubbing, no abnormal pigmentation, no evidence of bleeding or bruising, no lesions noted and no periungual infections  Head: Normocephalic, without obvious abnormality, atraumatic  Eyes: conjunctivae/corneas clear  PERRL, EOM's intact  Lungs: clear to auscultation bilaterally  Heart: regular rate and rhythm, S1, S2 normal, no murmur, click, rub or gallop  Abdomen: soft, non-tender; bowel sounds normal; no masses,  no organomegaly  Back: no kyphosis present, no scoliosis present, no skin lesions, erythema, or scars, symmetric, no curvature  ROM normal  No CVA tenderness  Extremities: extremities normal, warm and well-perfused; no cyanosis, clubbing, or edema, no edema, redness or tenderness in the calves or thighs, no ulcers, gangrene or trophic changes and extremities normal, atraumatic, no cyanosis or edema  Neurologic: Alert and oriented X 3, normal strength and tone  Normal symmetric reflexes  Normal coordination and gait  Psychiatric: n/a    Lab Results: I have personally reviewed pertinent reports      Results from last 7 days   Lab Units 10/05/19  1628   WBC Thousand/uL 12 07*   HEMOGLOBIN g/dL 13 2   HEMATOCRIT % 41 5   PLATELETS Thousands/uL 341   NEUTROS PCT % 72   LYMPHS PCT % 21   MONOS PCT % 4   EOS PCT % 2     Results from last 7 days   Lab Units 10/05/19  1628 09/29/19 2001   SODIUM mmol/L 137 139   POTASSIUM mmol/L 4 1 3 6   CHLORIDE mmol/L 102 103   CO2 mmol/L 26 26   ANION GAP mmol/L 9 10   BUN mg/dL 7 7   CREATININE mg/dL 0 74 0 91   CALCIUM mg/dL 8 7 9 0   ALBUMIN g/dL 3 2* 3 6   TOTAL BILIRUBIN mg/dL 0 40 0 30   ALK PHOS U/L 147* 155*   ALT U/L 67 51   AST U/L 80* 63*   EGFR ml/min/1 73sq m 116 91   GLUCOSE RANDOM mg/dL 120 111     Results from last 7 days   Lab Units 10/05/19  1628   INR  0 96     Results from last 7 days   Lab Units 10/05/19  1628 09/29/19  2346 09/29/19 2001   TROPONIN I ng/mL <0 02 <0 02 <0 02             Results from last 7 days   Lab Units 10/05/19  1628   NT-PRO BNP pg/mL 196*            Invalid input(s): URIBILINOGEN        Imaging: I have personally reviewed pertinent reports  Xr Chest 2 Views    Result Date: 9/30/2019  Narrative: CHEST INDICATION:   dyspnea  COMPARISON:  7/2/2019 EXAM PERFORMED/VIEWS:  XR CHEST PA & LATERAL  The frontal view was performed utilizing dual energy radiographic technique  Images: 4 FINDINGS: Cardiomediastinal silhouette appears unremarkable  The lungs are clear  No pneumothorax or pleural effusion  Osseous structures appear within normal limits for patient age  Impression: No acute cardiopulmonary disease  Workstation performed: [id number]     Ct Head Without Contrast    Result Date: 10/5/2019  Narrative: CT BRAIN - WITHOUT CONTRAST INDICATION:   HTN, headache, vertigo  COMPARISON:  2/16/2019 TECHNIQUE:  CT examination of the brain was performed  In addition to axial images, coronal 2D reformatted images were created and submitted for interpretation  Radiation dose length product (DLP) for this visit:  936 mGy-cm   This examination, like all CT scans performed in the [facility], was performed utilizing techniques to minimize radiation dose exposure, including the use of iterative reconstruction and automated exposure control  IMAGE QUALITY:  Diagnostic  FINDINGS: PARENCHYMA:  No intracranial mass, mass effect or midline shift  No CT signs of acute infarction  No acute parenchymal hemorrhage  VENTRICLES AND EXTRA-AXIAL SPACES:  Normal for the patient's age  VISUALIZED ORBITS AND PARANASAL SINUSES:  Unremarkable  CALVARIUM AND EXTRACRANIAL SOFT TISSUES:  Normal      Impression: No acute intracranial abnormality  Workstation performed: [id number]     Cta Dissection Protocol Chest And Abdomen    Result Date: 10/5/2019  Narrative: CTA - CHEST AND ABDOMEN  - WITHOUT AND WITH IV CONTRAST INDICATION:   chest pain, left arm tingling; HTN  COMPARISON:  None   TECHNIQUE: CT examination of the chest and abdomen was performed both prior to and after the administration of intravenous contrast   Thin section angiographic arterial phase post contrast technique was used in order to evaluate for aortic dissection  3D reformatted images and volume rendering were performed on an independent workstation  Additionally, axial, sagittal, and coronal 2D reformatted images were created from the source data and submitted for interpretation  Radiation dose length product (DLP) for this visit:  2610 mGy-cm   This examination, like all CT scans performed in the [facility], was performed utilizing techniques to minimize radiation dose exposure, including the use of iterative reconstruction and automated exposure control  IV Contrast:  100 mL of iohexol (OMNIPAQUE) Enteric Contrast: Enteric contrast was not administered  FINDINGS: AORTA: There is no aortic dissection or intramural hematoma  There is no aortic aneurysm  CHEST LUNGS:  Lungs are clear  There is no tracheal or endobronchial lesion  PLEURA:  Unremarkable  HEART/GREAT VESSELS:  Unremarkable for patient's age  MEDIASTINUM AND OSBALDO:  Unremarkable  CHEST WALL AND LOWER NECK:   Unremarkable  ABDOMEN LIVER/BILIARY TREE:  There is moderate fatty liver change and moderate hepatomegaly  GALLBLADDER:  There is a small calcified gallstone within the gallbladder, without pericholecystic inflammatory changes  SPLEEN:  Unremarkable  PANCREAS:  Unremarkable  ADRENAL GLANDS:  Unremarkable  KIDNEYS/URETERS:  Unremarkable  No hydronephrosis  VISUALIZED STOMACH, BOWEL AND APPENDIX:  Unremarkable  VISUALIZED ABDOMINOPELVIC CAVITY:  No ascites or free intraperitoneal air  No lymphadenopathy  ABDOMINAL WALL:  Unremarkable  OSSEOUS STRUCTURES:  No acute fracture or destructive osseous lesion  Impression: There is no aortic dissection  Moderate fatty liver change and moderate hepatomegaly   Cholelithiasis without CT evidence of acute cholecystitis Workstation performed: [id number]       EKG, Pathology, and Other Studies Reviewed on Admission:   EKG  Result Date: 10/05/19  Personally reviewed strips with impression of:  nsr    Allscripts/ Epic Records Reviewed: Yes    ** Please Note: This note has been constructed using a voice recognition system   **

## 2019-10-05 NOTE — ED PROVIDER NOTES
History  Chief Complaint   Patient presents with    Headache     reoccurant-  w left arm tingling and pain     This is a 68-year-old female patient here for evaluation of high blood pressure, headache, dizziness, chest pain, left arm tingling  She has had symptoms on off for about 1 week now  States initially there were mild when she came to the ER for the 1st visit where she had normal workup  Under 2nd visit today they are more severe  Progressively getting worse  Nothing she does seems to make any her symptoms better or worse  She reports history of hypertension, takes amlodipine  She is compliant with his medication, is adamant that she has not missed any doses, was recently took her dose this morning  She takes an 81 mg aspirin daily for prior history of TIA  Does not take any true anticoagulant  Denies any weakness in her extremities  No difficulty ambulating  No visual disturbances        History provided by:  Patient   used: No    Headache   Pain location:  Generalized  Quality:  Unable to specify  Radiates to:  Does not radiate  Severity currently:  9/10  Severity at highest:  10/10  Onset quality:  Gradual  Duration:  1 week  Timing:  Intermittent  Progression:  Worsening  Chronicity:  New  Similar to prior headaches: no    Context: not activity, not exposure to bright light, not caffeine, not coughing, not defecating, not eating, not stress, not exposure to cold air, not intercourse, not loud noise and not straining    Relieved by:  Nothing  Worsened by:  Nothing  Ineffective treatments:  None tried  Associated symptoms: dizziness, facial pain (left facial "twitching"), paresthesias (Left arm, no true anesthesia the arm) and tingling ( left arm)    Associated symptoms: no abdominal pain, no back pain, no blurred vision, no congestion, no cough, no diarrhea, no drainage, no ear pain, no eye pain, no fatigue, no fever, no focal weakness, no hearing loss, no loss of balance, no myalgias, no nausea, no near-syncope, no neck pain, no neck stiffness, no numbness, no photophobia, no seizures, no sinus pressure, no sore throat, no swollen glands, no syncope, no URI, no visual change, no vomiting and no weakness    Risk factors: no anger, no family hx of SAH, does not have insomnia and lifestyle not sedentary        Prior to Admission Medications   Prescriptions Last Dose Informant Patient Reported? Taking? amLODIPine (NORVASC) 10 mg tablet   No No   Sig: Take 1 tablet (10 mg total) by mouth daily   aspirin 81 MG tablet   Yes No   Sig: Take 81 mg by mouth daily      Facility-Administered Medications: None       Past Medical History:   Diagnosis Date    Disease of thyroid gland     hypothyroidism    History of MI (myocardial infarction)     History of TIA (transient ischemic attack)     Hypertension     TIA (transient ischemic attack)        Past Surgical History:   Procedure Laterality Date    BREAST SURGERY Left     TUBAL LIGATION         Family History   Problem Relation Age of Onset    Heart disease Mother     Hypertension Mother     Diabetes Father     Breast cancer Neg Hx     Colon cancer Neg Hx     Ovarian cancer Neg Hx     Uterine cancer Neg Hx     Cervical cancer Neg Hx      I have reviewed and agree with the history as documented  Social History     Tobacco Use    Smoking status: Current Every Day Smoker     Packs/day: 0 50     Types: Cigarettes    Smokeless tobacco: Never Used   Substance Use Topics    Alcohol use: No    Drug use: No        Review of Systems   Constitutional: Negative for activity change, appetite change, chills, diaphoresis, fatigue, fever and unexpected weight change  HENT: Negative for congestion, ear pain, hearing loss, postnasal drip, rhinorrhea, sinus pressure, sore throat and trouble swallowing  Eyes: Negative for blurred vision, photophobia, pain and visual disturbance     Respiratory: Negative for apnea, cough, choking, chest tightness, shortness of breath, wheezing and stridor  Cardiovascular: Positive for chest pain  Negative for palpitations, leg swelling, syncope and near-syncope  Gastrointestinal: Negative for abdominal distention, abdominal pain, blood in stool, constipation, diarrhea, nausea and vomiting  Genitourinary: Negative for decreased urine volume, difficulty urinating, dysuria, enuresis, flank pain, frequency, hematuria and urgency  Musculoskeletal: Negative for arthralgias, back pain, myalgias, neck pain and neck stiffness  Skin: Negative for color change, pallor, rash and wound  Allergic/Immunologic: Negative  Neurological: Positive for dizziness, headaches and paresthesias (Left arm, no true anesthesia the arm)  Negative for tremors, focal weakness, seizures, syncope, weakness, light-headedness, numbness and loss of balance  Hematological: Negative  Psychiatric/Behavioral: Negative  All other systems reviewed and are negative  Physical Exam  Physical Exam   Constitutional: She is oriented to person, place, and time  She appears well-developed and well-nourished  Non-toxic appearance  She does not have a sickly appearance  She does not appear ill  No distress  HENT:   Head: Normocephalic and atraumatic  Eyes: Pupils are equal, round, and reactive to light  EOM and lids are normal    Neck: Normal range of motion  Neck supple  Cardiovascular: Normal rate, regular rhythm, S1 normal, S2 normal, normal heart sounds, intact distal pulses and normal pulses  Exam reveals no gallop, no distant heart sounds, no friction rub and no decreased pulses  No murmur heard  Pulses:       Radial pulses are 2+ on the right side, and 2+ on the left side  Pulmonary/Chest: Effort normal and breath sounds normal  No accessory muscle usage  No apnea, no tachypnea and no bradypnea  No respiratory distress  She has no decreased breath sounds  She has no wheezes  She has no rhonchi  She has no rales  Abdominal: Soft  Normal appearance  She exhibits no distension  There is no tenderness  There is no rigidity, no rebound and no guarding  Musculoskeletal: Normal range of motion  She exhibits no edema, tenderness or deformity  Neurological: She is alert and oriented to person, place, and time  No cranial nerve deficit  GCS eye subscore is 4  GCS verbal subscore is 5  GCS motor subscore is 6  GCS 15  AAOx3  No focal neuro deficits  CN II-XII grossly intact  Speech normal, no aphasia or dysarthria  No pronator drift  Cerebellar function normal  Finger to nose normal  PERRL  EOMI  Peripheral vision intact  No nystagmus  Upper and lower extremity strength 5/5 through   strength 5/5 b/l  Gross sensation intact b/l  Skin: Skin is warm, dry and intact  No rash noted  She is not diaphoretic  No erythema  No pallor  Psychiatric: Her speech is normal    Nursing note and vitals reviewed        Vital Signs  ED Triage Vitals [10/05/19 1545]   Temperature Pulse Respirations Blood Pressure SpO2   97 7 °F (36 5 °C) 101 18 (!) 234/112 100 %      Temp Source Heart Rate Source Patient Position - Orthostatic VS BP Location FiO2 (%)   Oral -- Sitting Right arm --      Pain Score       9           Vitals:    10/05/19 1545 10/05/19 1659 10/05/19 1809 10/05/19 1821   BP: (!) 234/112 169/80 (!) 188/91 (!) 188/91   Pulse: 101   84   Patient Position - Orthostatic VS: Sitting            Visual Acuity  Visual Acuity      Most Recent Value   L Pupil Size (mm)  3   R Pupil Size (mm)  3          ED Medications  Medications   Labetalol HCl (NORMODYNE) injection 10 mg (10 mg Intravenous Given 10/5/19 1635)   iohexol (OMNIPAQUE) 350 MG/ML injection (MULTI-DOSE) 100 mL (100 mL Intravenous Given 10/5/19 1740)   metoclopramide (REGLAN) injection 10 mg (10 mg Intravenous Given 10/5/19 1756)   acetaminophen (TYLENOL) tablet 650 mg (650 mg Oral Given 10/5/19 1754)       Diagnostic Studies  Results Reviewed     Procedure Component Value Units Date/Time    Comprehensive metabolic panel [[id number]]  (Abnormal) Collected:  10/05/19 1628    Lab Status:  Final result Specimen:  Blood from Arm, Left Updated:  10/05/19 1704     Sodium 137 mmol/L      Potassium 4 1 mmol/L      Chloride 102 mmol/L      CO2 26 mmol/L      ANION GAP 9 mmol/L      BUN 7 mg/dL      Creatinine 0 74 mg/dL      Glucose 120 mg/dL      Calcium 8 7 mg/dL      AST 80 U/L      ALT 67 U/L      Alkaline Phosphatase 147 U/L      Total Protein 7 7 g/dL      Albumin 3 2 g/dL      Total Bilirubin 0 40 mg/dL      eGFR 116 ml/min/1 73sq m     Narrative:       [facility] guidelines for Chronic Kidney Disease (CKD):     Stage 1 with normal or high GFR (GFR > 90 mL/min/1 73 square meters)    Stage 2 Mild CKD (GFR = 60-89 mL/min/1 73 square meters)    Stage 3A Moderate CKD (GFR = 45-59 mL/min/1 73 square meters)    Stage 3B Moderate CKD (GFR = 30-44 mL/min/1 73 square meters)    Stage 4 Severe CKD (GFR = 15-29 mL/min/1 73 square meters)    Stage 5 End Stage CKD (GFR <15 mL/min/1 73 square meters)  Note: GFR calculation is accurate only with a steady state creatinine    NT-BNP PRO (BNP for [place], [place], [place], [place], [place], [facility], , [place] campuses) [[id number]]  (Abnormal) Collected:  10/05/19 1628    Lab Status:  Final result Specimen:  Blood from Arm, Left Updated:  10/05/19 1704     NT-proBNP 196 pg/mL     Troponin I [[id number]]  (Normal) Collected:  10/05/19 1628    Lab Status:  Final result Specimen:  Blood from Arm, Left Updated:  10/05/19 1655     Troponin I <0 02 ng/mL     Protime-INR [[id number]]  (Normal) Collected:  10/05/19 1628    Lab Status:  Final result Specimen:  Blood from Arm, Left Updated:  10/05/19 1646     Protime 12 8 seconds      INR 0 96    APTT [[id number]]  (Normal) Collected:  10/05/19 1628    Lab Status:  Final result Specimen:  Blood from Arm, Left Updated:  10/05/19 1646     PTT 30 seconds     CBC and differential [[id number]]  (Abnormal) Collected:  10/05/19 1628    Lab Status:  Final result Specimen:  Blood from Arm, Left Updated:  10/05/19 1642     WBC 12 07 Thousand/uL      RBC 4 80 Million/uL      Hemoglobin 13 2 g/dL      Hematocrit 41 5 %      MCV 87 fL      MCH 27 5 pg      MCHC 31 8 g/dL      RDW 14 6 %      MPV 10 8 fL      Platelets 886 Thousands/uL      nRBC 0 /100 WBCs      Neutrophils Relative 72 %      Immat GRANS % 1 %      Lymphocytes Relative 21 %      Monocytes Relative 4 %      Eosinophils Relative 2 %      Basophils Relative 0 %      Neutrophils Absolute 8 61 Thousands/µL      Immature Grans Absolute 0 08 Thousand/uL      Lymphocytes Absolute 2 56 Thousands/µL      Monocytes Absolute 0 52 Thousand/µL      Eosinophils Absolute 0 28 Thousand/µL      Basophils Absolute 0 02 Thousands/µL                  CT head without contrast   Final Result by Rudy Webb MD (10/05 1759)      No acute intracranial abnormality  Workstation performed: [id number]         CTA dissection protocol chest and abdomen   Final Result by Rohit Ackerman MD (10/05 1819)      There is no aortic dissection  Moderate fatty liver change and moderate hepatomegaly        Cholelithiasis without CT evidence of acute cholecystitis               Workstation performed: [id number]                    Procedures  ECG 12 Lead Documentation Only  Date/Time: 10/5/2019 5:20 PM  Performed by: Giselle Granger PA-C  Authorized by: Giselle Granger PA-C     Indications / Diagnosis:  Chest pain  ECG reviewed by me, the ED Provider: yes    Patient location:  ED  Previous ECG:     Previous ECG:  Compared to current    Comparison ECG info:  Sep 29, 2019    Similarity:  No change    Comparison to cardiac monitor: Yes    Interpretation:     Interpretation: non-specific    Quality:     Tracing quality:  Limited by artifact  Rate:     ECG rate:  90    ECG rate assessment: normal    Rhythm:     Rhythm: sinus rhythm    Ectopy:     Ectopy: none    QRS:     QRS axis:  Normal    QRS intervals:  Normal  Conduction:     Conduction: normal    ST segments:     ST segments:  Normal  T waves:     T waves: non-specific             ED Course         HEART Risk Score      Most Recent Value   History  0 Filed at: 10/05/2019 1721   ECG  1 Filed at: 10/05/2019 1721   Age  0 Filed at: 10/05/2019 1721   Risk Factors  1 Filed at: 10/05/2019 1721   Troponin  0 Filed at: 10/05/2019 1721   Heart Score Risk Calculator   History  0 Filed at: 10/05/2019 1721   ECG  1 Filed at: 10/05/2019 1721   Age  0 Filed at: 10/05/2019 1721   Risk Factors  1 Filed at: 10/05/2019 1721   Troponin  0 Filed at: 10/05/2019 1721   HEART Score  2 Filed at: 10/05/2019 1721   HEART Score  2 Filed at: 10/05/2019 1721                            MDM  Number of Diagnoses or Management Options  Hypertensive urgency: new and requires workup  Diagnosis management comments: Differential diagnosis including but not limited to: hypertension, hypertensive urgency, hypertensive crisis, end organ damage, renal failure, metabolic abnormality, intracranial process, ACS, MI; doubt pheochromocytoma  Plan: CTA chest  CT head  Cardiac workup  dispo pending         Amount and/or Complexity of Data Reviewed  Clinical lab tests: ordered and reviewed  Tests in the radiology section of CPT®: ordered and reviewed  Independent visualization of images, tracings, or specimens: yes    Risk of Complications, Morbidity, and/or Mortality  Presenting problems: moderate  Management options: low  General comments: 73-year-old female with chest pain headache and hypertension  Her workup appears overall unremarkable  Labs baseline  CT head CT chest pelvis unremarkable  Blood pressure improved with labetalol  In the setting of her hypertension as well as neurologic symptoms including vertigo and headache will admit for hypertensive urgency/crisis  Patient agrees this plan  Stable at time of admission      Patient Progress  Patient progress: stable      Disposition  Final diagnoses:   Hypertensive urgency     Time reflects when diagnosis was documented in both MDM as applicable and the Disposition within this note     Time User Action Codes Description Comment    10/5/2019  6:50 PM Rosa Maria Mary Add [I16 0] Hypertensive urgency       ED Disposition     ED Disposition Condition Date/Time Comment    Admit Stable Sat Oct 5, 2019  6:50 PM Case was discussed with Dr Carmelita Sim and the patient's admission status was agreed to be Admission Status: observation status to the service of Dr Carmelita Sim   Follow-up Information    None         Patient's Medications   Discharge Prescriptions    No medications on file     No discharge procedures on file      ED Provider  Electronically Signed by           Kim Mclean PA-C  10/05/19 9291

## 2019-10-05 NOTE — H&P
H&P- Marito Devoid 1975, 41 y o  female MRN: [id number]  Unit/Bed#: ED 28 Encounter: [id number]  Primary Care Provider: Kim Johnson DO   Date and time admitted to hospital: 10/5/2019  3:37 PM        Assessment and Plan  * Chest pain  Assessment & Plan  Chest pain in adult  Patient reportedly has a history of "silent MI "  Is not compliant with her medications  Will attempt to better control hypertension, trend cardiac enzymes, monitor on telemetry, have cardiology evaluate  Morbid obesity with BMI of 45 0-49 9, adult [facility])  Assessment & Plan  Body mass index is 45 2 kg/m²  Morbid obesity would benefit from weight loss  Hypothyroidism  Assessment & Plan  Hypothyroidism noncompliant with levothyroxine; claims to have run out of levothyroxine 4-6 weeks ago  Reviewed [facility] records and TSH has been fluctuating from 40-70's  Restart levothyroxine at 100 mcg daily  No evidence of myxedema requiring corticosteroids  Recheck TSH  Headache  Assessment & Plan  Migraine headache likely in the setting of elevated blood pressures  Will trial fioricet and treat underlying problem  Hypertensive urgency  Assessment & Plan  Hypertensive urgency in the setting of noncompliance  Only on amlodipine at this time  Previously on atenolol and clonidine  Will trial losartan/HCT in addition to amlodipine  As needed hydralazine IV for elevated blood pressures  VTE Prophylaxis: Enoxaparin (Lovenox)  Code Status:  Level one full code  Anticipated Length of Stay:  Patient will be admitted on an Observation basis with an anticipated length of stay of  less than 2 midnights  Justification for Hospital Stay: Chest pain  Total Time for Visit, including Counseling / Coordination of Care: 45 mins  Greater than 50% of this total time spent on direct patient counseling and coordination of care      Chief Complaint:     Chief Complaint   Patient presents with    Headache     reoccurant-  w left arm tingling and pain     History of Present Illness:    James Bashir is a 41 y o  female who presents with headache and chest pain  The patient has a past medical history including but not limited to morbid obesity, hypertension, hypothyroidism, silent MI who states that for last several days she has had headaches  She also has chest pressure central midline without radiation  She came here to the emergency department where she was noted to have markedly elevated blood pressure  Upon further questioning she has only been taking amlodipine with aspirin without any of her other prescribed medications  She ran out of it levothyroxine over a month ago  She was given a dose labetalol with improvement in blood pressures but given persistent headache and chest pain admission was requested  Review of Systems:  History obtained from chart review and the patient  General ROS: negative for - chills or fever  Psychological ROS: negative for - disorientation or hallucinations  Ophthalmic ROS: negative for - loss of vision  Respiratory ROS: negative for - cough or shortness of breath  Cardiovascular ROS: positive for - chest pain  Gastrointestinal ROS: negative for - diarrhea or nausea/vomiting  Genito-Urinary ROS: negative for - dysuria  Musculoskeletal ROS: negative for - joint stiffness  Neurological ROS: positive for - headaches  Otherwise, all other 12 point review of systems normal     Past Medical and Surgical History:   Past Medical History:   Diagnosis Date    History of MI (myocardial infarction)     History of TIA (transient ischemic attack)     Hypertension     Hypothyroidism     hypothyroidism    Morbid obesity with BMI of 45 0-49 9, adult ([facility] [address]     TIA (transient ischemic attack)      Past Surgical History:   Procedure Laterality Date    BREAST SURGERY Left     TUBAL LIGATION       Meds/Allergies: Allergies:    Allergies   Allergen Reactions    Trimecaine Hallucinations    Toradol [Ketorolac Tromethamine] Anxiety     Prior to Admission Medications   Prescriptions Last Dose Informant Patient Reported? Taking?    amLODIPine (NORVASC) 10 mg tablet   No No   Sig: Take 1 tablet (10 mg total) by mouth daily   aspirin 81 MG tablet   Yes No   Sig: Take 81 mg by mouth daily      Facility-Administered Medications: None     Social History:     Social History     Socioeconomic History    Marital status: /Civil Union     Spouse name: Not on file    Number of children: Not on file    Years of education: Not on file    Highest education level: Not on file   Occupational History    Not on file   Social Needs    Financial resource strain: Not on file    Food insecurity:     Worry: Not on file     Inability: Not on file    Transportation needs:     Medical: Not on file     Non-medical: Not on file   Tobacco Use    Smoking status: Current Every Day Smoker     Packs/day: 0 50     Types: Cigarettes    Smokeless tobacco: Never Used   Substance and Sexual Activity    Alcohol use: No    Drug use: No    Sexual activity: Yes     Birth control/protection: None   Lifestyle    Physical activity:     Days per week: Not on file     Minutes per session: Not on file    Stress: Not on file   Relationships    Social connections:     Talks on phone: Not on file     Gets together: Not on file     Attends Alevism service: Not on file     Active member of club or organization: Not on file     Attends meetings of clubs or organizations: Not on file     Relationship status: Not on file    Intimate partner violence:     Fear of current or ex partner: Not on file     Emotionally abused: Not on file     Physically abused: Not on file     Forced sexual activity: Not on file   Other Topics Concern    Not on file   Social History Narrative    Not on file     Patient Pre-hospital Living Situation:   Patient Pre-hospital Level of Mobility:   Patient Pre-hospital Diet Restrictions:     Family History:  Family History Problem Relation Age of Onset    Heart disease Mother     Hypertension Mother     Diabetes Father      Physical Exam:   Vitals:   Blood Pressure: (!) 188/91 (10/05/19 1821)  Pulse: 84 (10/05/19 1821)  Temperature: 97 7 °F (36 5 °C) (10/05/19 1545)  Temp Source: Oral (10/05/19 1545)  Respirations: 20 (10/05/19 1821)  Weight - Scale: 131 kg (288 lb 9 3 oz) (10/05/19 1809)  SpO2: 97 % (10/05/19 1821)    General appearance: alert, appears stated age and cooperative  Skin: Skin color, texture, turgor normal  No rashes or lesions  Head: Normocephalic, without obvious abnormality, atraumatic  Eyes: conjunctivae/corneas clear  PERRL, EOM's intact  Lungs: clear to auscultation bilaterally  Heart: regular rate and rhythm  Abdomen: soft morbidly obese nontender positive bowel sounds  Back: negative, range of motion normal  Extremities: extremities normal, atraumatic, no cyanosis or edema  Neurologic: Grossly normal  Psychiatric:  Appropriate mood good eye contact    Lab Results: I have personally reviewed pertinent reports      Results from last 7 days   Lab Units 10/05/19  1628   WBC Thousand/uL 12 07*   HEMOGLOBIN g/dL 13 2   HEMATOCRIT % 41 5   PLATELETS Thousands/uL 341   NEUTROS PCT % 72   LYMPHS PCT % 21   MONOS PCT % 4   EOS PCT % 2     Results from last 7 days   Lab Units 10/05/19  1628 09/29/19  2001   SODIUM mmol/L 137 139   POTASSIUM mmol/L 4 1 3 6   CHLORIDE mmol/L 102 103   CO2 mmol/L 26 26   ANION GAP mmol/L 9 10   BUN mg/dL 7 7   CREATININE mg/dL 0 74 0 91   CALCIUM mg/dL 8 7 9 0   ALBUMIN g/dL 3 2* 3 6   TOTAL BILIRUBIN mg/dL 0 40 0 30   ALK PHOS U/L 147* 155*   ALT U/L 67 51   AST U/L 80* 63*   EGFR ml/min/1 73sq m 116 91   GLUCOSE RANDOM mg/dL 120 111     Results from last 7 days   Lab Units 10/05/19  1628   INR  0 96     Results from last 7 days   Lab Units 10/05/19  1628 09/29/19  2346 09/29/19 2001   TROPONIN I ng/mL <0 02 <0 02 <0 02             Results from last 7 days   Lab Units 10/05/19  4812 NT-PRO BNP pg/mL 196*              Imaging: I have personally reviewed pertinent films in PACS    Ct Head Without Contrast  Result Date: 10/5/2019  Impression: No acute intracranial abnormality  Workstation performed: [id number]     Cta Dissection Protocol Chest And Abdomen  Result Date: 10/5/2019  Impression: There is no aortic dissection  Moderate fatty liver change and moderate hepatomegaly  Cholelithiasis without CT evidence of acute cholecystitis Workstation performed: [id number]       EKG, Pathology, and Other Studies Reviewed on Admission:   EKG  Result Date: 10/05/19  Personally reviewed strips with impression of:  Normal sinus rhythm 90bpm    Allscripts/ Epic Records Reviewed: Yes    ** Please Note: This note has been constructed using a voice recognition system   **

## 2019-10-05 NOTE — ED NOTES
1  CC - headache, L arm tingling/pain    2  Orientation status - AAOx4    3  Abnormal labs/focused assessment/vitals - see labs    4  Medication/drips - labetalol 10mg IV, reglan, tylenol, oxycodone    5  Narcotic time/pain medication - oxycodone 5mg PO @ 1908    6  IV lines/drains/etc - 20 g L AC    7  Isolation status - n/a    8  Skin - not fully assessed in ED    9  Ambulation status - self    10   Phone number - [phone] [facility] 281, RN  10/05/19 9187

## 2019-10-06 VITALS
TEMPERATURE: 97.8 F | WEIGHT: 288.58 LBS | SYSTOLIC BLOOD PRESSURE: 122 MMHG | DIASTOLIC BLOOD PRESSURE: 76 MMHG | OXYGEN SATURATION: 92 % | BODY MASS INDEX: 45.2 KG/M2 | HEART RATE: 84 BPM | RESPIRATION RATE: 20 BRPM

## 2019-10-06 LAB
ALBUMIN SERPL BCP-MCNC: 3.2 G/DL (ref 3.5–5)
ALP SERPL-CCNC: 145 U/L (ref 46–116)
ALT SERPL W P-5'-P-CCNC: 75 U/L (ref 12–78)
ANION GAP SERPL CALCULATED.3IONS-SCNC: 11 MMOL/L (ref 4–13)
AST SERPL W P-5'-P-CCNC: 82 U/L (ref 5–45)
BILIRUB SERPL-MCNC: 0.3 MG/DL (ref 0.2–1)
BUN SERPL-MCNC: 7 MG/DL (ref 5–25)
CALCIUM SERPL-MCNC: 8.8 MG/DL (ref 8.3–10.1)
CHLORIDE SERPL-SCNC: 101 MMOL/L (ref 100–108)
CHOLEST SERPL-MCNC: 201 MG/DL (ref 50–200)
CO2 SERPL-SCNC: 25 MMOL/L (ref 21–32)
CREAT SERPL-MCNC: 0.8 MG/DL (ref 0.6–1.3)
ERYTHROCYTE [DISTWIDTH] IN BLOOD BY AUTOMATED COUNT: 14.8 % (ref 11.6–15.1)
GFR SERPL CREATININE-BSD FRML MDRD: 106 ML/MIN/1.73SQ M
GLUCOSE P FAST SERPL-MCNC: 122 MG/DL (ref 65–99)
GLUCOSE SERPL-MCNC: 122 MG/DL (ref 65–140)
HCT VFR BLD AUTO: 41.5 % (ref 34.8–46.1)
HDLC SERPL-MCNC: 30 MG/DL (ref 40–60)
HGB BLD-MCNC: 13 G/DL (ref 11.5–15.4)
LDLC SERPL CALC-MCNC: 138 MG/DL (ref 0–100)
MCH RBC QN AUTO: 27.1 PG (ref 26.8–34.3)
MCHC RBC AUTO-ENTMCNC: 31.3 G/DL (ref 31.4–37.4)
MCV RBC AUTO: 87 FL (ref 82–98)
PLATELET # BLD AUTO: 339 THOUSANDS/UL (ref 149–390)
PMV BLD AUTO: 11.2 FL (ref 8.9–12.7)
POTASSIUM SERPL-SCNC: 3.6 MMOL/L (ref 3.5–5.3)
PROT SERPL-MCNC: 7.7 G/DL (ref 6.4–8.2)
RBC # BLD AUTO: 4.79 MILLION/UL (ref 3.81–5.12)
SODIUM SERPL-SCNC: 137 MMOL/L (ref 136–145)
T4 FREE SERPL-MCNC: 0.53 NG/DL (ref 0.76–1.46)
TRIGL SERPL-MCNC: 164 MG/DL
TROPONIN I SERPL-MCNC: <0.02 NG/ML
WBC # BLD AUTO: 9.72 THOUSAND/UL (ref 4.31–10.16)

## 2019-10-06 PROCEDURE — 99244 OFF/OP CNSLTJ NEW/EST MOD 40: CPT | Performed by: INTERNAL MEDICINE

## 2019-10-06 PROCEDURE — 80053 COMPREHEN METABOLIC PANEL: CPT | Performed by: INTERNAL MEDICINE

## 2019-10-06 PROCEDURE — 84484 ASSAY OF TROPONIN QUANT: CPT | Performed by: INTERNAL MEDICINE

## 2019-10-06 PROCEDURE — 80061 LIPID PANEL: CPT | Performed by: INTERNAL MEDICINE

## 2019-10-06 PROCEDURE — 85027 COMPLETE CBC AUTOMATED: CPT | Performed by: INTERNAL MEDICINE

## 2019-10-06 PROCEDURE — 99217 PR OBSERVATION CARE DISCHARGE MANAGEMENT: CPT | Performed by: INTERNAL MEDICINE

## 2019-10-06 RX ORDER — CHLORTHALIDONE 25 MG/1
25 TABLET ORAL DAILY
Status: DISCONTINUED | OUTPATIENT
Start: 2019-10-07 | End: 2019-10-06 | Stop reason: HOSPADM

## 2019-10-06 RX ORDER — LEVOTHYROXINE SODIUM 0.1 MG/1
100 TABLET ORAL
Qty: 30 TABLET | Refills: 0 | Status: ON HOLD | OUTPATIENT
Start: 2019-10-07 | End: 2020-11-10 | Stop reason: SDUPTHER

## 2019-10-06 RX ORDER — LOSARTAN POTASSIUM 50 MG/1
50 TABLET ORAL DAILY
Qty: 30 TABLET | Refills: 0 | Status: SHIPPED | OUTPATIENT
Start: 2019-10-07 | End: 2020-11-10 | Stop reason: HOSPADM

## 2019-10-06 RX ORDER — DOCUSATE SODIUM 100 MG/1
100 CAPSULE, LIQUID FILLED ORAL 2 TIMES DAILY PRN
Qty: 10 CAPSULE | Refills: 0 | Status: SHIPPED | OUTPATIENT
Start: 2019-10-06 | End: 2021-07-16 | Stop reason: HOSPADM

## 2019-10-06 RX ORDER — CHLORTHALIDONE 25 MG/1
25 TABLET ORAL DAILY
Qty: 30 TABLET | Refills: 0 | Status: ON HOLD | OUTPATIENT
Start: 2019-10-07 | End: 2020-01-04

## 2019-10-06 RX ADMIN — ENOXAPARIN SODIUM 40 MG: 40 INJECTION SUBCUTANEOUS at 10:51

## 2019-10-06 RX ADMIN — LOSARTAN POTASSIUM 50 MG: 50 TABLET, FILM COATED ORAL at 10:51

## 2019-10-06 RX ADMIN — ASPIRIN 81 MG: 81 TABLET, COATED ORAL at 10:50

## 2019-10-06 RX ADMIN — HYDROCODONE BITARTRATE AND ACETAMINOPHEN 1 TABLET: 5; 325 TABLET ORAL at 14:23

## 2019-10-06 RX ADMIN — HYDROCODONE BITARTRATE AND ACETAMINOPHEN 1 TABLET: 5; 325 TABLET ORAL at 07:45

## 2019-10-06 RX ADMIN — LEVOTHYROXINE SODIUM 100 MCG: 100 TABLET ORAL at 05:21

## 2019-10-06 RX ADMIN — HYDROCHLOROTHIAZIDE 12.5 MG: 12.5 TABLET ORAL at 10:50

## 2019-10-06 RX ADMIN — AMLODIPINE BESYLATE 10 MG: 10 TABLET ORAL at 10:50

## 2019-10-06 NOTE — PLAN OF CARE
Problem: Potential for Falls  Goal: Patient will remain free of falls  Description  INTERVENTIONS:  - Assess patient frequently for physical needs  -  Identify cognitive and physical deficits and behaviors that affect risk of falls    -  [place] fall precautions as indicated by assessment   - Educate patient/family on patient safety including physical limitations  - Instruct patient to call for assistance with activity based on assessment  - Modify environment to reduce risk of injury  - Consider OT/PT consult to assist with strengthening/mobility  Outcome: Progressing     Problem: PAIN - ADULT  Goal: Verbalizes/displays adequate comfort level or baseline comfort level  Description  Interventions:  - Encourage patient to monitor pain and request assistance  - Assess pain using appropriate pain scale  - Administer analgesics based on type and severity of pain and evaluate response  - Implement non-pharmacological measures as appropriate and evaluate response  - Consider cultural and social influences on pain and pain management  - Notify physician/advanced practitioner if interventions unsuccessful or patient reports new pain  Outcome: Progressing     Problem: DISCHARGE PLANNING  Goal: Discharge to home or other facility with appropriate resources  Description  INTERVENTIONS:  - Identify barriers to discharge w/patient and caregiver  - Arrange for needed discharge resources and transportation as appropriate  - Identify discharge learning needs (meds, wound care, etc )  - Arrange for interpretive services to assist at discharge as needed  - Refer to Case Management Department for coordinating discharge planning if the patient needs post-hospital services based on physician/advanced practitioner order or complex needs related to functional status, cognitive ability, or social support system  Outcome: Progressing     Problem: Knowledge Deficit  Goal: Patient/family/caregiver demonstrates understanding of disease process, treatment plan, medications, and discharge instructions  Description  Complete learning assessment and assess knowledge base    Interventions:  - Provide teaching at level of understanding  - Provide teaching via preferred learning methods  Outcome: Progressing     Problem: CARDIOVASCULAR - ADULT  Goal: Maintains optimal cardiac output and hemodynamic stability  Description  INTERVENTIONS:  - Monitor I/O, vital signs and rhythm  - Monitor for S/S and trends of decreased cardiac output  - Administer and titrate ordered vasoactive medications to optimize hemodynamic stability  - Assess quality of pulses, skin color and temperature  - Assess for signs of decreased coronary artery perfusion  - Instruct patient to report change in severity of symptoms  Outcome: Progressing  Goal: Absence of cardiac dysrhythmias or at baseline rhythm  Description  INTERVENTIONS:  - Continuous cardiac monitoring, vital signs, obtain 12 lead EKG if ordered  - Administer antiarrhythmic and heart rate control medications as ordered  - Monitor electrolytes and administer replacement therapy as ordered  Outcome: Progressing

## 2019-10-06 NOTE — ASSESSMENT & PLAN NOTE
Migraine headache likely in the setting of elevated blood pressures  Will trial fioricet and treat underlying problem

## 2019-10-06 NOTE — CONSULTS
Consultation - Cardiology   [facility] LAWANDA 41 y o  female MRN: [id number]  Unit/Bed#: MS Max Encounter: [id number]  10/06/19  9:16 AM    Assessment/ Plan:  1- Chest pain, troponins negative x4  She received previous inpatient workup on 4/2018 to include echocardiogram which showed EF 60%, no RWMAs, moderate LVH, G2DD and pharmacologic stress test which showed normal study  2- Hypertensive urgency 2/2 to medication noncompliance  CT chest negative for dissection, CT head negative for acute intracranial abnormality  Restarted on home amlodipine 10mg daily  Start Lasix 40mg BID in place of HCTZ, continue Losartan  BP is trending downward, last BP reading 153/87  Continue to monitor  Will arrange for outpatient follow up in the office  3- Hypothyroidism, TSH 76 9, free T4 0 70  Restarted on levothyroxine as inpatient  4- Morbid obesity, strongly encouraged to lose weight  5- Tobacco abuse, continues to smoke  Strongly encouraged to quit  History of Present Illness   Physician Requesting Consult: Shayla Sheridan MD  Reason for Consult / Principal Problem:  Other chest pain  [facility] 41[facility] who presents with intermittent centralized non-radiating chest stabbing, mild shortness of breath and constant headache for 1 week  She denies alleviating or exacerbating factors to include exertion  She was recently seen in the ER 09/29/2019 for chest pain and hypertension, her workup at that time was negative and was sent home with controlled BP  She presents today with similar symptoms however she is feeling much worse  On admission, her blood pressures were again elevated, 234/112  She reports compliance with her home dose amlodipine and aspirin and denies any missed doses  However, reports she has not been compliant with any of her other medications  She unfortunately continues to smoke about half pack a day  Her symptoms are much improved with BP control       Inpatient consult to Cardiology  Consult performed by: Jackie Gannon PA-C  Consult ordered by: Joey Goins DO        EKG:  Normal sinus rhythm, septal infarct  Review of Systems   Constitutional: Negative for appetite change, chills, diaphoresis, fatigue and fever  Respiratory: Positive for shortness of breath  Negative for cough and chest tightness  Cardiovascular: Positive for chest pain  Negative for palpitations and leg swelling  Gastrointestinal: Negative for diarrhea, nausea and vomiting  Endocrine: Negative for cold intolerance and heat intolerance  Genitourinary: Negative for difficulty urinating, dysuria and enuresis  Musculoskeletal: Negative for arthralgias, back pain and gait problem  Allergic/Immunologic: Negative for environmental allergies and food allergies  Neurological: Positive for headaches  Negative for dizziness and facial asymmetry  Hematological: Negative for adenopathy  Does not bruise/bleed easily  Psychiatric/Behavioral: Negative for agitation, behavioral problems and confusion         Historical Information   Past Medical History:   Diagnosis Date    History of MI (myocardial infarction)     History of TIA (transient ischemic attack)     Hypertension     Hypothyroidism     hypothyroidism    Morbid obesity with BMI of 45 0-49 9, adult (HCC)     TIA (transient ischemic attack)      Past Surgical History:   Procedure Laterality Date    BREAST SURGERY Left     TUBAL LIGATION       Social History     Substance and Sexual Activity   Alcohol Use Not Currently     Social History     Substance and Sexual Activity   Drug Use No     Social History     Tobacco Use   Smoking Status Current Every Day Smoker    Packs/day: 0 50    Types: Cigarettes   Smokeless Tobacco Never Used       Family History:   Family History   Problem Relation Age of Onset    Heart disease Mother     Hypertension Mother     Diabetes Father        Meds/Allergies   current meds:   Current Facility-Administered Medications   Medication Dose Route Frequency    acetaminophen (TYLENOL) tablet 650 mg  650 mg Oral Q6H PRN    amLODIPine (NORVASC) tablet 10 mg  10 mg Oral Daily    aspirin (ECOTRIN LOW STRENGTH) EC tablet 81 mg  81 mg Oral Daily    diphenhydrAMINE (BENADRYL) tablet 25 mg  25 mg Oral Q6H PRN    docusate sodium (COLACE) capsule 100 mg  100 mg Oral BID PRN    enoxaparin (LOVENOX) subcutaneous injection 40 mg  40 mg Subcutaneous Q24H MARYA    hydrALAZINE (APRESOLINE) injection 10 mg  10 mg Intravenous Q4H PRN    losartan (COZAAR) tablet 50 mg  50 mg Oral Daily    And    hydrochlorothiazide (HYDRODIURIL) tablet 12 5 mg  12 5 mg Oral Daily    HYDROcodone-acetaminophen (NORCO) 5-325 mg per tablet 1 tablet  1 tablet Oral Q4H PRN    levothyroxine tablet 100 mcg  100 mcg Oral Early Morning    magnesium hydroxide (MILK OF MAGNESIA) 400 mg/5 mL oral suspension 30 mL  30 mL Oral BID PRN    ondansetron (ZOFRAN) injection 4 mg  4 mg Intravenous Q4H PRN     Allergies   Allergen Reactions    Trimecaine Hallucinations    Toradol [Ketorolac Tromethamine] Anxiety       Objective   Vitals: Blood pressure 153/87, pulse 80, temperature 97 6 °F (36 4 °C), resp  rate 19, weight 131 kg (288 lb 9 3 oz), SpO2 98 %, not currently breastfeeding , Body mass index is 45 2 kg/m² ,   Orthostatic Blood Pressures      Most Recent Value   Blood Pressure  153/87 filed at 10/06/2019 0707   Patient Position - Orthostatic VS  Lying filed at 10/05/2019 0920          Systolic (82EFZ), MZP:653 , Min:125 , FLR:193     Diastolic (49TOB), MO, Min:62, Max:112      No intake or output data in the 24 hours ending 10/06/19 0916    Invasive Devices     Peripheral Intravenous Line            Peripheral IV 10/05/19 Left Antecubital less than 1 day                    Physical Exam:  GEN: +obese  Alert and oriented x 3, in no acute distress  Well appearing and well nourished     HEENT: Sclera anicteric, conjunctivae pink, mucous membranes moist  Oropharynx clear  NECK: Supple, no carotid bruits, no significant JVD  Trachea midline, no thyromegaly  HEART: Regular rhythm, normal S1 and S2, no murmurs, clicks, gallops or rubs  PMI nondisplaced, no thrills  LUNGS: +decreased breath sounds  Clear to auscultation bilaterally; no wheezes, rales, or rhonchi  No increased work of breathing or signs of respiratory distress  ABDOMEN: Soft, nontender, nondistended, normoactive bowel sounds  EXTREMITIES: Skin warm and well perfused, no clubbing, cyanosis, or edema  NEURO: No focal findings  Normal speech  Mood and affect normal    SKIN: Normal without suspicious lesions on exposed skin        Lab Results:     Troponins:   Results from last 7 days   Lab Units 10/06/19  0124 10/05/19  2150 10/05/19  1628   TROPONIN I ng/mL <0 02 <0 02 <0 02       CBC with diff:   Results from last 7 days   Lab Units 10/06/19  0524 10/05/19  1628 09/29/19 2001   WBC Thousand/uL 9 72 12 07* 11 62*   HEMOGLOBIN g/dL 13 0 13 2 13 4   HEMATOCRIT % 41 5 41 5 43 0   MCV fL 87 87 87   PLATELETS Thousands/uL 339 341 349   MCH pg 27 1 27 5 27 1   MCHC g/dL 31 3* 31 8 31 2*   RDW % 14 8 14 6 14 5   MPV fL 11 2 10 8 10 7   NRBC AUTO /100 WBCs  --  0 0         CMP:   Results from last 7 days   Lab Units 10/06/19  0524 10/05/19  1628 09/29/19 2001   POTASSIUM mmol/L 3 6 4 1 3 6   CHLORIDE mmol/L 101 102 103   CO2 mmol/L 25 26 26   BUN mg/dL 7 7 7   CREATININE mg/dL 0 80 0 74 0 91   CALCIUM mg/dL 8 8 8 7 9 0   AST U/L 82* 80* 63*   ALT U/L 75 67 51   ALK PHOS U/L 145* 147* 155*   EGFR ml/min/1 73sq m 106 116 91

## 2019-10-06 NOTE — DISCHARGE SUMMARY
Discharge Summary - [facility] Internal Medicine    Patient Information: Vick De La Paz 41 y o  female MRN: [id number]  Unit/Bed#: -01 Encounter: [id number]    Discharging Physician / Practitioner: Karlee Chandler MD  PCP: Pretty Torres DO  Admission Date: 10/5/2019  Discharge Date: 10/06/19    Disposition:     Home    Reason for Admission: hypertensive urgency    Discharge Diagnoses:     Principal Problem:    Chest pain  Active Problems:    Hypertensive urgency    Headache    Hypothyroidism    Morbid obesity with BMI of 45 0-49 9, adult ([facility] [address] )  Resolved Problems:    * No resolved hospital problems  *      Consultations During Hospital Stay:  · cardiology    Procedures Performed:     None    Significant Findings / Test Results:   CTA dissection chest and abdomen  IMPRESSION:     There is no aortic dissection      Moderate fatty liver change and moderate hepatomegaly      Cholelithiasis without CT evidence of acute cholecystitis    Incidental Findings:   ·     Test Results Pending at Discharge (will require follow up):   · none     Outpatient Tests Requested:  · none    Complications:  none    Hospital Course:     Vick De La Paz is a 41 y o  female patient with prior history of hypertension, hypothyroidism noncompliance medical care who originally presented to the hospital on 10/5/2019 due to headache chest pain dyspnea on exertion  Blood pressure was elevated on admission greater than 200/100  Patient was started on antihypertensives, blood pressure improved as well as per symptoms  Also noted to have uncontrolled hypothyroidism again secondary to noncompliance  Started on levothyroxine, recommended close outpatient PCP follow-up for further monitoring and dose adjustments  Cardiology also followed the patient assisted in management  Recommended outpatient follow-up after discharge    Condition at Discharge: stable     Discharge Day Visit / Exam:     Subjective:  Patient seen and examined    No complaints no chest pain or shortness of mild headache resolved with Tylenol  Vitals: Blood Pressure: 122/76 (10/06/19 1521)  Pulse: 84 (10/06/19 1521)  Temperature: 97 8 °F (36 6 °C) (10/06/19 1521)  Temp Source: Oral (10/05/19 1545)  Respirations: 20 (10/06/19 1521)  Weight - Scale: 131 kg (288 lb 9 3 oz) (10/05/19 1809)  SpO2: 92 % (10/06/19 1521)  Exam:   Physical Exam   Constitutional: She is oriented to person, place, and time  She appears well-developed and well-nourished  Eyes: Pupils are equal, round, and reactive to light  EOM are normal    Neck: Normal range of motion  Neck supple  Cardiovascular: Normal rate and regular rhythm  Pulmonary/Chest: Effort normal and breath sounds normal    Abdominal: Soft  Bowel sounds are normal    Neurological: She is alert and oriented to person, place, and time  Skin: Skin is warm and dry  Psychiatric: She has a normal mood and affect  Discussion with Family: at the bedside    Discharge instructions/Information to patient and family:   See after visit summary for information provided to patient and family  Provisions for Follow-Up Care:  See after visit summary for information related to follow-up care and any pertinent home health orders  Planned Readmission: none     Discharge Statement:  I spent 35 minutes discharging the patient  This time was spent on the day of discharge  I had direct contact with the patient on the day of discharge  Greater than 50% of the total time was spent examining patient, answering all patient questions, arranging and discussing plan of care with patient as well as directly providing post-discharge instructions  Additional time then spent on discharge activities  Discharge Medications:  See after visit summary for reconciled discharge medications provided to patient and family        ** Please Note: This note has been constructed using a voice recognition system **

## 2019-10-06 NOTE — PLAN OF CARE
Problem: Potential for Falls  Goal: Patient will remain free of falls  Description  INTERVENTIONS:  - Assess patient frequently for physical needs  -  Identify cognitive and physical deficits and behaviors that affect risk of falls    -  [place] fall precautions as indicated by assessment   - Educate patient/family on patient safety including physical limitations  - Instruct patient to call for assistance with activity based on assessment  - Modify environment to reduce risk of injury  - Consider OT/PT consult to assist with strengthening/mobility  Outcome: Progressing     Problem: PAIN - ADULT  Goal: Verbalizes/displays adequate comfort level or baseline comfort level  Description  Interventions:  - Encourage patient to monitor pain and request assistance  - Assess pain using appropriate pain scale  - Administer analgesics based on type and severity of pain and evaluate response  - Implement non-pharmacological measures as appropriate and evaluate response  - Consider cultural and social influences on pain and pain management  - Notify physician/advanced practitioner if interventions unsuccessful or patient reports new pain  Outcome: Progressing     Problem: DISCHARGE PLANNING  Goal: Discharge to home or other facility with appropriate resources  Description  INTERVENTIONS:  - Identify barriers to discharge w/patient and caregiver  - Arrange for needed discharge resources and transportation as appropriate  - Identify discharge learning needs (meds, wound care, etc )  - Arrange for interpretive services to assist at discharge as needed  - Refer to Case Management Department for coordinating discharge planning if the patient needs post-hospital services based on physician/advanced practitioner order or complex needs related to functional status, cognitive ability, or social support system  Outcome: Progressing     Problem: Knowledge Deficit  Goal: Patient/family/caregiver demonstrates understanding of disease process, treatment plan, medications, and discharge instructions  Description  Complete learning assessment and assess knowledge base    Interventions:  - Provide teaching at level of understanding  - Provide teaching via preferred learning methods  Outcome: Progressing     Problem: CARDIOVASCULAR - ADULT  Goal: Maintains optimal cardiac output and hemodynamic stability  Description  INTERVENTIONS:  - Monitor I/O, vital signs and rhythm  - Monitor for S/S and trends of decreased cardiac output  - Administer and titrate ordered vasoactive medications to optimize hemodynamic stability  - Assess quality of pulses, skin color and temperature  - Assess for signs of decreased coronary artery perfusion  - Instruct patient to report change in severity of symptoms  Outcome: Progressing  Goal: Absence of cardiac dysrhythmias or at baseline rhythm  Description  INTERVENTIONS:  - Continuous cardiac monitoring, vital signs, obtain 12 lead EKG if ordered  - Administer antiarrhythmic and heart rate control medications as ordered  - Monitor electrolytes and administer replacement therapy as ordered  Outcome: Progressing

## 2019-10-06 NOTE — UTILIZATION REVIEW
Initial Clinical Review    Admission: Date/Time/Statement: Observation 10/5 @ 1851  Orders Placed This Encounter   Procedures    Place in Observation     Standing Status:   Standing     Number of Occurrences:   1     Order Specific Question:   Admitting Physician     Answer:   Carmelita Vazquez [[id number]]     Order Specific Question:   Level of Care     Answer:   Med Surg [[id number]]     ED Arrival Information     Expected Arrival Acuity Means of Arrival Escorted By Service Admission Type    - 10/5/2019 15:35 Urgent Walk-In Family Member Hospitalist Urgent    Arrival Complaint    HEADACHE,ARM TINGLING         Chief Complaint   Patient presents with    Headache     reoccurant-  w left arm tingling and pain     Assessment/Plan:   38y Female to ED presents with persistent headache and chest pain  PMH of Morbid obesity, Hypertension, Hypothyroidism and silent MI  She came to ED due to elevated B/P - took her amlodipine with aspirin  Pt ran out her levothyroxine over a month ago  She was given a dose labetalol with improvement in blood pressures  Admit Observation level of care for Chest pain, Headache and Hypertensive Urgency  Trend cardiac enzymes, tele monitoring, Cardiology consult and trail Fioricet for headache  Hypertensive urgency in the setting of noncompliance  Only on amlodipine at this time  Previously on atenolol and clonidine  Will trial losartan/HCT in addition to amlodipine  As needed hydralazine IV for elevated blood pressures  10/6 Cardiology cons; Hypertensive urgency; 2/2 to medication noncompliance  Restarted on home amlodipine 10mg daily  Start Lasix 40mg BID in place of HCTZ, continue Losartan  BP is trending downward, last BP reading 153/87  Continue to monitor  Chest pain work up neg      ED Triage Vitals   Temperature Pulse Respirations Blood Pressure SpO2   10/05/19 1545 10/05/19 1545 10/05/19 1545 10/05/19 1545 10/05/19 1545   97 7 °F (36 5 °C) 101 18 (!) 234/112 100 %      Temp Source Heart Rate Source Patient Position - Orthostatic VS BP Location FiO2 (%)   10/05/19 1545 10/05/19 2040 10/05/19 1545 10/05/19 1545 --   Oral Monitor Sitting Right arm       Pain Score       10/05/19 1545       9        Wt Readings from Last 1 Encounters:   10/05/19 131 kg (288 lb 9 3 oz)     Additional Vital Signs:   10/06/19 07:07:19  97 6 °F (36 4 °C)  80  19  153/87  109  98 %      10/06/19 02:55:39  98 3 °F (36 8 °C)  79  18  133/62  86  94 %      10/05/19 23:47:49  97 9 °F (36 6 °C)  82    129/84  99  99 %      10/05/19 21:16:46  97 7 °F (36 5 °C)  83  20  125/85  98  94 %      10/05/19 2040    82  18  175/85  Abnormal     97 %  None (Room air)    10/05/19 2027        175/85  Abnormal           10/05/19 1821    84  20  188/91  Abnormal     97 %  None (Room air)    10/05/19 1809        188/91  Abnormal             Pertinent Labs/Diagnostic Test Results:   10/5 CTA dissection chest and abd - There is no aortic dissection  Moderate fatty liver change and moderate hepatomegaly  Cholelithiasis without CT evidence of acute cholecystitis    CT Head - No acute intracranial abnormality      Results from last 7 days   Lab Units 10/06/19  0524 10/05/19  1628 09/29/19  2001   WBC Thousand/uL 9 72 12 07* 11 62*   HEMOGLOBIN g/dL 13 0 13 2 13 4   HEMATOCRIT % 41 5 41 5 43 0   PLATELETS Thousands/uL 339 341 349   NEUTROS ABS Thousands/µL  --  8 61* 7 81*         Results from last 7 days   Lab Units 10/06/19  0524 10/05/19  1628 09/29/19  2001   SODIUM mmol/L 137 137 139   POTASSIUM mmol/L 3 6 4 1 3 6   CHLORIDE mmol/L 101 102 103   CO2 mmol/L 25 26 26   ANION GAP mmol/L 11 9 10   BUN mg/dL 7 7 7   CREATININE mg/dL 0 80 0 74 0 91   EGFR ml/min/1 73sq m 106 116 91   CALCIUM mg/dL 8 8 8 7 9 0     Results from last 7 days   Lab Units 10/06/19  0524 10/05/19  1628 09/29/19  2001   AST U/L 82* 80* 63*   ALT U/L 75 67 51   ALK PHOS U/L 145* 147* 155*   TOTAL PROTEIN g/dL 7 7 7 7 8 4*   ALBUMIN g/dL 3 2* 3 2* 3 6   TOTAL BILIRUBIN mg/dL 0 30 0 40 0 30         Results from last 7 days   Lab Units 10/06/19  0524 10/05/19  1628 09/29/19 2001   GLUCOSE RANDOM mg/dL 122 120 111     Results from last 7 days   Lab Units 10/06/19  0124 10/05/19  2150 10/05/19  1628 09/29/19  2346 09/29/19 2001   TROPONIN I ng/mL <0 02 <0 02 <0 02 <0 02 <0 02         Results from last 7 days   Lab Units 10/05/19  1628 09/29/19 2001   PROTIME seconds 12 8 12 7   INR  0 96 0 95   PTT seconds 30 29     Results from last 7 days   Lab Units 10/05/19  1628   TSH 3RD GENERATON uIU/mL 76 903*     Results from last 7 days   Lab Units 10/05/19  1628   NT-PRO BNP pg/mL 196*     ED Treatment:   Medication Administration from 10/05/2019 1535 to 10/05/2019 2056       Date/Time Order Dose Route Action     10/05/2019 1635 Labetalol HCl (NORMODYNE) injection 10 mg 10 mg Intravenous Given     10/05/2019 1740 iohexol (OMNIPAQUE) 350 MG/ML injection (MULTI-DOSE) 100 mL 100 mL Intravenous Given     10/05/2019 1756 metoclopramide (REGLAN) injection 10 mg 10 mg Intravenous Given     10/05/2019 1754 acetaminophen (TYLENOL) tablet 650 mg 650 mg Oral Given     10/05/2019 1908 oxyCODONE (ROXICODONE) IR tablet 5 mg 5 mg Oral Given     10/05/2019 2027 amLODIPine (NORVASC) tablet 10 mg 10 mg Oral Given     10/05/2019 2027 hydrochlorothiazide (HYDRODIURIL) tablet 12 5 mg 12 5 mg Oral Given     10/05/2019 2027 butalbital-acetaminophen-caffeine (FIORICET,ESGIC) -40 mg per tablet 1 tablet 1 tablet Oral Given        Past Medical History:   Diagnosis Date    History of MI (myocardial infarction)     History of TIA (transient ischemic attack)     Hypertension     Hypothyroidism     hypothyroidism    Morbid obesity with BMI of 45 0-49 9, adult ([facility] [address] )     TIA (transient ischemic attack)      Present on Admission:   Chest pain   Headache   Hypertensive urgency   Hypothyroidism    Admitting Diagnosis: Other chest pain [R07 89]  Hypertensive urgency [I16 0]  Headache [R51] Age/Sex: 41 y o  female     Admission Orders:  Tele monitoring  IP CONSULT TO CARDIOLOGY    Current Facility-Administered Medications:  acetaminophen 650 mg Oral Q6H PRN   amLODIPine 10 mg Oral Daily   aspirin 81 mg Oral Daily   diphenhydrAMINE 25 mg Oral Q6H PRN   docusate sodium 100 mg Oral BID PRN   enoxaparin 40 mg Subcutaneous Q24H MARYA   hydrALAZINE 10 mg Intravenous Q4H PRN   losartan 50 mg Oral Daily   And      hydrochlorothiazide 12 5 mg Oral Daily   HYDROcodone-acetaminophen 1 tablet Oral Q4H PRN 10/5 x1, 10/6 x1   levothyroxine 100 mcg Oral Early Morning   magnesium hydroxide 30 mL Oral BID PRN   ondansetron 4 mg Intravenous Q4H PRN     Network Utilization Review Department  Phone: [phone]; Fax [phone]  [email or web address]  ATTENTION: Please call with any questions or concerns to [phone]  and carefully listen to the prompts so that you are directed to the right person  Send all requests for admission clinical reviews, approved or denied determinations and any other requests to fax [phone]   All voicemails are confidential

## 2019-10-06 NOTE — ASSESSMENT & PLAN NOTE
Hypertensive urgency in the setting of noncompliance  Only on amlodipine at this time  Previously on atenolol and clonidine  Will trial losartan/HCT in addition to amlodipine  As needed hydralazine IV for elevated blood pressures

## 2019-10-06 NOTE — ASSESSMENT & PLAN NOTE
Hypothyroidism noncompliant with levothyroxine; claims to have run out of levothyroxine 4-6 weeks ago  Reviewed [facility] records and TSH has been fluctuating from 40-70's  Restart levothyroxine at 100 mcg daily  No evidence of myxedema requiring corticosteroids  Recheck TSH

## 2019-10-07 LAB
ATRIAL RATE: 90 BPM
P AXIS: 50 DEGREES
PR INTERVAL: 160 MS
QRS AXIS: 2 DEGREES
QRSD INTERVAL: 84 MS
QT INTERVAL: 396 MS
QTC INTERVAL: 484 MS
T WAVE AXIS: 98 DEGREES
VENTRICULAR RATE: 90 BPM

## 2019-10-07 PROCEDURE — 93010 ELECTROCARDIOGRAM REPORT: CPT | Performed by: INTERNAL MEDICINE

## 2019-11-03 NOTE — PROGRESS NOTES
Progress Note - Melinda Mariee 1975, 38 y o  female MRN: [id number]    Unit/Bed#: -01 Encounter: [id number]    Primary Care Provider: Ranjeet Go DO   Date and time admitted to hospital: 4/8/2018  4:40 PM        * Headache   Assessment & Plan    Intractable on admission  · CTA with no acute findings  MRI brain pending w & wo contrast  · Neurology consult for further management - appreciate recommendations  · Received IV depacon with mild improvement, trial thorazine and topamax per further recs  · Discontinue Fioricet as this is not helping   · Blood pressure is better 120s SBP today, without improvement of the headache          Hypertensive urgency   Assessment & Plan    History of hypertension  Associated with headaches in the past, though despite BP improvement her headache persists  · Noncompliant with BP meds  Clonidine increased to 0 3 mg t i d   · Continue Norvasc  · P r n  hydralazine  · Cardiology consult appreciated, for stress test today        Chest pain   Assessment & Plan    Stabbing with pressure  8/10  Nonradiating  · Has associated risk factors, previous MI, TIA hypertension, obesity, family history and chronic smoker  · Troponin (-), continue telemetry  · Echo with preserved EF and grade 2 diastolic dysfunction  · Stress test pending        Dependence on nicotine from cigarettes   Assessment & Plan    Smoking cessation  Nicotine patch  Obesity   Assessment & Plan    Weight loss, lifestyle modification, dietary measures initiated and encourage  Thyroid disease   Assessment & Plan    TSH significantly elevated > 90, low T4, though patient is already on Synthroid  She freely admits that she is noncompliant with her medication in terms of taking it regularly or taking it as prescribed on an empty stomach    · Continue current dose levothyroxine - she needs repeat TFTs in 4-6 weeks, outpatient  · Counseling given to the patient on proper administration of medication          VTE Pharmacologic Prophylaxis:   Pharmacologic: Enoxaparin (Lovenox)  Mechanical VTE Prophylaxis in Place: Yes    Patient Centered Rounds: I have performed bedside rounds with nursing staff today  Discussions with Specialists or Other Care Team Provider:  Discussed with Neurology and Cardiology    Education and Discussions with Family / Patient:  Discussed with the patient regarding the stress test and MRI planned for today    Time Spent for Care: 20 minutes  More than 50% of total time spent on counseling and coordination of care as described above  Current Length of Stay: 0 day(s)    Current Patient Status: Inpatient   Certification Statement: The patient, admitted on an observation basis, will now require > 2 midnight hospital stay due to Continued workup for hypertensive urgency, chest pain, intractable headache/migraine    Discharge Plan:  Patient is not cleared for discharge at this time, multiple studies still pending    Code Status: Level 1 - Full Code      Subjective:   Patient seen examined, she reports the headache is no better at this time, though Thorazine and Depacon have seemed to at least bring it down to a 5/10, improved from 7/10 yesterday  Denies new neurologic symptoms, decreased vision or loss of vision, denies chest pain presently  She reports her blood pressure was great this morning, per headache is unchanged  Objective:     Vitals:   Temp (24hrs), Av 8 °F (36 6 °C), Min:97 5 °F (36 4 °C), Max:98 °F (36 7 °C)    HR:  [65-68] 66  Resp:  [16-18] 17  BP: (128-181)/(64-96) 128/64  SpO2:  [97 %-100 %] 100 %  Body mass index is 44 79 kg/m²  Input and Output Summary (last 24 hours):     No intake or output data in the 24 hours ending 18 0947    Physical Exam:     Physical Exam   Constitutional: She is oriented to person, place, and time  She appears well-developed  No distress     Obese  female in no apparent distress, sleeping but awoken easily   Eyes: Conjunctivae and EOM are normal    Cardiovascular: Normal rate, regular rhythm, S1 normal, S2 normal, normal heart sounds and intact distal pulses  No murmur heard  Pulmonary/Chest: Effort normal and breath sounds normal  No respiratory distress  She has no wheezes  She has no rales  Musculoskeletal: She exhibits no edema  Neurological: She is alert and oriented to person, place, and time  No cranial nerve deficit  Nursing note and vitals reviewed  Additional Data:     Labs:      Results from last 7 days  Lab Units 04/09/18  0324  04/08/18  1651   WBC Thousand/uL 9 61  --  12 27*   HEMOGLOBIN g/dL 12 3  --  14 0   HEMATOCRIT % 38 3  --  43 6   PLATELETS Thousands/uL 292  < > 336   NEUTROS PCT %  --   --  70   LYMPHS PCT %  --   --  22   MONOS PCT %  --   --  4   EOS PCT %  --   --  3   < > = values in this interval not displayed  Results from last 7 days  Lab Units 04/09/18  0324 04/08/18  1651   SODIUM mmol/L 138 137   POTASSIUM mmol/L 3 7 3 6   CHLORIDE mmol/L 106 102   CO2 mmol/L 24 27   BUN mg/dL 7 6   CREATININE mg/dL 0 77 0 91   CALCIUM mg/dL 8 2* 8 4   TOTAL PROTEIN g/dL  --  8 4*   BILIRUBIN TOTAL mg/dL  --  0 20   ALK PHOS U/L  --  128*   ALT U/L  --  34   AST U/L  --  43   GLUCOSE RANDOM mg/dL 105 100           * I Have Reviewed All Lab Data Listed Above    * Additional Pertinent Lab Tests Reviewed: Labs Pending At The Time Of This Note    Imaging:    Imaging Reports Reviewed Today Include:  MRI, stress test pending  Imaging Personally Reviewed by Myself Includes:  None    Recent Cultures (last 7 days):       Results from last 7 days  Lab Units 04/09/18  0639   URINE CULTURE  >100,000 cfu/ml        Last 24 Hours Medication List:     Current Facility-Administered Medications:  amLODIPine 10 mg Oral Daily ALVIN Pruitt   aspirin 81 mg Oral Daily ALVIN Pruitt   atenolol 25 mg Oral Daily Yoan Kimbrough MD   cloNIDine 0 3 mg Oral Q8H [facility] Hermelinda Quinteros MD   enoxaparin 40 mg Subcutaneous Daily ALVIN Pruitt   hydrALAZINE 5 mg Intravenous Q6H PRN Deena Amaro PA-C   levothyroxine 100 mcg Oral Early Morning ALVIN Pruitt   nicotine 1 patch Transdermal Daily ALVIN Pruitt   topiramate 50 mg Oral HS Cydney Gibson MD        Today, Patient Was Seen By: Eugenio Bingham PA-C    ** Please Note: Dictation voice to text software may have been used in the creation of this document   ** (4) rarely moist

## 2019-11-04 ENCOUNTER — HOSPITAL ENCOUNTER (EMERGENCY)
Facility: HOSPITAL | Age: 44
Discharge: HOME | End: 2019-11-05
Attending: EMERGENCY MEDICINE
Payer: COMMERCIAL

## 2019-11-04 ENCOUNTER — APPOINTMENT (EMERGENCY)
Dept: CT IMAGING | Facility: HOSPITAL | Age: 44
End: 2019-11-04
Payer: COMMERCIAL

## 2019-11-04 DIAGNOSIS — M54.50 LOW BACK PAIN: Primary | ICD-10-CM

## 2019-11-04 DIAGNOSIS — R10.9 NONSPECIFIC ABDOMINAL PAIN: ICD-10-CM

## 2019-11-04 DIAGNOSIS — R10.9 FLANK PAIN: ICD-10-CM

## 2019-11-04 LAB
ALBUMIN SERPL BCP-MCNC: 3.6 G/DL (ref 3.5–5)
ALP SERPL-CCNC: 165 U/L (ref 46–116)
ALT SERPL W P-5'-P-CCNC: 69 U/L (ref 12–78)
ANION GAP SERPL CALCULATED.3IONS-SCNC: 11 MMOL/L (ref 4–13)
AST SERPL W P-5'-P-CCNC: 72 U/L (ref 5–45)
BACTERIA UR QL AUTO: ABNORMAL /HPF
BASOPHILS # BLD AUTO: 0.05 THOUSANDS/ΜL (ref 0–0.1)
BASOPHILS NFR BLD AUTO: 0 % (ref 0–1)
BILIRUB SERPL-MCNC: 0.4 MG/DL (ref 0.2–1)
BILIRUB UR QL STRIP: NEGATIVE
BUN SERPL-MCNC: 9 MG/DL (ref 5–25)
CALCIUM SERPL-MCNC: 9.5 MG/DL (ref 8.3–10.1)
CHLORIDE SERPL-SCNC: 98 MMOL/L (ref 100–108)
CLARITY UR: CLEAR
CO2 SERPL-SCNC: 29 MMOL/L (ref 21–32)
COLOR UR: YELLOW
CREAT SERPL-MCNC: 0.98 MG/DL (ref 0.6–1.3)
EOSINOPHIL # BLD AUTO: 0.26 THOUSAND/ΜL (ref 0–0.61)
EOSINOPHIL NFR BLD AUTO: 2 % (ref 0–6)
ERYTHROCYTE [DISTWIDTH] IN BLOOD BY AUTOMATED COUNT: 14.5 % (ref 11.6–15.1)
GFR SERPL CREATININE-BSD FRML MDRD: 82 ML/MIN/1.73SQ M
GLUCOSE SERPL-MCNC: 124 MG/DL (ref 65–140)
GLUCOSE UR STRIP-MCNC: NEGATIVE MG/DL
HCG SERPL QL: NEGATIVE
HCT VFR BLD AUTO: 46.3 % (ref 34.8–46.1)
HGB BLD-MCNC: 14.3 G/DL (ref 11.5–15.4)
HGB UR QL STRIP.AUTO: NEGATIVE
HYALINE CASTS #/AREA URNS LPF: ABNORMAL /LPF
IMM GRANULOCYTES # BLD AUTO: 0.07 THOUSAND/UL (ref 0–0.2)
IMM GRANULOCYTES NFR BLD AUTO: 1 % (ref 0–2)
KETONES UR STRIP-MCNC: NEGATIVE MG/DL
LEUKOCYTE ESTERASE UR QL STRIP: ABNORMAL
LIPASE SERPL-CCNC: 176 U/L (ref 73–393)
LYMPHOCYTES # BLD AUTO: 2.96 THOUSANDS/ΜL (ref 0.6–4.47)
LYMPHOCYTES NFR BLD AUTO: 23 % (ref 14–44)
MCH RBC QN AUTO: 27.3 PG (ref 26.8–34.3)
MCHC RBC AUTO-ENTMCNC: 30.9 G/DL (ref 31.4–37.4)
MCV RBC AUTO: 89 FL (ref 82–98)
MONOCYTES # BLD AUTO: 0.53 THOUSAND/ΜL (ref 0.17–1.22)
MONOCYTES NFR BLD AUTO: 4 % (ref 4–12)
MUCOUS THREADS UR QL AUTO: ABNORMAL
NEUTROPHILS # BLD AUTO: 8.76 THOUSANDS/ΜL (ref 1.85–7.62)
NEUTS SEG NFR BLD AUTO: 70 % (ref 43–75)
NITRITE UR QL STRIP: NEGATIVE
NON-SQ EPI CELLS URNS QL MICRO: ABNORMAL /HPF
NRBC BLD AUTO-RTO: 0 /100 WBCS
PH UR STRIP.AUTO: 6 [PH]
PLATELET # BLD AUTO: 350 THOUSANDS/UL (ref 149–390)
PMV BLD AUTO: 11.6 FL (ref 8.9–12.7)
POTASSIUM SERPL-SCNC: 3.4 MMOL/L (ref 3.5–5.3)
PROT SERPL-MCNC: 8.7 G/DL (ref 6.4–8.2)
PROT UR STRIP-MCNC: NEGATIVE MG/DL
RBC # BLD AUTO: 5.23 MILLION/UL (ref 3.81–5.12)
RBC #/AREA URNS AUTO: ABNORMAL /HPF
SODIUM SERPL-SCNC: 138 MMOL/L (ref 136–145)
SP GR UR STRIP.AUTO: 1.02 (ref 1–1.03)
UROBILINOGEN UR QL STRIP.AUTO: 0.2 E.U./DL
WBC # BLD AUTO: 12.63 THOUSAND/UL (ref 4.31–10.16)
WBC #/AREA URNS AUTO: ABNORMAL /HPF

## 2019-11-04 PROCEDURE — 80053 COMPREHEN METABOLIC PANEL: CPT | Performed by: EMERGENCY MEDICINE

## 2019-11-04 PROCEDURE — 99284 EMERGENCY DEPT VISIT MOD MDM: CPT

## 2019-11-04 PROCEDURE — 96361 HYDRATE IV INFUSION ADD-ON: CPT

## 2019-11-04 PROCEDURE — 85025 COMPLETE CBC W/AUTO DIFF WBC: CPT | Performed by: EMERGENCY MEDICINE

## 2019-11-04 PROCEDURE — 83690 ASSAY OF LIPASE: CPT | Performed by: EMERGENCY MEDICINE

## 2019-11-04 PROCEDURE — 96375 TX/PRO/DX INJ NEW DRUG ADDON: CPT

## 2019-11-04 PROCEDURE — 36415 COLL VENOUS BLD VENIPUNCTURE: CPT | Performed by: EMERGENCY MEDICINE

## 2019-11-04 PROCEDURE — 74177 CT ABD & PELVIS W/CONTRAST: CPT

## 2019-11-04 PROCEDURE — 96374 THER/PROPH/DIAG INJ IV PUSH: CPT

## 2019-11-04 PROCEDURE — 84703 CHORIONIC GONADOTROPIN ASSAY: CPT | Performed by: EMERGENCY MEDICINE

## 2019-11-04 PROCEDURE — 81001 URINALYSIS AUTO W/SCOPE: CPT | Performed by: EMERGENCY MEDICINE

## 2019-11-04 RX ORDER — METHOCARBAMOL 500 MG/1
500 TABLET, FILM COATED ORAL 2 TIMES DAILY
Qty: 10 TABLET | Refills: 0 | Status: SHIPPED | OUTPATIENT
Start: 2019-11-04 | End: 2019-11-04 | Stop reason: SDUPTHER

## 2019-11-04 RX ORDER — ONDANSETRON 2 MG/ML
4 INJECTION INTRAMUSCULAR; INTRAVENOUS ONCE
Status: COMPLETED | OUTPATIENT
Start: 2019-11-04 | End: 2019-11-04

## 2019-11-04 RX ORDER — HYDROMORPHONE HCL/PF 1 MG/ML
1 SYRINGE (ML) INJECTION ONCE
Status: COMPLETED | OUTPATIENT
Start: 2019-11-04 | End: 2019-11-04

## 2019-11-04 RX ORDER — MORPHINE SULFATE 4 MG/ML
4 INJECTION, SOLUTION INTRAMUSCULAR; INTRAVENOUS ONCE
Status: COMPLETED | OUTPATIENT
Start: 2019-11-04 | End: 2019-11-04

## 2019-11-04 RX ADMIN — ONDANSETRON 4 MG: 2 INJECTION INTRAMUSCULAR; INTRAVENOUS at 20:37

## 2019-11-04 RX ADMIN — MORPHINE SULFATE 4 MG: 4 INJECTION INTRAVENOUS at 20:37

## 2019-11-04 RX ADMIN — IOHEXOL 100 ML: 350 INJECTION, SOLUTION INTRAVENOUS at 21:23

## 2019-11-04 RX ADMIN — HYDROMORPHONE HYDROCHLORIDE 1 MG: 1 INJECTION, SOLUTION INTRAMUSCULAR; INTRAVENOUS; SUBCUTANEOUS at 21:45

## 2019-11-04 RX ADMIN — SODIUM CHLORIDE 1000 ML: 0.9 INJECTION, SOLUTION INTRAVENOUS at 20:37

## 2019-11-05 VITALS
HEART RATE: 98 BPM | SYSTOLIC BLOOD PRESSURE: 124 MMHG | OXYGEN SATURATION: 93 % | TEMPERATURE: 97.7 F | RESPIRATION RATE: 18 BRPM | DIASTOLIC BLOOD PRESSURE: 78 MMHG

## 2019-11-05 PROCEDURE — 99285 EMERGENCY DEPT VISIT HI MDM: CPT | Performed by: EMERGENCY MEDICINE

## 2019-11-05 RX ORDER — METHOCARBAMOL 500 MG/1
500 TABLET, FILM COATED ORAL 2 TIMES DAILY
Qty: 10 TABLET | Refills: 0 | Status: SHIPPED | OUTPATIENT
Start: 2019-11-04 | End: 2020-11-08 | Stop reason: CLARIF

## 2019-11-05 NOTE — ED PROVIDER NOTES
History  Chief Complaint   Patient presents with    Back Pain     lower back pain that radiates to lower abd, c/o nausea and HA      44 y/o female presents to the ED for right lower back pain that radiates to her RLQ x2 days  She states that pain was worse today  She reports the pain is a constant sharp pain that radiates to her right lower quadrant  She denies any aggravating or alleviating factors  States that she has had nausea and 2 episodes of nonbilious nonbloody emesis associated with the pain  She reports that she has tried Advil at home without any relief  She denies any trauma or injury  Denies any fevers/chills, chest pain, shortness of breath, cough, urinary symptoms, vaginal complaints, or diarrhea/constipation  Denies any history of similar  She does note that she does have a history of kidney stones in the past   Also reports that she has a history of tubal ligation  No other complaints  History provided by:  Patient  Flank Pain   Pain location:  R flank  Pain quality: sharp and stabbing    Pain radiates to:  RLQ  Pain severity:  Moderate  Onset quality:  Gradual  Duration:  2 days  Timing:  Constant  Progression:  Worsening  Chronicity:  New  Context: previous surgery    Relieved by:  None tried  Worsened by:  Nothing  Ineffective treatments:  NSAIDs  Associated symptoms: nausea and vomiting    Associated symptoms: no chest pain, no chills, no constipation, no cough, no diarrhea, no dysuria, no fever, no hematuria, no shortness of breath and no sore throat        Prior to Admission Medications   Prescriptions Last Dose Informant Patient Reported? Taking?    amLODIPine (NORVASC) 10 mg tablet   No Yes   Sig: Take 1 tablet (10 mg total) by mouth daily   aspirin 81 MG tablet   Yes Yes   Sig: Take 81 mg by mouth daily   chlorthalidone 25 mg tablet   No Yes   Sig: Take 1 tablet (25 mg total) by mouth daily   docusate sodium (COLACE) 100 mg capsule   No Yes   Sig: Take 1 capsule (100 mg total) by mouth 2 (two) times a day as needed for constipation   levothyroxine 100 mcg tablet   No Yes   Sig: Take 1 tablet (100 mcg total) by mouth daily in the early morning   losartan (COZAAR) 50 mg tablet   No Yes   Sig: Take 1 tablet (50 mg total) by mouth daily      Facility-Administered Medications: None       Past Medical History:   Diagnosis Date    History of MI (myocardial infarction)     History of TIA (transient ischemic attack)     Hypertension     Hypothyroidism     hypothyroidism    Morbid obesity with BMI of 45 0-49 9, adult (HCC)     TIA (transient ischemic attack)        Past Surgical History:   Procedure Laterality Date    BREAST SURGERY Left     TUBAL LIGATION         Family History   Problem Relation Age of Onset    Heart disease Mother     Hypertension Mother     Diabetes Father      I have reviewed and agree with the history as documented  Social History     Tobacco Use    Smoking status: Current Every Day Smoker     Packs/day: 0 50     Types: Cigarettes    Smokeless tobacco: Never Used   Substance Use Topics    Alcohol use: Not Currently    Drug use: No        Review of Systems   Constitutional: Negative for chills and fever  HENT: Negative for congestion, ear pain and sore throat  Eyes: Negative for pain and visual disturbance  Respiratory: Negative for cough, shortness of breath and wheezing  Cardiovascular: Negative for chest pain and leg swelling  Gastrointestinal: Positive for abdominal pain, nausea and vomiting  Negative for constipation and diarrhea  Genitourinary: Positive for flank pain  Negative for dysuria, frequency, hematuria and urgency  Musculoskeletal: Negative for neck pain and neck stiffness  Skin: Negative for rash and wound  Neurological: Negative for weakness, numbness and headaches  Psychiatric/Behavioral: Negative for agitation and confusion  All other systems reviewed and are negative        Physical Exam  Physical Exam Constitutional: She is oriented to person, place, and time  She appears well-developed and well-nourished  HENT:   Head: Normocephalic and atraumatic  Mouth/Throat: Oropharynx is clear and moist    Eyes: Pupils are equal, round, and reactive to light  EOM are normal    Neck: Normal range of motion  Neck supple  Cardiovascular: Normal rate and regular rhythm  Pulmonary/Chest: Effort normal and breath sounds normal    Abdominal: Soft  Bowel sounds are normal  She exhibits no distension  There is tenderness in the right lower quadrant  There is CVA tenderness  There is no rebound and no guarding  Musculoskeletal: Normal range of motion  Neurological: She is alert and oriented to person, place, and time  No focal deficits   Skin: Skin is warm and dry  Nursing note and vitals reviewed        Vital Signs  ED Triage Vitals   Temperature Pulse Respirations Blood Pressure SpO2   11/04/19 1906 11/04/19 1906 11/04/19 1906 11/04/19 1906 11/04/19 1906   97 9 °F (36 6 °C) (!) 106 18 (!) 178/100 96 %      Temp Source Heart Rate Source Patient Position - Orthostatic VS BP Location FiO2 (%)   11/04/19 1906 11/04/19 1906 11/04/19 1906 11/04/19 1906 --   Oral Monitor Sitting Left arm       Pain Score       11/04/19 2037       7           Vitals:    11/04/19 1906 11/04/19 2032 11/04/19 2142 11/05/19 0002   BP: (!) 178/100 135/91 131/88 124/78   Pulse: (!) 106 95 91 98   Patient Position - Orthostatic VS: Sitting Lying           Visual Acuity      ED Medications  Medications   sodium chloride 0 9 % bolus 1,000 mL (0 mL Intravenous Stopped 11/5/19 0007)   ondansetron (ZOFRAN) injection 4 mg (4 mg Intravenous Given 11/4/19 2037)   morphine (PF) 4 mg/mL injection 4 mg (4 mg Intravenous Given 11/4/19 2037)   iohexol (OMNIPAQUE) 350 MG/ML injection (MULTI-DOSE) 100 mL (100 mL Intravenous Given 11/4/19 2123)   HYDROmorphone (DILAUDID) injection 1 mg (1 mg Intravenous Given 11/4/19 2145)       Diagnostic Studies  Results Reviewed     Procedure Component Value Units Date/Time    Lipase [[id number]]  (Normal) Collected:  11/04/19 2036    Lab Status:  Final result Specimen:  Blood from Arm, Left Updated:  11/04/19 2107     Lipase 176 u/L     hCG, qualitative pregnancy [[id number]]  (Normal) Collected:  11/04/19 2036    Lab Status:  Final result Specimen:  Blood from Arm, Left Updated:  11/04/19 2107     Preg, Serum Negative    Comprehensive metabolic panel [[id number]]  (Abnormal) Collected:  11/04/19 2036    Lab Status:  Final result Specimen:  Blood from Arm, Left Updated:  11/04/19 2106     Sodium 138 mmol/L      Potassium 3 4 mmol/L      Chloride 98 mmol/L      CO2 29 mmol/L      ANION GAP 11 mmol/L      BUN 9 mg/dL      Creatinine 0 98 mg/dL      Glucose 124 mg/dL      Calcium 9 5 mg/dL      AST 72 U/L      ALT 69 U/L      Alkaline Phosphatase 165 U/L      Total Protein 8 7 g/dL      Albumin 3 6 g/dL      Total Bilirubin 0 40 mg/dL      eGFR 82 ml/min/1 73sq m     Narrative:       [place] guidelines for Chronic Kidney Disease (CKD):     Stage 1 with normal or high GFR (GFR > 90 mL/min/1 73 square meters)    Stage 2 Mild CKD (GFR = 60-89 mL/min/1 73 square meters)    Stage 3A Moderate CKD (GFR = 45-59 mL/min/1 73 square meters)    Stage 3B Moderate CKD (GFR = 30-44 mL/min/1 73 square meters)    Stage 4 Severe CKD (GFR = 15-29 mL/min/1 73 square meters)    Stage 5 End Stage CKD (GFR <15 mL/min/1 73 square meters)  Note: GFR calculation is accurate only with a steady state creatinine    Urine Microscopic [[id number]]  (Abnormal) Collected:  11/04/19 2037    Lab Status:  Final result Specimen:  Urine, Clean Catch Updated:  11/04/19 2101     RBC, UA 0-1 /hpf      WBC, UA 1-2 /hpf      Epithelial Cells Moderate /hpf      Bacteria, UA Occasional /hpf      Hyaline Casts, UA 0-1 /lpf      MUCUS THREADS Occasional    UA w Reflex to Microscopic w Reflex to Culture [[id number]]  (Abnormal) Collected:  11/04/19 2037 Lab Status:  Final result Specimen:  Urine, Clean Catch Updated:  11/04/19 2049     Color, UA Yellow     Clarity, UA Clear     Specific [place], UA 1 020     pH, UA 6 0     Leukocytes, UA Small     Nitrite, UA Negative     Protein, UA Negative mg/dl      Glucose, UA Negative mg/dl      Ketones, UA Negative mg/dl      Urobilinogen, UA 0 2 E U /dl      Bilirubin, UA Negative     Blood, UA Negative    CBC and differential [[id number]]  (Abnormal) Collected:  11/04/19 2036    Lab Status:  Final result Specimen:  Blood from Arm, Left Updated:  11/04/19 2044     WBC 12 63 Thousand/uL      RBC 5 23 Million/uL      Hemoglobin 14 3 g/dL      Hematocrit 46 3 %      MCV 89 fL      MCH 27 3 pg      MCHC 30 9 g/dL      RDW 14 5 %      MPV 11 6 fL      Platelets 636 Thousands/uL      nRBC 0 /100 WBCs      Neutrophils Relative 70 %      Immat GRANS % 1 %      Lymphocytes Relative 23 %      Monocytes Relative 4 %      Eosinophils Relative 2 %      Basophils Relative 0 %      Neutrophils Absolute 8 76 Thousands/µL      Immature Grans Absolute 0 07 Thousand/uL      Lymphocytes Absolute 2 96 Thousands/µL      Monocytes Absolute 0 53 Thousand/µL      Eosinophils Absolute 0 26 Thousand/µL      Basophils Absolute 0 05 Thousands/µL                  CT abdomen pelvis with contrast   Final Result by Garth Moulton MD (11/04 2151)      No acute pathology visualized on CT of the abdomen and pelvis with IV without oral contrast             Workstation performed: [id number]                    Procedures  Procedures       ED Course  ED Course as of Nov 05 0142   Mon Nov 04, 2019 2011 Right sided lower back pain that radiates to her right lower abd- 2 days ago- worse today  Sharp pain  Constant  Nausea and vomiting x 2  Certain positions improve it  Hx of tubal ligation  Tried advil without relief                                     MDM  Number of Diagnoses or Management Options  Flank pain: new and requires workup  Low back pain: new and requires workup  Nonspecific abdominal pain: new and requires workup  Diagnosis management comments: Patient with right flank and right lower quadrant abdominal tenderness  Will labs, UA, and CT abdomen/pelvis  Will give pain medication and Zofran for symptom relief  Patient reevaluated and feels improved  Patient updated on results of tests  Discharge instructions given including medications, follow-up, and return precautions  Patient demonstrates verbal understanding and agrees with plan    Muscle relaxer precautions given       Amount and/or Complexity of Data Reviewed  Clinical lab tests: reviewed and ordered  Tests in the radiology section of CPT®: ordered and reviewed  Tests in the medicine section of CPT®: ordered and reviewed  Discussion of test results with the performing providers: yes  Decide to obtain previous medical records or to obtain history from someone other than the patient: yes  Obtain history from someone other than the patient: yes  Review and summarize past medical records: yes  Discuss the patient with other providers: yes  Independent visualization of images, tracings, or specimens: yes    Patient Progress  Patient progress: improved      Disposition  Final diagnoses:   Low back pain   Flank pain   Nonspecific abdominal pain     Time reflects when diagnosis was documented in both MDM as applicable and the Disposition within this note     Time User Action Codes Description Comment    11/4/2019 11:54 PM Ernesto Lawix A Add [R10 9] Flank pain     11/4/2019 11:54 PM Ernesto Law Teodoro A Add [R10 9] Abdominal pain     11/4/2019 11:54 PM Callie Jessy Modify [R10 9] Abdominal pain     11/4/2019 11:54 PM Ernesto Law A Remove [R10 9] Flank pain     11/4/2019 11:54 PM Ani, [address] [place] [address] [R10 9] Abdominal pain     11/4/2019 11:55 PM Callie Jessy Add [M54 5] Low back pain     11/4/2019 11:55 PM Ernesto Lawix A Add [R10 9] Flank pain 11/4/2019 11:55 PM Enoch Law Add [R10 9] Nonspecific abdominal pain       ED Disposition     ED Disposition Condition Date/Time Comment    Discharge Stable Mon Nov 4, 2019 11:54 PM Lio Jacobsen discharge to home/self care  Follow-up Information     Follow up With Specialties Details Why Contact Info Additional [address], DO Family Medicine Call in 1 day for follow up within 2-3 days [facility] [address] Emergency Department Emergency Medicine Go to  immediately for any new or worsening symptoms  34 [facility] 1490 ED, [address] [place], [place], [facility]          Discharge Medication List as of 11/4/2019 11:56 PM      CONTINUE these medications which have NOT CHANGED    Details   amLODIPine (NORVASC) 10 mg tablet Take 1 tablet (10 mg total) by mouth daily, Starting Tue 4/16/2019, Print      aspirin 81 MG tablet Take 81 mg by mouth daily, Until Discontinued, Historical Med      chlorthalidone 25 mg tablet Take 1 tablet (25 mg total) by mouth daily, Starting Mon 10/7/2019, Normal      docusate sodium (COLACE) 100 mg capsule Take 1 capsule (100 mg total) by mouth 2 (two) times a day as needed for constipation, Starting Sun 10/6/2019, Normal      levothyroxine 100 mcg tablet Take 1 tablet (100 mcg total) by mouth daily in the early morning, Starting Mon 10/7/2019, Normal      losartan (COZAAR) 50 mg tablet Take 1 tablet (50 mg total) by mouth daily, Starting Mon 10/7/2019, Normal           No discharge procedures on file      ED Provider  Electronically Signed by           Alisa Kirby DO  11/05/19 3696

## 2020-01-04 ENCOUNTER — APPOINTMENT (EMERGENCY)
Dept: RADIOLOGY | Facility: HOSPITAL | Age: 45
DRG: 192 | End: 2020-01-04
Payer: COMMERCIAL

## 2020-01-04 ENCOUNTER — APPOINTMENT (EMERGENCY)
Dept: CT IMAGING | Facility: HOSPITAL | Age: 45
DRG: 192 | End: 2020-01-04
Payer: COMMERCIAL

## 2020-01-04 ENCOUNTER — HOSPITAL ENCOUNTER (INPATIENT)
Facility: HOSPITAL | Age: 45
LOS: 1 days | Discharge: HOME | DRG: 192 | End: 2020-01-06
Attending: EMERGENCY MEDICINE | Admitting: INTERNAL MEDICINE
Payer: COMMERCIAL

## 2020-01-04 DIAGNOSIS — R55 SYNCOPE: ICD-10-CM

## 2020-01-04 DIAGNOSIS — M51.26 LUMBAR HERNIATED DISC: ICD-10-CM

## 2020-01-04 DIAGNOSIS — F17.218 CIGARETTE NICOTINE DEPENDENCE WITH OTHER NICOTINE-INDUCED DISORDER: ICD-10-CM

## 2020-01-04 DIAGNOSIS — R07.89 OTHER CHEST PAIN: ICD-10-CM

## 2020-01-04 DIAGNOSIS — M25.552 ACUTE HIP PAIN, LEFT: ICD-10-CM

## 2020-01-04 DIAGNOSIS — I16.0 HYPERTENSIVE URGENCY: Primary | ICD-10-CM

## 2020-01-04 DIAGNOSIS — R51.9 HEADACHE: ICD-10-CM

## 2020-01-04 LAB
ALBUMIN SERPL BCP-MCNC: 3.4 G/DL (ref 3.5–5)
ALP SERPL-CCNC: 141 U/L (ref 46–116)
ALT SERPL W P-5'-P-CCNC: 40 U/L (ref 12–78)
ANION GAP SERPL CALCULATED.3IONS-SCNC: 12 MMOL/L (ref 4–13)
ANION GAP SERPL CALCULATED.3IONS-SCNC: 9 MMOL/L (ref 4–13)
AST SERPL W P-5'-P-CCNC: 47 U/L (ref 5–45)
ATRIAL RATE: 78 BPM
ATRIAL RATE: 83 BPM
ATRIAL RATE: 85 BPM
BASOPHILS # BLD AUTO: 0.04 THOUSANDS/ΜL (ref 0–0.1)
BASOPHILS NFR BLD AUTO: 0 % (ref 0–1)
BILIRUB SERPL-MCNC: 0.2 MG/DL (ref 0.2–1)
BUN SERPL-MCNC: 7 MG/DL (ref 5–25)
BUN SERPL-MCNC: 8 MG/DL (ref 5–25)
CALCIUM SERPL-MCNC: 8.2 MG/DL (ref 8.3–10.1)
CALCIUM SERPL-MCNC: 8.4 MG/DL (ref 8.3–10.1)
CHLORIDE SERPL-SCNC: 102 MMOL/L (ref 100–108)
CHLORIDE SERPL-SCNC: 103 MMOL/L (ref 100–108)
CO2 SERPL-SCNC: 25 MMOL/L (ref 21–32)
CO2 SERPL-SCNC: 26 MMOL/L (ref 21–32)
CREAT SERPL-MCNC: 0.81 MG/DL (ref 0.6–1.3)
CREAT SERPL-MCNC: 0.85 MG/DL (ref 0.6–1.3)
EOSINOPHIL # BLD AUTO: 0.28 THOUSAND/ΜL (ref 0–0.61)
EOSINOPHIL NFR BLD AUTO: 3 % (ref 0–6)
ERYTHROCYTE [DISTWIDTH] IN BLOOD BY AUTOMATED COUNT: 14.1 % (ref 11.6–15.1)
GFR SERPL CREATININE-BSD FRML MDRD: 104 ML/MIN/1.73SQ M
GFR SERPL CREATININE-BSD FRML MDRD: 98 ML/MIN/1.73SQ M
GLUCOSE SERPL-MCNC: 110 MG/DL (ref 65–140)
GLUCOSE SERPL-MCNC: 153 MG/DL (ref 65–140)
HCT VFR BLD AUTO: 42.1 % (ref 34.8–46.1)
HGB BLD-MCNC: 13.2 G/DL (ref 11.5–15.4)
IMM GRANULOCYTES # BLD AUTO: 0.06 THOUSAND/UL (ref 0–0.2)
IMM GRANULOCYTES NFR BLD AUTO: 1 % (ref 0–2)
LYMPHOCYTES # BLD AUTO: 2.38 THOUSANDS/ΜL (ref 0.6–4.47)
LYMPHOCYTES NFR BLD AUTO: 24 % (ref 14–44)
MCH RBC QN AUTO: 27.7 PG (ref 26.8–34.3)
MCHC RBC AUTO-ENTMCNC: 31.4 G/DL (ref 31.4–37.4)
MCV RBC AUTO: 88 FL (ref 82–98)
MONOCYTES # BLD AUTO: 0.46 THOUSAND/ΜL (ref 0.17–1.22)
MONOCYTES NFR BLD AUTO: 5 % (ref 4–12)
NEUTROPHILS # BLD AUTO: 6.54 THOUSANDS/ΜL (ref 1.85–7.62)
NEUTS SEG NFR BLD AUTO: 67 % (ref 43–75)
NRBC BLD AUTO-RTO: 0 /100 WBCS
P AXIS: 14 DEGREES
P AXIS: 55 DEGREES
P AXIS: 71 DEGREES
PLATELET # BLD AUTO: 367 THOUSANDS/UL (ref 149–390)
PMV BLD AUTO: 10.4 FL (ref 8.9–12.7)
POTASSIUM SERPL-SCNC: 3.1 MMOL/L (ref 3.5–5.3)
POTASSIUM SERPL-SCNC: 3.6 MMOL/L (ref 3.5–5.3)
PR INTERVAL: 182 MS
PR INTERVAL: 186 MS
PR INTERVAL: 198 MS
PROT SERPL-MCNC: 7.8 G/DL (ref 6.4–8.2)
QRS AXIS: 22 DEGREES
QRS AXIS: 26 DEGREES
QRS AXIS: 29 DEGREES
QRSD INTERVAL: 86 MS
QRSD INTERVAL: 86 MS
QRSD INTERVAL: 88 MS
QT INTERVAL: 414 MS
QT INTERVAL: 420 MS
QT INTERVAL: 432 MS
QTC INTERVAL: 486 MS
QTC INTERVAL: 492 MS
QTC INTERVAL: 499 MS
RBC # BLD AUTO: 4.77 MILLION/UL (ref 3.81–5.12)
SODIUM SERPL-SCNC: 138 MMOL/L (ref 136–145)
SODIUM SERPL-SCNC: 139 MMOL/L (ref 136–145)
T WAVE AXIS: 77 DEGREES
T WAVE AXIS: 85 DEGREES
T WAVE AXIS: 87 DEGREES
TROPONIN I SERPL-MCNC: <0.02 NG/ML
VENTRICULAR RATE: 78 BPM
VENTRICULAR RATE: 83 BPM
VENTRICULAR RATE: 85 BPM
WBC # BLD AUTO: 9.76 THOUSAND/UL (ref 4.31–10.16)

## 2020-01-04 PROCEDURE — 84484 ASSAY OF TROPONIN QUANT: CPT | Performed by: PHYSICIAN ASSISTANT

## 2020-01-04 PROCEDURE — 80048 BASIC METABOLIC PNL TOTAL CA: CPT | Performed by: PHYSICIAN ASSISTANT

## 2020-01-04 PROCEDURE — 70450 CT HEAD/BRAIN W/O DYE: CPT

## 2020-01-04 PROCEDURE — 96374 THER/PROPH/DIAG INJ IV PUSH: CPT

## 2020-01-04 PROCEDURE — 99244 OFF/OP CNSLTJ NEW/EST MOD 40: CPT | Performed by: INTERNAL MEDICINE

## 2020-01-04 PROCEDURE — 99285 EMERGENCY DEPT VISIT HI MDM: CPT | Performed by: EMERGENCY MEDICINE

## 2020-01-04 PROCEDURE — 36415 COLL VENOUS BLD VENIPUNCTURE: CPT | Performed by: EMERGENCY MEDICINE

## 2020-01-04 PROCEDURE — 73502 X-RAY EXAM HIP UNI 2-3 VIEWS: CPT

## 2020-01-04 PROCEDURE — 80053 COMPREHEN METABOLIC PANEL: CPT | Performed by: EMERGENCY MEDICINE

## 2020-01-04 PROCEDURE — 84484 ASSAY OF TROPONIN QUANT: CPT | Performed by: INTERNAL MEDICINE

## 2020-01-04 PROCEDURE — 99220 PR INITIAL OBSERVATION CARE/DAY 70 MINUTES: CPT | Performed by: INTERNAL MEDICINE

## 2020-01-04 PROCEDURE — 93005 ELECTROCARDIOGRAM TRACING: CPT

## 2020-01-04 PROCEDURE — 99285 EMERGENCY DEPT VISIT HI MDM: CPT

## 2020-01-04 PROCEDURE — 85025 COMPLETE CBC W/AUTO DIFF WBC: CPT | Performed by: EMERGENCY MEDICINE

## 2020-01-04 PROCEDURE — 93010 ELECTROCARDIOGRAM REPORT: CPT | Performed by: INTERNAL MEDICINE

## 2020-01-04 PROCEDURE — 72131 CT LUMBAR SPINE W/O DYE: CPT

## 2020-01-04 RX ORDER — AMLODIPINE BESYLATE 10 MG/1
10 TABLET ORAL DAILY
Status: DISCONTINUED | OUTPATIENT
Start: 2020-01-04 | End: 2020-01-06 | Stop reason: HOSPADM

## 2020-01-04 RX ORDER — FUROSEMIDE 40 MG/1
40 TABLET ORAL DAILY
Status: DISCONTINUED | OUTPATIENT
Start: 2020-01-04 | End: 2020-01-04

## 2020-01-04 RX ORDER — ASPIRIN 81 MG/1
81 TABLET, CHEWABLE ORAL DAILY
Status: DISCONTINUED | OUTPATIENT
Start: 2020-01-04 | End: 2020-01-06 | Stop reason: HOSPADM

## 2020-01-04 RX ORDER — LEVOTHYROXINE SODIUM 0.1 MG/1
100 TABLET ORAL
Status: DISCONTINUED | OUTPATIENT
Start: 2020-01-04 | End: 2020-01-06 | Stop reason: HOSPADM

## 2020-01-04 RX ORDER — NAPROXEN 250 MG/1
500 TABLET ORAL ONCE
Status: COMPLETED | OUTPATIENT
Start: 2020-01-04 | End: 2020-01-04

## 2020-01-04 RX ORDER — DOCUSATE SODIUM 100 MG/1
100 CAPSULE, LIQUID FILLED ORAL 2 TIMES DAILY PRN
Status: DISCONTINUED | OUTPATIENT
Start: 2020-01-04 | End: 2020-01-06 | Stop reason: HOSPADM

## 2020-01-04 RX ORDER — METHOCARBAMOL 500 MG/1
500 TABLET, FILM COATED ORAL EVERY 6 HOURS PRN
Status: DISCONTINUED | OUTPATIENT
Start: 2020-01-04 | End: 2020-01-05

## 2020-01-04 RX ORDER — HYDRALAZINE HYDROCHLORIDE 20 MG/ML
5 INJECTION INTRAMUSCULAR; INTRAVENOUS ONCE
Status: DISCONTINUED | OUTPATIENT
Start: 2020-01-04 | End: 2020-01-04

## 2020-01-04 RX ORDER — ATORVASTATIN CALCIUM 40 MG/1
40 TABLET, FILM COATED ORAL
Status: DISCONTINUED | OUTPATIENT
Start: 2020-01-04 | End: 2020-01-06 | Stop reason: HOSPADM

## 2020-01-04 RX ORDER — OXYCODONE HYDROCHLORIDE 5 MG/1
5 TABLET ORAL EVERY 4 HOURS PRN
Status: DISCONTINUED | OUTPATIENT
Start: 2020-01-04 | End: 2020-01-06 | Stop reason: HOSPADM

## 2020-01-04 RX ORDER — OXYCODONE HYDROCHLORIDE 10 MG/1
10 TABLET ORAL EVERY 4 HOURS PRN
Status: DISCONTINUED | OUTPATIENT
Start: 2020-01-04 | End: 2020-01-06 | Stop reason: HOSPADM

## 2020-01-04 RX ORDER — POTASSIUM CHLORIDE 20 MEQ/1
40 TABLET, EXTENDED RELEASE ORAL 2 TIMES DAILY
Status: DISCONTINUED | OUTPATIENT
Start: 2020-01-04 | End: 2020-01-06 | Stop reason: HOSPADM

## 2020-01-04 RX ORDER — LOSARTAN POTASSIUM 50 MG/1
50 TABLET ORAL DAILY
Status: DISCONTINUED | OUTPATIENT
Start: 2020-01-04 | End: 2020-01-06 | Stop reason: HOSPADM

## 2020-01-04 RX ORDER — LABETALOL 20 MG/4 ML (5 MG/ML) INTRAVENOUS SYRINGE
10 ONCE
Status: COMPLETED | OUTPATIENT
Start: 2020-01-04 | End: 2020-01-04

## 2020-01-04 RX ORDER — LABETALOL 20 MG/4 ML (5 MG/ML) INTRAVENOUS SYRINGE
10 EVERY 6 HOURS PRN
Status: DISCONTINUED | OUTPATIENT
Start: 2020-01-04 | End: 2020-01-06 | Stop reason: HOSPADM

## 2020-01-04 RX ORDER — NICOTINE 21 MG/24HR
1 PATCH, TRANSDERMAL 24 HOURS TRANSDERMAL DAILY
Status: DISCONTINUED | OUTPATIENT
Start: 2020-01-04 | End: 2020-01-06 | Stop reason: HOSPADM

## 2020-01-04 RX ORDER — LIDOCAINE 50 MG/G
1 PATCH TOPICAL DAILY
Status: DISCONTINUED | OUTPATIENT
Start: 2020-01-04 | End: 2020-01-06 | Stop reason: HOSPADM

## 2020-01-04 RX ORDER — ACETAMINOPHEN 325 MG/1
975 TABLET ORAL EVERY 6 HOURS SCHEDULED
Status: DISCONTINUED | OUTPATIENT
Start: 2020-01-04 | End: 2020-01-06 | Stop reason: HOSPADM

## 2020-01-04 RX ORDER — CALCIUM CARBONATE 200(500)MG
1000 TABLET,CHEWABLE ORAL DAILY PRN
Status: DISCONTINUED | OUTPATIENT
Start: 2020-01-04 | End: 2020-01-06 | Stop reason: HOSPADM

## 2020-01-04 RX ORDER — ONDANSETRON 2 MG/ML
4 INJECTION INTRAMUSCULAR; INTRAVENOUS EVERY 6 HOURS PRN
Status: DISCONTINUED | OUTPATIENT
Start: 2020-01-04 | End: 2020-01-06 | Stop reason: HOSPADM

## 2020-01-04 RX ORDER — CHLORTHALIDONE 25 MG/1
25 TABLET ORAL DAILY
Status: DISCONTINUED | OUTPATIENT
Start: 2020-01-04 | End: 2020-01-05

## 2020-01-04 RX ORDER — OXYCODONE HYDROCHLORIDE AND ACETAMINOPHEN 5; 325 MG/1; MG/1
2 TABLET ORAL ONCE
Status: COMPLETED | OUTPATIENT
Start: 2020-01-04 | End: 2020-01-04

## 2020-01-04 RX ORDER — CARVEDILOL 3.12 MG/1
3.12 TABLET ORAL 2 TIMES DAILY WITH MEALS
Status: DISCONTINUED | OUTPATIENT
Start: 2020-01-04 | End: 2020-01-05

## 2020-01-04 RX ORDER — PREDNISONE 20 MG/1
40 TABLET ORAL ONCE
Status: COMPLETED | OUTPATIENT
Start: 2020-01-04 | End: 2020-01-04

## 2020-01-04 RX ADMIN — LABETALOL 20 MG/4 ML (5 MG/ML) INTRAVENOUS SYRINGE 10 MG: at 20:05

## 2020-01-04 RX ADMIN — ASPIRIN 81 MG 81 MG: 81 TABLET ORAL at 10:56

## 2020-01-04 RX ADMIN — ACETAMINOPHEN 975 MG: 325 TABLET, FILM COATED ORAL at 05:19

## 2020-01-04 RX ADMIN — NICOTINE 1 PATCH: 14 PATCH TRANSDERMAL at 14:40

## 2020-01-04 RX ADMIN — LABETALOL 20 MG/4 ML (5 MG/ML) INTRAVENOUS SYRINGE 10 MG: at 01:07

## 2020-01-04 RX ADMIN — LOSARTAN POTASSIUM 50 MG: 50 TABLET, FILM COATED ORAL at 11:03

## 2020-01-04 RX ADMIN — OXYCODONE HYDROCHLORIDE 10 MG: 10 TABLET ORAL at 18:43

## 2020-01-04 RX ADMIN — LIDOCAINE 1 PATCH: 50 PATCH TOPICAL at 04:20

## 2020-01-04 RX ADMIN — OXYCODONE HYDROCHLORIDE 10 MG: 10 TABLET ORAL at 10:56

## 2020-01-04 RX ADMIN — NAPROXEN 500 MG: 250 TABLET ORAL at 02:58

## 2020-01-04 RX ADMIN — OXYCODONE HYDROCHLORIDE AND ACETAMINOPHEN 2 TABLET: 5; 325 TABLET ORAL at 00:42

## 2020-01-04 RX ADMIN — FUROSEMIDE 40 MG: 40 TABLET ORAL at 10:56

## 2020-01-04 RX ADMIN — PREDNISONE 40 MG: 20 TABLET ORAL at 02:59

## 2020-01-04 RX ADMIN — ATORVASTATIN CALCIUM 40 MG: 40 TABLET, FILM COATED ORAL at 16:28

## 2020-01-04 RX ADMIN — AMLODIPINE BESYLATE 10 MG: 10 TABLET ORAL at 14:45

## 2020-01-04 RX ADMIN — POTASSIUM CHLORIDE 40 MEQ: 1500 TABLET, EXTENDED RELEASE ORAL at 12:19

## 2020-01-04 RX ADMIN — CHLORTHALIDONE 25 MG: 25 TABLET ORAL at 14:40

## 2020-01-04 RX ADMIN — ACETAMINOPHEN 975 MG: 325 TABLET, FILM COATED ORAL at 12:19

## 2020-01-04 RX ADMIN — LABETALOL 20 MG/4 ML (5 MG/ML) INTRAVENOUS SYRINGE 10 MG: at 05:09

## 2020-01-04 RX ADMIN — ENOXAPARIN SODIUM 40 MG: 40 INJECTION SUBCUTANEOUS at 12:19

## 2020-01-04 RX ADMIN — CARVEDILOL 3.12 MG: 3.12 TABLET, FILM COATED ORAL at 14:40

## 2020-01-04 RX ADMIN — LEVOTHYROXINE SODIUM 100 MCG: 100 TABLET ORAL at 05:19

## 2020-01-04 NOTE — PHYSICIAN ADVISOR
Current patient class: Observation  The patient is currently on Hospital Day: 1 at [address]        The patient was admitted to the hospital  on N/A at N/A for the following diagnosis:  Syncope [R55]  Lumbar herniated disc [M51 26]  Hypertensive urgency [I16 0]  Headache [R51]     After review of the relevant documentation, labs, vital signs and test results, the patient is most appropriate for OBSERVATION STATUS  Rationale is as follows: The patient is a 45 yrs   Female who presented to the ED at 1/4/2020 12:13 AM with a chief complaint of Hip Pain (Left hip pain radiates down leg for three days  Pt reports headache as well)     The patient was admitted with syncope and hypertensive urgency, which may have been due to left hip pain, chest pain and headache  The initial plan of care includes telemetry monitoring, orthostatic vital signs, pain control, home BP medication and prn labetalol  Since admission the patient has only received one dose of IV Labetalol and is on an oral medication regimen  The patient is currently appropriate for OBSERVATION; please re-send for re-evaluation if there is a change in the patient's clinical status       The patients vitals on arrival were ED Triage Vitals   Temperature Pulse Respirations Blood Pressure SpO2   01/04/20 0257 01/04/20 0019 01/04/20 0019 01/04/20 0019 01/04/20 0019   97 6 °F (36 4 °C) 96 18 (!) 201/79 100 %      Temp Source Heart Rate Source Patient Position - Orthostatic VS BP Location FiO2 (%)   01/04/20 0257 01/04/20 0019 01/04/20 0019 01/04/20 0019 --   Oral Monitor Sitting Right arm       Pain Score       01/04/20 0019       Worst Possible Pain           Past Medical History:   Diagnosis Date    History of MI (myocardial infarction)     History of TIA (transient ischemic attack)     Hypertension     Hypothyroidism     hypothyroidism    Morbid obesity with BMI of 45 0-49 9, adult ([facility] [address] )     TIA (transient ischemic attack) Past Surgical History:   Procedure Laterality Date    BREAST SURGERY Left     TUBAL LIGATION             Consults have been placed to:   IP CONSULT TO CARDIOLOGY    Vitals:    01/04/20 1100 01/04/20 1130 01/04/20 1215 01/04/20 1315   BP: (!) 177/88 (!) 173/84 (!) 175/84    BP Location:       Pulse: 92 85 80 90   Resp: 20 (!) 23 21 (!) 38   Temp:       TempSrc:       SpO2: 99% 99%     Weight:       Height:           Most recent labs:    Recent Labs     01/04/20  0053 01/04/20  0427  01/04/20  1548   WBC 9 76  --   --   --    HGB 13 2  --   --   --    HCT 42 1  --   --   --      --   --   --    K 3 6 3 1*  --   --    CALCIUM 8 4 8 2*  --   --    BUN 8 7  --   --    CREATININE 0 85 0 81  --   --    TROPONINI  --  <0 02   < > <0 02   AST 47*  --   --   --    ALT 40  --   --   --    ALKPHOS 141*  --   --   --     < > = values in this interval not displayed         Scheduled Meds:  Current Facility-Administered Medications:  [facility] Q6H [facility]   amLODIPine 10 mg Oral Daily Barrett Beth MD   aspirin 81 mg Oral Daily Jessica Rias, JOJO   atorvastatin 40 mg Oral Daily With [employer] JOJO Antonio   calcium carbonate 1,000 mg Oral Daily PRN Jessica RiasJOJO   carvedilol 3 125 mg Oral BID With Meals Carrington Antonio PA-C   chlorthalidone 25 mg Oral Daily Barrett Beth MD   docusate sodium 100 mg Oral BID PRN Jessica RiasJOJO   enoxaparin 40 mg Subcutaneous Daily Jairo Murphy PA-C   Labetalol HCl 10 mg Intravenous Q6H PRN Jessica RiasJOJO   levothyroxine 100 mcg Oral Early Morning Jessica Rias, JOJO   lidocaine 1 patch Topical Daily Jessica RiasJOJO   losartan 50 mg Oral Daily Jessica Rias, JOJO   methocarbamol 500 mg Oral Q6H PRN Jessica Rias, JOJO   nicotine 1 patch Transdermal Daily Jessica RiasJOJO   ondansetron 4 mg Intravenous Q6H PRN Jessica JOJO [place]   oxyCODONE 10 mg Oral Q4H PRN Jessica Skelton PA-C   oxyCODONE 5 mg Oral Q4H PRN Edwin Moran PA-C   potassium chloride 40 mEq Oral BID Jonathan Singh MD     Continuous Infusions:   PRN Meds: calcium carbonate    docusate sodium    Labetalol HCl    methocarbamol    ondansetron    oxyCODONE    oxyCODONE    Surgical procedures (if appropriate):

## 2020-01-04 NOTE — ED NOTES
Pt appears to be sleeping  No signs of distress noted  Pt on cardiac monitor  Will continue to monitor        Latoya Haywood RN  01/04/20 5301

## 2020-01-04 NOTE — ASSESSMENT & PLAN NOTE
· Likely 2/2 hypertensive urgency   Now resolved  · CT head (-)  · Ensure adequate BP control  · PRN pain med

## 2020-01-04 NOTE — ED NOTES
Pt states she will provide a urine sample when the PO pain medication given relieves her pain        [facility]  01/04/20 4355

## 2020-01-04 NOTE — PLAN OF CARE
Problem: Potential for Falls  Goal: Patient will remain free of falls  Description  INTERVENTIONS:  - Assess patient frequently for physical needs  -  Identify cognitive and physical deficits and behaviors that affect risk of falls    -  [place] fall precautions as indicated by assessment   - Educate patient/family on patient safety including physical limitations  - Instruct patient to call for assistance with activity based on assessment  - Modify environment to reduce risk of injury  - Consider OT/PT consult to assist with strengthening/mobility  1/4/2020 1833 by Bang Tafoya RN  Outcome: Progressing  1/4/2020 1821 by Bang Tafoya RN  Outcome: Progressing  1/4/2020 1821 by Bang Tafoya RN  Outcome: Progressing     Problem: PAIN - ADULT  Goal: Verbalizes/displays adequate comfort level or baseline comfort level  Description  Interventions:  - Encourage patient to monitor pain and request assistance  - Assess pain using appropriate pain scale  - Administer analgesics based on type and severity of pain and evaluate response  - Implement non-pharmacological measures as appropriate and evaluate response  - Consider cultural and social influences on pain and pain management  - Notify physician/advanced practitioner if interventions unsuccessful or patient reports new pain  1/4/2020 1833 by Bang Tafoya RN  Outcome: Progressing  1/4/2020 1821 by Bang Tafoya RN  Outcome: Progressing  1/4/2020 1821 by Bang Tafoya RN  Outcome: Progressing     Problem: INFECTION - ADULT  Goal: Absence or prevention of progression during hospitalization  Description  INTERVENTIONS:  - Assess and monitor for signs and symptoms of infection  - Monitor lab/diagnostic results  - Monitor all insertion sites, i e  indwelling lines, tubes, and drains  - Monitor endotracheal if appropriate and nasal secretions for changes in amount and color  - [place] appropriate cooling/warming therapies per order  - Administer medications as ordered  - Instruct and encourage patient and family to use good hand hygiene technique  - Identify and instruct in appropriate isolation precautions for identified infection/condition  1/4/2020 1833 by Raj Carter RN  Outcome: Progressing  1/4/2020 1545 Jens Tolentino by Raj Carter RN  Outcome: Progressing  1/4/2020 1821 by Raj Carter RN  Outcome: Progressing  Goal: Absence of fever/infection during neutropenic period  Description  INTERVENTIONS:  - Monitor WBC    1/4/2020 1833 by Raj Carter RN  Outcome: Progressing  1/4/2020 1545 Jens Tolentino by Raj Carter RN  Outcome: Progressing  1/4/2020 1821 by Raj Carter RN  Outcome: Progressing     Problem: SAFETY ADULT  Goal: Maintain or return to baseline ADL function  Description  INTERVENTIONS:  -  Assess patient's ability to carry out ADLs; assess patient's baseline for ADL function and identify physical deficits which impact ability to perform ADLs (bathing, care of mouth/teeth, toileting, grooming, dressing, etc )  - Assess/evaluate cause of self-care deficits   - Assess range of motion  - Assess patient's mobility; develop plan if impaired  - Assess patient's need for assistive devices and provide as appropriate  - Encourage maximum independence but intervene and supervise when necessary  - Involve family in performance of ADLs  - Assess for home care needs following discharge   - Consider OT consult to assist with ADL evaluation and planning for discharge  - Provide patient education as appropriate  1/4/2020 1833 by Raj Carter RN  Outcome: Progressing  1/4/2020 1821 by Raj Carter RN  Outcome: Progressing  1/4/2020 1821 by Raj Carter RN  Outcome: Progressing  Goal: Maintain or return mobility status to optimal level  Description  INTERVENTIONS:  - Assess patient's baseline mobility status (ambulation, transfers, stairs, etc )    - Identify cognitive and physical deficits and behaviors that affect mobility  - Identify mobility aids required to assist with transfers and/or ambulation (gait belt, sit-to-stand, lift, walker, cane, etc )  - [place] fall precautions as indicated by assessment  - Record patient progress and toleration of activity level on Mobility SBAR; progress patient to next Phase/Stage  - Instruct patient to call for assistance with activity based on assessment  - Consider rehabilitation consult to assist with strengthening/weightbearing, etc   1/4/2020 1833 by Kristi Hill RN  Outcome: Progressing  1/4/2020 1821 by Kristi Hill RN  Outcome: Progressing  1/4/2020 1821 by Kristi Hill RN  Outcome: Progressing     Problem: DISCHARGE PLANNING  Goal: Discharge to home or other facility with appropriate resources  Description  INTERVENTIONS:  - Identify barriers to discharge w/patient and caregiver  - Arrange for needed discharge resources and transportation as appropriate  - Identify discharge learning needs (meds, wound care, etc )  - Arrange for interpretive services to assist at discharge as needed  - Refer to Case Management Department for coordinating discharge planning if the patient needs post-hospital services based on physician/advanced practitioner order or complex needs related to functional status, cognitive ability, or social support system  1/4/2020 1833 by Kristi Hill RN  Outcome: Progressing  1/4/2020 1821 by Kristi Hill RN  Outcome: Progressing  1/4/2020 1821 by Kristi Hill RN  Outcome: Progressing     Problem: Knowledge Deficit  Goal: Patient/family/caregiver demonstrates understanding of disease process, treatment plan, medications, and discharge instructions  Description  Complete learning assessment and assess knowledge base    Interventions:  - Provide teaching at level of understanding  - Provide teaching via preferred learning methods  1/4/2020 1833 by Kristi Hill RN  Outcome: Progressing  1/4/2020 1821 by Kristi Hill RN  Outcome: Progressing  1/4/2020 1821 by Sarah Mcclellan RN  Outcome: Progressing

## 2020-01-04 NOTE — ASSESSMENT & PLAN NOTE
· Reports single episode of Lt anterior chest wall pain earlier in the day   Denies radiation or associated symptoms  · Will trend troponin x 3  · Had NM stress test 4/11/2018 that was (-)

## 2020-01-04 NOTE — ASSESSMENT & PLAN NOTE
· Reports was walking from kitchen to family room when she had a syncopal episode that was witnessed by family  States was only unconscious a couple seconds  (+) light-headedness and nausea immediately prior to episode    · Check orthostatic BP and HR  · CT head (-)  · Continuous telemetry to r/o cardiac arrhythmias

## 2020-01-04 NOTE — UTILIZATION REVIEW
Initial Clinical Review    Admission: Date/Time/Statement:   Observation  1/4  @    0308    IP ORDER   ENTERED   1/5  @    4551    The patient will continue to require additional inpatient hospital stay due to Ongoing optimization of blood pressure, pain control, and cardiac interventions planned for Monday 01/06/2020 01/05/20 0746  Inpatient Admission Once     Transfer Service: Hospitalist       Question Answer Comment   Admitting Physician Marcellus Davenport    Level of Care Med Surg    Estimated length of stay More than 2 Midnights    Certification I certify that inpatient services are medically necessary for this patient for a duration of greater than two midnights  See H&P and MD Progress Notes for additional information about the patient's course of treatment  01/05/20 0746       ED Arrival Information     Expected Arrival Acuity Means of Arrival Escorted By Service Admission Type    - 1/4/2020 00:02 Urgent Wheelchair Family Member Hospitalist Urgent    Arrival Complaint    left hip pain/high blood pressure/headache        Chief Complaint   Patient presents with    Hip Pain     Left hip pain radiates down leg for three days  Pt reports headache as well     Assessment/Plan: Hypertensive urgency  Assessment & Plan  · /79 on arrival to ED  Likely related to acute L hip pain with sciatica  · Received Labetalol 10mg IV in ED  Repeat /80  · Ensure adequate pain control  · Unsure what home medication is currently   States it was changes to a combination antihypertensive/diuretic when discharged from the hospital on 10/6/2019  · Will start Cozaar and Lasix per cardiology recommendations on last admission  · Will need to clarify current home med list, but states has been compliant with taking all meds as directed since discharge  · Labetalol PRN SBP > 180  · Monitor BP per unit protocol     Syncope  Assessment & Plan  · Reports was walking from kitchen to family room when she had a syncopal episode that was witnessed by family  States was only unconscious a couple seconds  (+) light-headedness and nausea immediately prior to episode  · Check orthostatic BP and HR  · CT head (-)  · Continuous telemetry to r/o cardiac arrhythmias     Chest pain  Assessment & Plan  · Reports single episode of Lt anterior chest wall pain earlier in the day  Denies radiation or associated symptoms  · Will trend troponin x 3  · Had NM stress test 4/11/2018 that was (-)     Headache  Assessment & Plan  · Likely 2/2 hypertensive urgency  Now resolved  · CT head (-)  · Ensure adequate BP control  PRN pain med    Acquired hypothyroidism  Assessment & Plan  · Continue home dose Levothyroxine     Dependence on nicotine from cigarettes  Assessment & Plan  · Smoking cessation education  · Currently smoking 0 5ppd  · Nicotine patch     Morbid obesity with BMI of 45 0-49 9, adult ([facility] [address] )  Assessment & Plan  · Lifestyle modifications    James Bashir is a 45 y o  female who presents with increasing Lt hip pain with radiculopathy x 3 days  States pain became severe today  BP elevated today with associated HA and dizziness  Also reports syncopal episode in the afternoon  States was walking from kitchen to family room when she passed out  States family witnessed syncopa episode which only last a few seconds  Reports intermittent blurred vision recently  States is taking all meds as prescribed  However does not remember new combination med (diuretic/antihypertensive) that was prescribed when discharged from hospital on 10/[id number]  On review of records cardiology recommended continuing Losartan and switching thiazide diuretic to Lasix  (+) sinus congestion with post nasal gtt and mild sore throat  1/5:   BP  Remains  Elevated  Pain not yet  Controlled,   Unable to titrate antihypertensives  Until pain more optimally  Controlled  Initiated  Coreg  On  1/4  For  BP control     Plan cardiac  Cath  Mon  1/5 given significant risk factors and Presenting chest pain               ED Triage Vitals   Temperature Pulse Respirations Blood Pressure SpO2   01/04/20 0257 01/04/20 0019 01/04/20 0019 01/04/20 0019 01/04/20 0019   97 6 °F (36 4 °C) 96 18 (!) 201/79 100 %      Temp Source Heart Rate Source Patient Position - Orthostatic VS BP Location FiO2 (%)   01/04/20 0257 01/04/20 0019 01/04/20 0019 01/04/20 0019 --   Oral Monitor Sitting Right arm       Pain Score       01/04/20 0019       Worst Possible Pain        Wt Readings from Last 1 Encounters:   01/04/20 136 kg (299 lb 2 6 oz)     Additional Vital Signs:   01/05/20 07:20:20  97 5 °F (36 4 °C)  79  18  153/83  106  96 %  --  --   01/04/20 2027  --  --  --  162/88  --  --  --  --   01/04/20 1900  97 9 °F (36 6 °C)  82  24Abnormal   184/88Abnormal   --  94 %  None (Room air)  Lying   01/04/20 1315  --  90  38Abnormal   --  --  --  --  --   01/04/20 1215  --  80  21  175/84Abnormal   121  --  --  --   01/04/20 1130  --  85  23Abnormal   173/84Abnormal   121  99 %  None (Room air)  --   01/04/20 1100  --  92  20  177/88Abnormal   --  99 %  --  --   01/04/20 1030  --  95  29Abnormal   163/71  102  98 %  --  --   01/04/20 1000  --  91  32Abnormal   168/83  116  98 %  --  --   01/04/20 0930  --  89  21  176/82Abnormal   --  97 %  None (Room air)  --   01/04/20 0900  --  87  26Abnormal   165/86  --  98 %  None (Room air)           01/04/20 0522  --  81  24Abnormal   149/78  98 %  None (Room air)  --   01/04/20 0500  --  84  22  186/96Abnormal   99 %  None (Room air)  --   01/04/20 0434  --  83  26Abnormal   193/96Abnormal   --  --  Standing - Orthostatic VS   01/04/20 0433  --  88  26Abnormal   185/90Abnormal   --  --  Sitting - Orthostatic VS   01/04/20 0431  --  83  24Abnormal   157/78  --  --  Lying - Orthostatic VS   01/04/20 0300  --  82  23Abnormal   177/82Abnormal   99 %  None (Room air)  --   01/04/20 0257  97 6 °F (36 4 °C)  --  --  --  --  --  --   01/04/20 0200  --  80  22  166/80  99 %  None (Room air)  --   01/04/20 0130  --  82  24Abnormal   175/82Abnormal   99 %  None (Room air)  --   01/04/20 0100  --  81  23Abnormal   183/95Abnormal   100 %  None (Room air)  --   01/04/20 0019  --  96  18  201/79Abnormal   100 %  None (Room air)           Pertinent Labs/Diagnostic Test Results:   Ct  L/S  Spine  ( 1/4)    No evidence of acute fracture or subluxation of the lumbar spine   There appears to be some disc bulging at L4-5 and L5-S1 with ligamentum flavum hypertrophy and probable mild canal stenosis at these levels   Probable bilateral foraminal stenosis L5-S1   and possible mild left foraminal stenosis L4-5   Consider MRI of the lumbar spine for further evaluation, if there are no contraindications      Ct  Head  ( 1/4)     No acute intracranial abnormality  Results from last 7 days   Lab Units 01/04/20  0053   WBC Thousand/uL 9 76   HEMOGLOBIN g/dL 13 2   HEMATOCRIT % 42 1   PLATELETS Thousands/uL 367   NEUTROS ABS Thousands/µL 6 54         Results from last 7 days   Lab Units 01/04/20  0427 01/04/20  0053   SODIUM mmol/L 139 138   POTASSIUM mmol/L 3 1* 3 6   CHLORIDE mmol/L 102 103   CO2 mmol/L 25 26   ANION GAP mmol/L 12 9   BUN mg/dL 7 8   CREATININE mg/dL 0 81 0 85   EGFR ml/min/1 73sq m 104 98   CALCIUM mg/dL 8 2* 8 4     Results from last 7 days   Lab Units 01/04/20  0053   AST U/L 47*   ALT U/L 40   ALK PHOS U/L 141*   TOTAL PROTEIN g/dL 7 8   ALBUMIN g/dL 3 4*   TOTAL BILIRUBIN mg/dL 0 20         Results from last 7 days   Lab Units 01/04/20  0427 01/04/20  0053   GLUCOSE RANDOM mg/dL 153* 110           Results from last 7 days   Lab Units 01/04/20  0427   TROPONIN I ng/mL <0 02         ED Treatment:   Medication Administration from 01/04/2020 0002 to 01/04/2020 0523       Date/Time Order Dose Route Action Comments     01/04/2020 0042 oxyCODONE-acetaminophen (PERCOCET) 5-325 mg per tablet 2 tablet 2 tablet Oral Given      01/04/2020 0107 hydrALAZINE (APRESOLINE) injection 5 mg 5 mg Intravenous Not Given      01/04/2020 0107 Labetalol HCl (NORMODYNE) injection 10 mg 10 mg Intravenous Given bp 183/95hr 81     01/04/2020 0258 naproxen (NAPROSYN) tablet 500 mg 500 mg Oral Given      01/04/2020 0259 predniSONE tablet 40 mg 40 mg Oral Given      01/04/2020 0519 levothyroxine tablet 100 mcg 100 mcg Oral Given      01/04/2020 0420 lidocaine (LIDODERM) 5 % patch 1 patch 1 patch Topical Medication Applied      01/04/2020 0519 acetaminophen (TYLENOL) tablet 975 mg 975 mg Oral Given      01/04/2020 0509 Labetalol HCl (NORMODYNE) injection 10 mg 10 mg Intravenous Given /96HR 80          Present on Admission:   Hypertensive urgency   Syncope   Chest pain   Headache   Dependence on nicotine from cigarettes      Admitting Diagnosis: No admission diagnoses are documented for this encounter  Age/Sex: 45 y o  female  Admission Orders:  Scheduled Medications:    Medications:  acetaminophen 975 mg Oral Q6H [facility]   aspirin 81 mg Oral Daily   enoxaparin 40 mg Subcutaneous Daily   furosemide 40 mg Oral Daily   levothyroxine 100 mcg Oral Early Morning   lidocaine 1 patch Topical Daily   losartan 50 mg Oral Daily   nicotine 1 patch Transdermal Daily     Continuous IV Infusions:     PRN Meds:    calcium carbonate 1,000 mg Oral Daily PRN   docusate sodium 100 mg Oral BID PRN   Labetalol HCl 10 mg Intravenous Q6H PRN   X 2    1 /4   methocarbamol 500 mg Oral Q6H PRN   ondansetron 4 mg Intravenous Q6H PRN   oxyCODONE 10 mg Oral Q4H PRN   oxyCODONE 5 mg Oral Q4H PRN       IP CONSULT TO CARDIOLOGY     Tele  Serial troponin    Network Utilization Review Department  [email or web address]  ATTENTION: Please call with any questions or concerns to [phone] and carefully listen to the prompts so that you are directed to the right person   All voicemails are confidential   Teri Rahman all requests for admission clinical reviews, approved or denied determinations and any other requests to dedicated fax number below belonging to the campus where the patient is receiving treatment   List of dedicated fax numbers for the Facilities:  [address] DENIALS (Administrative/Medical Necessity) [phone]   [address]  (Maternity/NICU/Pediatrics) [phone]   Zion Khan [phone]   Sujata Lux [phone]   Dann Margaret [phone]   Jimmie Night [phone]   [address] [facility] [address] [place] [address] [phone]   [facility]  [phone]   [address] [facility] [address]  [address] [facility] [address] [facility] [phone]

## 2020-01-04 NOTE — H&P
H&P- Randee Roa 1975, 45 y o  female MRN: [id number]    Unit/Bed#: ED 12 Encounter: [id number]    Primary Care Provider: Unique Martinez DO   Date and time admitted to hospital: 1/4/2020 12:13 AM        Hypertensive urgency  Assessment & Plan  · /79 on arrival to ED  Likely related to acute L hip pain with sciatica  · Received Labetalol 10mg IV in ED  Repeat /80  · Ensure adequate pain control  · Unsure what home medication is currently  States it was changes to a combination antihypertensive/diuretic when discharged from the hospital on 10/6/2019  · Will start Cozaar and Lasix per cardiology recommendations on last admission  · Will need to clarify current home med list, but states has been compliant with taking all meds as directed since discharge  · Labetalol PRN SBP > 180  · Monitor BP per unit protocol    Syncope  Assessment & Plan  · Reports was walking from kitchen to family room when she had a syncopal episode that was witnessed by family  States was only unconscious a couple seconds  (+) light-headedness and nausea immediately prior to episode  · Check orthostatic BP and HR  · CT head (-)  · Continuous telemetry to r/o cardiac arrhythmias    Chest pain  Assessment & Plan  · Reports single episode of Lt anterior chest wall pain earlier in the day  Denies radiation or associated symptoms  · Will trend troponin x 3  · Had NM stress test 4/11/2018 that was (-)    Headache  Assessment & Plan  · Likely 2/2 hypertensive urgency   Now resolved  · CT head (-)  · Ensure adequate BP control  · PRN pain med    Acquired hypothyroidism  Assessment & Plan  · Continue home dose Levothyroxine    Dependence on nicotine from cigarettes  Assessment & Plan  · Smoking cessation education  · Currently smoking 0 5ppd  · Nicotine patch    Morbid obesity with BMI of 45 0-49 9, adult (HCC)  Assessment & Plan  · Lifestyle modifications      VTE Prophylaxis: Enoxaparin (Lovenox)  / sequential compression device   Code Status: Level 1 Full Code  POLST: POLST form is not discussed and not completed at this time  Discussion with family: NA    Anticipated Length of Stay:  Patient will be admitted on an Observation basis with an anticipated length of stay of  Less than 2 midnights  Justification for Hospital Stay: See AP above    Total Time for Visit, including Counseling / Coordination of Care: 45 minutes  Greater than 50% of this total time spent on direct patient counseling and coordination of care  Chief Complaint:   Lt hip pain radiating down LLE  Headache  High BP  Passed out    History of Present Illness:    Yogesh Land is a 45 y o  female who presents with increasing Lt hip pain with radiculopathy x 3 days  States pain became severe today  BP elevated today with associated HA and dizziness  Also reports syncopal episode in the afternoon  States was walking from kitchen to family room when she passed out  States family witnessed syncopa episode which only last a few seconds  Reports intermittent blurred vision recently  States is taking all meds as prescribed  However does not remember new combination med (diuretic/antihypertensive) that was prescribed when discharged from hospital on 10/[id number]  On review of records cardiology recommended continuing Losartan and switching thiazide diuretic to Lasix  (+) sinus congestion with post nasal gtt and mild sore throat  Review of Systems:    Review of Systems   Constitutional: Positive for activity change  Negative for appetite change, chills and fever  HENT: Positive for congestion, postnasal drip and sinus pressure  Negative for ear pain  Eyes: Positive for visual disturbance  Intermittent blurred vision   Respiratory: Negative for cough, shortness of breath and wheezing  Cardiovascular: Positive for chest pain  Negative for palpitations and leg swelling  Gastrointestinal: Positive for nausea   Negative for abdominal pain, constipation, diarrhea and vomiting  Genitourinary: Positive for frequency  Negative for difficulty urinating, dysuria and urgency  Musculoskeletal: Positive for back pain  Negative for neck pain and neck stiffness  Neurological: Negative for dizziness, syncope and headaches  All other systems reviewed and are negative  Past Medical and Surgical History:     Past Medical History:   Diagnosis Date    History of MI (myocardial infarction)     History of TIA (transient ischemic attack)     Hypertension     Hypothyroidism     hypothyroidism    Morbid [facility] with BMI of 45 0-49 9, [facility]     TIA (transient ischemic attack)        Past Surgical History:   Procedure Laterality Date    BREAST SURGERY Left     TUBAL LIGATION         Meds/Allergies:    Prior to Admission medications    Medication Sig Start Date End Date Taking? Authorizing Provider   amLODIPine (NORVASC) 10 mg tablet Take 1 tablet (10 mg total) by mouth daily 4/16/19   Jaylan Graham MD   aspirin 81 MG tablet Take 81 mg by mouth daily    Historical Provider, MD   chlorthalidone 25 mg tablet Take 1 tablet (25 mg total) by mouth daily 10/7/19   Kacey Bocanegra MD   docusate sodium (COLACE) 100 mg capsule Take 1 capsule (100 mg total) by mouth 2 (two) times a day as needed for constipation 10/6/19   Kacey Bocanegra MD   levothyroxine 100 mcg tablet Take 1 tablet (100 mcg total) by mouth daily in the early morning 10/7/19   Kacey Bocanegra MD   losartan (COZAAR) 50 mg tablet Take 1 tablet (50 mg total) by mouth daily 10/7/19   Kacey Bocanegra MD   methocarbamol (ROBAXIN) 500 mg tablet Take 1 tablet (500 mg total) by mouth 2 (two) times a day 11/4/19   Sanchez Keita, DO     I have reviewed home medications with patient personally  Allergies:    Allergies   Allergen Reactions    Trimecaine Hallucinations    Toradol [Ketorolac Tromethamine] Anxiety       Social History:     Marital Status: /Civil Union   Patient Pre-hospital Living Situation: Lives w/   Patient Pre-hospital Level of Mobility: ambulatory w/o assistive device  Patient Pre-hospital Diet Restrictions: None  Substance Use History:   Social History     Substance and Sexual Activity   Alcohol Use Yes    Frequency: Monthly or less    Comment: occasional     Social History     Tobacco Use   Smoking Status Current Every Day Smoker    Packs/day: 0 50    Types: Cigarettes   Smokeless Tobacco Never Used     Social History     Substance and Sexual Activity   Drug Use No       Family History:    Family History   Problem Relation Age of Onset    Heart disease Mother     Hypertension Mother     Diabetes Father        Physical Exam:     Vitals:   Blood Pressure: (!) 193/96 (01/04/20 0434)  Pulse: 83 (01/04/20 0434)  Temperature: 97 6 °F (36 4 °C) (01/04/20 0257)  Temp Source: Oral (01/04/20 0257)  Respirations: (!) 26 (01/04/20 0434)  Height: 5' 6" (167 6 cm) (01/04/20 0019)  Weight - Scale: 136 kg (299 lb 2 6 oz) (01/04/20 0019)  SpO2: 99 % (01/04/20 0300)    Physical Exam   Constitutional: She is oriented to person, place, and time  She appears well-developed and well-nourished  No distress  HENT:   Head: Normocephalic and atraumatic  Eyes: Pupils are equal, round, and reactive to light  EOM are normal    Neck: Normal range of motion  Neck supple  Cardiovascular: Normal rate, regular rhythm, normal heart sounds and intact distal pulses  Exam reveals no gallop and no friction rub  No murmur heard  Pulmonary/Chest: Effort normal and breath sounds normal  No respiratory distress  She has no wheezes  She has no rales  Diminished BLL   Abdominal: Soft  Bowel sounds are normal  There is no tenderness  There is no rebound and no guarding  Musculoskeletal: Normal range of motion  She exhibits no edema or tenderness  Lt lower back/hip pain radiating down LLE   Neurological: She is alert and oriented to person, place, and time  Skin: Skin is warm and dry  Psychiatric: She has a normal mood and affect  Her behavior is normal  Thought content normal        Additional Data:     Lab Results: I have personally reviewed pertinent reports  Results from last 7 days   Lab Units 01/04/20  0053   WBC Thousand/uL 9 76   HEMOGLOBIN g/dL 13 2   HEMATOCRIT % 42 1   PLATELETS Thousands/uL 367   NEUTROS PCT % 67   LYMPHS PCT % 24   MONOS PCT % 5   EOS PCT % 3     Results from last 7 days   Lab Units 01/04/20  0427 01/04/20  0053   SODIUM mmol/L 139 138   POTASSIUM mmol/L 3 1* 3 6   CHLORIDE mmol/L 102 103   CO2 mmol/L 25 26   BUN mg/dL 7 8   CREATININE mg/dL 0 81 0 85   ANION GAP mmol/L 12 9   CALCIUM mg/dL 8 2* 8 4   ALBUMIN g/dL  --  3 4*   TOTAL BILIRUBIN mg/dL  --  0 20   ALK PHOS U/L  --  141*   ALT U/L  --  40   AST U/L  --  47*   GLUCOSE RANDOM mg/dL 153* 110                       Imaging: I have personally reviewed pertinent reports  XR hip/pelv 2-3 vws left if performed   ED Interpretation by Pascual Fontanez MD (01/04 0225)   No acute fractures or malalignment  CT spine lumbar without contrast   Final Result by Juan Ramon Hopkins MD (01/04 0208)      No evidence of acute fracture or subluxation of the lumbar spine  There appears to be some disc bulging at L4-5 and L5-S1 with ligamentum flavum hypertrophy and probable mild canal stenosis at these levels  Probable bilateral foraminal stenosis L5-S1    and possible mild left foraminal stenosis L4-5  Consider MRI of the lumbar spine for further evaluation, if there are no contraindications  Workstation performed: [id number]         CT head without contrast   Final Result by Juan Ramon Hopkins MD (01/04 0140)      No acute intracranial abnormality  Workstation performed: [id number]             EKG, Pathology, and Other Studies Reviewed on Admission:   · EKG: NA    Allscripts / Epic Records Reviewed: Yes     ** Please Note: This note has been constructed using a voice recognition system  **

## 2020-01-04 NOTE — ASSESSMENT & PLAN NOTE
· /79 on arrival to ED  Likely related to acute L hip pain with sciatica  · Received Labetalol 10mg IV in ED  Repeat /80  · Ensure adequate pain control  · Unsure what home medication is currently   States it was changes to a combination antihypertensive/diuretic when discharged from the hospital on 10/6/2019  · Will start Cozaar and Lasix per cardiology recommendations on last admission  · Will need to clarify current home med list, but states has been compliant with taking all meds as directed since discharge  · Labetalol PRN SBP > 180  · Monitor BP per unit protocol

## 2020-01-04 NOTE — ED PROVIDER NOTES
History  Chief Complaint   Patient presents with    Hip Pain     Left hip pain radiates down leg for three days  Pt reports headache as well     HPI     72-year-old female with history of hypertension, hypothyroidism, prior TIA, noncompliance with medical care, presenting for evaluation of left hip pain and left lower back pain radiating down the back of her left leg  Has been occurring for the last 3 days  Is worse with sitting for long periods of time or walking, though the patient is able to ambulate  Denies fevers or chills  Denies injury to the area  No numbness or weakness in her legs, saddle anesthesia, or bowel or bladder incontinence  Denies abdominal pain  Patient also endorses a frontal headache that is been present for the last 3 days  Denies missing any of her blood pressure medications  States that she had an episode of syncope this morning where she felt lightheaded and then passed out on the ground  She woke up after a couple of seconds, and her family was able to get her up and back into a chair  Reports that the pain in her back and left hip was present prior to the episode of syncope and is not worse after the fall  Headache was also present prior to her fainting  Endorses mild dizziness that she describes as lightheadedness with her headache  No vision changes, nausea, or vomiting  Prior to Admission Medications   Prescriptions Last Dose Informant Patient Reported? Taking?    amLODIPine (NORVASC) 10 mg tablet   No No   Sig: Take 1 tablet (10 mg total) by mouth daily   aspirin 81 MG tablet   Yes No   Sig: Take 81 mg by mouth daily   chlorthalidone 25 mg tablet   No No   Sig: Take 1 tablet (25 mg total) by mouth daily   docusate sodium (COLACE) 100 mg capsule   No No   Sig: Take 1 capsule (100 mg total) by mouth 2 (two) times a day as needed for constipation   levothyroxine 100 mcg tablet   No No   Sig: Take 1 tablet (100 mcg total) by mouth daily in the early morning   losartan (COZAAR) 50 mg tablet   No No   Sig: Take 1 tablet (50 mg total) by mouth daily   methocarbamol (ROBAXIN) 500 mg tablet   No No   Sig: Take 1 tablet (500 mg total) by mouth 2 (two) times a day      Facility-Administered Medications: None       Past Medical History:   Diagnosis Date    History of MI (myocardial infarction)     History of TIA (transient ischemic attack)     Hypertension     Hypothyroidism     hypothyroidism    Morbid obesity with BMI of 45 0-49 9, adult (HCC)     TIA (transient ischemic attack)        Past Surgical History:   Procedure Laterality Date    BREAST SURGERY Left     TUBAL LIGATION         Family History   Problem Relation Age of Onset    Heart disease Mother     Hypertension Mother     Diabetes Father      I have reviewed and agree with the history as documented  Social History     Tobacco Use    Smoking status: Current Every Day Smoker     Packs/day: 0 50     Types: Cigarettes    Smokeless tobacco: Never Used   Substance Use Topics    Alcohol use: Yes     Frequency: Monthly or less     Comment: occasional    Drug use: No        Review of Systems   Constitutional: Negative for chills and fever  HENT: Negative for congestion  Eyes: Negative for visual disturbance  Respiratory: Negative for cough and shortness of breath  Cardiovascular: Negative for chest pain and leg swelling  Gastrointestinal: Negative for abdominal pain, diarrhea, nausea and vomiting  Genitourinary: Negative for dysuria and frequency  Musculoskeletal: Positive for arthralgias (L hip) and back pain  Negative for neck pain and neck stiffness  Skin: Negative for rash  Neurological: Positive for dizziness, syncope and headaches  Negative for weakness and numbness  Psychiatric/Behavioral: Negative for agitation, behavioral problems and confusion  Physical Exam  Physical Exam   Constitutional: She is oriented to person, place, and time  She appears well-developed and well-nourished  No distress  HENT:   Head: Normocephalic and atraumatic  Right Ear: External ear normal    Left Ear: External ear normal    Nose: Nose normal    Mouth/Throat: Oropharynx is clear and moist    Eyes: Pupils are equal, round, and reactive to light  Conjunctivae and EOM are normal    Neck: Normal range of motion  Neck supple  No midline tenderness to palpation over the cervical spine   Cardiovascular: Normal rate, regular rhythm, normal heart sounds and intact distal pulses  Exam reveals no gallop and no friction rub  No murmur heard  Pulmonary/Chest: Effort normal and breath sounds normal  No respiratory distress  She has no wheezes  She has no rales  She exhibits no tenderness  Abdominal: Soft  Bowel sounds are normal  She exhibits no distension  There is no tenderness  There is no guarding  Musculoskeletal: Normal range of motion  She exhibits no edema or deformity  Midline tenderness to palpation over the lower lumbar spine and left paraspinous muscles of the lower lumbar spine  No particular tenderness to palpation over the left hip, no swelling or overlying erythema  Discomfort with movement of the left hip but range of motion is intact  Neurological: She is alert and oriented to person, place, and time  She exhibits normal muscle tone  5/5 strength in the proximal and distal bilateral upper and lower extremities with intact sensation to light touch  No saddle anesthesia  Face symmetric, tongue midline  Skin: Skin is warm and dry  She is not diaphoretic         Vital Signs  ED Triage Vitals   Temperature Pulse Respirations Blood Pressure SpO2   01/04/20 0257 01/04/20 0019 01/04/20 0019 01/04/20 0019 01/04/20 0019   97 6 °F (36 4 °C) 96 18 (!) 201/79 100 %      Temp Source Heart Rate Source Patient Position - Orthostatic VS BP Location FiO2 (%)   01/04/20 0257 01/04/20 0019 01/04/20 0019 01/04/20 0019 --   Oral Monitor Sitting Right arm       Pain Score       01/04/20 0019       Worst Possible Pain           Vitals:    01/04/20 0300 01/04/20 0431 01/04/20 0433 01/04/20 0434   BP: (!) 177/82 157/78 (!) 185/90 (!) 193/96   Pulse: 82 83 88 83   Patient Position - Orthostatic VS:  Lying - Orthostatic VS Sitting - Orthostatic VS Standing - Orthostatic VS         Visual Acuity      ED Medications  Medications   aspirin chewable tablet 81 mg (has no administration in time range)   levothyroxine tablet 100 mcg (has no administration in time range)   losartan (COZAAR) tablet 50 mg (has no administration in time range)   methocarbamol (ROBAXIN) tablet 500 mg (has no administration in time range)   furosemide (LASIX) tablet 40 mg (has no administration in time range)   ondansetron (ZOFRAN) injection 4 mg (has no administration in time range)   calcium carbonate (TUMS) chewable tablet 1,000 mg (has no administration in time range)   docusate sodium (COLACE) capsule 100 mg (has no administration in time range)   nicotine (NICODERM CQ) 14 mg/24hr TD 24 hr patch 1 patch (has no administration in time range)   enoxaparin (LOVENOX) subcutaneous injection 40 mg (has no administration in time range)   lidocaine (LIDODERM) 5 % patch 1 patch (1 patch Topical Medication Applied 1/4/20 0420)   acetaminophen (TYLENOL) tablet 975 mg (has no administration in time range)   oxyCODONE (ROXICODONE) IR tablet 5 mg (has no administration in time range)   oxyCODONE (ROXICODONE) immediate release tablet 10 mg (has no administration in time range)   Labetalol HCl (NORMODYNE) injection 10 mg (has no administration in time range)   oxyCODONE-acetaminophen (PERCOCET) 5-325 mg per tablet 2 tablet (2 tablets Oral Given 1/4/20 0042)   Labetalol HCl (NORMODYNE) injection 10 mg (10 mg Intravenous Given 1/4/20 0107)   naproxen (NAPROSYN) tablet 500 mg (500 mg Oral Given 1/4/20 0258)   predniSONE tablet 40 mg (40 mg Oral Given 1/4/20 0259)       Diagnostic Studies  Results Reviewed     Procedure Component Value Units Date/Time    Troponin I [[id number]] Collected:  01/04/20 0427    Lab Status: In process Specimen:  Blood from Arm, Right Updated:  01/04/20 1913    Basic metabolic panel [[id number]] Collected:  01/04/20 0427    Lab Status:   In process Specimen:  Blood from Arm, Right Updated:  01/04/20 0439    Troponin I [[id number]]     Lab Status:  No result Specimen:  Blood     Comprehensive metabolic panel [[id number]]  (Abnormal) Collected:  01/04/20 0053    Lab Status:  Final result Specimen:  Blood from Arm, Right Updated:  01/04/20 0119     Sodium 138 mmol/L      Potassium 3 6 mmol/L      Chloride 103 mmol/L      CO2 26 mmol/L      ANION GAP 9 mmol/L      BUN 8 mg/dL      Creatinine 0 85 mg/dL      Glucose 110 mg/dL      Calcium 8 4 mg/dL      AST 47 U/L      ALT 40 U/L      Alkaline Phosphatase 141 U/L      Total Protein 7 8 g/dL      Albumin 3 4 g/dL      Total Bilirubin 0 20 mg/dL      eGFR 98 ml/min/1 73sq m     Narrative:       [place] guidelines for Chronic Kidney Disease (CKD):     Stage 1 with normal or high GFR (GFR > 90 mL/min/1 73 square meters)    Stage 2 Mild CKD (GFR = 60-89 mL/min/1 73 square meters)    Stage 3A Moderate CKD (GFR = 45-59 mL/min/1 73 square meters)    Stage 3B Moderate CKD (GFR = 30-44 mL/min/1 73 square meters)    Stage 4 Severe CKD (GFR = 15-29 mL/min/1 73 square meters)    Stage 5 End Stage CKD (GFR <15 mL/min/1 73 square meters)  Note: GFR calculation is accurate only with a steady state creatinine    CBC and differential [[id number]] Collected:  01/04/20 0053    Lab Status:  Final result Specimen:  Blood from Arm, Right Updated:  01/04/20 0103     WBC 9 76 Thousand/uL      RBC 4 77 Million/uL      Hemoglobin 13 2 g/dL      Hematocrit 42 1 %      MCV 88 fL      MCH 27 7 pg      MCHC 31 4 g/dL      RDW 14 1 %      MPV 10 4 fL      Platelets 239 Thousands/uL      nRBC 0 /100 WBCs      Neutrophils Relative 67 %      Immat GRANS % 1 %      Lymphocytes Relative 24 %      Monocytes Relative 5 %      Eosinophils Relative 3 %      Basophils Relative 0 %      Neutrophils Absolute 6 54 Thousands/µL      Immature Grans Absolute 0 06 Thousand/uL      Lymphocytes Absolute 2 38 Thousands/µL      Monocytes Absolute 0 46 Thousand/µL      Eosinophils Absolute 0 28 Thousand/µL      Basophils Absolute 0 04 Thousands/µL                  XR hip/pelv 2-3 vws left if performed   ED Interpretation by Sophie Meyrs MD (01/04 0225)   No acute fractures or malalignment  CT spine lumbar without contrast   Final Result by Ilana Barkley MD (01/04 0208)      No evidence of acute fracture or subluxation of the lumbar spine  There appears to be some disc bulging at L4-5 and L5-S1 with ligamentum flavum hypertrophy and probable mild canal stenosis at these levels  Probable bilateral foraminal stenosis L5-S1    and possible mild left foraminal stenosis L4-5  Consider MRI of the lumbar spine for further evaluation, if there are no contraindications  Workstation performed: [id number]         CT head without contrast   Final Result by Ilana Barkley MD (01/04 0140)      No acute intracranial abnormality  Workstation performed: [id number]                    Procedures  Procedures         ED Course                               MDM  Number of Diagnoses or Management Options  Headache: new and requires workup  Hypertensive urgency: new and requires workup  Lumbar herniated disc: new and requires workup  Syncope: new and requires workup  Diagnosis management comments: Generally well appearing, nontoxic  Blood pressure 201/79 on arrival   Patient reports 8/10 headache with dizziness that she describes as lightheadedness  Neuro exam unremarkable as above  CT head with no acute intracranial abnormalities  Blood pressure improved to 166/80 with labetalol, with improvement in headache and lightheadedness  Suspect hypertensive urgency    No signs or symptoms of end-organ damage on exam     I personally interpreted the patient's EKG, which reveals normal rate, normal sinus rhythm, normal axis, T-wave inversion in lead aVL unchanged from prior, Q-wave in lead V2 unchanged from prior, no acute ischemic changes  No findings that would be significant for WPW, [place], or HOCM  Grossly unchanged from most recent prior EKG  Will admit for telemetry obs given episode of syncope in the setting of hypertensive urgency  Patient's pain in the left hip actually originates from the lower back and radiates to the left hip  She has full range of motion of the joint but with some pain  No swelling, overlying erythema, or rigidity  X-ray of the left hip with no acute fractures or malalignment  Patient has pain in the middle of the lumbar spine  CT scan shows no evidence of acute fracture or dislcoation, but she has some disc bulging at L4-L5 and L5-S1 with ligamentum flavum hypertrophy and mild canal stenosis, patient also has left foraminal stenosis at L4-L5  No bowel or bladder incontinence, leg weakness, sensory deficits, or saddle anesthesia to necessitate emergent MRI, though she was counseled that she will need an MRI as an outpatient for further workup of her pain  She was given Percocet as well as naproxen and prednisone with some improvement  Will admit for syncopal event in the setting of hypertensive urgency         Amount and/or Complexity of Data Reviewed  Clinical lab tests: ordered and reviewed  Tests in the radiology section of CPT®: ordered and reviewed  Review and summarize past medical records: yes  Independent visualization of images, tracings, or specimens: yes    Patient Progress  Patient progress: improved         Disposition  Final diagnoses:   Hypertensive urgency   Syncope   Headache   Lumbar herniated disc     Time reflects when diagnosis was documented in both MDM as applicable and the Disposition within this note     Time User Action Codes Description Comment 1/4/2020  3:00 AM Yoanna Honey Add [I16 0] Hypertensive urgency     1/4/2020  3:00 AM Yoanna Honey Add [R55] Syncope     1/4/2020  3:00 AM Yoanna Honey Add [R51] Headache     1/4/2020  3:00 AM Yoanna Honey Add [M51 26] Lumbar herniated disc     1/4/2020  3:56 AM Latia Market Modify [R55] Syncope       ED Disposition     ED Disposition Condition Date/Time Comment    Admit Stable Sat Jan 4, 2020  2:59 AM Case was discussed with JAYESH and the patient's admission status was agreed to be Admission Status: observation status to the service of Dr Katheirne Chavez         Follow-up Information    None         Patient's Medications   Discharge Prescriptions    No medications on file     No discharge procedures on file      ED Provider  Electronically Signed by           Lesley Wilder MD  01/04/20 9552

## 2020-01-04 NOTE — PHYSICAL THERAPY NOTE
Physical Therapy Cancellation Note    PT order received  Chart review performed  At this time, PT evaluation cancelled secondary to patient pending cardiac catheterization per chart review  PT will follow and evaluate as appropriate      Marissa Rich PT, DPT

## 2020-01-04 NOTE — PLAN OF CARE
Problem: Potential for Falls  Goal: Patient will remain free of falls  Description  INTERVENTIONS:  - Assess patient frequently for physical needs  -  Identify cognitive and physical deficits and behaviors that affect risk of falls    -  [place] fall precautions as indicated by assessment   - Educate patient/family on patient safety including physical limitations  - Instruct patient to call for assistance with activity based on assessment  - Modify environment to reduce risk of injury  - Consider OT/PT consult to assist with strengthening/mobility  1/4/2020 1821 by Rocio Luna RN  Outcome: Progressing  1/4/2020 1821 by Rocio Luna RN  Outcome: Progressing     Problem: PAIN - ADULT  Goal: Verbalizes/displays adequate comfort level or baseline comfort level  Description  Interventions:  - Encourage patient to monitor pain and request assistance  - Assess pain using appropriate pain scale  - Administer analgesics based on type and severity of pain and evaluate response  - Implement non-pharmacological measures as appropriate and evaluate response  - Consider cultural and social influences on pain and pain management  - Notify physician/advanced practitioner if interventions unsuccessful or patient reports new pain  1/4/2020 1821 by Rocio Luna RN  Outcome: Progressing  1/4/2020 1821 by Rocio Luna RN  Outcome: Progressing     Problem: INFECTION - ADULT  Goal: Absence or prevention of progression during hospitalization  Description  INTERVENTIONS:  - Assess and monitor for signs and symptoms of infection  - Monitor lab/diagnostic results  - Monitor all insertion sites, i e  indwelling lines, tubes, and drains  - Monitor endotracheal if appropriate and nasal secretions for changes in amount and color  - [place] appropriate cooling/warming therapies per order  - Administer medications as ordered  - Instruct and encourage patient and family to use good hand hygiene technique  - Identify and instruct in appropriate isolation precautions for identified infection/condition  1/4/2020 1821 by Kin Logan RN  Outcome: Progressing  1/4/2020 1821 by Kin Logan RN  Outcome: Progressing  Goal: Absence of fever/infection during neutropenic period  Description  INTERVENTIONS:  - Monitor WBC    1/4/2020 1821 by Kin Logan RN  Outcome: Progressing  1/4/2020 1821 by Kin Logan RN  Outcome: Progressing     Problem: SAFETY ADULT  Goal: Maintain or return to baseline ADL function  Description  INTERVENTIONS:  -  Assess patient's ability to carry out ADLs; assess patient's baseline for ADL function and identify physical deficits which impact ability to perform ADLs (bathing, care of mouth/teeth, toileting, grooming, dressing, etc )  - Assess/evaluate cause of self-care deficits   - Assess range of motion  - Assess patient's mobility; develop plan if impaired  - Assess patient's need for assistive devices and provide as appropriate  - Encourage maximum independence but intervene and supervise when necessary  - Involve family in performance of ADLs  - Assess for home care needs following discharge   - Consider OT consult to assist with ADL evaluation and planning for discharge  - Provide patient education as appropriate  1/4/2020 1821 by Kin Logan RN  Outcome: Progressing  1/4/2020 1821 by Kin Logan RN  Outcome: Progressing  Goal: Maintain or return mobility status to optimal level  Description  INTERVENTIONS:  - Assess patient's baseline mobility status (ambulation, transfers, stairs, etc )    - Identify cognitive and physical deficits and behaviors that affect mobility  - Identify mobility aids required to assist with transfers and/or ambulation (gait belt, sit-to-stand, lift, walker, cane, etc )  - [place] fall precautions as indicated by assessment  - Record patient progress and toleration of activity level on Mobility SBAR; progress patient to next Phase/Stage  - Instruct patient to call for assistance with activity based on assessment  - Consider rehabilitation consult to assist with strengthening/weightbearing, etc   1/4/2020 1821 by Richard Mar RN  Outcome: Progressing  1/4/2020 1821 by Richard Mar RN  Outcome: Progressing     Problem: DISCHARGE PLANNING  Goal: Discharge to home or other facility with appropriate resources  Description  INTERVENTIONS:  - Identify barriers to discharge w/patient and caregiver  - Arrange for needed discharge resources and transportation as appropriate  - Identify discharge learning needs (meds, wound care, etc )  - Arrange for interpretive services to assist at discharge as needed  - Refer to Case Management Department for coordinating discharge planning if the patient needs post-hospital services based on physician/advanced practitioner order or complex needs related to functional status, cognitive ability, or social support system  1/4/2020 1821 by Richard Mar RN  Outcome: Progressing  1/4/2020 1821 by Richard Mar RN  Outcome: Progressing     Problem: Knowledge Deficit  Goal: Patient/family/caregiver demonstrates understanding of disease process, treatment plan, medications, and discharge instructions  Description  Complete learning assessment and assess knowledge base    Interventions:  - Provide teaching at level of understanding  - Provide teaching via preferred learning methods  1/4/2020 1821 by Richard Mar RN  Outcome: Progressing  1/4/2020 1821 by Richard Mar RN  Outcome: Progressing

## 2020-01-04 NOTE — CONSULTS
Consultation - Cardiology   [facility] LAWANDA 45 y o  female MRN: [id number]  Unit/Bed#: ED 19 Encounter: [id number]  01/04/20  12:27 PM    Assessment/ Plan:  1- Syncope, recurrent  Reports 2-3 previous episodes, reports of prodromal chest pain to event  Check TTE  Continue telemetry monitoring for arrhythmias or pauses  2- Hypertensive urgency, last BP reading 173/84, add Coreg 3 125 mg BID  Continue with Norvasc 10 mg, chlorthalidone 25 mg, losartan 50 mg   3- Chest pain, patient reports experiencing chest pain prior to syncopal episode  Continue serial troponins  Given risk factors, will evaluate further with cardiac catheterization on Monday to definitively assess coronary anatomy  Risks versus benefits was discussed with patient was agreeable to proceed  NPO midnight on Monday  Continue ASA  Started on BB and statin  4- Tobacco abuse, continues to smoke  Strongly encouraged to quit  5- Morbid obesity, strongly encouraged to lose weight via lifestyle modifications such as cardiac diet and exercise  History of Present Illness   Physician Requesting Consult: Barak Walter MD  Reason for Consult / Principal Problem: Syncope  [facility] 45[facility] o  year old female with history of hypertension, hypothyroidism, tobacco abuse, prior TIA and morbid obesity who presents with syncopal episode sustained yesterday evening while walking from her kitchen  Patient reports feeling blurred vision, lightheadedness/dizziness, chest pain and shortness of breath before losing consciousness  She states she had been on the ground for seconds  Chest pain is described as left-sided diffuse pressure which radiates to the right side of her chest  Chest pain feels different from when she was worked up in the past  Of note, she is complaining of increasing left hip pain related to her sciatica for the last 3 days  During this time are at home BP readings have been very high in the 160s   She reports she had been compliant with her home medication regimen before admission  She reports family history of with mother with CAD and stenting in her 46s  She reports she is a current every day smoker  She has not been watching her salt at home recently  Inpatient consult to Cardiology  Consult performed by: Nilton Norton PA-C  Consult ordered by: Vicki Crawford PA-C        Review of Systems   Constitutional: Negative for appetite change, chills, diaphoresis, fatigue and fever  Respiratory: Positive for chest tightness  Negative for cough and shortness of breath  Cardiovascular: Positive for chest pain and leg swelling  Negative for palpitations  Gastrointestinal: Negative for diarrhea, nausea and vomiting  Endocrine: Negative for cold intolerance and heat intolerance  Genitourinary: Negative for difficulty urinating, dysuria and enuresis  Musculoskeletal: Negative for arthralgias, back pain and gait problem  Allergic/Immunologic: Negative for environmental allergies and food allergies  Neurological: Positive for light-headedness and headaches  Negative for dizziness and facial asymmetry  Hematological: Negative for adenopathy  Does not bruise/bleed easily  Psychiatric/Behavioral: Negative for agitation, behavioral problems and confusion         Historical Information   Past Medical History:   Diagnosis Date    History of MI (myocardial infarction)     History of TIA (transient ischemic attack)     Hypertension     Hypothyroidism     hypothyroidism    Morbid obesity with BMI of 45 0-49 9, adult ([facility] [address] )     TIA (transient ischemic attack)      Past Surgical History:   Procedure Laterality Date    BREAST SURGERY Left     TUBAL LIGATION       Social History     Substance and Sexual Activity   Alcohol Use Yes    Frequency: Monthly or less    Comment: occasional     Social History     Substance and Sexual Activity   Drug Use No     Social History     Tobacco Use   Smoking Status Current Every Day Smoker    Packs/day: 0 50    Types: Cigarettes   Smokeless Tobacco Never Used       Family History:   Family History   Problem Relation Age of Onset    Heart disease Mother     Hypertension Mother     Diabetes Father        Meds/Allergies   current meds:   Current Facility-Administered Medications   Medication Dose Route Frequency    acetaminophen (TYLENOL) tablet 975 mg  975 mg Oral Q6H [address]    amLODIPine (NORVASC) tablet 10 mg  10 mg Oral Daily    aspirin chewable tablet 81 mg  81 mg Oral Daily    calcium carbonate (TUMS) chewable tablet 1,000 mg  1,000 mg Oral Daily PRN    carvedilol (COREG) tablet 3 125 mg  3 125 mg Oral BID With Meals    chlorthalidone tablet 25 mg  25 mg Oral Daily    docusate sodium (COLACE) capsule 100 mg  100 mg Oral BID PRN    enoxaparin (LOVENOX) subcutaneous injection 40 mg  40 mg Subcutaneous Daily    Labetalol HCl (NORMODYNE) injection 10 mg  10 mg Intravenous Q6H PRN    levothyroxine tablet 100 mcg  100 mcg Oral Early Morning    lidocaine (LIDODERM) 5 % patch 1 patch  1 patch Topical Daily    losartan (COZAAR) tablet 50 mg  50 mg Oral Daily    methocarbamol (ROBAXIN) tablet 500 mg  500 mg Oral Q6H PRN    nicotine (NICODERM CQ) 14 mg/24hr TD 24 hr patch 1 patch  1 patch Transdermal Daily    ondansetron (ZOFRAN) injection 4 mg  4 mg Intravenous Q6H PRN    oxyCODONE (ROXICODONE) immediate release tablet 10 mg  10 mg Oral Q4H PRN    oxyCODONE (ROXICODONE) IR tablet 5 mg  5 mg Oral Q4H PRN    potassium chloride (K-DUR,KLOR-CON) CR tablet 40 mEq  40 mEq Oral BID     Allergies   Allergen Reactions    Trimecaine Hallucinations    Toradol [Ketorolac Tromethamine] Anxiety       Objective   Vitals: Blood pressure (!) 173/84, pulse 85, temperature 97 6 °F (36 4 °C), temperature source Oral, resp   rate (!) 23, height 5' 6" (1 676 m), weight 136 kg (299 lb 2 6 oz), SpO2 99 %, not currently breastfeeding , Body mass index is 48 29 kg/m² ,   Orthostatic Blood Pressures      Most Recent Value Blood Pressure  (!) 173/84 filed at 01/04/2020 1130   Patient Position - Orthostatic VS  Standing - Orthostatic VS filed at 01/04/2020 8602          Systolic (08LTQ), YUY:233 , Min:142 , JODIE:000     Diastolic (79PDO), PRN:88, Min:67, Max:96      No intake or output data in the 24 hours ending 01/04/20 1227    Invasive Devices     Peripheral Intravenous Line            Peripheral IV 01/04/20 Right Antecubital less than 1 day                    Physical Exam:  GEN: +obese  Alert and oriented x 3, in no acute distress  Well appearing and well nourished  HEENT: Sclera anicteric, conjunctivae pink, mucous membranes moist  Oropharynx clear  NECK: Supple, no carotid bruits, no significant JVD  Trachea midline, no thyromegaly  HEART: Regular rhythm, normal S1 and S2, no murmurs, clicks, gallops or rubs  PMI nondisplaced, no thrills  LUNGS: +decreased breath sounds; no wheezes, rales, or rhonchi  No increased work of breathing or signs of respiratory distress  ABDOMEN: Soft, nontender, nondistended, normoactive bowel sounds  EXTREMITIES: +1 B/L LE edema  Skin warm and well perfused, no clubbing, cyanosis  NEURO: No focal findings  Normal speech  Mood and affect normal    SKIN: Normal without suspicious lesions on exposed skin        Lab Results:     Troponins:   Results from last 7 days   Lab Units 01/04/20  0427   TROPONIN I ng/mL <0 02       CBC with diff:   Results from last 7 days   Lab Units 01/04/20  0053   WBC Thousand/uL 9 76   HEMOGLOBIN g/dL 13 2   HEMATOCRIT % 42 1   MCV fL 88   PLATELETS Thousands/uL 367   MCH pg 27 7   MCHC g/dL 31 4   RDW % 14 1   MPV fL 10 4   NRBC AUTO /100 WBCs 0         CMP:   Results from last 7 days   Lab Units 01/04/20  0427 01/04/20  0053   POTASSIUM mmol/L 3 1* 3 6   CHLORIDE mmol/L 102 103   CO2 mmol/L 25 26   BUN mg/dL 7 8   CREATININE mg/dL 0 81 0 85   CALCIUM mg/dL 8 2* 8 4   AST U/L  --  47*   ALT U/L  --  40   ALK PHOS U/L  --  141*   EGFR ml/min/1 73sq m 104 98

## 2020-01-05 PROBLEM — M25.552 ACUTE HIP PAIN, LEFT: Status: ACTIVE | Noted: 2020-01-05

## 2020-01-05 PROBLEM — E78.2 MIXED HYPERLIPIDEMIA: Status: ACTIVE | Noted: 2020-01-05

## 2020-01-05 LAB
ANION GAP SERPL CALCULATED.3IONS-SCNC: 10 MMOL/L (ref 4–13)
BASOPHILS # BLD AUTO: 0.02 THOUSANDS/ΜL (ref 0–0.1)
BASOPHILS NFR BLD AUTO: 0 % (ref 0–1)
BUN SERPL-MCNC: 9 MG/DL (ref 5–25)
CALCIUM SERPL-MCNC: 8.7 MG/DL (ref 8.3–10.1)
CHLORIDE SERPL-SCNC: 102 MMOL/L (ref 100–108)
CHOLEST SERPL-MCNC: 204 MG/DL (ref 50–200)
CO2 SERPL-SCNC: 24 MMOL/L (ref 21–32)
CREAT SERPL-MCNC: 0.87 MG/DL (ref 0.6–1.3)
EOSINOPHIL # BLD AUTO: 0.17 THOUSAND/ΜL (ref 0–0.61)
EOSINOPHIL NFR BLD AUTO: 2 % (ref 0–6)
ERYTHROCYTE [DISTWIDTH] IN BLOOD BY AUTOMATED COUNT: 14.3 % (ref 11.6–15.1)
EST. AVERAGE GLUCOSE BLD GHB EST-MCNC: 111 MG/DL
GFR SERPL CREATININE-BSD FRML MDRD: 95 ML/MIN/1.73SQ M
GLUCOSE SERPL-MCNC: 104 MG/DL (ref 65–140)
HBA1C MFR BLD: 5.5 % (ref 4.2–6.3)
HCT VFR BLD AUTO: 43.4 % (ref 34.8–46.1)
HDLC SERPL-MCNC: 27 MG/DL
HGB BLD-MCNC: 13.4 G/DL (ref 11.5–15.4)
IMM GRANULOCYTES # BLD AUTO: 0.04 THOUSAND/UL (ref 0–0.2)
IMM GRANULOCYTES NFR BLD AUTO: 0 % (ref 0–2)
LDLC SERPL CALC-MCNC: 155 MG/DL (ref 0–100)
LYMPHOCYTES # BLD AUTO: 2.8 THOUSANDS/ΜL (ref 0.6–4.47)
LYMPHOCYTES NFR BLD AUTO: 29 % (ref 14–44)
MAGNESIUM SERPL-MCNC: 1.9 MG/DL (ref 1.6–2.6)
MCH RBC QN AUTO: 27.7 PG (ref 26.8–34.3)
MCHC RBC AUTO-ENTMCNC: 30.9 G/DL (ref 31.4–37.4)
MCV RBC AUTO: 90 FL (ref 82–98)
MONOCYTES # BLD AUTO: 0.44 THOUSAND/ΜL (ref 0.17–1.22)
MONOCYTES NFR BLD AUTO: 5 % (ref 4–12)
NEUTROPHILS # BLD AUTO: 6.29 THOUSANDS/ΜL (ref 1.85–7.62)
NEUTS SEG NFR BLD AUTO: 64 % (ref 43–75)
NONHDLC SERPL-MCNC: 177 MG/DL
NRBC BLD AUTO-RTO: 0 /100 WBCS
PLATELET # BLD AUTO: 341 THOUSANDS/UL (ref 149–390)
PMV BLD AUTO: 10.6 FL (ref 8.9–12.7)
POTASSIUM SERPL-SCNC: 4 MMOL/L (ref 3.5–5.3)
RBC # BLD AUTO: 4.84 MILLION/UL (ref 3.81–5.12)
SODIUM SERPL-SCNC: 136 MMOL/L (ref 136–145)
TRIGL SERPL-MCNC: 110 MG/DL
TROPONIN I SERPL-MCNC: <0.02 NG/ML
WBC # BLD AUTO: 9.76 THOUSAND/UL (ref 4.31–10.16)

## 2020-01-05 PROCEDURE — 99232 SBSQ HOSP IP/OBS MODERATE 35: CPT | Performed by: INTERNAL MEDICINE

## 2020-01-05 PROCEDURE — 80061 LIPID PANEL: CPT | Performed by: INTERNAL MEDICINE

## 2020-01-05 PROCEDURE — 83036 HEMOGLOBIN GLYCOSYLATED A1C: CPT | Performed by: INTERNAL MEDICINE

## 2020-01-05 PROCEDURE — 80048 BASIC METABOLIC PNL TOTAL CA: CPT | Performed by: INTERNAL MEDICINE

## 2020-01-05 PROCEDURE — 99232 SBSQ HOSP IP/OBS MODERATE 35: CPT | Performed by: NURSE PRACTITIONER

## 2020-01-05 PROCEDURE — 85025 COMPLETE CBC W/AUTO DIFF WBC: CPT | Performed by: INTERNAL MEDICINE

## 2020-01-05 PROCEDURE — 83735 ASSAY OF MAGNESIUM: CPT | Performed by: INTERNAL MEDICINE

## 2020-01-05 PROCEDURE — 84484 ASSAY OF TROPONIN QUANT: CPT | Performed by: PHYSICIAN ASSISTANT

## 2020-01-05 RX ORDER — FUROSEMIDE 10 MG/ML
40 INJECTION INTRAMUSCULAR; INTRAVENOUS DAILY
Status: DISCONTINUED | OUTPATIENT
Start: 2020-01-05 | End: 2020-01-06

## 2020-01-05 RX ORDER — GABAPENTIN 100 MG/1
100 CAPSULE ORAL 3 TIMES DAILY
Status: DISCONTINUED | OUTPATIENT
Start: 2020-01-05 | End: 2020-01-06 | Stop reason: HOSPADM

## 2020-01-05 RX ORDER — METHOCARBAMOL 500 MG/1
500 TABLET, FILM COATED ORAL EVERY 6 HOURS SCHEDULED
Status: DISCONTINUED | OUTPATIENT
Start: 2020-01-05 | End: 2020-01-06 | Stop reason: HOSPADM

## 2020-01-05 RX ORDER — AMOXICILLIN 250 MG
1 CAPSULE ORAL 2 TIMES DAILY
Status: DISCONTINUED | OUTPATIENT
Start: 2020-01-05 | End: 2020-01-06 | Stop reason: HOSPADM

## 2020-01-05 RX ORDER — POLYETHYLENE GLYCOL 3350 17 G/17G
17 POWDER, FOR SOLUTION ORAL DAILY PRN
Status: DISCONTINUED | OUTPATIENT
Start: 2020-01-05 | End: 2020-01-05

## 2020-01-05 RX ORDER — CARVEDILOL 3.12 MG/1
3.12 TABLET ORAL 2 TIMES DAILY WITH MEALS
Status: DISCONTINUED | OUTPATIENT
Start: 2020-01-05 | End: 2020-01-06 | Stop reason: HOSPADM

## 2020-01-05 RX ORDER — CARVEDILOL 6.25 MG/1
6.25 TABLET ORAL 2 TIMES DAILY WITH MEALS
Status: DISCONTINUED | OUTPATIENT
Start: 2020-01-05 | End: 2020-01-05

## 2020-01-05 RX ORDER — POLYETHYLENE GLYCOL 3350 17 G/17G
17 POWDER, FOR SOLUTION ORAL DAILY PRN
Status: DISCONTINUED | OUTPATIENT
Start: 2020-01-05 | End: 2020-01-06 | Stop reason: HOSPADM

## 2020-01-05 RX ADMIN — FUROSEMIDE 40 MG: 10 INJECTION, SOLUTION INTRAMUSCULAR; INTRAVENOUS at 08:53

## 2020-01-05 RX ADMIN — ASPIRIN 81 MG 81 MG: 81 TABLET ORAL at 08:51

## 2020-01-05 RX ADMIN — METHOCARBAMOL TABLETS 500 MG: 500 TABLET, COATED ORAL at 18:22

## 2020-01-05 RX ADMIN — CARVEDILOL 3.12 MG: 3.12 TABLET, FILM COATED ORAL at 16:42

## 2020-01-05 RX ADMIN — METHOCARBAMOL TABLETS 500 MG: 500 TABLET, COATED ORAL at 11:35

## 2020-01-05 RX ADMIN — ACETAMINOPHEN 975 MG: 325 TABLET, FILM COATED ORAL at 11:36

## 2020-01-05 RX ADMIN — ATORVASTATIN CALCIUM 40 MG: 40 TABLET, FILM COATED ORAL at 16:42

## 2020-01-05 RX ADMIN — POTASSIUM CHLORIDE 40 MEQ: 1500 TABLET, EXTENDED RELEASE ORAL at 08:52

## 2020-01-05 RX ADMIN — METHOCARBAMOL TABLETS 500 MG: 500 TABLET, COATED ORAL at 09:10

## 2020-01-05 RX ADMIN — POTASSIUM CHLORIDE 40 MEQ: 1500 TABLET, EXTENDED RELEASE ORAL at 18:21

## 2020-01-05 RX ADMIN — GABAPENTIN 100 MG: 100 CAPSULE ORAL at 20:19

## 2020-01-05 RX ADMIN — SENNOSIDES AND DOCUSATE SODIUM 1 TABLET: 8.6; 5 TABLET ORAL at 08:51

## 2020-01-05 RX ADMIN — GABAPENTIN 100 MG: 100 CAPSULE ORAL at 16:42

## 2020-01-05 RX ADMIN — GABAPENTIN 100 MG: 100 CAPSULE ORAL at 08:51

## 2020-01-05 RX ADMIN — CARVEDILOL 3.12 MG: 3.12 TABLET, FILM COATED ORAL at 07:40

## 2020-01-05 RX ADMIN — SENNOSIDES AND DOCUSATE SODIUM 1 TABLET: 8.6; 5 TABLET ORAL at 18:20

## 2020-01-05 RX ADMIN — LOSARTAN POTASSIUM 50 MG: 50 TABLET, FILM COATED ORAL at 08:52

## 2020-01-05 RX ADMIN — ACETAMINOPHEN 975 MG: 325 TABLET, FILM COATED ORAL at 00:23

## 2020-01-05 RX ADMIN — ACETAMINOPHEN 975 MG: 325 TABLET, FILM COATED ORAL at 18:21

## 2020-01-05 RX ADMIN — ENOXAPARIN SODIUM 40 MG: 40 INJECTION SUBCUTANEOUS at 08:52

## 2020-01-05 RX ADMIN — LEVOTHYROXINE SODIUM 100 MCG: 100 TABLET ORAL at 05:52

## 2020-01-05 RX ADMIN — OXYCODONE HYDROCHLORIDE 10 MG: 10 TABLET ORAL at 16:47

## 2020-01-05 RX ADMIN — ACETAMINOPHEN 975 MG: 325 TABLET, FILM COATED ORAL at 05:51

## 2020-01-05 RX ADMIN — NICOTINE 1 PATCH: 14 PATCH TRANSDERMAL at 08:53

## 2020-01-05 RX ADMIN — LIDOCAINE 1 PATCH: 50 PATCH TOPICAL at 08:49

## 2020-01-05 RX ADMIN — AMLODIPINE BESYLATE 10 MG: 10 TABLET ORAL at 08:51

## 2020-01-05 NOTE — PROGRESS NOTES
Cardiology Progress Note - Alvarez Sellers 45 y o  female MRN: [id number]    Unit/Bed#: -01 Encounter: [id number]      Assessment/Plan:  1- Syncope, recurrent  Reports 2-3 previous episodes, reports of prodromal chest pain to event  Check TTE  Continue telemetry monitoring for arrhythmias or pauses  2- Hypertension, BP better controlled  Was switched to Lasix IV 40mg daily in place of chorthalidone 2/2 LE edema on exam  Continue with Coreg 3 125 mg BID, Norvasc 10 mg and losartan 50 mg    3- Chest pain, patient reports experiencing chest pain prior to syncopal episode  Continue serial troponins  Given risk factors, will evaluate further with cardiac catheterization on Monday to definitively assess coronary anatomy  Risks versus benefits was discussed with patient was agreeable to proceed  NPO midnight on tomorrow  Hold Lovenox morning of procedure  Continue ASA, BB, statin  4- Tobacco abuse, continues to smoke  Strongly encouraged to quit  5- Morbid obesity, strongly encouraged to lose weight via lifestyle modifications such as cardiac diet and exercise  Subjective:   Patient seen and examined  No significant events overnight  Objective:     Vitals: Blood pressure 154/88, pulse 79, temperature 97 9 °F (36 6 °C), resp  rate 18, height 5' 6" (1 676 m), weight 135 kg (298 lb 11 6 oz), SpO2 94 %, not currently breastfeeding , Body mass index is 48 22 kg/m² ,   Orthostatic Blood Pressures      Most Recent Value   Blood Pressure  154/88 filed at 01/05/2020 1112   Patient Position - Orthostatic VS  Lying filed at 01/04/2020 1900            Intake/Output Summary (Last 24 hours) at 1/5/2020 1300  Last data filed at 1/5/2020 0818  Gross per 24 hour   Intake --   Output 200 ml   Net -200 ml         Physical Exam:    GEN: +obese   Alvarez Sellers appears well, alert and oriented x 3, pleasant and cooperative   HEENT: pupils equal, round, and reactive to light; extraocular muscles intact  NECK: supple, no carotid bruits   HEART: regular rhythm, normal S1 and S2, no murmurs, clicks, gallops or rubs   LUNGS: clear to auscultation bilaterally; no wheezes, rales, or rhonchi   ABDOMEN: normal bowel sounds, soft, no tenderness, no distention  EXTREMITIES: +1 LE edema   Peripheral pulses normal; no clubbing, cyanosis      Medications:    Current Facility-Administered Medications:     acetaminophen (TYLENOL) tablet 975 mg, 975 mg, Oral, Q6H [address], Andriy Amador PA-C, 975 mg at 01/05/20 1136    amLODIPine (NORVASC) tablet 10 mg, 10 mg, Oral, Daily, Radha Umaña MD, 10 mg at 01/05/20 0851    aspirin chewable tablet 81 mg, 81 mg, Oral, Daily, Jairo Murphy PA-C, 81 mg at 01/05/20 0851    atorvastatin (LIPITOR) tablet 40 mg, 40 mg, Oral, Daily With Ramses Antonio PA-C, 40 mg at 01/04/20 1628    calcium carbonate (TUMS) chewable tablet 1,000 mg, 1,000 mg, Oral, Daily PRN, Andriy Amador PA-C    carvedilol (COREG) tablet 3 125 mg, 3 125 mg, Oral, BID With Meals, Nahomy Hamlin MD, 3 125 mg at 01/05/20 0740    docusate sodium (COLACE) capsule 100 mg, 100 mg, Oral, BID PRN, Andriy Amador PA-C    enoxaparin (LOVENOX) subcutaneous injection 40 mg, 40 mg, Subcutaneous, Daily, Jairo Murphy PA-C, 40 mg at 01/05/20 3400    furosemide (LASIX) injection 40 mg, 40 mg, Intravenous, Daily, Nahomy Hamlin MD, 40 mg at 01/05/20 0853    gabapentin (NEURONTIN) capsule 100 mg, 100 mg, Oral, TID, ALVIN Umanzor, 100 mg at 01/05/20 0851    Labetalol HCl (NORMODYNE) injection 10 mg, 10 mg, Intravenous, Q6H PRN, Andriy Amador PA-C, 10 mg at 01/04/20 2005    levothyroxine tablet 100 mcg, 100 mcg, Oral, Early Morning, Andriy Amador PA-C, 100 mcg at 01/05/20 0552    lidocaine (LIDODERM) 5 % patch 1 patch, 1 patch, Topical, Daily, Andriy Amador PA-C, 1 patch at 01/05/20 0849    losartan (COZAAR) tablet 50 mg, 50 mg, Oral, Daily, Jairo Murphy PA-C, 50 mg at 01/05/20 6898    methocarbamol (ROBAXIN) tablet 500 mg, 500 mg, Oral, Q6H [facility], ALVIN Umanzor, 500 mg at 01/05/20 1135    nicotine (NICODERM CQ) 14 mg/24hr TD 24 hr patch 1 patch, 1 patch, Transdermal, Daily, Keith Cowden, PA-C, 1 patch at 01/05/20 0853    ondansetron (ZOFRAN) injection 4 mg, 4 mg, Intravenous, Q6H PRN, Keith Cowden, PA-C    oxyCODONE (ROXICODONE) immediate release tablet 10 mg, 10 mg, Oral, Q4H PRN, Keith Cowden, PA-C, 10 mg at 01/04/20 1843    oxyCODONE (ROXICODONE) IR tablet 5 mg, 5 mg, Oral, Q4H PRN, Keith Cowden, PA-C    polyethylene glycol (MIRALAX) packet 17 g, 17 g, Oral, Daily PRN, ALVIN Umanzor    potassium chloride (K-DUR,KLOR-CON) CR tablet 40 mEq, 40 mEq, Oral, BID, Sav Soto MD, 40 mEq at 01/05/20 0852    senna-docusate sodium (SENOKOT S) 8 6-50 mg per tablet 1 tablet, 1 tablet, Oral, BID, ALVIN Umanzor, 1 tablet at 01/05/20 0851     Labs & Results:    Results from last 7 days   Lab Units 01/04/20  1548 01/04/20  1310 01/04/20  0427   TROPONIN I ng/mL <0 02 <0 02 <0 02     Results from last 7 days   Lab Units 01/05/20  0554 01/04/20  0053   WBC Thousand/uL 9 76 9 76   HEMOGLOBIN g/dL 13 4 13 2   HEMATOCRIT % 43 4 42 1   PLATELETS Thousands/uL 341 367     Results from last 7 days   Lab Units 01/05/20  0554   TRIGLYCERIDES mg/dL 110   HDL mg/dL 27*     Results from last 7 days   Lab Units 01/05/20  0554 01/04/20  0427 01/04/20  0053   POTASSIUM mmol/L 4 0 3 1* 3 6   CHLORIDE mmol/L 102 102 103   CO2 mmol/L 24 25 26   BUN mg/dL 9 7 8   CREATININE mg/dL 0 87 0 81 0 85   CALCIUM mg/dL 8 7 8 2* 8 4   ALK PHOS U/L  --   --  141*   ALT U/L  --   --  40   AST U/L  --   --  47*         Results from last 7 days   Lab Units 01/05/20  0554   MAGNESIUM mg/dL 1 9

## 2020-01-05 NOTE — ASSESSMENT & PLAN NOTE
· Reports was walking from kitchen to family room when she had a syncopal episode that was witnessed by family  States was only unconscious a couple seconds  (+) light-headedness and nausea immediately prior to episode    · Orthostatics negative  · CT head negative  · Telemetry reviewed; since discontinued  · Cardiology following recommendations as noted below  · Echocardiogram ordered

## 2020-01-05 NOTE — ASSESSMENT & PLAN NOTE
· Present on admission; likely reason for acute hypertension  This pain however is chronic in nature and is not new  She does report that the severity of the pain is however new  · Pain regimen as noted below  · Scheduled Robaxin 500 mg every 6 hours scheduled  · Scheduled gabapentin 100 mg t i d   · P r n  Oxycodone  · Lidocaine patch  · Bowel regimen  · Senna S 1 tablet b i d   · P r n   MiraLax

## 2020-01-05 NOTE — ASSESSMENT & PLAN NOTE
· Reported a single episode of left anterior chest wall pain earlier in the day prior to presentation the emergency department  She also reported shortness of breath associated with her pain    She does not believe that her shortness of breath is secondary to her pain but feels like it is more in her chest   · Troponin negative x3  · NM stress test 4/11/2018 that was (-)  · Cardiology consulted recommendations as noted below  · Given significant risk factors MPS stress test versus cardiac catheterization were discussed at the bedside  · Patient wants to move forward with cardiac catheterization; plan for cardiac catheterization on Monday 01/06/2020  · NPO on 1/6 at midnight  · Initiated Coreg on 1/4 for optimal blood pressure control

## 2020-01-05 NOTE — ASSESSMENT & PLAN NOTE
· Resolved; Likely secondary to hypertensive urgency     · CT head (-)  · Ensure adequate blood pressure control  · Pain regimen as noted above

## 2020-01-05 NOTE — PLAN OF CARE
Problem: Potential for Falls  Goal: Patient will remain free of falls  Description  INTERVENTIONS:  - Assess patient frequently for physical needs  -  Identify cognitive and physical deficits and behaviors that affect risk of falls    -  [place] fall precautions as indicated by assessment   - Educate patient/family on patient safety including physical limitations  - Instruct patient to call for assistance with activity based on assessment  - Modify environment to reduce risk of injury  - Consider OT/PT consult to assist with strengthening/mobility  Outcome: Progressing

## 2020-01-05 NOTE — PHYSICAL THERAPY NOTE
Physical Therapy Cancellation Note    PT order received  Chart review performed  At this time, PT evaluation cancelled secondary to patient pending cardiac catheterization on Monday 01/06/2020  PT will follow and evaluate as appropriate following procedure      Mague Neely, PT, DPT

## 2020-01-05 NOTE — ASSESSMENT & PLAN NOTE
· On arrival to the emergency department /79 appreciated  Patient was unsure what home medication is currently  States it was changes to a combination antihypertensive/diuretic when discharged from the hospital on 10/6/2019  She does however report medication compliance since discharge  · Likely related to acute left hip pain with sciatica  · Received Labetalol 10mg IV in ED  Repeat /80  · Pain regimen under plan for left hip pain  · Anti hypertensive regimen as noted below  · Started Cozaar and Lasix per cardiology recommendations on last admission  · Labetalol p r n  For SBP greater than 180  · Norvasc 10 mg daily  · 40 mg Lasix daily  · Coreg 3 125 mg b i d   · Continue to monitor blood pressure with schedule vitals  · Cardiology following recommendations appreciated  · On exam today blood pressure still elevated in the 150s to 160s; patient does report her pain is not yet controlled and do not recommend to titrate her antihypertensive medication until pain more optimally controlled

## 2020-01-05 NOTE — PROGRESS NOTES
Progress Note - Marito Devoid 1975, 45 y o  female MRN: [id number]    Unit/Bed#: -01 Encounter: [id number]    Primary Care Provider: Kim Johnson DO   Date and time admitted to hospital: 1/4/2020 12:13 AM    * Hypertensive urgency  Assessment & Plan  · On arrival to the emergency department /79 appreciated  Patient was unsure what home medication is currently  States it was changes to a combination antihypertensive/diuretic when discharged from the hospital on 10/6/2019  She does however report medication compliance since discharge  · Likely related to acute left hip pain with sciatica  · Received Labetalol 10mg IV in ED  Repeat /80  · Pain regimen under plan for left hip pain  · Anti hypertensive regimen as noted below  · Started Cozaar and Lasix per cardiology recommendations on last admission  · Labetalol p r n  For SBP greater than 180  · Norvasc 10 mg daily  · 40 mg Lasix daily  · Coreg 3 125 mg b i d   · Continue to monitor blood pressure with schedule vitals  · Cardiology following recommendations appreciated  · On exam today blood pressure still elevated in the 150s to 160s; patient does report her pain is not yet controlled and do not recommend to titrate her antihypertensive medication until pain more optimally controlled  Chest pain  Assessment & Plan  · Reported a single episode of left anterior chest wall pain earlier in the day prior to presentation the emergency department  She also reported shortness of breath associated with her pain    She does not believe that her shortness of breath is secondary to her pain but feels like it is more in her chest   · Troponin negative x3  · NM stress test 4/11/2018 that was (-)  · Cardiology consulted recommendations as noted below  · Given significant risk factors MPS stress test versus cardiac catheterization were discussed at the bedside  · Patient wants to move forward with cardiac catheterization; plan for cardiac catheterization on Monday 01/06/2020  · NPO on 1/6 at midnight  · Initiated Coreg on 1/4 for optimal blood pressure control    Acute hip pain, left  Assessment & Plan  · Present on admission; likely reason for acute hypertension  This pain however is chronic in nature and is not new  She does report that the severity of the pain is however new  · Pain regimen as noted below  · Scheduled Robaxin 500 mg every 6 hours scheduled  · Scheduled gabapentin 100 mg t i d   · P r n  Oxycodone  · Lidocaine patch  · Bowel regimen  · Senna S 1 tablet b i d   · P r n  MiraLax    Syncope  Assessment & Plan  · Reports was walking from kitchen to family room when she had a syncopal episode that was witnessed by family  States was only unconscious a couple seconds  (+) light-headedness and nausea immediately prior to episode  · Orthostatics negative  · CT head negative  · Telemetry reviewed; since discontinued  · Cardiology following recommendations as noted below  · Echocardiogram ordered    Mixed hyperlipidemia  Assessment & Plan  · Lipid panel on admission revealing cholesterol 204, HDL 27,   · Atorvastatin 40 mg daily    Morbid obesity with BMI of 45 0-49 9, adult (HCC)  Assessment & Plan  · Lifestyle modifications    Acquired hypothyroidism  Assessment & Plan  · Continue home dose Levothyroxine    Dependence on nicotine from cigarettes  Assessment & Plan  · Smoking cessation education  · Currently smoking 0 5ppd  · Nicotine patch    Headache  Assessment & Plan  · Resolved; Likely secondary to hypertensive urgency  · CT head (-)  · Ensure adequate blood pressure control  · Pain regimen as noted above      VTE Pharmacologic Prophylaxis:   Pharmacologic: Enoxaparin (Lovenox)  Mechanical VTE Prophylaxis in Place: Yes    Patient Centered Rounds: I have performed bedside rounds with nursing staff today      Discussions with Specialists or Other Care Team Provider:  Nursing staff    Education and Discussions with Family / Patient:  Education provided the bedside regarding need for extended hospitalization given lack of blood pressure control as well as need for cardiac catheterization tomorrow  She is aware and endorses understanding  Time Spent for Care: 30 minutes  More than 50% of total time spent on counseling and coordination of care as described above  Current Length of Stay: 0 day(s)    Current Patient Status: Inpatient   Certification Statement: The patient will continue to require additional inpatient hospital stay due to Ongoing optimization of blood pressure, pain control, and cardiac interventions planned for 2020  Discharge Plan:   pending no change in clinical status and blood pressure and pain controlled  Code Status: Level 1 - Full Code      Subjective:   Patient currently reports left hip pain on a scale of 0-10 as a 10  Regimen adjusted accordingly  Objective:     Vitals:   Temp (24hrs), Av 7 °F (36 5 °C), Min:97 5 °F (36 4 °C), Max:97 9 °F (36 6 °C)    Temp:  [97 5 °F (36 4 °C)-97 9 °F (36 6 °C)] 97 5 °F (36 4 °C)  HR:  [79-95] 79  Resp:  [18-38] 18  BP: (153-184)/(71-88) 153/83  SpO2:  [94 %-99 %] 96 %  Body mass index is 48 22 kg/m²  Input and Output Summary (last 24 hours):     No intake or output data in the 24 hours ending 20 0802    Physical Exam:     Physical Exam   Constitutional: She is oriented to person, place, and time  She appears well-nourished  She is cooperative  No distress  Cardiovascular: Normal rate, regular rhythm, normal heart sounds and intact distal pulses  Pulses:       Radial pulses are 2+ on the right side, and 2+ on the left side  Dorsalis pedis pulses are 2+ on the right side, and 2+ on the left side  Posterior tibial pulses are 2+ on the right side, and 2+ on the left side  No peripheral edema noted  Pulmonary/Chest: Effort normal and breath sounds normal  No respiratory distress  She has no wheezes  Abdominal: Soft  Bowel sounds are normal  She exhibits no distension  There is no tenderness  Musculoskeletal:        Left hip: She exhibits tenderness  Neurological: She is alert and oriented to person, place, and time  Skin: Skin is warm and dry  Capillary refill takes less than 2 seconds  She is not diaphoretic  Psychiatric: She has a normal mood and affect  Her speech is normal and behavior is normal  Judgment normal    Vitals reviewed  Additional Data:     Labs:    Results from last 7 days   Lab Units 01/05/20  0554   WBC Thousand/uL 9 76   HEMOGLOBIN g/dL 13 4   HEMATOCRIT % 43 4   PLATELETS Thousands/uL 341   NEUTROS PCT % 64   LYMPHS PCT % 29   MONOS PCT % 5   EOS PCT % 2     Results from last 7 days   Lab Units 01/05/20  0554  01/04/20  0053   SODIUM mmol/L 136   < > 138   POTASSIUM mmol/L 4 0   < > 3 6   CHLORIDE mmol/L 102   < > 103   CO2 mmol/L 24   < > 26   BUN mg/dL 9   < > 8   CREATININE mg/dL 0 87   < > 0 85   ANION GAP mmol/L 10   < > 9   CALCIUM mg/dL 8 7   < > 8 4   ALBUMIN g/dL  --   --  3 4*   TOTAL BILIRUBIN mg/dL  --   --  0 20   ALK PHOS U/L  --   --  141*   ALT U/L  --   --  40   AST U/L  --   --  47*   GLUCOSE RANDOM mg/dL 104   < > 110    < > = values in this interval not displayed  * I Have Reviewed All Lab Data Listed Above  * Additional Pertinent Lab Tests Reviewed:  [facility] Admission Reviewed    Imaging:    Imaging Reports Reviewed Today Include:  Hip x-ray, CT spine lumbar without contrast, CT head  Imaging Personally Reviewed by Myself Includes:  None    Recent Cultures (last 7 days):           Last 24 Hours Medication List:     Current Facility-Administered Medications:  acetaminophen 975 mg Oral Q6H 1000 [facility], PAKERRY   amLODIPine 10 mg Oral Daily Radha Umaña MD   aspirin 81 mg Oral Daily Andriy Amador PA-C   atorvastatin 40 mg Oral Daily With LAILA Antonio PA-C   calcium carbonate 1,000 mg Oral Daily PRN Efrain Sanders Eb Rodriguez PA-C   carvedilol 3 125 mg Oral BID With Meals Venus Valadez MD   docusate sodium 100 mg Oral BID PRN Keith Cowden, JOJO   enoxaparin 40 mg Subcutaneous Daily Jairo Murphy PA-C   furosemide 40 mg Intravenous Daily Venus Valadez MD   gabapentin 100 mg Oral TID Edith House, ALVIN   Labetalol HCl 10 mg Intravenous Q6H PRN Keith Cowden, JOJO   levothyroxine 100 mcg Oral Early Morning Keith Cowden, JOJO   lidocaine 1 patch Topical Daily Nakul Cowden, JOJO   losartan 50 mg Oral Daily Nakul Cowden, JOJO   methocarbamol 500 mg Oral Q6H [address] [facility], ALVIN   nicotine 1 patch Transdermal Daily Keith Cowden, JOJO   ondansetron 4 mg Intravenous Q6H PRN Keith Cowden, JOJO   oxyCODONE 10 mg Oral Q4H PRN Keith Cowden, JOJO   oxyCODONE 5 mg Oral Q4H PRN Keith Cowden, JOJO   polyethylene glycol 17 g Oral Daily PRN Edith House, ALVIN   potassium chloride 40 mEq Oral BID Sav Soto MD   senna-docusate sodium 1 tablet Oral BID ALVIN Polo        Today, Patient Was Seen By: ALVIN Polo    ** Please Note: Dictation voice to text software may have been used in the creation of this document   **

## 2020-01-06 ENCOUNTER — APPOINTMENT (INPATIENT)
Dept: INTERVENTIONAL RADIOLOGY/VASCULAR | Facility: HOSPITAL | Age: 45
DRG: 192 | End: 2020-01-06
Payer: COMMERCIAL

## 2020-01-06 ENCOUNTER — APPOINTMENT (INPATIENT)
Dept: NON INVASIVE DIAGNOSTICS | Facility: HOSPITAL | Age: 45
DRG: 192 | End: 2020-01-06
Payer: COMMERCIAL

## 2020-01-06 ENCOUNTER — TELEPHONE (OUTPATIENT)
Dept: NON INVASIVE DIAGNOSTICS | Facility: HOSPITAL | Age: 45
End: 2020-01-06

## 2020-01-06 VITALS
SYSTOLIC BLOOD PRESSURE: 130 MMHG | RESPIRATION RATE: 19 BRPM | HEART RATE: 81 BPM | WEIGHT: 293 LBS | HEIGHT: 66 IN | DIASTOLIC BLOOD PRESSURE: 69 MMHG | OXYGEN SATURATION: 92 % | BODY MASS INDEX: 47.09 KG/M2 | TEMPERATURE: 98.2 F

## 2020-01-06 LAB
ANION GAP SERPL CALCULATED.3IONS-SCNC: 11 MMOL/L (ref 4–13)
BASOPHILS # BLD AUTO: 0.03 THOUSANDS/ΜL (ref 0–0.1)
BASOPHILS NFR BLD AUTO: 0 % (ref 0–1)
BUN SERPL-MCNC: 11 MG/DL (ref 5–25)
CALCIUM SERPL-MCNC: 8.8 MG/DL (ref 8.3–10.1)
CHLORIDE SERPL-SCNC: 101 MMOL/L (ref 100–108)
CO2 SERPL-SCNC: 24 MMOL/L (ref 21–32)
CREAT SERPL-MCNC: 0.82 MG/DL (ref 0.6–1.3)
EOSINOPHIL # BLD AUTO: 0.24 THOUSAND/ΜL (ref 0–0.61)
EOSINOPHIL NFR BLD AUTO: 2 % (ref 0–6)
ERYTHROCYTE [DISTWIDTH] IN BLOOD BY AUTOMATED COUNT: 14.3 % (ref 11.6–15.1)
GFR SERPL CREATININE-BSD FRML MDRD: 102 ML/MIN/1.73SQ M
GLUCOSE SERPL-MCNC: 98 MG/DL (ref 65–140)
HCT VFR BLD AUTO: 44.7 % (ref 34.8–46.1)
HGB BLD-MCNC: 14.1 G/DL (ref 11.5–15.4)
IMM GRANULOCYTES # BLD AUTO: 0.05 THOUSAND/UL (ref 0–0.2)
IMM GRANULOCYTES NFR BLD AUTO: 1 % (ref 0–2)
LYMPHOCYTES # BLD AUTO: 2.56 THOUSANDS/ΜL (ref 0.6–4.47)
LYMPHOCYTES NFR BLD AUTO: 25 % (ref 14–44)
MCH RBC QN AUTO: 28 PG (ref 26.8–34.3)
MCHC RBC AUTO-ENTMCNC: 31.5 G/DL (ref 31.4–37.4)
MCV RBC AUTO: 89 FL (ref 82–98)
MONOCYTES # BLD AUTO: 0.48 THOUSAND/ΜL (ref 0.17–1.22)
MONOCYTES NFR BLD AUTO: 5 % (ref 4–12)
NEUTROPHILS # BLD AUTO: 6.91 THOUSANDS/ΜL (ref 1.85–7.62)
NEUTS SEG NFR BLD AUTO: 67 % (ref 43–75)
NRBC BLD AUTO-RTO: 0 /100 WBCS
PLATELET # BLD AUTO: 373 THOUSANDS/UL (ref 149–390)
PMV BLD AUTO: 10.7 FL (ref 8.9–12.7)
POTASSIUM SERPL-SCNC: 4.2 MMOL/L (ref 3.5–5.3)
RBC # BLD AUTO: 5.04 MILLION/UL (ref 3.81–5.12)
SODIUM SERPL-SCNC: 136 MMOL/L (ref 136–145)
WBC # BLD AUTO: 10.27 THOUSAND/UL (ref 4.31–10.16)

## 2020-01-06 PROCEDURE — C1894 INTRO/SHEATH, NON-LASER: HCPCS | Performed by: PHYSICIAN ASSISTANT

## 2020-01-06 PROCEDURE — 93458 L HRT ARTERY/VENTRICLE ANGIO: CPT | Performed by: INTERNAL MEDICINE

## 2020-01-06 PROCEDURE — 93458 L HRT ARTERY/VENTRICLE ANGIO: CPT | Performed by: PHYSICIAN ASSISTANT

## 2020-01-06 PROCEDURE — 93306 TTE W/DOPPLER COMPLETE: CPT | Performed by: INTERNAL MEDICINE

## 2020-01-06 PROCEDURE — 99152 MOD SED SAME PHYS/QHP 5/>YRS: CPT | Performed by: INTERNAL MEDICINE

## 2020-01-06 PROCEDURE — 99239 HOSP IP/OBS DSCHRG MGMT >30: CPT | Performed by: NURSE PRACTITIONER

## 2020-01-06 PROCEDURE — 85025 COMPLETE CBC W/AUTO DIFF WBC: CPT | Performed by: INTERNAL MEDICINE

## 2020-01-06 PROCEDURE — 80048 BASIC METABOLIC PNL TOTAL CA: CPT | Performed by: INTERNAL MEDICINE

## 2020-01-06 PROCEDURE — 93306 TTE W/DOPPLER COMPLETE: CPT

## 2020-01-06 PROCEDURE — C1769 GUIDE WIRE: HCPCS | Performed by: PHYSICIAN ASSISTANT

## 2020-01-06 PROCEDURE — 99152 MOD SED SAME PHYS/QHP 5/>YRS: CPT | Performed by: PHYSICIAN ASSISTANT

## 2020-01-06 PROCEDURE — 99232 SBSQ HOSP IP/OBS MODERATE 35: CPT | Performed by: INTERNAL MEDICINE

## 2020-01-06 RX ORDER — ATROPINE SULFATE 0.1 MG/ML
INJECTION, SOLUTION ENDOTRACHEAL; INTRAMUSCULAR; INTRAVENOUS; SUBCUTANEOUS CODE/TRAUMA/SEDATION MEDICATION
Status: COMPLETED | OUTPATIENT
Start: 2020-01-06 | End: 2020-01-06

## 2020-01-06 RX ORDER — CARVEDILOL 3.12 MG/1
3.12 TABLET ORAL 2 TIMES DAILY WITH MEALS
Qty: 180 TABLET | Refills: 0 | Status: SHIPPED | OUTPATIENT
Start: 2020-01-06 | End: 2020-11-10 | Stop reason: HOSPADM

## 2020-01-06 RX ORDER — ONDANSETRON 2 MG/ML
INJECTION INTRAMUSCULAR; INTRAVENOUS CODE/TRAUMA/SEDATION MEDICATION
Status: COMPLETED | OUTPATIENT
Start: 2020-01-06 | End: 2020-01-06

## 2020-01-06 RX ORDER — OXYCODONE HYDROCHLORIDE 5 MG/1
5 TABLET ORAL EVERY 4 HOURS PRN
Qty: 30 TABLET | Refills: 0 | Status: SHIPPED | OUTPATIENT
Start: 2020-01-06 | End: 2020-01-13

## 2020-01-06 RX ORDER — HEPARIN SODIUM 1000 [USP'U]/ML
INJECTION, SOLUTION INTRAVENOUS; SUBCUTANEOUS CODE/TRAUMA/SEDATION MEDICATION
Status: COMPLETED | OUTPATIENT
Start: 2020-01-06 | End: 2020-01-06

## 2020-01-06 RX ORDER — NITROGLYCERIN 20 MG/100ML
INJECTION INTRAVENOUS CODE/TRAUMA/SEDATION MEDICATION
Status: COMPLETED | OUTPATIENT
Start: 2020-01-06 | End: 2020-01-06

## 2020-01-06 RX ORDER — SODIUM CHLORIDE 9 MG/ML
125 INJECTION, SOLUTION INTRAVENOUS CONTINUOUS
Status: DISCONTINUED | OUTPATIENT
Start: 2020-01-06 | End: 2020-01-06

## 2020-01-06 RX ORDER — ATORVASTATIN CALCIUM 40 MG/1
40 TABLET, FILM COATED ORAL
Qty: 90 TABLET | Refills: 0 | Status: ON HOLD | OUTPATIENT
Start: 2020-01-06 | End: 2021-07-16

## 2020-01-06 RX ORDER — VERAPAMIL HCL 2.5 MG/ML
AMPUL (ML) INTRAVENOUS CODE/TRAUMA/SEDATION MEDICATION
Status: COMPLETED | OUTPATIENT
Start: 2020-01-06 | End: 2020-01-06

## 2020-01-06 RX ORDER — FUROSEMIDE 40 MG/1
40 TABLET ORAL DAILY
Qty: 90 TABLET | Refills: 0 | Status: SHIPPED | OUTPATIENT
Start: 2020-01-06 | End: 2021-07-16 | Stop reason: HOSPADM

## 2020-01-06 RX ORDER — POTASSIUM CHLORIDE 20 MEQ/1
40 TABLET, EXTENDED RELEASE ORAL 2 TIMES DAILY
Qty: 360 TABLET | Refills: 0 | Status: ON HOLD | OUTPATIENT
Start: 2020-01-06 | End: 2021-07-16

## 2020-01-06 RX ORDER — LIDOCAINE WITH 8.4% SOD BICARB 0.9%(10ML)
SYRINGE (ML) INJECTION CODE/TRAUMA/SEDATION MEDICATION
Status: COMPLETED | OUTPATIENT
Start: 2020-01-06 | End: 2020-01-06

## 2020-01-06 RX ORDER — EZETIMIBE 10 MG/1
10 TABLET ORAL
Qty: 90 TABLET | Refills: 0 | Status: SHIPPED | OUTPATIENT
Start: 2020-01-06 | End: 2020-04-05

## 2020-01-06 RX ORDER — FENTANYL CITRATE 50 UG/ML
INJECTION, SOLUTION INTRAMUSCULAR; INTRAVENOUS CODE/TRAUMA/SEDATION MEDICATION
Status: COMPLETED | OUTPATIENT
Start: 2020-01-06 | End: 2020-01-06

## 2020-01-06 RX ORDER — NICOTINE 21 MG/24HR
1 PATCH, TRANSDERMAL 24 HOURS TRANSDERMAL DAILY
Qty: 30 PATCH | Refills: 0 | Status: SHIPPED | OUTPATIENT
Start: 2020-01-07 | End: 2020-02-06

## 2020-01-06 RX ORDER — FUROSEMIDE 40 MG/1
40 TABLET ORAL DAILY
Status: DISCONTINUED | OUTPATIENT
Start: 2020-01-06 | End: 2020-01-06 | Stop reason: HOSPADM

## 2020-01-06 RX ORDER — EZETIMIBE 10 MG/1
10 TABLET ORAL
Status: DISCONTINUED | OUTPATIENT
Start: 2020-01-06 | End: 2020-01-06 | Stop reason: HOSPADM

## 2020-01-06 RX ORDER — GABAPENTIN 100 MG/1
100 CAPSULE ORAL 3 TIMES DAILY
Qty: 270 CAPSULE | Refills: 0 | Status: SHIPPED | OUTPATIENT
Start: 2020-01-06 | End: 2020-11-08 | Stop reason: CLARIF

## 2020-01-06 RX ORDER — MIDAZOLAM HYDROCHLORIDE 2 MG/2ML
INJECTION, SOLUTION INTRAMUSCULAR; INTRAVENOUS CODE/TRAUMA/SEDATION MEDICATION
Status: COMPLETED | OUTPATIENT
Start: 2020-01-06 | End: 2020-01-06

## 2020-01-06 RX ADMIN — HEPARIN SODIUM 5000 UNITS: 1000 INJECTION INTRAVENOUS; SUBCUTANEOUS at 12:46

## 2020-01-06 RX ADMIN — ATROPINE SULFATE 0.5 MG: 0.1 INJECTION, SOLUTION ENDOTRACHEAL; INTRAMUSCULAR; INTRAVENOUS; SUBCUTANEOUS at 12:48

## 2020-01-06 RX ADMIN — SENNOSIDES AND DOCUSATE SODIUM 1 TABLET: 8.6; 5 TABLET ORAL at 09:38

## 2020-01-06 RX ADMIN — NITROGLYCERIN 200 MCG: 20 INJECTION INTRAVENOUS at 12:40

## 2020-01-06 RX ADMIN — LOSARTAN POTASSIUM 50 MG: 50 TABLET, FILM COATED ORAL at 09:38

## 2020-01-06 RX ADMIN — METHOCARBAMOL TABLETS 500 MG: 500 TABLET, COATED ORAL at 12:14

## 2020-01-06 RX ADMIN — POTASSIUM CHLORIDE 40 MEQ: 1500 TABLET, EXTENDED RELEASE ORAL at 09:39

## 2020-01-06 RX ADMIN — ASPIRIN 81 MG 81 MG: 81 TABLET ORAL at 09:38

## 2020-01-06 RX ADMIN — LIDOCAINE 1 PATCH: 50 PATCH TOPICAL at 13:20

## 2020-01-06 RX ADMIN — ACETAMINOPHEN 975 MG: 325 TABLET, FILM COATED ORAL at 00:52

## 2020-01-06 RX ADMIN — FUROSEMIDE 40 MG: 40 TABLET ORAL at 15:58

## 2020-01-06 RX ADMIN — CARVEDILOL 3.12 MG: 3.12 TABLET, FILM COATED ORAL at 07:30

## 2020-01-06 RX ADMIN — VERAPAMIL HYDROCHLORIDE 2.5 MG: 2.5 INJECTION, SOLUTION INTRAVENOUS at 12:41

## 2020-01-06 RX ADMIN — OXYCODONE HYDROCHLORIDE 10 MG: 10 TABLET ORAL at 09:44

## 2020-01-06 RX ADMIN — ATORVASTATIN CALCIUM 40 MG: 40 TABLET, FILM COATED ORAL at 15:51

## 2020-01-06 RX ADMIN — ACETAMINOPHEN 975 MG: 325 TABLET, FILM COATED ORAL at 12:14

## 2020-01-06 RX ADMIN — METHOCARBAMOL TABLETS 500 MG: 500 TABLET, COATED ORAL at 00:51

## 2020-01-06 RX ADMIN — FENTANYL CITRATE 25 MCG: 50 INJECTION, SOLUTION INTRAMUSCULAR; INTRAVENOUS at 12:35

## 2020-01-06 RX ADMIN — GABAPENTIN 100 MG: 100 CAPSULE ORAL at 09:38

## 2020-01-06 RX ADMIN — SENNOSIDES AND DOCUSATE SODIUM 1 TABLET: 8.6; 5 TABLET ORAL at 18:08

## 2020-01-06 RX ADMIN — NICOTINE 1 PATCH: 14 PATCH TRANSDERMAL at 13:19

## 2020-01-06 RX ADMIN — ONDANSETRON 4 MG: 2 INJECTION INTRAMUSCULAR; INTRAVENOUS at 12:46

## 2020-01-06 RX ADMIN — GABAPENTIN 100 MG: 100 CAPSULE ORAL at 15:52

## 2020-01-06 RX ADMIN — LIDOCAINE HYDROCHLORIDE 2 ML: 10 INJECTION, SOLUTION INFILTRATION; PERINEURAL at 12:39

## 2020-01-06 RX ADMIN — ACETAMINOPHEN 975 MG: 325 TABLET, FILM COATED ORAL at 18:07

## 2020-01-06 RX ADMIN — METHOCARBAMOL TABLETS 500 MG: 500 TABLET, COATED ORAL at 18:08

## 2020-01-06 RX ADMIN — FUROSEMIDE 40 MG: 10 INJECTION, SOLUTION INTRAMUSCULAR; INTRAVENOUS at 09:39

## 2020-01-06 RX ADMIN — SODIUM CHLORIDE 125 ML/HR: 0.9 INJECTION, SOLUTION INTRAVENOUS at 13:58

## 2020-01-06 RX ADMIN — IOHEXOL 35 ML: 350 INJECTION, SOLUTION INTRAVENOUS at 12:51

## 2020-01-06 RX ADMIN — MIDAZOLAM HYDROCHLORIDE 1 MG: 1 INJECTION, SOLUTION INTRAMUSCULAR; INTRAVENOUS at 12:35

## 2020-01-06 RX ADMIN — AMLODIPINE BESYLATE 10 MG: 10 TABLET ORAL at 09:44

## 2020-01-06 RX ADMIN — CARVEDILOL 3.12 MG: 3.12 TABLET, FILM COATED ORAL at 15:51

## 2020-01-06 NOTE — DISCHARGE SUMMARY
Discharge- Esperanza Lazaro 1975, 45 y o  female MRN: [id number]    Unit/Bed#: -01 Encounter: [id number]    Primary Care Provider: Kev Borrero DO   Date and time admitted to hospital: 1/4/2020 12:13 AM    * Hypertensive urgency  Assessment & Plan  · On arrival to the emergency department /79 appreciated  Patient was unsure what home medication is currently  States it was changes to a combination antihypertensive/diuretic when discharged from the hospital on 10/6/2019  She does however report medication compliance since discharge  · Likely related to acute left hip pain with sciatica  · Received Labetalol 10mg IV in ED  Repeat /80  · Pain regimen under plan for left hip pain  · Will discharge with pain specialist recommendation  · Anti hypertensive regimen as noted below  · Started Cozaar and Lasix per cardiology recommendations on last admission  · Labetalol p r n  For SBP greater than 180  · Norvasc 10 mg daily  · 40 mg Lasix daily  · Coreg 3 125 mg b i d   · Continue to monitor blood pressure with schedule vitals  · Cardiology following recommendations appreciated  · Patient tolerated oral Lasix and blood pressure responded appropriately will discharge home to follow up outpatient with Cardiology and PCP and pain management per above  Chest pain  Assessment & Plan  · Reported a single episode of left anterior chest wall pain earlier in the day prior to presentation the emergency department  She also reported shortness of breath associated with her pain  She does not believe that her shortness of breath is secondary to her pain but feels like it is more in her chest   · Troponin negative x3  · NM stress test 4/11/2018 that was (-)  · Cardiology consulted recommendations as noted below  · Given significant risk factors MPS stress test versus cardiac catheterization were discussed at the bedside  · Cardiac catheterization unremarkable    Okay to discharge from cardiac standpoint  · Coreg initiated; will discharge for home  Acute hip pain, left  Assessment & Plan  · Present on admission; likely reason for acute hypertension  This pain however is chronic in nature and is not new  She does report that the severity of the pain is however new  · Pain regimen as noted below  · Scheduled Robaxin 500 mg every 6 hours scheduled  · Scheduled gabapentin 100 mg t i d   · P r n  Oxycodone  · Lidocaine patch  · Bowel regimen  · Senna S 1 tablet b i d   · P r n  MiraLax    Syncope  Assessment & Plan  · Reports was walking from kitchen to family room when she had a syncopal episode that was witnessed by family  States was only unconscious a couple seconds  (+) light-headedness and nausea immediately prior to episode  · Orthostatics negative  · CT head negative  · Telemetry reviewed; since discontinued  · Cardiology following recommendations as noted below  · Echocardiogram completed follow-up results with Cardiology team outpatient  Mixed hyperlipidemia  Assessment & Plan  · Lipid panel on admission revealing cholesterol 204, HDL 27,   · Atorvastatin 40 mg daily    Morbid obesity with BMI of 45 0-49 9, adult (HCC)  Assessment & Plan  · Lifestyle modifications    Acquired hypothyroidism  Assessment & Plan  · Continue home dose Levothyroxine    Dependence on nicotine from cigarettes  Assessment & Plan  · Smoking cessation education  · Currently smoking 0 5ppd  · Nicotine patch at discharge    Headache  Assessment & Plan  · Resolved; Likely secondary to hypertensive urgency     · CT head (-)  · Ensure adequate blood pressure control  · Pain regimen as noted above      Discharging Physician / Practitioner: Victor Hugo Lowe  PCP: Laci Cabrera DO  Admission Date:   Admission Orders (From admission, onward)     Ordered        01/05/20 0746  Inpatient Admission  Once         01/04/20 0308  Place in Observation (expected length of stay for this patient is less than two midnights)  Once                   Discharge Date: 01/06/20    Resolved Problems  Date Reviewed: 1/5/2020    None          Consultations During Hospital Stay:  · Cardiology  · Physical therapy  · Occupational therapy    Procedures Performed:   · Cardiac catheterization    Significant Findings / Test Results:   As noted above    Incidental Findings:   · None     Test Results Pending at Discharge (will require follow up): · Echo results follow up outpatient with Cardiology team     Outpatient Tests Requested:  · None    Complications:  None    Reason for Admission:  Acute left hip pain with associated hypertensive emergency  Hospital Course:     Vick De La Paz is a 45 y o  female patient with past medical history of TIA, hypothyroidism, hypertension, myocardial infarction who originally presented to the hospital on 1/4/2020 due to acute left hip pain with associated hypertensive emergency  She was subsequently given IV antihypertensives and her blood pressure did trend down appropriately with pain control as well as IV antihypertensive  She did have noted elevated patient chest pain and given her high risk of she was seen by Cardiology and subsequently had a heart catheterization  It was unremarkable and coronary arteries were clear  She will continue with the new agents as noted above at discharge to follow up outpatient with Cardiology team   Given longstanding chronic left hip pain will also provide ambulatory referral to pain management  Please see above list of diagnoses and related plan for additional information  Condition at Discharge: stable     Discharge Day Visit / Exam:     Subjective:  Patient continues report left hip pain but improved with gabapentin    Vitals: Blood Pressure: 127/62 (01/06/20 1538)  Pulse: 81 (01/06/20 1508)  Temperature: 98 2 °F (36 8 °C) (01/06/20 1508)  Temp Source: Oral (01/06/20 1508)  Respirations: 19 (01/06/20 1508)  Height: 5' 6" (167 6 cm) (01/04/20 0019)  Weight - Scale: 134 kg (295 lb 13 7 oz) (01/06/20 0600)  SpO2: 92 % (01/06/20 1508)  Exam:   Physical Exam   Constitutional: She is oriented to person, place, and time  She appears well-nourished  She is cooperative  No distress  Cardiovascular: Normal rate, regular rhythm, normal heart sounds and intact distal pulses  Pulses:       Radial pulses are 2+ on the right side, and 2+ on the left side  No peripheral edema noted  Pulmonary/Chest: Effort normal and breath sounds normal  No respiratory distress  She has no wheezes  Abdominal: Soft  Bowel sounds are normal  She exhibits no distension  There is no tenderness  Neurological: She is alert and oriented to person, place, and time  Skin: Skin is warm and dry  Capillary refill takes less than 2 seconds  She is not diaphoretic  Psychiatric: She has a normal mood and affect  Her speech is normal and behavior is normal  Judgment normal    Vitals reviewed  Discharge instructions/Information to patient and family:   See after visit summary for information provided to patient and family  Provisions for Follow-Up Care:  See after visit summary for information related to follow-up care and any pertinent home health orders  Disposition:     Home    Discharge Statement:  I spent 45 minutes discharging the patient  This time was spent on the day of discharge  I had direct contact with the patient on the day of discharge  Greater than 50% of the total time was spent examining patient, answering all patient questions, arranging and discussing plan of care with patient as well as directly providing post-discharge instructions  Additional time then spent on discharge activities  Discharge Medications:  See after visit summary for reconciled discharge medications provided to patient and family        ** Please Note: This note has been constructed using a voice recognition system **

## 2020-01-06 NOTE — DISCHARGE INSTRUCTIONS
Patient transported to [facility], report given to RN at bedside, care assumed  R wrist C/D/I  Assessed R radial puncture site with RN, site is clean, dry and intact with no signs or symptoms of bleeding or hematoma  Cap refill is brisk  TR band 10cc air  Patient denies any chest pain at this time  VSS  Family at bedside  No further questions at this time  Ezetimibe (By mouth)   Ezetimibe (t-LON-s-mibe)  Lowers high cholesterol levels  Brand Name(s): Zetia   There may be other brand names for this medicine  When This Medicine Should Not Be Used: This medicine is not right for everyone  Do not use it if you had an allergic reaction to ezetimibe  How to Use This Medicine:   Tablet  · Take your medicine as directed  Your dose may need to be changed several times to find what works best for you  · If you also use cholestyramine, take it at least 2 hours after or 4 hours before you take ezetimibe  · Missed dose: Take a dose as soon as you remember  If it is almost time for your next dose, wait until then and take a regular dose  Do not take extra medicine to make up for a missed dose  · Store the medicine in a closed container at room temperature, away from heat, moisture, and direct light  Drugs and Foods to Avoid:   Ask your doctor or pharmacist before using any other medicine, including over-the-counter medicines, vitamins, and herbal products  · Do not use this medicine together with a statin medicine if you are pregnant or breastfeeding, or if you have liver disease  · Some medicines can affect how ezetimibe works  Tell your doctor if you are using any of the following:   ¨ Cyclosporine  ¨ Cholestyramine  ¨ Fenofibrate  ¨ Gemfibrozil  ¨ A blood thinner, such as warfarin  Warnings While Using This Medicine:   · Tell your doctor if you are pregnant or breastfeeding, or if you have liver disease, kidney disease, or thyroid problems    · This medicine may cause myopathy or rhabdomyolysis, which are serious muscle problems  · Your doctor will check your progress and the effects of this medicine at regular visits  Keep all appointments  · Keep all medicine out of the reach of children  Never share your medicine with anyone  Possible Side Effects While Using This Medicine:   Call your doctor right away if you notice any of these side effects:  · Allergic reaction: Itching or hives, swelling in your face or hands, swelling or tingling in your mouth or throat, chest tightness, trouble breathing  · Muscle pain, tenderness, or weakness  · Nausea, vomiting, loss of appetite, stomach pain, yellow skin or eyes  If you notice other side effects that you think are caused by this medicine, tell your doctor  Call your doctor for medical advice about side effects  You may report side effects to FDA at 8-545-FDA-3097  © 2017 [employer] Information is for End User's use only and may not be sold, redistributed or otherwise used for commercial purposes  The above information is an  only  It is not intended as medical advice for individual conditions or treatments  Talk to your doctor, nurse or pharmacist before following any medical regimen to see if it is safe and effective for you  Atorvastatin (By mouth)   Atorvastatin (a-tor-va-STAT-in)  Treats high cholesterol and triglyceride levels  Reduces the risk of angina, stroke, heart attack, or certain heart and blood vessel problems  This medicine is a statin  Brand Name(s): Lipitor   There may be other brand names for this medicine  When This Medicine Should Not Be Used: This medicine is not right for everyone  Do not use it if you had an allergic reaction to atorvastatin, if you have active liver disease, or if you are pregnant or breastfeeding  How to Use This Medicine:   Tablet  · Take your medicine as directed  Your dose may need to be changed several times to find what works best for you    · Take this medicine at the same time each day  · Swallow the tablet whole  Do not break it  · Missed dose: Take a dose as soon as you remember  If it is less than 12 hours until your next dose, skip the missed dose and take the next dose at the regular time  Do not take 2 doses of this medicine within 12 hours  · Read and follow the patient instructions that come with this medicine  Talk to your doctor or pharmacist if you have any questions  · Store the medicine in a closed container at room temperature, away from heat, moisture, and direct light  Drugs and Foods to Avoid:   Ask your doctor or pharmacist before using any other medicine, including over-the-counter medicines, vitamins, and herbal products  · Some medicines can affect how atorvastatin works  Tell your doctor if you also use birth control pills, boceprevir, cimetidine, colchicine, cyclosporine, digoxin, niacin, rifampin, spironolactone, telaprevir, medicine to treat an infection, or medicine to treat HIV/AIDS  Warnings While Using This Medicine:   · It is not safe to take this medicine during pregnancy  It could harm an unborn baby  Tell your doctor right away if you become pregnant  · Tell your doctor if you have kidney disease, diabetes, muscle pain or weakness, thyroid problems, have recently had a stroke or TIA (transient ischemic attack), or have a history of liver disease  Tell your doctor if you drink grapefruit juice or drink alcohol regularly  · This medicine can cause muscle problems, which can lead to kidney problems  · Tell any doctor or dentist who treats you that you use this medicine  You may need to stop using it if you have surgery, have an injury, or develop serious health problems  · Your doctor will do lab tests at regular visits to check on the effects of this medicine  Keep all appointments  · Keep all medicine out of the reach of children  Never share your medicine with anyone    Possible Side Effects While Using This Medicine:   Call your doctor right away if you notice any of these side effects:  · Allergic reaction: Itching or hives, swelling in your face or hands, swelling or tingling in your mouth or throat, chest tightness, trouble breathing  · Blistering, peeling, red skin rash  · Change in how much or how often you urinate  · Dark urine or pale stools, nausea, vomiting, loss of appetite, stomach pain, yellow skin or eyes  · Fever  · Muscle pain, tenderness, or weakness  · Unusual tiredness  If you notice these less serious side effects, talk with your doctor:   · Diarrhea  · Joint pain  If you notice other side effects that you think are caused by this medicine, tell your doctor  Call your doctor for medical advice about side effects  You may report side effects to FDA at 2-623-FDA-1322  © 2017 [employer] Information is for End User's use only and may not be sold, redistributed or otherwise used for commercial purposes  The above information is an  only  It is not intended as medical advice for individual conditions or treatments  Talk to your doctor, nurse or pharmacist before following any medical regimen to see if it is safe and effective for you

## 2020-01-06 NOTE — ASSESSMENT & PLAN NOTE
· Reported a single episode of left anterior chest wall pain earlier in the day prior to presentation the emergency department  She also reported shortness of breath associated with her pain  She does not believe that her shortness of breath is secondary to her pain but feels like it is more in her chest   · Troponin negative x3  · NM stress test 4/11/2018 that was (-)  · Cardiology consulted recommendations as noted below  · Given significant risk factors MPS stress test versus cardiac catheterization were discussed at the bedside  · Cardiac catheterization unremarkable  Okay to discharge from cardiac standpoint  · Coreg initiated; will discharge for home

## 2020-01-06 NOTE — ASSESSMENT & PLAN NOTE
· Reports was walking from kitchen to family room when she had a syncopal episode that was witnessed by family  States was only unconscious a couple seconds  (+) light-headedness and nausea immediately prior to episode  · Orthostatics negative  · CT head negative  · Telemetry reviewed; since discontinued  · Cardiology following recommendations as noted below  · Echocardiogram completed follow-up results with Cardiology team outpatient

## 2020-01-06 NOTE — PLAN OF CARE
Problem: Potential for Falls  Goal: Patient will remain free of falls  Description  INTERVENTIONS:  - Assess patient frequently for physical needs  -  Identify cognitive and physical deficits and behaviors that affect risk of falls    -  [place] fall precautions as indicated by assessment   - Educate patient/family on patient safety including physical limitations  - Instruct patient to call for assistance with activity based on assessment  - Modify environment to reduce risk of injury  - Consider OT/PT consult to assist with strengthening/mobility  Outcome: Progressing

## 2020-01-06 NOTE — PHYSICAL THERAPY NOTE
Physical Therapy Cancellation Note    PT order received  Chart review performed  At this time, PT evaluation cancelled per conversation with RN Jl Green RN requesting PT await evaluation, pt pending cardiac cath this afternoon  PT notified OT Debora Paiges of same  PT will follow and evaluate as appropriate      Apollo Traver, PT, DPT

## 2020-01-06 NOTE — OCCUPATIONAL THERAPY NOTE
Occupational Therapy Cancellation Note        Patient Name: Antonieta FLOREZ Date: 1/6/2020    OT order received  Chart review completed  Spoke with PT Paco Barron who reports WEN Rosario requesting to await initial evaluations  Pt pending cardiac cath this afternoon  Will continue to follow and evaluate as appropriate      Yesi Beck, OTR/L

## 2020-01-06 NOTE — ASSESSMENT & PLAN NOTE
· On arrival to the emergency department /79 appreciated  Patient was unsure what home medication is currently  States it was changes to a combination antihypertensive/diuretic when discharged from the hospital on 10/6/2019  She does however report medication compliance since discharge  · Likely related to acute left hip pain with sciatica  · Received Labetalol 10mg IV in ED  Repeat /80  · Pain regimen under plan for left hip pain  · Will discharge with pain specialist recommendation  · Anti hypertensive regimen as noted below  · Started Cozaar and Lasix per cardiology recommendations on last admission  · Labetalol p r n  For SBP greater than 180  · Norvasc 10 mg daily  · 40 mg Lasix daily  · Coreg 3 125 mg b i d   · Continue to monitor blood pressure with schedule vitals  · Cardiology following recommendations appreciated  · Patient tolerated oral Lasix and blood pressure responded appropriately will discharge home to follow up outpatient with Cardiology and PCP and pain management per above

## 2020-01-06 NOTE — PROGRESS NOTES
Cardiology Progress Note    Assessment/Plan   Non-cardiac chest pain  Vagal episode in cath lab  HTN  Syncope  Tobacco abuse  Morbid obesity  Hx of TIA  HLD      Reassuring diagnostic catheterization this morning  No CAD  Expectant management post cath via right radial artery  Limited weight bearing on right wrist for one week  Change furosemide to PO  Start ezetimibe for CAD risk equivalency and persistently elevated LDL  She may be a candidate for PCSK9 inhibitors as an outpatient  Monitor HR/BP given recent vagal episode  Monitor renal function  Keep K > 4, Mg > 2, Ca > 8 and PO4 > 2 5  If BP stabilizes and she is able to tolerate PO furosemide, she can be seen as an outpatient  LOS: 1 day     Subjective   No acute events  Patient underwent cardiac catheterization which shows normal/mildly elevated LVEDP and no coronary artery disease  During procedure, she suffered a vasovagal episode which responded to atropine  Objective     Vital signs in last 24 hours:  Temp:  [97 5 °F (36 4 °C)-98 3 °F (36 8 °C)] 97 8 °F (36 6 °C)  HR:  [71-80] 74  Resp:  [18-19] 18  BP: (116-162)/(72-93) 121/72      Intake/Output Summary (Last 24 hours) at 1/6/2020 1335  Last data filed at 1/6/2020 0944  Gross per 24 hour   Intake 0 ml   Output 500 ml   Net -500 ml     Weight (last 2 days)     Date/Time   Weight    01/06/20 0600   134 (295 86)    01/05/20 0556   135 (298 72)    01/04/20 0019   136 (299 16)                Physical Exam:  General: AAOx3, NAD  HEENT: Normocephalic/Atraumatic, No thyromegaly, lymphadenopathy, JVD or carotid bruits  Cardiovasc: Regular rhythm with a normal rate  No murmurs, rubs or gallops  Radial, carotid, femoral, dorsalis pedis and posterior tibial pulses are 2+/4  Chest/Pulm: CTAB, no wheezes, rales or rhonchi  Abdomen: +BSx4, Soft, non-tender  No organomegaly, rebound, rigidity or guarding  Extremities: No edema  Right radial access site is C/D/I            Scheduled Meds:  Current Facility-Administered Medications:  acetaminophen 975 mg Oral Q6H [facility], PAAngelC   amLODIPine 10 mg Oral Daily Gayathri Purcell MD   aspirin 81 mg Oral Daily Jairo Murphy, JOJO   atorvastatin 40 mg Oral Daily With LAILA Maureen Antonio, PA-C   calcium carbonate 1,000 mg Oral Daily PRN Katheren Grammes, PA-C   carvedilol 3 125 mg Oral BID With Meals Lio Morales MD   docusate sodium 100 mg Oral BID PRN Katheren Grammes, JOJO   furosemide 40 mg Intravenous Daily iLo Morales MD   gabapentin 100 mg Oral TID [facility], [place]   Labetalol HCl 10 mg Intravenous Q6H PRN Katheren Grammes, PA-C   levothyroxine 100 mcg Oral Early Morning Katheren Grammes, PA-C   lidocaine 1 patch Topical Daily Katheren Grammes, PA-C   losartan 50 mg Oral Daily Katheren Grammes, PA-C   methocarbamol 500 mg Oral Q6H [address] [facility], [place]   nicotine 1 patch Transdermal Daily Katheren Grammes, PA-C   ondansetron 4 mg Intravenous Q6H PRN Katheren Grammes, PA-C   oxyCODONE 10 mg Oral Q4H PRN Katheren Grammes, PA-C   oxyCODONE 5 mg Oral Q4H PRN Katheren Grammes, PA-C   polyethylene glycol 17 g Oral Daily PRN Carla Kincaid MD   potassium chloride 40 mEq Oral BID Gayathri Purcell MD   senna-docusate sodium 1 tablet Oral BID [facility], [place]   sodium chloride 125 mL/hr Intravenous Continuous Katherine Godoy DO     Continuous Infusions:  sodium chloride 125 mL/hr     PRN Meds: calcium carbonate    docusate sodium    Labetalol HCl    ondansetron    oxyCODONE    oxyCODONE    polyethylene glycol          Lab Review   Results for Umair Mai (MRN [id number]) as of 1/6/2020 13:35   1/6/2020 05:04   Sodium 136   Potassium 4 2   Chloride 101   CO2 24   Anion Gap 11   BUN 11   Creatinine 0 82   Glucose, Random 98   Calcium 8 8   eGFR 102     Results for LIDYA FORBES (MRN [id number]) as of 1/6/2020 13:35   1/6/2020 05:04   WBC 10 27 (H)   Red Blood Cell Count 5 04   Hemoglobin 14 1   HCT 44 7   MCV 89 MCH 28 0   MCHC 31 5   RDW 14 3   Platelet Count 944     Results for LIDYA FORBES (MRN [id number]) as of 1/6/2020 13:35   1/5/2020 05:54   Cholesterol 204 (H)   Triglycerides 110   HDL 27 (L)   Non-HDL Cholesterol 177   LDL Direct 155 (H)

## 2020-01-07 NOTE — UTILIZATION REVIEW
Notification of Discharge  This is a Notification of Discharge from our facility [address]  Please be advised that this patient has been discharge from our facility  Below you will find the admission and discharge date and time including the patients disposition  PRESENTATION DATE: 1/4/2020 12:13 AM  OBS ADMISSION DATE: [id number]  IP ADMISSION DATE: 1/5/20 0746   DISCHARGE DATE: 1/6/2020  6:43 PM  DISPOSITION: Home/Self Care Home/Self Care   Admission Orders listed below:  Admission Orders (From admission, onward)     Ordered        01/05/20 0746  Inpatient Admission  Once         01/04/20 0308  Place in Observation (expected length of stay for this patient is less than two midnights)  Once                   Please contact the UR Department if additional information is required to close this patient's authorization/case  [address] [facility] Utilization Review Department  Main: [phone] x carefully listen to the prompts  All voicemails are confidential   [email or web address]  Send all requests for admission clinical reviews, approved or denied determinations and any other requests to dedicated fax number below belonging to the campus where the patient is receiving treatment   List of dedicated fax numbers:  [address] [place] [address] DENIALS (Administrative/Medical Necessity) [phone]   [address]  (Maternity/NICU/Pediatrics) [phone]   Melida Boone [phone]   [facility] [phone]   Jonathan Alexandre [phone]   Abi Spicer [facility] [address] [facility] [phone]   [facility]  [phone]   [address] [facility] [address] [facility]  [address] [facility] [phone]

## 2020-01-28 ENCOUNTER — HOSPITAL ENCOUNTER (EMERGENCY)
Facility: HOSPITAL | Age: 45
Discharge: HOME | End: 2020-01-28
Attending: EMERGENCY MEDICINE
Payer: COMMERCIAL

## 2020-01-28 ENCOUNTER — APPOINTMENT (EMERGENCY)
Dept: RADIOLOGY | Facility: HOSPITAL | Age: 45
End: 2020-01-28
Payer: COMMERCIAL

## 2020-01-28 VITALS
BODY MASS INDEX: 46.26 KG/M2 | SYSTOLIC BLOOD PRESSURE: 186 MMHG | TEMPERATURE: 98 F | OXYGEN SATURATION: 98 % | HEART RATE: 90 BPM | DIASTOLIC BLOOD PRESSURE: 102 MMHG | RESPIRATION RATE: 20 BRPM | WEIGHT: 286.6 LBS

## 2020-01-28 DIAGNOSIS — S93.409A ANKLE SPRAIN: Primary | ICD-10-CM

## 2020-01-28 PROCEDURE — 99283 EMERGENCY DEPT VISIT LOW MDM: CPT

## 2020-01-28 PROCEDURE — 73630 X-RAY EXAM OF FOOT: CPT

## 2020-01-28 PROCEDURE — 99283 EMERGENCY DEPT VISIT LOW MDM: CPT | Performed by: EMERGENCY MEDICINE

## 2020-01-28 PROCEDURE — 73610 X-RAY EXAM OF ANKLE: CPT

## 2020-01-28 RX ORDER — ACETAMINOPHEN 325 MG/1
650 TABLET ORAL ONCE
Status: COMPLETED | OUTPATIENT
Start: 2020-01-28 | End: 2020-01-28

## 2020-01-28 RX ORDER — TRAMADOL HYDROCHLORIDE 50 MG/1
50 TABLET ORAL ONCE
Status: COMPLETED | OUTPATIENT
Start: 2020-01-28 | End: 2020-01-28

## 2020-01-28 RX ADMIN — TRAMADOL HYDROCHLORIDE 50 MG: 50 TABLET, FILM COATED ORAL at 13:09

## 2020-01-28 RX ADMIN — ACETAMINOPHEN 650 MG: 325 TABLET ORAL at 12:11

## 2020-01-28 NOTE — ED PROVIDER NOTES
History  Chief Complaint   Patient presents with   [facility] Injury     Patient states she slipped an almost fell while coming out of the bank  Patient states she was able to catch herself but in now having right pain and states she is uanble to move her right foot  Patient slipped and lost footing while walking out of bank today  C/o right ankle pain  No actual fall  C/o pain over dorsal aspect of right ankle and anterior aspect of right shin  Pain worse with movement  No numbness  No knee pain  No other injuries  Pain is moderate in severity  No other associated symptoms  Sudden onset  Prior to Admission Medications   Prescriptions Last Dose Informant Patient Reported? Taking?    amLODIPine (NORVASC) 10 mg tablet 1/28/2020 at Unknown time Self No Yes   Sig: Take 1 tablet (10 mg total) by mouth daily   aspirin 81 MG tablet 1/28/2020 at Unknown time Self Yes Yes   Sig: Take 81 mg by mouth daily   atorvastatin (LIPITOR) 40 mg tablet 1/27/2020 at Unknown time Self No Yes   Sig: Take 1 tablet (40 mg total) by mouth daily with dinner   carvedilol (COREG) 3 125 mg tablet 1/28/2020 at Unknown time Self No Yes   Sig: Take 1 tablet (3 125 mg total) by mouth 2 (two) times a day with meals   docusate sodium (COLACE) 100 mg capsule Not Taking at Unknown time Self No No   Sig: Take 1 capsule (100 mg total) by mouth 2 (two) times a day as needed for constipation   ezetimibe (ZETIA) 10 mg tablet 1/27/2020 at Unknown time Self No Yes   Sig: Take 1 tablet (10 mg total) by mouth daily at bedtime   furosemide (LASIX) 40 mg tablet 1/28/2020 at Unknown time Self No Yes   Sig: Take 1 tablet (40 mg total) by mouth daily   gabapentin (NEURONTIN) 100 mg capsule 1/28/2020 at Unknown time Self No Yes   Sig: Take 1 capsule (100 mg total) by mouth 3 (three) times a day   levothyroxine 100 mcg tablet 1/28/2020 at Unknown time Self No Yes   Sig: Take 1 tablet (100 mcg total) by mouth daily in the early morning   losartan (COZAAR) 50 mg tablet 1/28/2020 at Unknown time Self No Yes   Sig: Take 1 tablet (50 mg total) by mouth daily   methocarbamol (ROBAXIN) 500 mg tablet Not Taking at Unknown time Self No No   Sig: Take 1 tablet (500 mg total) by mouth 2 (two) times a day   nicotine (NICODERM CQ) 14 mg/24hr TD 24 hr patch Not Taking at Unknown time Self No No   Sig: Place 1 patch on the skin daily   potassium chloride (K-DUR,KLOR-CON) 20 mEq tablet 1/28/2020 at Unknown time Self No Yes   Sig: Take 2 tablets (40 mEq total) by mouth 2 (two) times a day      Facility-Administered Medications: None       Past Medical History:   Diagnosis Date    History of MI (myocardial infarction)     History of TIA (transient ischemic attack)     Hypertension     Hypothyroidism     hypothyroidism    Morbid obesity with BMI of 45 0-49 9, adult (HCC)     TIA (transient ischemic attack)        Past Surgical History:   Procedure Laterality Date    BREAST SURGERY Left     TUBAL LIGATION         Family History   Problem Relation Age of Onset    Heart disease Mother     Hypertension Mother     Diabetes Father      I have reviewed and agree with the history as documented  Social History     Tobacco Use    Smoking status: Current Every Day Smoker     Packs/day: 0 50     Types: Cigarettes    Smokeless tobacco: Never Used   Substance Use Topics    Alcohol use: Yes     Frequency: Monthly or less     Comment: occasional    Drug use: No        Review of Systems   Constitutional: Negative for chills and fever  Gastrointestinal: Negative for abdominal pain and nausea  Musculoskeletal: Positive for arthralgias and gait problem  Negative for back pain, joint swelling and neck pain  Skin: Negative for color change, pallor, rash and wound  Neurological: Negative for weakness and numbness  Hematological: Does not bruise/bleed easily  Physical Exam  Physical Exam   Constitutional: She is oriented to person, place, and time   She appears well-developed and well-nourished  No distress  HENT:   Head: Normocephalic and atraumatic  Nose: Nose normal    Eyes: Conjunctivae are normal  No scleral icterus  Neck: Normal range of motion  Neck supple  Cardiovascular: Normal rate and regular rhythm  Pulmonary/Chest: Effort normal  No respiratory distress  Musculoskeletal: She exhibits tenderness  She exhibits no edema or deformity  Pain with rom of right ankle  Tender over dorsal aspect of foot and anterior shin  Able to flex and extend ankle but with pain  No significant swelling noted  Distal neurovascular exam intact  Neurological: She is alert and oriented to person, place, and time  She exhibits normal muscle tone  Coordination normal    Skin: Skin is warm and dry  No rash noted  No erythema  No pallor  Psychiatric: She has a normal mood and affect  Her behavior is normal        Vital Signs  ED Triage Vitals   Temperature Pulse Respirations Blood Pressure SpO2   01/28/20 1108 01/28/20 1108 01/28/20 1108 01/28/20 1108 01/28/20 1108   98 °F (36 7 °C) 100 18 (!) 216/121 98 %      Temp Source Heart Rate Source Patient Position - Orthostatic VS BP Location FiO2 (%)   01/28/20 1108 01/28/20 1108 01/28/20 1108 01/28/20 1108 --   Oral Monitor Sitting Left arm       Pain Score       01/28/20 1211       8           Vitals:    01/28/20 1108 01/28/20 1257 01/28/20 1345   BP: (!) 216/121 (!) 207/98 (!) 186/102   Pulse: 100 90    Patient Position - Orthostatic VS: Sitting Sitting          Visual Acuity      ED Medications  Medications   acetaminophen (TYLENOL) tablet 650 mg (650 mg Oral Given 1/28/20 1211)   traMADol (ULTRAM) tablet 50 mg (50 mg Oral Given 1/28/20 1309)       Diagnostic Studies  Results Reviewed     None                 XR foot 3+ views RIGHT   ED Interpretation by Johnnie Blackmon MD (01/28 1309)   No fracture      Final Result by Estella Mario MD (01/28 1430)      No acute osseous abnormality              Workstation performed: [id number] XR ankle 3+ views RIGHT   ED Interpretation by Ollie Tyler MD (01/28 1309)   No fracture      Final Result by Yaima Sidhu MD (01/28 1430)      No acute osseous abnormality  Workstation performed: [id number]                    Procedures  Procedures         ED Course  ED Course as of Feb 16 1645 Tue Jan 28, 2020   1146 Will recheck BP  Patient with pain  States she did take her AM BP meds  MDM      Disposition  Final diagnoses: Ankle sprain     Time reflects when diagnosis was documented in both MDM as applicable and the Disposition within this note     Time User Action Codes Description Comment    1/28/2020  1:39 PM Fox Escalona Add [E40 526E] Ankle sprain       ED Disposition     ED Disposition Condition Date/Time Comment    Discharge Stable Tue Jan 28, 2020  1:39 PM Rene Kruger discharge to home/self care  Follow-up Information     Follow up With Specialties Details Why Contact Info Additional [address] [facility] [place] [facility] In 5 days  [address]  [facility] [id number]  [address] [place] [address], [address] [place] [address], [address] [facility], DO Family Medicine In 2 days for recheck of your blood pressure which was elevated today   [address] [place]   [phone]             Discharge Medication List as of 1/28/2020  1:40 PM      CONTINUE these medications which have NOT CHANGED    Details   amLODIPine (NORVASC) 10 mg tablet Take 1 tablet (10 mg total) by mouth daily, Starting Tue 4/16/2019, Print      aspirin 81 MG tablet Take 81 mg by mouth daily, Until Discontinued, Historical Med      atorvastatin (LIPITOR) 40 mg tablet Take 1 tablet (40 mg total) by mouth daily with dinner, Starting Mon 1/6/2020, Until Sun 4/5/2020, Normal carvedilol (COREG) 3 125 mg tablet Take 1 tablet (3 125 mg total) by mouth 2 (two) times a day with meals, Starting Mon 1/6/2020, Until Sun 4/5/2020, Normal      ezetimibe (ZETIA) 10 mg tablet Take 1 tablet (10 mg total) by mouth daily at bedtime, Starting Mon 1/6/2020, Until Sun 4/5/2020, Normal      furosemide (LASIX) 40 mg tablet Take 1 tablet (40 mg total) by mouth daily, Starting Mon 1/6/2020, Until Sun 4/5/2020, Normal      gabapentin (NEURONTIN) 100 mg capsule Take 1 capsule (100 mg total) by mouth 3 (three) times a day, Starting Mon 1/6/2020, Until Sun 4/5/2020, Normal      levothyroxine 100 mcg tablet Take 1 tablet (100 mcg total) by mouth daily in the early morning, Starting Mon 10/7/2019, Normal      losartan (COZAAR) 50 mg tablet Take 1 tablet (50 mg total) by mouth daily, Starting Mon 10/7/2019, Normal      potassium chloride (K-DUR,KLOR-CON) 20 mEq tablet Take 2 tablets (40 mEq total) by mouth 2 (two) times a day, Starting Mon 1/6/2020, Until Sun 4/5/2020, Normal      docusate sodium (COLACE) 100 mg capsule Take 1 capsule (100 mg total) by mouth 2 (two) times a day as needed for constipation, Starting Sun 10/6/2019, Normal      methocarbamol (ROBAXIN) 500 mg tablet Take 1 tablet (500 mg total) by mouth 2 (two) times a day, Starting Mon 11/4/2019, Print      nicotine (NICODERM CQ) 14 mg/24hr TD 24 hr patch Place 1 patch on the skin daily, Starting Tue 1/7/2020, Until Thu 2/6/2020, Normal           No discharge procedures on file      ED Provider  Electronically Signed by           Kelly Goyal MD  02/16/20 9287

## 2020-02-04 ENCOUNTER — OFFICE VISIT (OUTPATIENT)
Dept: OBGYN CLINIC | Facility: MEDICAL CENTER | Age: 45
End: 2020-02-04
Payer: COMMERCIAL

## 2020-02-04 VITALS — SYSTOLIC BLOOD PRESSURE: 195 MMHG | DIASTOLIC BLOOD PRESSURE: 125 MMHG | HEART RATE: 97 BPM

## 2020-02-04 DIAGNOSIS — S99.911A INJURY OF ANKLE AND FOOT, RIGHT, INITIAL ENCOUNTER: Primary | ICD-10-CM

## 2020-02-04 DIAGNOSIS — S99.921A INJURY OF ANKLE AND FOOT, RIGHT, INITIAL ENCOUNTER: Primary | ICD-10-CM

## 2020-02-04 PROCEDURE — 99203 OFFICE O/P NEW LOW 30 MIN: CPT | Performed by: EMERGENCY MEDICINE

## 2020-02-04 NOTE — PATIENT INSTRUCTIONS
Ankle Sprain   AMBULATORY CARE:   An ankle sprain  happens when 1 or more ligaments in your ankle joint stretch or tear  Ligaments are tough tissues that connect bones  Ligaments support your joints and keep your bones in place  Common symptoms include the following:   · Trouble moving your ankle or foot    · Pain when you touch or put weight on your ankle    · Bruised, swollen, or misshapen ankle  Seek care immediately if:   · You have severe pain in your ankle  · Your foot or toes are cold or numb  · Your ankle becomes more weak or unstable (wobbly)  · You are unable to put any weight on your ankle or foot  · Your swelling has increased or returned  Contact your healthcare provider if:   · Your pain does not go away, even after treatment  · You have questions or concerns about your condition or care  Treatment:   · Medicines      ¨ NSAIDs , such as ibuprofen, help decrease swelling, pain, and fever  This medicine is available with or without a doctor's order  NSAIDs can cause stomach bleeding or kidney problems in certain people  If you take blood thinner medicine, always ask your healthcare provider if NSAIDs are safe for you  Always read the medicine label and follow directions  ¨ Acetaminophen  decreases pain  It is available without a doctor's order  Ask how much to take and how often to take it  Follow directions  Acetaminophen can cause liver damage if not taken correctly  ¨ Prescription pain medicine  may be given  Ask how to take this medicine safely  · Surgery  may be needed to repair or replace a torn ligament if your sprain does not heal with other treatments  Your healthcare provider may use screws to attach the bones in your ankle together  The screws may help support your ankle and make it stable  Ask your healthcare provider for more information about surgery to treat your ankle sprain    Self care:   · Use support devices,  such as a brace, cast, or splint, may be needed to limit your movement and protect your joint  You may need to use crutches to decrease your pain as you move around  · Go to physical therapy as directed  A physical therapist teaches you exercises to help improve movement and strength, and to decrease pain  · Rest  your ankle so that it can heal  Return to normal activities as directed  · Apply ice on your ankle for 15 to 20 minutes every hour or as directed  Use an ice pack, or put crushed ice in a plastic bag  Cover it with a towel  Ice helps prevent tissue damage and decreases swelling and pain  · Compress  your ankle  Ask if you should wrap an elastic bandage around your injured ligament  An elastic bandage provides support and helps decrease swelling and movement so your joint can heal  Wear as long as directed  · Elevate  your ankle above the level of your heart as often as you can  This will help decrease swelling and pain  Prop your ankle on pillows or blankets to keep it elevated comfortably  Prevent another ankle sprain:   · Let your ankle heal   Find out how long your ligament needs to heal  Do not do any physical activity until your healthcare provider says it is okay  If you start activity too soon, you may develop a more serious injury  · Always warm up and stretch  before you exercise or play sports  · Use the right equipment  Always wear shoes that fit well and are made for the activity that you are doing  You may also need ankle supports, elbow and knee pads, or braces  Follow up with your healthcare provider as directed:  Write down your questions so you remember to ask them during your visits  © 2017 [employer] Information is for End User's use only and may not be sold, redistributed or otherwise used for commercial purposes  All illustrations and images included in CareNotes® are the copyrighted property of [employer]  or Gerardo Blackman    The above information is an  only  It is not intended as medical advice for individual conditions or treatments  Talk to your doctor, nurse or pharmacist before following any medical regimen to see if it is safe and effective for you  Ankle Exercises   AMBULATORY CARE:   What you need to know about ankle exercises: Ankle exercises help strengthen your ankle and improve its function after injury  These are beginning exercises  Ask your healthcare provider if you need to see a physical therapist for more advanced exercises  · Do these exercises 3 to 5 days a week , or as directed by your healthcare provider  Ask if you should perform the exercises on each ankle  · Do the exercises in the order that your healthcare provider recommends  This will help prevent swelling, chronic pain, and reinjury  Start with range of motion exercises  Then progress to strengthening exercises, and finally to balancing exercises  · Warm up before you do ankle exercises  Walk or ride a stationary bike for 5 to 10 minutes to prepare your ankle for movement  · Stop if you feel pain  It is normal to feel some discomfort at first  Regular exercise will help decrease your discomfort over time  How to perform range of motion exercises safely:  Begin with range of motion exercises to improve flexibility  Ask your healthcare provider when you can progress to strengthening exercises  · Ankle alphabet:  Sit on a chair so that your feet do not touch the floor  Use your big toe to write each letter of the alphabet  Use only your foot and ankle, and keep your movements small  Do 2 sets  · Calf stretches:      ¨ Sitting calf stretches with a towel:  Sit on the floor with both legs out straight in front of you  Loop a towel around the ball of your injured foot  Grasp the ends of the towel and pull it toward you  Keep your leg and back straight  Do not lean forward as you pull the towel  Hold for 30 seconds  Then relax for 30 seconds  Do 2 sets of 10  ¨ Standing calf stretches:  Stand facing a wall with the foot that is not injured forward and your knee slightly bent  Keep the leg with the injured foot straight and behind you with your toes pointed in slightly  With both heels flat on the floor, press your hips forward  Do not arch your back  Hold for 30 seconds, and then relax for 30 seconds  Do 2 sets of 10  Repeat with your leg bent  Do 2 sets of 10  How to perform strengthening exercises safely:  After you can perform range of motion exercises without pain, you may begin strengthening exercises  Ask your healthcare provider when you can progress to balancing exercises  · Ankle movement in 4 directions:  Sit on the floor with your legs straight in front of you  Keep your heels on the floor for support  ¨ Dorsiflexion:  Begin with your toes pointing straight up  Pull your toes toward your body  Slowly return to the starting position  Do 3 sets of 5      ¨ Plantar flexion:  Begin with your toes pointing straight up  Push your toes away from your body  Slowly return to the starting position  Do 3 sets of 5            ¨ Inversion:  Begin with your toes pointing straight up  Push your toes inward, toward each other  Slowly return to the starting position  Do 3 sets of 5      ¨ Eversion:  Begin with your toes pointing straight up  Push your toes outward, away from each other  Slowly return to the starting position  Do 3 sets of 5          · Toe curls with a towel:  Sit on a chair so that both of your feet are flat on the floor  Place a small towel on the floor in front of your injured foot  Grab the center of the towel with your toes and curl the towel toward you  Relax and repeat  Do 1 set of 5            · [place] [employer] pick-ups:  Sit on a chair so that both of your feet are flat on the floor  Place 20 marbles on the floor in front of your injured foot  Use your toes to  one marble at a time and place it into a bowl   Repeat until you have picked up all the marbles  Do 1 set  · Heel raises:      ¨ Single leg heel raises:  Stand with your weight evenly on both feet  Hold on to a chair or a wall for balance  Lift the foot that is not injured off the floor so all your weight is placed on your injured foot  Raise the heel of your injured foot as high as you can  Slowly lower your heel to the floor  Do 1 set of 10  ¨ Double leg heel raises:  Stand with your weight evenly on both feet  Hold on to a chair or a wall for balance  Raise both of your heels as high as you can  Slowly lower your heels to the floor  Do 1 set of 10  · Heel and toe walks:      ¨ Heel walks:  Begin in a standing position  Lift your toes off the floor and walk on your heels  Keep your toes lifted as high as possible  Do 2 sets of 10  ¨ Toe walks:  Begin in a standing position  Lift your heels off the floor and walk on the balls and toes of your feet  Keep your heels lifted as high as possible  Do 2 sets of 10  How to perform a balance exercise safely:  After you can perform strengthening exercises without pain, you may do this beginning balancing exercise  Ask your healthcare provider for more advanced balance exercises  · Single leg stance:  Stand with your weight evenly on both feet, or hold on to a chair or a wall  Do not lean to the side  Lift the foot that is not injured off the floor so all your weight is placed on your injured foot  Balance on your injured foot  Ask your healthcare provider how long to hold this position  Contact your healthcare provider if:   · Your pain becomes worse  · You have new pain  · You have questions or concerns about your condition, care, or exercise program   © 2017 [facility] [facility] Information is for End User's use only and may not be sold, redistributed or otherwise used for commercial purposes   All illustrations and images included in CareNotes® are the copyrighted property of [employer]  or Gerardo Blackman  The above information is an  only  It is not intended as medical advice for individual conditions or treatments  Talk to your doctor, nurse or pharmacist before following any medical regimen to see if it is safe and effective for you

## 2020-02-04 NOTE — PROGRESS NOTES
Assessment/Plan:    Diagnoses and all orders for this visit:    Injury of ankle and foot, right, initial encounter  -     Ambulatory referral to Physical Therapy; Future     patient with ankle pain foot pain out of proportion to exam   We have provided a low Cam boot for the patient given her symptoms however I did instruct her to only use it during ambulation  She may take it off to perform range-of-motion exercises and may walk without it at home  She may wean from boot and transition into the Aircast and or postop shoe as tolerated  We will start physical therapy  Return in about 4 weeks (around 3/3/2020)  Chief Complaint:     Chief Complaint   Patient presents with    Right Ankle - Pain       Subjective:   Patient ID: Humza Will is a 45 y o  female  New patient presents for right foot and ankle injury occurring 01/28/2020 after leaving a store states she slipped and her right foot and ankle slipped down front of her  Denies a twisting mechanism to the ankle but complains of ankle pain and swelling was evaluated with x-rays of the ankle and foot she is been wearing a an Aircast and postop shoe in using a walker  Pain is mostly of the dorsal aspect of the foot and anterior aspect of the ankle it does radiate up the posterior calf  Review of Systems   Constitutional: Negative for chills and fever  HENT: Negative for drooling and sore throat  Eyes: Negative for pain and redness  Respiratory: Negative for shortness of breath and stridor  Cardiovascular: Negative for chest pain and palpitations  Gastrointestinal: Negative for abdominal pain  Genitourinary: Negative for difficulty urinating  Musculoskeletal: Positive for arthralgias, gait problem and joint swelling  Skin: Negative for rash  Neurological: Negative for weakness  Psychiatric/Behavioral: Negative for self-injury and suicidal ideas         The following portions of the patient's chart were reviewed and updated as appropriate:    Allergy:    Allergies   Allergen Reactions    Trimecaine Hallucinations    Toradol [Ketorolac Tromethamine] Anxiety         Past Medical History:   Diagnosis Date    History of MI (myocardial infarction)     History of TIA (transient ischemic attack)     Hypertension     Hypothyroidism     hypothyroidism    Morbid obesity with BMI of 45 0-49 9, adult (HCC)     TIA (transient ischemic attack)        Past Surgical History:   Procedure Laterality Date    BREAST SURGERY Left     TUBAL LIGATION         Social History     Socioeconomic History    Marital status: /Civil Union     Spouse name: Not on file    Number of children: Not on file    Years of education: Not on file    Highest education level: Not on file   Occupational History    Not on file   Social Needs    Financial resource strain: Not on file    Food insecurity:     Worry: Not on file     Inability: Not on file    Transportation needs:     Medical: Not on file     Non-medical: Not on file   Tobacco Use    Smoking status: Current Every Day Smoker     Packs/day: 0 50     Types: Cigarettes    Smokeless tobacco: Never Used   Substance and Sexual Activity    Alcohol use: Yes     Frequency: Monthly or less     Comment: occasional    Drug use: No    Sexual activity: Yes     Birth control/protection: None   Lifestyle    Physical activity:     Days per week: Not on file     Minutes per session: Not on file    Stress: Not on file   Relationships    Social connections:     Talks on phone: Not on file     Gets together: Not on file     Attends Gnosticist service: Not on file     Active member of club or organization: Not on file     Attends meetings of clubs or organizations: Not on file     Relationship status: Not on file    Intimate partner violence:     Fear of current or ex partner: Not on file     Emotionally abused: Not on file     Physically abused: Not on file     Forced sexual activity: Not on file   Other Topics Concern    Not on file   Social History Narrative    Not on file       Family History   Problem Relation Age of Onset    Heart disease Mother     Hypertension Mother     Diabetes Father        Medications:    Current Outpatient Medications:     amLODIPine (NORVASC) 10 mg tablet, Take 1 tablet (10 mg total) by mouth daily, Disp: 30 tablet, Rfl: 0    aspirin 81 MG tablet, Take 81 mg by mouth daily, Disp: , Rfl:     atorvastatin (LIPITOR) 40 mg tablet, Take 1 tablet (40 mg total) by mouth daily with dinner, Disp: 90 tablet, Rfl: 0    carvedilol (COREG) 3 125 mg tablet, Take 1 tablet (3 125 mg total) by mouth 2 (two) times a day with meals, Disp: 180 tablet, Rfl: 0    docusate sodium (COLACE) 100 mg capsule, Take 1 capsule (100 mg total) by mouth 2 (two) times a day as needed for constipation, Disp: 10 capsule, Rfl: 0    ezetimibe (ZETIA) 10 mg tablet, Take 1 tablet (10 mg total) by mouth daily at bedtime, Disp: 90 tablet, Rfl: 0    furosemide (LASIX) 40 mg tablet, Take 1 tablet (40 mg total) by mouth daily, Disp: 90 tablet, Rfl: 0    gabapentin (NEURONTIN) 100 mg capsule, Take 1 capsule (100 mg total) by mouth 3 (three) times a day, Disp: 270 capsule, Rfl: 0    levothyroxine 100 mcg tablet, Take 1 tablet (100 mcg total) by mouth daily in the early morning, Disp: 30 tablet, Rfl: 0    losartan (COZAAR) 50 mg tablet, Take 1 tablet (50 mg total) by mouth daily, Disp: 30 tablet, Rfl: 0    methocarbamol (ROBAXIN) 500 mg tablet, Take 1 tablet (500 mg total) by mouth 2 (two) times a day, Disp: 10 tablet, Rfl: 0    nicotine (NICODERM CQ) 14 mg/24hr TD 24 hr patch, Place 1 patch on the skin daily, Disp: 30 patch, Rfl: 0    potassium chloride (K-DUR,KLOR-CON) 20 mEq tablet, Take 2 tablets (40 mEq total) by mouth 2 (two) times a day, Disp: 360 tablet, Rfl: 0    Patient Active Problem List   Diagnosis    Chest pain    Hypertensive urgency    Headache    Dependence on nicotine from cigarettes    Obesity  Thyroid disease    Essential hypertension    Breast abscess    Acquired hypothyroidism    Morbid obesity with BMI of 45 0-49 9, adult (HCC)    Syncope    Acute hip pain, left    Mixed hyperlipidemia       Objective:  BP (!) 195/125 Comment: DR COLES  Pulse 97     Right Ankle Exam     Tests   Varus tilt: positive    Other   Erythema: absent  Sensation: normal  Pulse: present     Comments:  No significant swelling there is no bruising no plantar ecchymosis noted  There is diffuse tenderness of the ankle and foot to palpation and even light touch  No deformity noted  There is decreased range of motion due to pain  Physical Exam   Constitutional: She is oriented to person, place, and time  She appears well-developed and well-nourished  HENT:   Head: Normocephalic and atraumatic  Eyes: Conjunctivae are normal    Neck: Normal range of motion  Neck supple  Cardiovascular: Normal rate and intact distal pulses  Pulmonary/Chest: Effort normal  No respiratory distress  Neurological: She is alert and oriented to person, place, and time  Skin: Skin is warm and dry  Psychiatric: She has a normal mood and affect  Her behavior is normal    Vitals reviewed  Neurologic Exam     Mental Status   Oriented to person, place, and time  Procedures    I have personally reviewed pertinent films in PACS  and I have personally reviewed the written report of the pertinent studies       Xrays foot and ankle

## 2020-02-06 PROCEDURE — 4A023N7 MEASUREMENT OF CARDIAC SAMPLING AND PRESSURE, LEFT HEART, PERCUTANEOUS APPROACH: ICD-10-PCS | Performed by: INTERNAL MEDICINE

## 2020-02-06 PROCEDURE — B2111ZZ FLUOROSCOPY OF MULTIPLE CORONARY ARTERIES USING LOW OSMOLAR CONTRAST: ICD-10-PCS | Performed by: INTERNAL MEDICINE

## 2020-02-10 ENCOUNTER — EVALUATION (OUTPATIENT)
Dept: PHYSICAL THERAPY | Facility: CLINIC | Age: 45
End: 2020-02-10
Payer: COMMERCIAL

## 2020-02-10 DIAGNOSIS — S99.911A INJURY OF ANKLE AND FOOT, RIGHT, INITIAL ENCOUNTER: ICD-10-CM

## 2020-02-10 DIAGNOSIS — M79.671 RIGHT FOOT PAIN: ICD-10-CM

## 2020-02-10 DIAGNOSIS — M25.571 ACUTE RIGHT ANKLE PAIN: Primary | ICD-10-CM

## 2020-02-10 DIAGNOSIS — S99.921A INJURY OF ANKLE AND FOOT, RIGHT, INITIAL ENCOUNTER: ICD-10-CM

## 2020-02-10 PROCEDURE — 97161 PT EVAL LOW COMPLEX 20 MIN: CPT | Performed by: PHYSICAL THERAPIST

## 2020-02-10 PROCEDURE — 97032 APPL MODALITY 1+ESTIM EA 15: CPT | Performed by: PHYSICAL THERAPIST

## 2020-02-10 PROCEDURE — 97110 THERAPEUTIC EXERCISES: CPT | Performed by: PHYSICAL THERAPIST

## 2020-02-10 NOTE — PROGRESS NOTES
PT Evaluation     Today's date: 2/10/2020  Patient name: Shannon Valle  : 1975  MRN: [id number]  Referring provider: Pablo Kelly MD  Dx:   Encounter Diagnosis     ICD-10-CM    1  Acute right ankle pain M25 571    2  Right foot pain M79 671        Start Time: 1130  Stop Time: 1230  Total time in clinic (min): 60 minutes    Assessment  Assessment details: Patient presents with R ankle and foot pain secondary to slipping on snow outside of bank 3 weeks ago, ambulates I with rollator and boot, antalgic gait, demonstrates (+) tenderness dorsum of foot, lateral malleoli, decreased Arom and Strength, reports difficulty standing, walking, stairclimbing, WBAT; patient would benefit from therapeutic exercise, manual therapy, gait, balance, and endurance training, patient education regarding joint protection, modalities PRN; patient issued HEP  Impairments: abnormal gait, abnormal or restricted ROM, impaired physical strength and pain with function  Understanding of Dx/Px/POC: good   Prognosis: good    Goals  STG  1  Decrease pain 50% in 2 weeks  2  Increase Rom 50% in 2 weeks  3  Increase Strength half grade in 2 weeks  4  Compliant with HEP in 2 weeks  LTG  1  Decrease pain 75% in 4 weeks  2  Increase Rom WFL in 4 weeks  3  Increase Strength WFL in 4 weeks  4  Able to stand I with mild difficulty in 4 weeks  5  Able to walk I with mild difficulty in 4 weeks  6   Able to ascend/descend stairs with handrail and mild difficulty in 4 weeks    Plan  Patient would benefit from: skilled physical therapy  Planned modality interventions: ultrasound and unattended electrical stimulation  Planned therapy interventions: manual therapy, neuromuscular re-education, patient education, strengthening, stretching, therapeutic exercise, home exercise program, gait training, functional ROM exercises and balance  Frequency: 2x week  Duration in weeks: 4        Subjective Evaluation    History of Present Illness  Mechanism of injury: 3 weeks ago, slipped outside of bank          Not a recurrent problem   Quality of life: good    Pain  Current pain ratin  At best pain ratin  At worst pain ratin  Quality: discomfort, sharp and knife-like  Relieving factors: support  Aggravating factors: standing, walking and stair climbing    Social Support  Steps to enter house: yes  Stairs in house: yes   Lives in: multiple-level home  Lives with: spouse and young children    Employment status: not working  Hand dominance: right      Diagnostic Tests  X-ray: abnormal  Patient Goals  Patient goals for therapy: increased strength, decreased pain and increased motion  Patient goal: stand, walk, ascend/descend        Objective     Tenderness     Right Ankle/Foot   Tenderness in the dorsum foot and lateral malleolus       Active Range of Motion     Right Ankle/Foot   Plantar flexion: 10 degrees with pain  Inversion: 10 degrees with pain  Eversion: 10 degrees with pain    Additional Active Range of Motion Details  R ankle Df 0 deg with pain    Strength/Myotome Testing     Right Knee   Flexion: 4  Extension: 4    Right Ankle/Foot   Dorsiflexion: 3-  Plantar flexion: 3-  Inversion: 3-  Eversion: 3-    Ambulation     Ambulation: Level Surfaces     Additional Level Surfaces Ambulation Details  Ambulates I with rollator and boot, antalgic             Precautions: WBAT      Manual              MFR                                                                     Exercise Diary              Prom/Stretch             [place] pick-up             Pro-Stretch             Gait // bars             Circuit - LEs             TB-all planes             Stance on foam             Stability disc             Bosu lunge             Bike             TM             Step up/dwn/sdws             PT ed - HEP 15                                                                                                           Modalities  2/10            E-Stim 15            US             MH/CP 15 MH

## 2020-02-14 ENCOUNTER — OFFICE VISIT (OUTPATIENT)
Dept: PHYSICAL THERAPY | Facility: CLINIC | Age: 45
End: 2020-02-14
Payer: COMMERCIAL

## 2020-02-14 DIAGNOSIS — M25.571 ACUTE RIGHT ANKLE PAIN: Primary | ICD-10-CM

## 2020-02-14 DIAGNOSIS — M79.671 RIGHT FOOT PAIN: ICD-10-CM

## 2020-02-14 PROCEDURE — 97014 ELECTRIC STIMULATION THERAPY: CPT | Performed by: PHYSICAL THERAPIST

## 2020-02-14 PROCEDURE — 97110 THERAPEUTIC EXERCISES: CPT | Performed by: PHYSICAL THERAPIST

## 2020-02-14 PROCEDURE — 97140 MANUAL THERAPY 1/> REGIONS: CPT | Performed by: PHYSICAL THERAPIST

## 2020-03-10 ENCOUNTER — OFFICE VISIT (OUTPATIENT)
Dept: PHYSICAL THERAPY | Facility: CLINIC | Age: 45
End: 2020-03-10
Payer: COMMERCIAL

## 2020-03-10 DIAGNOSIS — S99.911A INJURY OF ANKLE AND FOOT, RIGHT, INITIAL ENCOUNTER: ICD-10-CM

## 2020-03-10 DIAGNOSIS — M79.671 RIGHT FOOT PAIN: ICD-10-CM

## 2020-03-10 DIAGNOSIS — M25.571 ACUTE RIGHT ANKLE PAIN: Primary | ICD-10-CM

## 2020-03-10 DIAGNOSIS — S99.921A INJURY OF ANKLE AND FOOT, RIGHT, INITIAL ENCOUNTER: ICD-10-CM

## 2020-03-10 PROCEDURE — 97140 MANUAL THERAPY 1/> REGIONS: CPT

## 2020-03-10 PROCEDURE — 97110 THERAPEUTIC EXERCISES: CPT

## 2020-03-10 PROCEDURE — 97014 ELECTRIC STIMULATION THERAPY: CPT

## 2020-03-10 NOTE — PROGRESS NOTES
Daily Note     Today's date: 3/10/2020  Patient name: Sharri Cortez  : 1975  MRN: [id number]  Referring provider: Mimi Thompson MD  Dx:   Encounter Diagnosis     ICD-10-CM    1  Acute right ankle pain M25 571    2  Right foot pain M79 671    3  Injury of ankle and foot, right, initial encounter 35 88 32        Start Time: 0905  Stop Time: 1000  Total time in clinic (min): 55 minutes    Subjective: Patient stated her pain in ankle is 6/10  Patient stated her ankle hurts w/ DF/PF  Objective: See treatment diary below      Assessment: Patient now wearing shoe and not cam boot  Discomfort in ankle w/ DF/PF while on rec  Bike  Rest breaks required  Tolerated leg circuit for strengthening  Applied MH/stim to decrease tightness and pain  MFR to ankle to reduce tightness in ankle  Plan: Continue per plan of care        Precautions: WBAT      Manual  2/14 3/10           MFR 10' 15'                                                                   Exercise Diary  2/10 2/14 3/10          Prom/Stretch  15' 10'          [place] pick-up             Pro-Stretch             Gait // bars             Circuit - LEs   2x10          TB-all planes             Stance on foam             Stability disc             Bosu lunge             Bike  15' 10'          TM             Step up/dwn/sdws             PT ed - HEP 15                                                                                                           Modalities  2/10 2/14 3/10          E-Stim 15 15 15'          US             MH/CP 15 MH MH MH 13'

## 2020-03-12 ENCOUNTER — APPOINTMENT (OUTPATIENT)
Dept: PHYSICAL THERAPY | Facility: CLINIC | Age: 45
End: 2020-03-12
Payer: COMMERCIAL

## 2020-03-13 ENCOUNTER — APPOINTMENT (OUTPATIENT)
Dept: PHYSICAL THERAPY | Facility: CLINIC | Age: 45
End: 2020-03-13
Payer: COMMERCIAL

## 2020-03-19 ENCOUNTER — APPOINTMENT (OUTPATIENT)
Dept: PHYSICAL THERAPY | Facility: CLINIC | Age: 45
End: 2020-03-19
Payer: COMMERCIAL

## 2020-03-27 DIAGNOSIS — R07.89 OTHER CHEST PAIN: ICD-10-CM

## 2020-03-27 DIAGNOSIS — I16.0 HYPERTENSIVE URGENCY: ICD-10-CM

## 2020-03-27 DIAGNOSIS — M25.552 ACUTE HIP PAIN, LEFT: ICD-10-CM

## 2020-03-30 RX ORDER — CARVEDILOL 3.12 MG/1
TABLET ORAL
Qty: 180 TABLET | Refills: 0 | OUTPATIENT
Start: 2020-03-30

## 2020-03-30 RX ORDER — EZETIMIBE 10 MG/1
TABLET ORAL
Qty: 90 TABLET | Refills: 0 | OUTPATIENT
Start: 2020-03-30

## 2020-03-30 RX ORDER — ATORVASTATIN CALCIUM 40 MG/1
TABLET, FILM COATED ORAL
Qty: 90 TABLET | Refills: 0 | OUTPATIENT
Start: 2020-03-30

## 2020-03-30 RX ORDER — GABAPENTIN 100 MG/1
CAPSULE ORAL
Qty: 270 CAPSULE | Refills: 0 | OUTPATIENT
Start: 2020-03-30

## 2020-03-30 RX ORDER — POTASSIUM CHLORIDE 20 MEQ/1
TABLET, EXTENDED RELEASE ORAL
Qty: 360 TABLET | Refills: 0 | OUTPATIENT
Start: 2020-03-30

## 2020-11-08 ENCOUNTER — HOSPITAL ENCOUNTER (INPATIENT)
Facility: HOSPITAL | Age: 45
LOS: 1 days | Discharge: HOME | DRG: 199 | End: 2020-11-10
Attending: EMERGENCY MEDICINE | Admitting: INTERNAL MEDICINE
Payer: COMMERCIAL

## 2020-11-08 ENCOUNTER — APPOINTMENT (EMERGENCY)
Dept: RADIOLOGY | Facility: HOSPITAL | Age: 45
DRG: 199 | End: 2020-11-08
Payer: COMMERCIAL

## 2020-11-08 ENCOUNTER — APPOINTMENT (EMERGENCY)
Dept: CT IMAGING | Facility: HOSPITAL | Age: 45
DRG: 199 | End: 2020-11-08
Payer: COMMERCIAL

## 2020-11-08 DIAGNOSIS — I10 HTN (HYPERTENSION): ICD-10-CM

## 2020-11-08 DIAGNOSIS — R07.9 CHEST PAIN: Primary | ICD-10-CM

## 2020-11-08 DIAGNOSIS — I16.9 HYPERTENSIVE CRISIS: ICD-10-CM

## 2020-11-08 DIAGNOSIS — I16.0 HYPERTENSIVE URGENCY: ICD-10-CM

## 2020-11-08 PROBLEM — D72.829 LEUKOCYTOSIS: Status: ACTIVE | Noted: 2020-11-08

## 2020-11-08 PROBLEM — E87.6 HYPOKALEMIA: Status: ACTIVE | Noted: 2020-11-08

## 2020-11-08 PROBLEM — R06.02 SOB (SHORTNESS OF BREATH): Status: ACTIVE | Noted: 2020-11-08

## 2020-11-08 LAB
ALBUMIN SERPL BCP-MCNC: 3.6 G/DL (ref 3.5–5)
ALP SERPL-CCNC: 153 U/L (ref 46–116)
ALT SERPL W P-5'-P-CCNC: 36 U/L (ref 12–78)
ANION GAP SERPL CALCULATED.3IONS-SCNC: 11 MMOL/L (ref 4–13)
AST SERPL W P-5'-P-CCNC: 40 U/L (ref 5–45)
ATRIAL RATE: 91 BPM
BASOPHILS # BLD AUTO: 0.03 THOUSANDS/ΜL (ref 0–0.1)
BASOPHILS NFR BLD AUTO: 0 % (ref 0–1)
BILIRUB SERPL-MCNC: 0.3 MG/DL (ref 0.2–1)
BUN SERPL-MCNC: 8 MG/DL (ref 5–25)
CALCIUM SERPL-MCNC: 9.3 MG/DL (ref 8.3–10.1)
CHLORIDE SERPL-SCNC: 103 MMOL/L (ref 100–108)
CO2 SERPL-SCNC: 27 MMOL/L (ref 21–32)
CREAT SERPL-MCNC: 1.03 MG/DL (ref 0.6–1.3)
D DIMER PPP FEU-MCNC: 0.4 UG/ML FEU
EOSINOPHIL # BLD AUTO: 0.28 THOUSAND/ΜL (ref 0–0.61)
EOSINOPHIL NFR BLD AUTO: 3 % (ref 0–6)
ERYTHROCYTE [DISTWIDTH] IN BLOOD BY AUTOMATED COUNT: 14.2 % (ref 11.6–15.1)
GFR SERPL CREATININE-BSD FRML MDRD: 77 ML/MIN/1.73SQ M
GLUCOSE SERPL-MCNC: 126 MG/DL (ref 65–140)
HCG SERPL QL: NEGATIVE
HCT VFR BLD AUTO: 43.1 % (ref 34.8–46.1)
HGB BLD-MCNC: 13.5 G/DL (ref 11.5–15.4)
IMM GRANULOCYTES # BLD AUTO: 0.04 THOUSAND/UL (ref 0–0.2)
IMM GRANULOCYTES NFR BLD AUTO: 0 % (ref 0–2)
LYMPHOCYTES # BLD AUTO: 2.45 THOUSANDS/ΜL (ref 0.6–4.47)
LYMPHOCYTES NFR BLD AUTO: 23 % (ref 14–44)
MCH RBC QN AUTO: 27.4 PG (ref 26.8–34.3)
MCHC RBC AUTO-ENTMCNC: 31.3 G/DL (ref 31.4–37.4)
MCV RBC AUTO: 88 FL (ref 82–98)
MONOCYTES # BLD AUTO: 0.4 THOUSAND/ΜL (ref 0.17–1.22)
MONOCYTES NFR BLD AUTO: 4 % (ref 4–12)
NEUTROPHILS # BLD AUTO: 7.43 THOUSANDS/ΜL (ref 1.85–7.62)
NEUTS SEG NFR BLD AUTO: 70 % (ref 43–75)
NRBC BLD AUTO-RTO: 0 /100 WBCS
NT-PROBNP SERPL-MCNC: 270 PG/ML
P AXIS: 57 DEGREES
PLATELET # BLD AUTO: 353 THOUSANDS/UL (ref 149–390)
PLATELET # BLD AUTO: 388 THOUSANDS/UL (ref 149–390)
PMV BLD AUTO: 10.1 FL (ref 8.9–12.7)
PMV BLD AUTO: 10.3 FL (ref 8.9–12.7)
POTASSIUM SERPL-SCNC: 3.4 MMOL/L (ref 3.5–5.3)
PR INTERVAL: 182 MS
PROT SERPL-MCNC: 8.5 G/DL (ref 6.4–8.2)
QRS AXIS: 16 DEGREES
QRSD INTERVAL: 86 MS
QT INTERVAL: 394 MS
QTC INTERVAL: 484 MS
RBC # BLD AUTO: 4.92 MILLION/UL (ref 3.81–5.12)
SARS-COV-2 RNA RESP QL NAA+PROBE: NEGATIVE
SODIUM SERPL-SCNC: 141 MMOL/L (ref 136–145)
T WAVE AXIS: 57 DEGREES
TROPONIN I SERPL-MCNC: <0.02 NG/ML
TROPONIN I SERPL-MCNC: <0.02 NG/ML
TSH SERPL DL<=0.05 MIU/L-ACNC: 112.64 UIU/ML (ref 0.36–3.74)
VENTRICULAR RATE: 91 BPM
WBC # BLD AUTO: 10.63 THOUSAND/UL (ref 4.31–10.16)

## 2020-11-08 PROCEDURE — 99285 EMERGENCY DEPT VISIT HI MDM: CPT | Performed by: EMERGENCY MEDICINE

## 2020-11-08 PROCEDURE — 99285 EMERGENCY DEPT VISIT HI MDM: CPT

## 2020-11-08 PROCEDURE — 83880 ASSAY OF NATRIURETIC PEPTIDE: CPT | Performed by: EMERGENCY MEDICINE

## 2020-11-08 PROCEDURE — 96375 TX/PRO/DX INJ NEW DRUG ADDON: CPT

## 2020-11-08 PROCEDURE — 84484 ASSAY OF TROPONIN QUANT: CPT | Performed by: PHYSICIAN ASSISTANT

## 2020-11-08 PROCEDURE — 96374 THER/PROPH/DIAG INJ IV PUSH: CPT

## 2020-11-08 PROCEDURE — 71045 X-RAY EXAM CHEST 1 VIEW: CPT

## 2020-11-08 PROCEDURE — 70450 CT HEAD/BRAIN W/O DYE: CPT

## 2020-11-08 PROCEDURE — G1004 CDSM NDSC: HCPCS

## 2020-11-08 PROCEDURE — 96376 TX/PRO/DX INJ SAME DRUG ADON: CPT

## 2020-11-08 PROCEDURE — 99219 PR INITIAL OBSERVATION CARE/DAY 50 MINUTES: CPT | Performed by: GENERAL PRACTICE

## 2020-11-08 PROCEDURE — 85025 COMPLETE CBC W/AUTO DIFF WBC: CPT | Performed by: EMERGENCY MEDICINE

## 2020-11-08 PROCEDURE — 87635 SARS-COV-2 COVID-19 AMP PRB: CPT | Performed by: EMERGENCY MEDICINE

## 2020-11-08 PROCEDURE — 36415 COLL VENOUS BLD VENIPUNCTURE: CPT | Performed by: EMERGENCY MEDICINE

## 2020-11-08 PROCEDURE — 80053 COMPREHEN METABOLIC PANEL: CPT | Performed by: EMERGENCY MEDICINE

## 2020-11-08 PROCEDURE — 93010 ELECTROCARDIOGRAM REPORT: CPT | Performed by: INTERNAL MEDICINE

## 2020-11-08 PROCEDURE — 84443 ASSAY THYROID STIM HORMONE: CPT | Performed by: PHYSICIAN ASSISTANT

## 2020-11-08 PROCEDURE — 85379 FIBRIN DEGRADATION QUANT: CPT | Performed by: EMERGENCY MEDICINE

## 2020-11-08 PROCEDURE — 85049 AUTOMATED PLATELET COUNT: CPT | Performed by: PHYSICIAN ASSISTANT

## 2020-11-08 PROCEDURE — 84703 CHORIONIC GONADOTROPIN ASSAY: CPT | Performed by: EMERGENCY MEDICINE

## 2020-11-08 PROCEDURE — 93005 ELECTROCARDIOGRAM TRACING: CPT

## 2020-11-08 PROCEDURE — 84484 ASSAY OF TROPONIN QUANT: CPT | Performed by: EMERGENCY MEDICINE

## 2020-11-08 RX ORDER — LOSARTAN POTASSIUM 50 MG/1
100 TABLET ORAL DAILY
Status: DISCONTINUED | OUTPATIENT
Start: 2020-11-09 | End: 2020-11-10 | Stop reason: HOSPADM

## 2020-11-08 RX ORDER — ASPIRIN 81 MG/1
324 TABLET, CHEWABLE ORAL ONCE
Status: COMPLETED | OUTPATIENT
Start: 2020-11-08 | End: 2020-11-08

## 2020-11-08 RX ORDER — MORPHINE SULFATE 4 MG/ML
4 INJECTION, SOLUTION INTRAMUSCULAR; INTRAVENOUS ONCE
Status: COMPLETED | OUTPATIENT
Start: 2020-11-08 | End: 2020-11-08

## 2020-11-08 RX ORDER — NICOTINE 21 MG/24HR
1 PATCH, TRANSDERMAL 24 HOURS TRANSDERMAL DAILY
Status: DISCONTINUED | OUTPATIENT
Start: 2020-11-09 | End: 2020-11-10 | Stop reason: HOSPADM

## 2020-11-08 RX ORDER — POTASSIUM CHLORIDE 20 MEQ/1
20 TABLET, EXTENDED RELEASE ORAL ONCE
Status: COMPLETED | OUTPATIENT
Start: 2020-11-08 | End: 2020-11-08

## 2020-11-08 RX ORDER — ESCITALOPRAM OXALATE 10 MG/1
10 TABLET ORAL DAILY
Status: ON HOLD | COMMUNITY
End: 2021-07-16 | Stop reason: SDUPTHER

## 2020-11-08 RX ORDER — ACETAMINOPHEN 325 MG/1
650 TABLET ORAL EVERY 6 HOURS PRN
Status: DISCONTINUED | OUTPATIENT
Start: 2020-11-08 | End: 2020-11-10 | Stop reason: HOSPADM

## 2020-11-08 RX ORDER — AMLODIPINE BESYLATE 10 MG/1
10 TABLET ORAL DAILY
Status: DISCONTINUED | OUTPATIENT
Start: 2020-11-08 | End: 2020-11-10 | Stop reason: HOSPADM

## 2020-11-08 RX ORDER — CHLORTHALIDONE 25 MG/1
25 TABLET ORAL DAILY
Status: ON HOLD | COMMUNITY
End: 2021-07-16 | Stop reason: SDUPTHER

## 2020-11-08 RX ORDER — LABETALOL 20 MG/4 ML (5 MG/ML) INTRAVENOUS SYRINGE
10 ONCE
Status: COMPLETED | OUTPATIENT
Start: 2020-11-08 | End: 2020-11-08

## 2020-11-08 RX ORDER — ESCITALOPRAM OXALATE 10 MG/1
10 TABLET ORAL DAILY
Status: DISCONTINUED | OUTPATIENT
Start: 2020-11-09 | End: 2020-11-10 | Stop reason: HOSPADM

## 2020-11-08 RX ORDER — MECLIZINE HCL 12.5 MG/1
12.5 TABLET ORAL EVERY 12 HOURS PRN
COMMUNITY

## 2020-11-08 RX ORDER — NITROGLYCERIN 0.4 MG/1
0.4 TABLET SUBLINGUAL ONCE
Status: DISCONTINUED | OUTPATIENT
Start: 2020-11-08 | End: 2020-11-08

## 2020-11-08 RX ORDER — ASPIRIN 81 MG/1
81 TABLET ORAL DAILY
Status: DISCONTINUED | OUTPATIENT
Start: 2020-11-09 | End: 2020-11-10 | Stop reason: HOSPADM

## 2020-11-08 RX ORDER — LEVOTHYROXINE SODIUM 0.12 MG/1
125 TABLET ORAL
Status: DISCONTINUED | OUTPATIENT
Start: 2020-11-09 | End: 2020-11-10 | Stop reason: HOSPADM

## 2020-11-08 RX ORDER — HYDRALAZINE HYDROCHLORIDE 20 MG/ML
5 INJECTION INTRAMUSCULAR; INTRAVENOUS EVERY 6 HOURS PRN
Status: DISCONTINUED | OUTPATIENT
Start: 2020-11-08 | End: 2020-11-10 | Stop reason: HOSPADM

## 2020-11-08 RX ORDER — CHLORTHALIDONE 25 MG/1
25 TABLET ORAL DAILY
Status: DISCONTINUED | OUTPATIENT
Start: 2020-11-08 | End: 2020-11-10 | Stop reason: HOSPADM

## 2020-11-08 RX ADMIN — MORPHINE SULFATE 2 MG: 2 INJECTION, SOLUTION INTRAMUSCULAR; INTRAVENOUS at 20:44

## 2020-11-08 RX ADMIN — LABETALOL 20 MG/4 ML (5 MG/ML) INTRAVENOUS SYRINGE 10 MG: at 19:42

## 2020-11-08 RX ADMIN — MORPHINE SULFATE 4 MG: 4 INJECTION INTRAVENOUS at 19:43

## 2020-11-08 RX ADMIN — ASPIRIN 324 MG: 81 TABLET, CHEWABLE ORAL at 20:41

## 2020-11-08 RX ADMIN — POTASSIUM CHLORIDE 20 MEQ: 1500 TABLET, EXTENDED RELEASE ORAL at 23:20

## 2020-11-08 RX ADMIN — AMLODIPINE BESYLATE 10 MG: 10 TABLET ORAL at 23:23

## 2020-11-08 RX ADMIN — CHLORTHALIDONE 25 MG: 25 TABLET ORAL at 23:23

## 2020-11-08 RX ADMIN — LABETALOL 20 MG/4 ML (5 MG/ML) INTRAVENOUS SYRINGE 10 MG: at 17:54

## 2020-11-08 RX ADMIN — ACETAMINOPHEN 650 MG: 325 TABLET, FILM COATED ORAL at 23:20

## 2020-11-09 ENCOUNTER — APPOINTMENT (OUTPATIENT)
Dept: ULTRASOUND IMAGING | Facility: HOSPITAL | Age: 45
DRG: 199 | End: 2020-11-09
Payer: COMMERCIAL

## 2020-11-09 LAB
ALBUMIN SERPL BCP-MCNC: 3.2 G/DL (ref 3.5–5)
ALP SERPL-CCNC: 141 U/L (ref 46–116)
ALT SERPL W P-5'-P-CCNC: 34 U/L (ref 12–78)
ANION GAP SERPL CALCULATED.3IONS-SCNC: 10 MMOL/L (ref 4–13)
AST SERPL W P-5'-P-CCNC: 37 U/L (ref 5–45)
BILIRUB SERPL-MCNC: 0.3 MG/DL (ref 0.2–1)
BUN SERPL-MCNC: 10 MG/DL (ref 5–25)
CALCIUM ALBUM COR SERPL-MCNC: 9.9 MG/DL (ref 8.3–10.1)
CALCIUM SERPL-MCNC: 9.3 MG/DL (ref 8.3–10.1)
CHLORIDE SERPL-SCNC: 104 MMOL/L (ref 100–108)
CO2 SERPL-SCNC: 25 MMOL/L (ref 21–32)
CREAT SERPL-MCNC: 0.91 MG/DL (ref 0.6–1.3)
ERYTHROCYTE [DISTWIDTH] IN BLOOD BY AUTOMATED COUNT: 14.3 % (ref 11.6–15.1)
GFR SERPL CREATININE-BSD FRML MDRD: 89 ML/MIN/1.73SQ M
GLUCOSE SERPL-MCNC: 127 MG/DL (ref 65–140)
HCT VFR BLD AUTO: 43.1 % (ref 34.8–46.1)
HGB BLD-MCNC: 13.3 G/DL (ref 11.5–15.4)
MAGNESIUM SERPL-MCNC: 1.9 MG/DL (ref 1.6–2.6)
MCH RBC QN AUTO: 27.4 PG (ref 26.8–34.3)
MCHC RBC AUTO-ENTMCNC: 30.9 G/DL (ref 31.4–37.4)
MCV RBC AUTO: 89 FL (ref 82–98)
PLATELET # BLD AUTO: 363 THOUSANDS/UL (ref 149–390)
PMV BLD AUTO: 10.5 FL (ref 8.9–12.7)
POTASSIUM SERPL-SCNC: 3.6 MMOL/L (ref 3.5–5.3)
PROT SERPL-MCNC: 7.8 G/DL (ref 6.4–8.2)
RBC # BLD AUTO: 4.86 MILLION/UL (ref 3.81–5.12)
SODIUM SERPL-SCNC: 139 MMOL/L (ref 136–145)
TROPONIN I SERPL-MCNC: <0.02 NG/ML
TROPONIN I SERPL-MCNC: <0.02 NG/ML
WBC # BLD AUTO: 9.48 THOUSAND/UL (ref 4.31–10.16)

## 2020-11-09 PROCEDURE — 85027 COMPLETE CBC AUTOMATED: CPT | Performed by: PHYSICIAN ASSISTANT

## 2020-11-09 PROCEDURE — 84484 ASSAY OF TROPONIN QUANT: CPT | Performed by: INTERNAL MEDICINE

## 2020-11-09 PROCEDURE — 84484 ASSAY OF TROPONIN QUANT: CPT | Performed by: PHYSICIAN ASSISTANT

## 2020-11-09 PROCEDURE — 83735 ASSAY OF MAGNESIUM: CPT | Performed by: PHYSICIAN ASSISTANT

## 2020-11-09 PROCEDURE — 93975 VASCULAR STUDY: CPT

## 2020-11-09 PROCEDURE — 99255 IP/OBS CONSLTJ NEW/EST HI 80: CPT | Performed by: INTERNAL MEDICINE

## 2020-11-09 PROCEDURE — 83835 ASSAY OF METANEPHRINES: CPT | Performed by: INTERNAL MEDICINE

## 2020-11-09 PROCEDURE — 93975 VASCULAR STUDY: CPT | Performed by: SURGERY

## 2020-11-09 PROCEDURE — 99233 SBSQ HOSP IP/OBS HIGH 50: CPT | Performed by: INTERNAL MEDICINE

## 2020-11-09 PROCEDURE — 80053 COMPREHEN METABOLIC PANEL: CPT | Performed by: PHYSICIAN ASSISTANT

## 2020-11-09 RX ORDER — OXYCODONE HYDROCHLORIDE AND ACETAMINOPHEN 5; 325 MG/1; MG/1
1 TABLET ORAL EVERY 4 HOURS PRN
Status: DISCONTINUED | OUTPATIENT
Start: 2020-11-09 | End: 2020-11-10 | Stop reason: HOSPADM

## 2020-11-09 RX ORDER — ONDANSETRON 2 MG/ML
4 INJECTION INTRAMUSCULAR; INTRAVENOUS EVERY 6 HOURS PRN
Status: DISCONTINUED | OUTPATIENT
Start: 2020-11-09 | End: 2020-11-10 | Stop reason: HOSPADM

## 2020-11-09 RX ORDER — CARVEDILOL 12.5 MG/1
25 TABLET ORAL 2 TIMES DAILY WITH MEALS
Status: DISCONTINUED | OUTPATIENT
Start: 2020-11-09 | End: 2020-11-10

## 2020-11-09 RX ORDER — BUTALBITAL, ACETAMINOPHEN AND CAFFEINE 50; 325; 40 MG/1; MG/1; MG/1
1 TABLET ORAL ONCE AS NEEDED
Status: DISCONTINUED | OUTPATIENT
Start: 2020-11-09 | End: 2020-11-09

## 2020-11-09 RX ADMIN — MORPHINE SULFATE 2 MG: 2 INJECTION, SOLUTION INTRAMUSCULAR; INTRAVENOUS at 08:05

## 2020-11-09 RX ADMIN — OXYCODONE HYDROCHLORIDE AND ACETAMINOPHEN 1 TABLET: 5; 325 TABLET ORAL at 11:03

## 2020-11-09 RX ADMIN — OXYCODONE HYDROCHLORIDE AND ACETAMINOPHEN 1 TABLET: 5; 325 TABLET ORAL at 22:42

## 2020-11-09 RX ADMIN — ONDANSETRON 4 MG: 2 INJECTION INTRAMUSCULAR; INTRAVENOUS at 11:04

## 2020-11-09 RX ADMIN — HYDRALAZINE HYDROCHLORIDE 5 MG: 20 INJECTION INTRAMUSCULAR; INTRAVENOUS at 08:05

## 2020-11-09 RX ADMIN — LEVOTHYROXINE SODIUM 125 MCG: 125 TABLET ORAL at 05:27

## 2020-11-09 RX ADMIN — Medication 1 PATCH: at 08:06

## 2020-11-09 RX ADMIN — ENOXAPARIN SODIUM 40 MG: 40 INJECTION SUBCUTANEOUS at 08:06

## 2020-11-09 RX ADMIN — ESCITALOPRAM OXALATE 10 MG: 10 TABLET ORAL at 08:06

## 2020-11-09 RX ADMIN — CARVEDILOL 25 MG: 12.5 TABLET, FILM COATED ORAL at 10:06

## 2020-11-09 RX ADMIN — ASPIRIN 81 MG: 81 TABLET ORAL at 08:06

## 2020-11-09 RX ADMIN — ACETAMINOPHEN 650 MG: 325 TABLET, FILM COATED ORAL at 08:06

## 2020-11-09 RX ADMIN — HYDRALAZINE HYDROCHLORIDE 5 MG: 20 INJECTION INTRAMUSCULAR; INTRAVENOUS at 02:50

## 2020-11-09 RX ADMIN — CHLORTHALIDONE 25 MG: 25 TABLET ORAL at 08:13

## 2020-11-09 RX ADMIN — AMLODIPINE BESYLATE 10 MG: 10 TABLET ORAL at 08:05

## 2020-11-09 RX ADMIN — OXYCODONE HYDROCHLORIDE AND ACETAMINOPHEN 1 TABLET: 5; 325 TABLET ORAL at 15:35

## 2020-11-09 RX ADMIN — LOSARTAN POTASSIUM 100 MG: 50 TABLET, FILM COATED ORAL at 08:06

## 2020-11-10 VITALS
OXYGEN SATURATION: 98 % | BODY MASS INDEX: 45.99 KG/M2 | TEMPERATURE: 97.7 F | WEIGHT: 293 LBS | SYSTOLIC BLOOD PRESSURE: 165 MMHG | HEIGHT: 67 IN | DIASTOLIC BLOOD PRESSURE: 80 MMHG | HEART RATE: 72 BPM | RESPIRATION RATE: 19 BRPM

## 2020-11-10 LAB
ANION GAP SERPL CALCULATED.3IONS-SCNC: 8 MMOL/L (ref 4–13)
BUN SERPL-MCNC: 8 MG/DL (ref 5–25)
CALCIUM SERPL-MCNC: 9.2 MG/DL (ref 8.3–10.1)
CHLORIDE SERPL-SCNC: 101 MMOL/L (ref 100–108)
CO2 SERPL-SCNC: 27 MMOL/L (ref 21–32)
CREAT SERPL-MCNC: 0.96 MG/DL (ref 0.6–1.3)
GFR SERPL CREATININE-BSD FRML MDRD: 84 ML/MIN/1.73SQ M
GLUCOSE SERPL-MCNC: 107 MG/DL (ref 65–140)
POTASSIUM SERPL-SCNC: 3.7 MMOL/L (ref 3.5–5.3)
SODIUM SERPL-SCNC: 136 MMOL/L (ref 136–145)

## 2020-11-10 PROCEDURE — 99233 SBSQ HOSP IP/OBS HIGH 50: CPT | Performed by: INTERNAL MEDICINE

## 2020-11-10 PROCEDURE — 80048 BASIC METABOLIC PNL TOTAL CA: CPT | Performed by: INTERNAL MEDICINE

## 2020-11-10 PROCEDURE — 99239 HOSP IP/OBS DSCHRG MGMT >30: CPT | Performed by: INTERNAL MEDICINE

## 2020-11-10 RX ORDER — LEVOTHYROXINE SODIUM 0.12 MG/1
125 TABLET ORAL
Qty: 30 TABLET | Refills: 0 | Status: ON HOLD | OUTPATIENT
Start: 2020-11-10 | End: 2021-07-16 | Stop reason: SDUPTHER

## 2020-11-10 RX ORDER — LOSARTAN POTASSIUM 100 MG/1
100 TABLET ORAL DAILY
Qty: 30 TABLET | Refills: 0 | Status: ON HOLD | OUTPATIENT
Start: 2020-11-11 | End: 2021-07-16 | Stop reason: SDUPTHER

## 2020-11-10 RX ADMIN — LOSARTAN POTASSIUM 100 MG: 50 TABLET, FILM COATED ORAL at 08:34

## 2020-11-10 RX ADMIN — ESCITALOPRAM OXALATE 10 MG: 10 TABLET ORAL at 08:34

## 2020-11-10 RX ADMIN — OXYCODONE HYDROCHLORIDE AND ACETAMINOPHEN 1 TABLET: 5; 325 TABLET ORAL at 08:34

## 2020-11-10 RX ADMIN — OXYCODONE HYDROCHLORIDE AND ACETAMINOPHEN 1 TABLET: 5; 325 TABLET ORAL at 12:14

## 2020-11-10 RX ADMIN — CHLORTHALIDONE 25 MG: 25 TABLET ORAL at 08:35

## 2020-11-10 RX ADMIN — ASPIRIN 81 MG: 81 TABLET ORAL at 08:33

## 2020-11-10 RX ADMIN — AMLODIPINE BESYLATE 10 MG: 10 TABLET ORAL at 08:34

## 2020-11-10 RX ADMIN — LEVOTHYROXINE SODIUM 125 MCG: 125 TABLET ORAL at 06:57

## 2020-11-10 RX ADMIN — ENOXAPARIN SODIUM 40 MG: 40 INJECTION SUBCUTANEOUS at 08:34

## 2020-11-10 RX ADMIN — Medication 1 PATCH: at 08:35

## 2020-11-17 LAB
METANEPH FREE SERPL-MCNC: 24.6 PG/ML (ref 0–88)
NORMETANEPHRINE SERPL-MCNC: 133 PG/ML (ref 0–125.8)

## 2021-07-15 ENCOUNTER — APPOINTMENT (EMERGENCY)
Dept: RADIOLOGY | Facility: HOSPITAL | Age: 46
End: 2021-07-15
Payer: COMMERCIAL

## 2021-07-15 ENCOUNTER — HOSPITAL ENCOUNTER (OUTPATIENT)
Facility: HOSPITAL | Age: 46
Setting detail: OBSERVATION
Discharge: HOME | End: 2021-07-16
Attending: EMERGENCY MEDICINE | Admitting: FAMILY MEDICINE
Payer: COMMERCIAL

## 2021-07-15 DIAGNOSIS — R07.9 CHEST PAIN: Primary | ICD-10-CM

## 2021-07-15 DIAGNOSIS — I16.9 HYPERTENSIVE CRISIS: ICD-10-CM

## 2021-07-15 DIAGNOSIS — F32.9 MDD (MAJOR DEPRESSIVE DISORDER): ICD-10-CM

## 2021-07-15 DIAGNOSIS — I16.0 HYPERTENSIVE URGENCY: ICD-10-CM

## 2021-07-15 DIAGNOSIS — I10 ESSENTIAL HYPERTENSION: ICD-10-CM

## 2021-07-15 DIAGNOSIS — Z91.19 NONCOMPLIANCE: ICD-10-CM

## 2021-07-15 PROBLEM — R65.10 SIRS (SYSTEMIC INFLAMMATORY RESPONSE SYNDROME) (HCC): Status: ACTIVE | Noted: 2021-07-15

## 2021-07-15 LAB
ANION GAP SERPL CALCULATED.3IONS-SCNC: 10 MMOL/L (ref 4–13)
ATRIAL RATE: 83 BPM
ATRIAL RATE: 88 BPM
ATRIAL RATE: 91 BPM
ATRIAL RATE: 94 BPM
BASOPHILS # BLD AUTO: 0.03 THOUSANDS/ΜL (ref 0–0.1)
BASOPHILS NFR BLD AUTO: 0 % (ref 0–1)
BUN SERPL-MCNC: 7 MG/DL (ref 5–25)
CALCIUM SERPL-MCNC: 9.5 MG/DL (ref 8.3–10.1)
CHLORIDE SERPL-SCNC: 102 MMOL/L (ref 100–108)
CO2 SERPL-SCNC: 27 MMOL/L (ref 21–32)
CREAT SERPL-MCNC: 0.92 MG/DL (ref 0.6–1.3)
D DIMER PPP FEU-MCNC: 0.28 UG/ML FEU
EOSINOPHIL # BLD AUTO: 0.31 THOUSAND/ΜL (ref 0–0.61)
EOSINOPHIL NFR BLD AUTO: 3 % (ref 0–6)
ERYTHROCYTE [DISTWIDTH] IN BLOOD BY AUTOMATED COUNT: 14.6 % (ref 11.6–15.1)
GFR SERPL CREATININE-BSD FRML MDRD: 88 ML/MIN/1.73SQ M
GLUCOSE SERPL-MCNC: 140 MG/DL (ref 65–140)
HCG SERPL QL: NEGATIVE
HCT VFR BLD AUTO: 42.8 % (ref 34.8–46.1)
HGB BLD-MCNC: 13.5 G/DL (ref 11.5–15.4)
IMM GRANULOCYTES # BLD AUTO: 0.06 THOUSAND/UL (ref 0–0.2)
IMM GRANULOCYTES NFR BLD AUTO: 1 % (ref 0–2)
LACTATE SERPL-SCNC: 1.2 MMOL/L (ref 0.5–2)
LYMPHOCYTES # BLD AUTO: 2.96 THOUSANDS/ΜL (ref 0.6–4.47)
LYMPHOCYTES NFR BLD AUTO: 25 % (ref 14–44)
MCH RBC QN AUTO: 27.9 PG (ref 26.8–34.3)
MCHC RBC AUTO-ENTMCNC: 31.5 G/DL (ref 31.4–37.4)
MCV RBC AUTO: 88 FL (ref 82–98)
MONOCYTES # BLD AUTO: 0.5 THOUSAND/ΜL (ref 0.17–1.22)
MONOCYTES NFR BLD AUTO: 4 % (ref 4–12)
NEUTROPHILS # BLD AUTO: 8.01 THOUSANDS/ΜL (ref 1.85–7.62)
NEUTS SEG NFR BLD AUTO: 67 % (ref 43–75)
NRBC BLD AUTO-RTO: 0 /100 WBCS
P AXIS: 48 DEGREES
P AXIS: 59 DEGREES
P AXIS: 64 DEGREES
P AXIS: 65 DEGREES
PLATELET # BLD AUTO: 335 THOUSANDS/UL (ref 149–390)
PMV BLD AUTO: 10.6 FL (ref 8.9–12.7)
POTASSIUM SERPL-SCNC: 3.6 MMOL/L (ref 3.5–5.3)
PR INTERVAL: 178 MS
PR INTERVAL: 182 MS
PR INTERVAL: 196 MS
PR INTERVAL: 202 MS
QRS AXIS: -12 DEGREES
QRS AXIS: -17 DEGREES
QRS AXIS: 17 DEGREES
QRS AXIS: 4 DEGREES
QRSD INTERVAL: 86 MS
QRSD INTERVAL: 88 MS
QRSD INTERVAL: 88 MS
QRSD INTERVAL: 90 MS
QT INTERVAL: 384 MS
QT INTERVAL: 386 MS
QT INTERVAL: 414 MS
QT INTERVAL: 416 MS
QTC INTERVAL: 472 MS
QTC INTERVAL: 482 MS
QTC INTERVAL: 483 MS
QTC INTERVAL: 503 MS
RBC # BLD AUTO: 4.84 MILLION/UL (ref 3.81–5.12)
SODIUM SERPL-SCNC: 139 MMOL/L (ref 136–145)
T WAVE AXIS: 76 DEGREES
T WAVE AXIS: 79 DEGREES
T WAVE AXIS: 79 DEGREES
T WAVE AXIS: 83 DEGREES
TROPONIN I SERPL-MCNC: <0.02 NG/ML
VENTRICULAR RATE: 82 BPM
VENTRICULAR RATE: 88 BPM
VENTRICULAR RATE: 91 BPM
VENTRICULAR RATE: 94 BPM
WBC # BLD AUTO: 11.87 THOUSAND/UL (ref 4.31–10.16)

## 2021-07-15 PROCEDURE — 84484 ASSAY OF TROPONIN QUANT: CPT | Performed by: EMERGENCY MEDICINE

## 2021-07-15 PROCEDURE — 99285 EMERGENCY DEPT VISIT HI MDM: CPT | Performed by: EMERGENCY MEDICINE

## 2021-07-15 PROCEDURE — 85025 COMPLETE CBC W/AUTO DIFF WBC: CPT | Performed by: EMERGENCY MEDICINE

## 2021-07-15 PROCEDURE — 99220 PR INITIAL OBSERVATION CARE/DAY 70 MINUTES: CPT | Performed by: FAMILY MEDICINE

## 2021-07-15 PROCEDURE — 36415 COLL VENOUS BLD VENIPUNCTURE: CPT | Performed by: EMERGENCY MEDICINE

## 2021-07-15 PROCEDURE — 93005 ELECTROCARDIOGRAM TRACING: CPT

## 2021-07-15 PROCEDURE — 71046 X-RAY EXAM CHEST 2 VIEWS: CPT

## 2021-07-15 PROCEDURE — 84484 ASSAY OF TROPONIN QUANT: CPT | Performed by: PHYSICIAN ASSISTANT

## 2021-07-15 PROCEDURE — 83605 ASSAY OF LACTIC ACID: CPT | Performed by: PHYSICIAN ASSISTANT

## 2021-07-15 PROCEDURE — 93010 ELECTROCARDIOGRAM REPORT: CPT | Performed by: INTERNAL MEDICINE

## 2021-07-15 PROCEDURE — 85379 FIBRIN DEGRADATION QUANT: CPT | Performed by: FAMILY MEDICINE

## 2021-07-15 PROCEDURE — 87040 BLOOD CULTURE FOR BACTERIA: CPT | Performed by: PHYSICIAN ASSISTANT

## 2021-07-15 PROCEDURE — 80048 BASIC METABOLIC PNL TOTAL CA: CPT | Performed by: EMERGENCY MEDICINE

## 2021-07-15 PROCEDURE — 99285 EMERGENCY DEPT VISIT HI MDM: CPT

## 2021-07-15 PROCEDURE — 84484 ASSAY OF TROPONIN QUANT: CPT | Performed by: FAMILY MEDICINE

## 2021-07-15 PROCEDURE — 84703 CHORIONIC GONADOTROPIN ASSAY: CPT | Performed by: PHYSICIAN ASSISTANT

## 2021-07-15 RX ORDER — ASPIRIN 81 MG/1
324 TABLET, CHEWABLE ORAL ONCE
Status: COMPLETED | OUTPATIENT
Start: 2021-07-15 | End: 2021-07-15

## 2021-07-15 RX ORDER — FUROSEMIDE 40 MG/1
40 TABLET ORAL DAILY
Status: DISCONTINUED | OUTPATIENT
Start: 2021-07-15 | End: 2021-07-16 | Stop reason: HOSPADM

## 2021-07-15 RX ORDER — LEVOTHYROXINE SODIUM 0.12 MG/1
125 TABLET ORAL
Status: DISCONTINUED | OUTPATIENT
Start: 2021-07-15 | End: 2021-07-16 | Stop reason: HOSPADM

## 2021-07-15 RX ORDER — HYDRALAZINE HYDROCHLORIDE 20 MG/ML
5 INJECTION INTRAMUSCULAR; INTRAVENOUS EVERY 6 HOURS PRN
Status: DISCONTINUED | OUTPATIENT
Start: 2021-07-15 | End: 2021-07-16 | Stop reason: HOSPADM

## 2021-07-15 RX ORDER — NICOTINE 21 MG/24HR
1 PATCH, TRANSDERMAL 24 HOURS TRANSDERMAL DAILY
Status: DISCONTINUED | OUTPATIENT
Start: 2021-07-15 | End: 2021-07-16 | Stop reason: HOSPADM

## 2021-07-15 RX ORDER — ACETAMINOPHEN 325 MG/1
975 TABLET ORAL ONCE
Status: COMPLETED | OUTPATIENT
Start: 2021-07-15 | End: 2021-07-15

## 2021-07-15 RX ORDER — NITROGLYCERIN 0.4 MG/1
0.4 TABLET SUBLINGUAL
Status: DISCONTINUED | OUTPATIENT
Start: 2021-07-15 | End: 2021-07-16 | Stop reason: HOSPADM

## 2021-07-15 RX ORDER — AMLODIPINE BESYLATE 10 MG/1
10 TABLET ORAL DAILY
Status: DISCONTINUED | OUTPATIENT
Start: 2021-07-15 | End: 2021-07-16

## 2021-07-15 RX ORDER — ACETAMINOPHEN 325 MG/1
650 TABLET ORAL EVERY 6 HOURS PRN
Status: DISCONTINUED | OUTPATIENT
Start: 2021-07-15 | End: 2021-07-16 | Stop reason: HOSPADM

## 2021-07-15 RX ORDER — LIDOCAINE 50 MG/G
1 PATCH TOPICAL DAILY PRN
Status: DISCONTINUED | OUTPATIENT
Start: 2021-07-15 | End: 2021-07-16 | Stop reason: HOSPADM

## 2021-07-15 RX ORDER — LOSARTAN POTASSIUM 25 MG/1
25 TABLET ORAL DAILY
Status: DISCONTINUED | OUTPATIENT
Start: 2021-07-15 | End: 2021-07-16

## 2021-07-15 RX ORDER — AMLODIPINE BESYLATE 10 MG/1
10 TABLET ORAL DAILY
Status: DISCONTINUED | OUTPATIENT
Start: 2021-07-15 | End: 2021-07-16 | Stop reason: HOSPADM

## 2021-07-15 RX ORDER — EZETIMIBE 10 MG/1
10 TABLET ORAL
Status: DISCONTINUED | OUTPATIENT
Start: 2021-07-15 | End: 2021-07-16 | Stop reason: HOSPADM

## 2021-07-15 RX ORDER — ESCITALOPRAM OXALATE 10 MG/1
10 TABLET ORAL DAILY
Status: DISCONTINUED | OUTPATIENT
Start: 2021-07-15 | End: 2021-07-16 | Stop reason: HOSPADM

## 2021-07-15 RX ORDER — ASPIRIN 81 MG/1
81 TABLET ORAL DAILY
Status: DISCONTINUED | OUTPATIENT
Start: 2021-07-15 | End: 2021-07-16 | Stop reason: HOSPADM

## 2021-07-15 RX ORDER — SODIUM CHLORIDE 9 MG/ML
3 INJECTION INTRAVENOUS
Status: DISCONTINUED | OUTPATIENT
Start: 2021-07-15 | End: 2021-07-16 | Stop reason: HOSPADM

## 2021-07-15 RX ORDER — MECLIZINE HCL 12.5 MG/1
12.5 TABLET ORAL EVERY 12 HOURS PRN
Status: DISCONTINUED | OUTPATIENT
Start: 2021-07-15 | End: 2021-07-16 | Stop reason: HOSPADM

## 2021-07-15 RX ORDER — ATORVASTATIN CALCIUM 40 MG/1
40 TABLET, FILM COATED ORAL
Status: DISCONTINUED | OUTPATIENT
Start: 2021-07-15 | End: 2021-07-16 | Stop reason: HOSPADM

## 2021-07-15 RX ORDER — MAGNESIUM HYDROXIDE/ALUMINUM HYDROXICE/SIMETHICONE 120; 1200; 1200 MG/30ML; MG/30ML; MG/30ML
30 SUSPENSION ORAL EVERY 6 HOURS PRN
Status: DISCONTINUED | OUTPATIENT
Start: 2021-07-15 | End: 2021-07-16 | Stop reason: HOSPADM

## 2021-07-15 RX ORDER — ACETAMINOPHEN 325 MG/1
650 TABLET ORAL EVERY 6 HOURS PRN
Status: DISCONTINUED | OUTPATIENT
Start: 2021-07-15 | End: 2021-07-15

## 2021-07-15 RX ADMIN — ATORVASTATIN CALCIUM 40 MG: 40 TABLET, FILM COATED ORAL at 16:34

## 2021-07-15 RX ADMIN — ESCITALOPRAM OXALATE 10 MG: 10 TABLET ORAL at 08:51

## 2021-07-15 RX ADMIN — ACETAMINOPHEN 650 MG: 325 TABLET, FILM COATED ORAL at 14:04

## 2021-07-15 RX ADMIN — LOSARTAN POTASSIUM 25 MG: 25 TABLET, FILM COATED ORAL at 08:51

## 2021-07-15 RX ADMIN — HYDRALAZINE HYDROCHLORIDE 5 MG: 20 INJECTION, SOLUTION INTRAMUSCULAR; INTRAVENOUS at 06:43

## 2021-07-15 RX ADMIN — ACETAMINOPHEN 975 MG: 325 TABLET, FILM COATED ORAL at 04:02

## 2021-07-15 RX ADMIN — NICOTINE 1 PATCH: 14 PATCH, EXTENDED RELEASE TRANSDERMAL at 08:51

## 2021-07-15 RX ADMIN — FUROSEMIDE 40 MG: 40 TABLET ORAL at 08:51

## 2021-07-15 RX ADMIN — ENOXAPARIN SODIUM 40 MG: 40 INJECTION SUBCUTANEOUS at 08:51

## 2021-07-15 RX ADMIN — NITROGLYCERIN 0.4 MG: 0.4 TABLET SUBLINGUAL at 04:03

## 2021-07-15 RX ADMIN — EZETIMIBE 10 MG: 10 TABLET ORAL at 21:09

## 2021-07-15 RX ADMIN — AMLODIPINE BESYLATE 10 MG: 10 TABLET ORAL at 11:31

## 2021-07-15 RX ADMIN — LIDOCAINE 5% 1 PATCH: 700 PATCH TOPICAL at 14:05

## 2021-07-15 RX ADMIN — ASPIRIN 81 MG: 81 TABLET, DELAYED RELEASE ORAL at 08:51

## 2021-07-15 RX ADMIN — LEVOTHYROXINE SODIUM 125 MCG: 125 TABLET ORAL at 06:41

## 2021-07-15 RX ADMIN — ASPIRIN 81 MG CHEWABLE TABLET 324 MG: 81 TABLET CHEWABLE at 01:48

## 2021-07-15 NOTE — ED NOTES
Report EKG completed and given to [facility] provider, Dr Aleksandr Gonzalez, RN  07/15/21 [id number]

## 2021-07-15 NOTE — ASSESSMENT & PLAN NOTE
· Initially presenting with blood pressure 218/107 most likely related to stopping blood pressure medications on her own a month ago  · Will restart home losartan at 25 mg p o  Q d   · P r n  IV hydralazine with parameters

## 2021-07-15 NOTE — PLAN OF CARE
Problem: Potential for Falls  Goal: Patient will remain free of falls  Description: INTERVENTIONS:  - Educate patient/family on patient safety including physical limitations  - Instruct patient to call for assistance with activity   - Consult OT/PT to assist with strengthening/mobility   - Keep Call bell within reach  - Keep bed low and locked with side rails adjusted as appropriate  - Keep care items and personal belongings within reach  - Initiate and maintain comfort rounds  - Make Fall Risk Sign visible to staff  - Apply yellow socks and bracelet for high fall risk patients  - Consider moving patient to room near nurses station  Outcome: Progressing     Problem: PAIN - ADULT  Goal: Verbalizes/displays adequate comfort level or baseline comfort level  Description: Interventions:  - Encourage patient to monitor pain and request assistance  - Assess pain using appropriate pain scale  - Administer analgesics based on type and severity of pain and evaluate response  - Implement non-pharmacological measures as appropriate and evaluate response  - Consider cultural and social influences on pain and pain management  - Notify physician/advanced practitioner if interventions unsuccessful or patient reports new pain  Outcome: Progressing     Problem: INFECTION - ADULT  Goal: Absence or prevention of progression during hospitalization  Description: INTERVENTIONS:  - Assess and monitor for signs and symptoms of infection  - Monitor lab/diagnostic results  - Monitor all insertion sites, i e  indwelling lines, tubes, and drains  - Monitor endotracheal if appropriate and nasal secretions for changes in amount and color  - [place] appropriate cooling/warming therapies per order  - Administer medications as ordered  - Instruct and encourage patient and family to use good hand hygiene technique  - Identify and instruct in appropriate isolation precautions for identified infection/condition  Outcome: Progressing  Goal: Absence of fever/infection during neutropenic period  Description: INTERVENTIONS:  - Monitor WBC    Outcome: Progressing     Problem: SAFETY ADULT  Goal: Patient will remain free of falls  Description: INTERVENTIONS:  - Educate patient/family on patient safety including physical limitations  - Instruct patient to call for assistance with activity   - Consult OT/PT to assist with strengthening/mobility   - Keep Call bell within reach  - Keep bed low and locked with side rails adjusted as appropriate  - Keep care items and personal belongings within reach  - Initiate and maintain comfort rounds  - Make Fall Risk Sign visible to staff  - Apply yellow socks and bracelet for high fall risk patients  - Consider moving patient to room near nurses station  Outcome: Progressing  Goal: Maintain or return to baseline ADL function  Description: INTERVENTIONS:  -  Assess patient's ability to carry out ADLs; assess patient's baseline for ADL function and identify physical deficits which impact ability to perform ADLs (bathing, care of mouth/teeth, toileting, grooming, dressing, etc )  - Assess/evaluate cause of self-care deficits   - Assess range of motion  - Assess patient's mobility; develop plan if impaired  - Assess patient's need for assistive devices and provide as appropriate  - Encourage maximum independence but intervene and supervise when necessary  - Involve family in performance of ADLs  - Assess for home care needs following discharge   - Consider OT consult to assist with ADL evaluation and planning for discharge  - Provide patient education as appropriate  Outcome: Progressing  Goal: Maintains/Returns to pre admission functional level  Description: INTERVENTIONS:  - Perform BMAT or MOVE assessment daily    - Set and communicate daily mobility goal to care team and patient/family/caregiver     - Collaborate with rehabilitation services on mobility goals if consulted  - Out of bed for toileting  - Record patient progress and toleration of activity level   Outcome: Progressing     Problem: DISCHARGE PLANNING  Goal: Discharge to home or other facility with appropriate resources  Description: INTERVENTIONS:  - Identify barriers to discharge w/patient and caregiver  - Arrange for needed discharge resources and transportation as appropriate  - Identify discharge learning needs (meds, wound care, etc )  - Arrange for interpretive services to assist at discharge as needed  - Refer to Case Management Department for coordinating discharge planning if the patient needs post-hospital services based on physician/advanced practitioner order or complex needs related to functional status, cognitive ability, or social support system  Outcome: Progressing     Problem: Knowledge Deficit  Goal: Patient/family/caregiver demonstrates understanding of disease process, treatment plan, medications, and discharge instructions  Description: Complete learning assessment and assess knowledge base    Interventions:  - Provide teaching at level of understanding  - Provide teaching via preferred learning methods  Outcome: Progressing

## 2021-07-15 NOTE — ASSESSMENT & PLAN NOTE
· CARLI score 2, chest x-ray negative  · Initial EKG revealing normal sinus rhythm, prolonged QTC  · Initial troponin negative, continue to trend 2 more times q 3 hours with EKG  · Telemetry  · Given 324 mg aspirin x1 in ED will continue home aspirin 81 mg p o  Q d  And statin  · P r n   Sublingual nitroglycerin, lidocaine patch for pain

## 2021-07-15 NOTE — ED PROVIDER NOTES
History  Chief Complaint   Patient presents with    Chest Pain     Patient presents with chest pressure everytime she coughs that started earlier today     HPI  39 y o  female presents with one day of substernal chest pain without radiation currently but previously to the back  Patient describes "like it's caving in" that came on gradually and continues in the ER though mild without coughing  Patient states coughing worsens the pain and nothing improves the pain  Patient denies pleuritic component to chest pain  Patient denies exertional component to the chest pain  Patient associates congestion earlier in the day; denies fever/chills, nausea/vomiting, diaphoresis, dyspnea, cough, hemoptysis, weakness, dizziness, syncope, focal weakness or numbness, leg pain or swelling  All other review of systems reviewed and noted to be negative  The patient affirms a history of atherosclerotic disease (CAD/TIA/CVA/PAD)  Patient does not take her medication  Patient affirms any use of tobacco in the past 90 days  The patient denies any use of illicit drugs, including cocaine  Patient denies any immobilization of at least 3 days or surgery in the past 4 weeks  Patient denies any history of DVT or PE  Patient denies any history or family history of thrombophilia  Patient denies any malignancy with treatment within the past 6 months  Patient denies any use of exogenous estrogen containing compounds  Patient denies pregnancy or recent (less than 6 weeks) pregnancy  Patient did not receive COVID vaccination  Focused Objective  CV:  Normal inspection with no rash, signs of infection, or trauma  Regular rate and rhythm  No murmur  Peripheral pulses intact and equal   Tender to palpitation over area of patient's reported pain  Respiratory:  Lungs clear to auscultation bilaterally without adventitious sounds  Skin:  Warm and dry    No rash or signs of herpes zoster over area of patient's reported pain   Extremities:  Non-tender lower extremities without asymmetry; no clinical signs of DVT  No lower extremity edema  Medical Decision Making    Patient presenting with chest pain with a broad differential, including multiple emergent etiologies  Patient noncompliant with medications and severely hypertensive upon arrival, potentially component of hypertensive urgency  Emphasized the patient the need of continued medication compliance in the long-term risks of not taking medications  EKG obtained and reviewed independently by myself, which was interpreted by myself as listed under ED course  Patient placed on cardiac monitoring  Regarding the possibility for ACS, patient's risk stratification by the HEART SCORE:    HEART score:    History 1=Moderate suspicious  ECG 1=Nonspecific repolarization disturbance  Age 0= < 45 years  Risk Factors 2= > 3 risk factors, or history of atherosclerotic disease  Troponin 0= < Normal limit  Total 4      Score 0-3: 1 7% had a MACE risk  0 4% (1 patient)   36 4% of patients were in this low risk group    Score 4-6: 16 6% had a MACE risk    Score 7-10: 50 1% had a MACE risk       The patient's HEART score is 4  CXR will be obtained to evaluate for alternative pathologies, including pneumothorax or pneumonia  Laboratory analysis including troponin to evaluate for ACS, CBC to evaluate for anemia or leukocytosis, and BMP to evaluate renal function and electrolytes considering possibility of cardiac involvement  Labs obtained and reviewed  Patient's HEART score demonstrated high risk for ACS and, patient likely to benefit from continued observation in the hospital for serial monitoring of troponin and cardiac monitoring  Patient in agreement with this plan                  History provided by:  Patient  Chest Pain  Pain location:  Substernal area  Pain quality comment:  See HPI  Pain radiates to:  Upper back  Pain radiates to the back: yes    Pain severity: Moderate  Onset quality:  Gradual  Timing:  Constant  Progression:  Waxing and waning  Relieved by:  Nothing  Worsened by:  Coughing  Associated symptoms: cough    Associated symptoms: no abdominal pain, no altered mental status, no anorexia, no claudication, no diaphoresis, no dizziness, no fatigue, no fever, no headache, no heartburn, no lower extremity edema, no nausea, no near-syncope, no numbness, no orthopnea, no palpitations, no PND, no shortness of breath, no syncope, not vomiting and no weakness        Prior to Admission Medications   Prescriptions Last Dose Informant Patient Reported? Taking?    amLODIPine (NORVASC) 10 mg tablet 7/15/2021 at Unknown time Self No Yes   Sig: Take 1 tablet (10 mg total) by mouth daily   aspirin 81 MG tablet 7/15/2021 at Unknown time Self Yes Yes   Sig: Take 81 mg by mouth daily   atorvastatin (LIPITOR) 40 mg tablet  Self No No   Sig: Take 1 tablet (40 mg total) by mouth daily with dinner   chlorthalidone 25 mg tablet Unknown at Unknown time  Yes No   Sig: Take 25 mg by mouth daily   docusate sodium (COLACE) 100 mg capsule Not Taking at Unknown time Self No No   Sig: Take 1 capsule (100 mg total) by mouth 2 (two) times a day as needed for constipation   Patient not taking: Reported on 11/8/2020   escitalopram (LEXAPRO) 10 mg tablet 7/15/2021 at Unknown time  Yes Yes   Sig: Take 10 mg by mouth daily   ezetimibe (ZETIA) 10 mg tablet  Self No No   Sig: Take 1 tablet (10 mg total) by mouth daily at bedtime   furosemide (LASIX) 40 mg tablet  Self No No   Sig: Take 1 tablet (40 mg total) by mouth daily   levothyroxine 125 mcg tablet 7/15/2021 at Unknown time  No Yes   Sig: Take 1 tablet (125 mcg total) by mouth daily in the early morning   losartan (COZAAR) 100 MG tablet 7/15/2021 at Unknown time  No Yes   Sig: Take 1 tablet (100 mg total) by mouth daily   meclizine (ANTIVERT) 12 5 MG tablet Unknown at Unknown time  Yes No   Sig: Take 12 5 mg by mouth every 12 (twelve) hours as needed for dizziness   potassium chloride (K-DUR,KLOR-CON) 20 mEq tablet  Self No No   Sig: Take 2 tablets (40 mEq total) by mouth 2 (two) times a day      Facility-Administered Medications: None       Past Medical History:   Diagnosis Date    History of MI (myocardial infarction)     History of TIA (transient ischemic attack)     Hypertension     Hypothyroidism     hypothyroidism    Morbid obesity with BMI of 45 0-49 9, adult (HCC)     TIA (transient ischemic attack)        Past Surgical History:   Procedure Laterality Date    BREAST SURGERY Left     TUBAL LIGATION         Family History   Problem Relation Age of Onset    Heart disease Mother     Hypertension Mother     Diabetes Father      I have reviewed and agree with the history as documented  E-Cigarette/Vaping    E-Cigarette Use Never User      E-Cigarette/Vaping Substances    Nicotine No     THC No     CBD No     Flavoring No     Other No     Unknown No      Social History     Tobacco Use    Smoking status: Current Every Day Smoker     Packs/day: 0 50     Years: 30 00     Pack years: 15 00     Types: Cigarettes    Smokeless tobacco: Never Used   Vaping Use    Vaping Use: Never used   Substance Use Topics    Alcohol use: Not Currently     Comment: occasional    Drug use: No       Review of Systems   Constitutional: Negative for diaphoresis, fatigue and fever  Respiratory: Positive for cough  Negative for shortness of breath  Cardiovascular: Positive for chest pain  Negative for palpitations, orthopnea, claudication, syncope, PND and near-syncope  Gastrointestinal: Negative for abdominal pain, anorexia, heartburn, nausea and vomiting  Neurological: Negative for dizziness, weakness, numbness and headaches  All other systems reviewed and are negative  Physical Exam  Physical Exam  Vitals reviewed  Constitutional:       Appearance: She is well-developed     Eyes:      Pupils: Pupils are equal, round, and reactive to light  Cardiovascular:      Rate and Rhythm: Normal rate and regular rhythm  Heart sounds: Normal heart sounds  Pulmonary:      Effort: Pulmonary effort is normal    Chest:      Chest wall: Tenderness present  Abdominal:      Palpations: Abdomen is soft  Musculoskeletal:      Cervical back: Neck supple  Right lower leg: No tenderness  No edema  Left lower leg: No tenderness  No edema  Skin:     General: Skin is warm and dry  Neurological:      General: No focal deficit present  Mental Status: She is alert  Psychiatric:         Mood and Affect: Mood normal          Vital Signs  ED Triage Vitals [07/15/21 0115]   Temperature Pulse Respirations Blood Pressure SpO2   98 1 °F (36 7 °C) 91 22 (!) 218/107 94 %      Temp Source Heart Rate Source Patient Position - Orthostatic VS BP Location FiO2 (%)   Oral Monitor Lying Left arm --      Pain Score       9           Vitals:    07/15/21 2234 07/16/21 0914 07/16/21 0914 07/16/21 1300   BP: (!) 173/92 (!) 195/103 (!) 195/103 165/98   Pulse: 85 81 81    Patient Position - Orthostatic VS:             Visual Acuity      ED Medications  Medications   aspirin chewable tablet 324 mg (324 mg Oral Given 7/15/21 0148)   acetaminophen (TYLENOL) tablet 975 mg (975 mg Oral Given 7/15/21 0402)       Diagnostic Studies  Results Reviewed     Procedure Component Value Units Date/Time    Blood culture [[id number]] Collected: 07/15/21 0440    Lab Status: Preliminary result Specimen: Blood from Hand, Right Updated: 07/18/21 0902     Blood Culture No Growth at 72 hrs      Blood culture [[id number]]  (Abnormal) Collected: 07/15/21 0440    Lab Status: Final result Specimen: Blood from Arm, Left Updated: 07/17/21 1047     Blood Culture Staphylococcus coagulase negative     Gram Stain Result Gram positive cocci in clusters    Narrative:      Susceptibility testing will not be performed as this organism, when isolated from a single set of blood cultures, represents probable skin regla contamination  Please call the Microbiology Laboratory within 5 days at [phone] if further workup is required  Troponin I [[id number]]  (Normal) Collected: 07/15/21 1129    Lab Status: Final result Specimen: Blood from Arm, Right Updated: 07/15/21 1154     Troponin I <0 02 ng/mL     D-dimer, quantitative [[id number]]  (Normal) Collected: 07/15/21 0750    Lab Status: Final result Specimen: Blood from Arm, Left Updated: 07/15/21 0824     D-Dimer, Quant 0 28 ug/ml FEU     Lactic acid, plasma [[id number]]  (Normal) Collected: 07/15/21 0446    Lab Status: Final result Specimen: Blood from Arm, Left Updated: 07/15/21 0513     LACTIC ACID 1 2 mmol/L     Narrative:      Result may be elevated if tourniquet was used during collection      Troponin I repeat in 3hrs [[id number]]  (Normal) Collected: 07/15/21 0440    Lab Status: Final result Specimen: Blood from Arm, Left Updated: 07/15/21 0511     Troponin I <0 02 ng/mL     Pregnancy Test (HCG Qualitative) [[id number]]  (Normal) Collected: 07/15/21 0130    Lab Status: Final result Specimen: Blood from Arm, Right Updated: 07/15/21 0414     Preg, Serum Negative    Troponin I [[id number]]  (Normal) Collected: 07/15/21 0130    Lab Status: Final result Specimen: Blood from Arm, Right Updated: 07/15/21 0157     Troponin I <0 02 ng/mL     Basic metabolic panel [[id number]] Collected: 07/15/21 0130    Lab Status: Final result Specimen: Blood from Arm, Right Updated: 07/15/21 0155     Sodium 139 mmol/L      Potassium 3 6 mmol/L      Chloride 102 mmol/L      CO2 27 mmol/L      ANION GAP 10 mmol/L      BUN 7 mg/dL      Creatinine 0 92 mg/dL      Glucose 140 mg/dL      Calcium 9 5 mg/dL      eGFR 88 ml/min/1 73sq m     Narrative:      [facility] guidelines for Chronic Kidney Disease (CKD):     Stage 1 with normal or high GFR (GFR > 90 mL/min/1 73 square meters)    Stage 2 Mild CKD (GFR = 60-89 mL/min/1 73 square meters)    Stage 3A Moderate CKD (GFR = 45-59 mL/min/1 73 square meters)    Stage 3B Moderate CKD (GFR = 30-44 mL/min/1 73 square meters)    Stage 4 Severe CKD (GFR = 15-29 mL/min/1 73 square meters)    Stage 5 End Stage CKD (GFR <15 mL/min/1 73 square meters)  Note: GFR calculation is accurate only with a steady state creatinine    CBC and differential [[id number]]  (Abnormal) Collected: 07/15/21 0130    Lab Status: Final result Specimen: Blood from Arm, Right Updated: 07/15/21 0139     WBC 11 87 Thousand/uL      RBC 4 84 Million/uL      Hemoglobin 13 5 g/dL      Hematocrit 42 8 %      MCV 88 fL      MCH 27 9 pg      MCHC 31 5 g/dL      RDW 14 6 %      MPV 10 6 fL      Platelets 534 Thousands/uL      nRBC 0 /100 WBCs      Neutrophils Relative 67 %      Immat GRANS % 1 %      Lymphocytes Relative 25 %      Monocytes Relative 4 %      Eosinophils Relative 3 %      Basophils Relative 0 %      Neutrophils Absolute 8 01 Thousands/µL      Immature Grans Absolute 0 06 Thousand/uL      Lymphocytes Absolute 2 96 Thousands/µL      Monocytes Absolute 0 50 Thousand/µL      Eosinophils Absolute 0 31 Thousand/µL      Basophils Absolute 0 03 Thousands/µL                  XR chest 2 views   Final Result by Tessa Brooke MD (07/15 7344)      No acute cardiopulmonary disease  Workstation performed: [id number]                    Procedures  Procedures         ED Course  ED Course as of Jul 19 0119   Thu Jul 15, 2021   9839 EKG demonstrates normal sinus rhythm with no acute ST segment changes  There are signs of prior septal infarct  Appears similar to prior  SBIRT 20yo+      Most Recent Value   SBIRT (22 yo +)   In order to provide better care to our patients, we are screening all of our patients for alcohol and drug use  Would it be okay to ask you these screening questions? Yes Filed at: 07/15/2021 3286   Initial Alcohol Screen: US AUDIT-C    1   How often do you have a drink containing alcohol?  0 Filed at: 07/15/2021 0611   3b  FEMALE Any Age, or MALE 65+: How often do you have 4 or more drinks on one occassion? 0 Filed at: 07/15/2021 0611   Audit-C Score  0 Filed at: 07/15/2021 3402   THANH: How many times in the past year have you    Used an illegal drug or used a prescription medication for non-medical reasons?   Never Filed at: 07/15/2021 1618        CARLI Risk Score      Most Recent Value   Age >= 72  0 Filed at: 07/15/2021 9064   Known CAD (stenosis >= 50%)  0 Filed at: 07/15/2021 3394   Recent (<=24 hrs) Service Angina  1 Filed at: 07/15/2021 0318   ST Deviation >= 0 5 mm  0 Filed at: 07/15/2021 0318   3+ CAD Risk Factors (FHx, HTN, HLP, DM, Smoker)  0 Filed at: 07/15/2021 0318   Aspirin Use Past 7 Days  1 Filed at: 07/15/2021 0318   Elevated Cardiac Markers  0 Filed at: 07/15/2021 0318   CARLI Risk Score (Calculated)  2 Filed at: 07/15/2021 3045                  MDM    Disposition  Final diagnoses:   Chest pain   Noncompliance   Hypertensive urgency     Time reflects when diagnosis was documented in both MDM as applicable and the Disposition within this note     Time User Action Codes Description Comment    7/15/2021  2:11 AM Nikolas Kobuk Add [R07 9] Chest pain     7/15/2021  2:11 AM Nikolas Kobuk Add [Z91 19] Noncompliance     7/15/2021  2:11 AM Nikolas Kobuk Add [I16 0] Hypertensive urgency     7/16/2021  2:09 PM [place], [id number] Essential hypertension     7/16/2021  2:09 PM [place], [address] Essential hypertension     7/16/2021  2:09 PM [place], [address] Essential hypertension     7/16/2021  2:09 PM [place], [address] Essential hypertension     7/16/2021  2:09 PM Hans Lopez Add [I16 9] Hypertensive crisis     7/16/2021  2:09 PM [place], [address] Essential hypertension     7/16/2021  2:10 PM Eduard [address] Essential hypertension     7/16/2021  2:11 PM Hans Lopez Add [F32 9] MDD (major depressive disorder)       ED Disposition     ED Disposition Condition Date/Time Comment    Admit Stable Thu Jul 15, 2021  2:11 AM Case was discussed with JAYESH and the patient's admission status was agreed to be Admission Status: observation status to the service of Dr Cesar Allen         Discharge Medication List as of 7/16/2021  3:31 PM      START taking these medications    Details   hydrALAZINE (APRESOLINE) 10 mg tablet Take 1 tablet (10 mg total) by mouth every 8 (eight) hours, Starting Fri 7/16/2021, Until Sun 8/15/2021, Normal         CONTINUE these medications which have CHANGED    Details   amLODIPine (NORVASC) 10 mg tablet Take 1 tablet (10 mg total) by mouth daily, Starting Fri 7/16/2021, Until Sun 8/15/2021, Normal      atorvastatin (LIPITOR) 40 mg tablet Take 1 tablet (40 mg total) by mouth daily with dinner, Starting Fri 7/16/2021, Until Thu 10/14/2021, Normal      chlorthalidone 25 mg tablet Take 1 tablet (25 mg total) by mouth daily, Starting Fri 7/16/2021, Until Sun 8/15/2021, Normal      escitalopram (LEXAPRO) 10 mg tablet Take 1 tablet (10 mg total) by mouth daily, Starting Fri 7/16/2021, Until Sun 8/15/2021, Normal      levothyroxine 125 mcg tablet Take 1 tablet (125 mcg total) by mouth daily in the early morning, Starting Fri 7/16/2021, Until Sun 8/15/2021, Normal      losartan (COZAAR) 100 MG tablet Take 1 tablet (100 mg total) by mouth daily, Starting Fri 7/16/2021, Until Sun 8/15/2021, Normal      potassium chloride (K-DUR,KLOR-CON) 20 mEq tablet Take 1 tablet (20 mEq total) by mouth daily, Starting Fri 7/16/2021, Until Thu 10/14/2021, Normal         CONTINUE these medications which have NOT CHANGED    Details   aspirin 81 MG tablet Take 81 mg by mouth daily, Historical Med      ezetimibe (ZETIA) 10 mg tablet Take 1 tablet (10 mg total) by mouth daily at bedtime, Starting Mon 1/6/2020, Until Sun 4/5/2020, Normal      meclizine (ANTIVERT) 12 5 MG tablet Take 12 5 mg by mouth every 12 (twelve) hours as needed for dizziness, Historical Med         STOP taking these medications       docusate sodium (COLACE) 100 mg capsule Comments:   Reason for Stopping:         furosemide (LASIX) 40 mg tablet Comments:   Reason for Stopping:             Outpatient Discharge Orders   Discharge Diet     Activity as tolerated       PDMP Review     None          ED Provider  Electronically Signed by           Jim Pappas MD  07/19/21 4132

## 2021-07-15 NOTE — ASSESSMENT & PLAN NOTE
· Meeting criteria due to tachycardia and tachypnea most likely in setting of chest pain  · No infectious source at this time so hold off on antibiotics, BP elevated to no fluids for now  · Check lactic acid and blood culture x2

## 2021-07-15 NOTE — H&P
[address] 1975, 39 y o  female MRN: [id number]  Unit/Bed#: ED 19 Encounter: [id number]  Primary Care Provider: Isabelle Chu DO   Date and time admitted to hospital: 7/15/2021  1:09 AM    * Chest pain  Assessment & Plan  · CARLI score 2, chest x-ray negative  · Initial EKG revealing normal sinus rhythm, prolonged QTC  · Initial troponin negative, continue to trend 2 more times q 3 hours with EKG  · Telemetry  · Given 324 mg aspirin x1 in ED will continue home aspirin 81 mg p o  Q d  And statin  · P r n  Sublingual nitroglycerin, lidocaine patch for pain    Hypertensive urgency  Assessment & Plan  · Initially presenting with blood pressure 218/107 most likely related to stopping blood pressure medications on her own a month ago  · Will restart home losartan at 25 mg p o  Q d   · P r n  IV hydralazine with parameters    SIRS (systemic inflammatory response syndrome) (HCC)  Assessment & Plan  · Meeting criteria due to tachycardia and tachypnea most likely in setting of chest pain  · No infectious source at this time so hold off on antibiotics, BP elevated to no fluids for now  · Check lactic acid and blood culture x2      VTE Pharmacologic Prophylaxis: VTE Score: 3 Moderate Risk (Score 3-4) - Pharmacological DVT Prophylaxis Ordered: enoxaparin (Lovenox)  Code Status: Prior level 1 full code per patient  Discussion with family: Updated  () at bedside  Anticipated Length of Stay: Patient will be admitted on an observation basis with an anticipated length of stay of less than 2 midnights secondary to See above  Total Time for Visit, including Counseling / Coordination of Care: 45 minutes Greater than 50% of this total time spent on direct patient counseling and coordination of care      Chief Complaint:    Chief Complaint   Patient presents with    Chest Pain     Patient presents with chest pressure everytime she coughs that started earlier today       History of Present Illness:  Donal Urrutia is a 39 y o  female with a PMH of history of ACS, hypertension, hyperlipidemia who presents with complaint of onset of chest pain especially with coughing since yesterday morning  Reports has been constant and much worse when she coughs  Reports feels like a pressure, no radiation of feeling  Denies any worsening shortness of breath       Review of Systems:  Review of Systems   Constitutional: Negative for activity change  Respiratory: Positive for cough  Negative for shortness of breath  Cardiovascular: Positive for chest pain  Negative for leg swelling  Gastrointestinal: Positive for nausea  Negative for abdominal pain  Neurological: Negative for dizziness and headaches  Past Medical and Surgical History:   Past Medical History:   Diagnosis Date    History of MI (myocardial infarction)     History of TIA (transient ischemic attack)     Hypertension     Hypothyroidism     hypothyroidism    Morbid obesity with BMI of 45 0-49 9, adult ([facility] )     TIA (transient ischemic attack)        Past Surgical History:   Procedure Laterality Date    BREAST SURGERY Left     TUBAL LIGATION         Meds/Allergies:  Prior to Admission medications    Medication Sig Start Date End Date Taking?  Authorizing Provider   amLODIPine (NORVASC) 10 mg tablet Take 1 tablet (10 mg total) by mouth daily  Patient not taking: Reported on 11/8/2020 4/16/19   Arron Dubose MD   aspirin 81 MG tablet Take 81 mg by mouth daily    Historical Provider, MD   atorvastatin (LIPITOR) 40 mg tablet Take 1 tablet (40 mg total) by mouth daily with dinner 1/6/20 4/5/20  ALVIN Umanzor   chlorthalidone 25 mg tablet Take 25 mg by mouth daily    Historical Provider, MD   docusate sodium (COLACE) 100 mg capsule Take 1 capsule (100 mg total) by mouth 2 (two) times a day as needed for constipation  Patient not taking: Reported on 11/8/2020 10/6/19   Wily Weber MD escitalopram (LEXAPRO) 10 mg tablet Take 10 mg by mouth daily    Historical Provider, MD   ezetimibe (ZETIA) 10 mg tablet Take 1 tablet (10 mg total) by mouth daily at bedtime 1/6/20 4/5/20  ALVIN Umanzor   furosemide (LASIX) 40 mg tablet Take 1 tablet (40 mg total) by mouth daily 1/6/20 4/5/20  ALVIN Umanzor   levothyroxine 125 mcg tablet Take 1 tablet (125 mcg total) by mouth daily in the early morning 11/10/20   Abram Eddy MD   losartan (COZAAR) 100 MG tablet Take 1 tablet (100 mg total) by mouth daily 11/11/20   Abram Eddy MD   meclizine (ANTIVERT) 12 5 MG tablet Take 12 5 mg by mouth every 12 (twelve) hours as needed for dizziness    Historical Provider, MD   potassium chloride (K-DUR,KLOR-CON) 20 mEq tablet Take 2 tablets (40 mEq total) by mouth 2 (two) times a day 1/6/20 4/5/20  ALVIN Lopez     Have reviewed home medications per review of chart  Allergies:    Allergies   Allergen Reactions    Trimecaine Hallucinations    Toradol [Ketorolac Tromethamine] Anxiety       Social History:  Marital Status: /Civil Union   Patient Pre-hospital Living Situation: Home  Patient Pre-hospital Level of Mobility: walks  Patient Pre-hospital Diet Restrictions: n/a  Substance Use History:   Social History     Substance and Sexual Activity   Alcohol Use Not Currently    Comment: occasional     Social History     Tobacco Use   Smoking Status Current Every Day Smoker    Packs/day: 0 50    Years: 30 00    Pack years: 15 00    Types: Cigarettes   Smokeless Tobacco Never Used     Social History     Substance and Sexual Activity   Drug Use No       Family History:  Family History   Problem Relation Age of Onset    Heart disease Mother     Hypertension Mother     Diabetes Father        Physical Exam:     Vitals:   Blood Pressure: (!) 218/107 (07/15/21 0115)  Pulse: 91 (07/15/21 0115)  Temperature: 98 1 °F (36 7 °C) (07/15/21 0115)  Temp Source: Oral (07/15/21 3114)  Respirations: 22 (07/15/21 0115)  Height: 5' 7" (170 2 cm) (07/15/21 0115)  Weight - Scale: (!) 138 kg (304 lb) (07/15/21 0115)  SpO2: 94 % (07/15/21 0115)    Physical Exam  Vitals and nursing note reviewed  Constitutional:       Appearance: Normal appearance  She is obese  She is not diaphoretic  Comments: Admits to slight distress due to chest pain   HENT:      Head: Normocephalic  Cardiovascular:      Rate and Rhythm: Normal rate and regular rhythm  Pulses: Normal pulses  Heart sounds: Normal heart sounds  Pulmonary:      Effort: Pulmonary effort is normal  No respiratory distress  Breath sounds: Normal breath sounds  No wheezing or rales  Abdominal:      General: Abdomen is flat  Bowel sounds are normal  There is no distension  Palpations: Abdomen is soft  Tenderness: There is no abdominal tenderness  There is no guarding  Musculoskeletal:      Right lower leg: No edema  Left lower leg: No edema  Comments: Admits to slight tenderness of bilateral medial portion of lower extremities  No erythema, edema noted  Skin:     General: Skin is warm and dry  Coloration: Skin is not pale  Findings: No erythema  Neurological:      General: No focal deficit present  Mental Status: She is alert and oriented to person, place, and time  Psychiatric:         Mood and Affect: Mood normal          Behavior: Behavior normal          Thought Content:  Thought content normal          Judgment: Judgment normal          Additional Data:     Lab Results:  Results from last 7 days   Lab Units 07/15/21  0130   WBC Thousand/uL 11 87*   HEMOGLOBIN g/dL 13 5   HEMATOCRIT % 42 8   PLATELETS Thousands/uL 335   NEUTROS PCT % 67   LYMPHS PCT % 25   MONOS PCT % 4   EOS PCT % 3     Results from last 7 days   Lab Units 07/15/21  0130   SODIUM mmol/L 139   POTASSIUM mmol/L 3 6   CHLORIDE mmol/L 102   CO2 mmol/L 27   BUN mg/dL 7   CREATININE mg/dL 0 92   ANION GAP mmol/L 10   CALCIUM mg/dL 9 5   GLUCOSE RANDOM mg/dL 140                       Imaging: Personally reviewed the following imaging: chest xray  XR chest 2 views    (Results Pending)       EKG and Other Studies Reviewed on Admission:   · EKG: Normal sinus rhythm, prolonged QTC     ** Please Note: This note has been constructed using a voice recognition system   **

## 2021-07-16 VITALS
DIASTOLIC BLOOD PRESSURE: 98 MMHG | BODY MASS INDEX: 45.99 KG/M2 | RESPIRATION RATE: 20 BRPM | HEART RATE: 81 BPM | HEIGHT: 67 IN | OXYGEN SATURATION: 93 % | SYSTOLIC BLOOD PRESSURE: 165 MMHG | WEIGHT: 293 LBS | TEMPERATURE: 97.5 F

## 2021-07-16 PROCEDURE — 99217 PR OBSERVATION CARE DISCHARGE MANAGEMENT: CPT | Performed by: FAMILY MEDICINE

## 2021-07-16 PROCEDURE — 87040 BLOOD CULTURE FOR BACTERIA: CPT | Performed by: FAMILY MEDICINE

## 2021-07-16 RX ORDER — LEVOTHYROXINE SODIUM 0.12 MG/1
125 TABLET ORAL
Qty: 30 TABLET | Refills: 0 | Status: SHIPPED | OUTPATIENT
Start: 2021-07-16 | End: 2021-08-15

## 2021-07-16 RX ORDER — HYDRALAZINE HYDROCHLORIDE 10 MG/1
10 TABLET, FILM COATED ORAL EVERY 8 HOURS SCHEDULED
Status: DISCONTINUED | OUTPATIENT
Start: 2021-07-16 | End: 2021-07-16 | Stop reason: HOSPADM

## 2021-07-16 RX ORDER — LOSARTAN POTASSIUM 50 MG/1
100 TABLET ORAL DAILY
Status: DISCONTINUED | OUTPATIENT
Start: 2021-07-16 | End: 2021-07-16 | Stop reason: HOSPADM

## 2021-07-16 RX ORDER — LOSARTAN POTASSIUM 100 MG/1
100 TABLET ORAL DAILY
Qty: 30 TABLET | Refills: 0 | Status: SHIPPED | OUTPATIENT
Start: 2021-07-16 | End: 2021-08-15

## 2021-07-16 RX ORDER — AMLODIPINE BESYLATE 10 MG/1
10 TABLET ORAL DAILY
Qty: 30 TABLET | Refills: 0 | Status: SHIPPED | OUTPATIENT
Start: 2021-07-16 | End: 2022-05-27 | Stop reason: SDUPTHER

## 2021-07-16 RX ORDER — CHLORTHALIDONE 25 MG/1
25 TABLET ORAL DAILY
Qty: 30 TABLET | Refills: 0 | Status: SHIPPED | OUTPATIENT
Start: 2021-07-16 | End: 2022-05-27 | Stop reason: SDUPTHER

## 2021-07-16 RX ORDER — ATORVASTATIN CALCIUM 40 MG/1
40 TABLET, FILM COATED ORAL
Qty: 90 TABLET | Refills: 0 | Status: SHIPPED | OUTPATIENT
Start: 2021-07-16 | End: 2021-10-14

## 2021-07-16 RX ORDER — POTASSIUM CHLORIDE 20 MEQ/1
20 TABLET, EXTENDED RELEASE ORAL DAILY
Qty: 360 TABLET | Refills: 0 | Status: SHIPPED | OUTPATIENT
Start: 2021-07-16 | End: 2021-10-14

## 2021-07-16 RX ORDER — ESCITALOPRAM OXALATE 10 MG/1
10 TABLET ORAL DAILY
Qty: 30 TABLET | Refills: 0 | Status: SHIPPED | OUTPATIENT
Start: 2021-07-16 | End: 2021-08-15

## 2021-07-16 RX ORDER — HYDRALAZINE HYDROCHLORIDE 10 MG/1
10 TABLET, FILM COATED ORAL EVERY 8 HOURS SCHEDULED
Qty: 90 TABLET | Refills: 0 | Status: SHIPPED | OUTPATIENT
Start: 2021-07-16 | End: 2022-05-27 | Stop reason: SDUPTHER

## 2021-07-16 RX ADMIN — LOSARTAN POTASSIUM 100 MG: 50 TABLET, FILM COATED ORAL at 09:20

## 2021-07-16 RX ADMIN — LEVOTHYROXINE SODIUM 125 MCG: 125 TABLET ORAL at 05:10

## 2021-07-16 RX ADMIN — HYDRALAZINE HYDROCHLORIDE 5 MG: 20 INJECTION, SOLUTION INTRAMUSCULAR; INTRAVENOUS at 14:26

## 2021-07-16 RX ADMIN — HYDRALAZINE HYDROCHLORIDE 5 MG: 20 INJECTION, SOLUTION INTRAMUSCULAR; INTRAVENOUS at 09:20

## 2021-07-16 RX ADMIN — ASPIRIN 81 MG: 81 TABLET, DELAYED RELEASE ORAL at 09:15

## 2021-07-16 RX ADMIN — ESCITALOPRAM OXALATE 10 MG: 10 TABLET ORAL at 09:15

## 2021-07-16 RX ADMIN — AMLODIPINE BESYLATE 10 MG: 10 TABLET ORAL at 09:15

## 2021-07-16 RX ADMIN — FUROSEMIDE 40 MG: 40 TABLET ORAL at 09:15

## 2021-07-16 RX ADMIN — NICOTINE 1 PATCH: 14 PATCH, EXTENDED RELEASE TRANSDERMAL at 09:11

## 2021-07-16 RX ADMIN — ENOXAPARIN SODIUM 40 MG: 40 INJECTION SUBCUTANEOUS at 09:16

## 2021-07-16 NOTE — UTILIZATION REVIEW
Initial Clinical Review    Admission: Date/Time/Statement:   Admission Orders (From admission, onward)     Ordered        07/15/21 0211  Place in Observation  Once                   Orders Placed This Encounter   Procedures    Place in Observation     Standing Status:   Standing     Number of Occurrences:   1     Order Specific Question:   Level of Care     Answer:   Med Surg [16]     ED Arrival Information     Expected Arrival Acuity    - 7/15/2021 00:55 Emergent         Means of arrival Escorted by Service Admission type    [address] Emergency         Arrival complaint    sob        Chief Complaint   Patient presents with    Chest Pain     Patient presents with chest pressure everytime she coughs that started earlier today       Initial Presentation: 40 yo female to ED from home w/ CP renita w/ coughing since yest am   Admitted OBS status for serial trop , asa, sl ntg ,lidocaine patch for pain   /107 pt stopped her BP meds 1 month ago , restart Losartan and add iv hydralazine prn   Check lactic acid and BC x2        ED Triage Vitals [07/15/21 0115]   Temperature Pulse Respirations Blood Pressure SpO2   98 1 °F (36 7 °C) 91 22 (!) 218/107 94 %      Temp Source Heart Rate Source Patient Position - Orthostatic VS BP Location FiO2 (%)   Oral Monitor Lying Left arm --      Pain Score       9          Wt Readings from Last 1 Encounters:   07/15/21 (!) 138 kg (304 lb)     Additional Vital Signs:   07/15/21 22:34:24  98 3 °F (36 8 °C)  85  20  173/92Abnormal   119  91 %  --  --   07/15/21 2229  --  --  --  --  --  --  None (Room air)  --   07/15/21 15:09:01  98 1 °F (36 7 °C)  77  19  161/92  115  94 %  --  --   07/15/21 13:49:06  --  77  --  177/85Abnormal   116  97 %  --  --   07/15/21 1230  --  79  22  186/86Abnormal   124  97 %  None (Room air)  Lying   07/15/21 1200  --  74  23Abnormal   178/77Abnormal   110  98 %  None (Room air)  Lying   07/15/21 1130  --  79  20  181/104Abnormal   135  99 %  None (Room air)  Lying   07/15/21 0700  --  78  24Abnormal   187/91Abnormal   131  95 %  None (Room air)  Lying   07/15/21 0600  --  82  20  187/127Abnormal   146  97 %  None (Room air)  Lying   07/15/21 0500  --  82  20  175/72Abnormal   103  99 %  None (Room air)  Lying   07/15/21 0345  --  80  20  186/92Abnormal   131  100 %  None (Room air)  Lying   07/15/21 0131  --  --  --  --  --  --  None (Room air         Pertinent Labs/Diagnostic Test Results:   7/15 CXR No acute cardiopulmonary disease    7/15 EKG NSR   Results from last 7 days   Lab Units 07/15/21  0130   WBC Thousand/uL 11 87*   HEMOGLOBIN g/dL 13 5   HEMATOCRIT % 42 8   PLATELETS Thousands/uL 335   NEUTROS ABS Thousands/µL 8 01*     Results from last 7 days   Lab Units 07/15/21  0130   SODIUM mmol/L 139   POTASSIUM mmol/L 3 6   CHLORIDE mmol/L 102   CO2 mmol/L 27   ANION GAP mmol/L 10   BUN mg/dL 7   CREATININE mg/dL 0 92   EGFR ml/min/1 73sq m 88   CALCIUM mg/dL 9 5     Results from last 7 days   Lab Units 07/15/21  0130   GLUCOSE RANDOM mg/dL 140     Results from last 7 days   Lab Units 07/15/21  1129 07/15/21  0440 07/15/21  0130   TROPONIN I ng/mL <0 02 <0 02 <0 02     Results from last 7 days   Lab Units 07/15/21  0750   D-DIMER QUANTITATIVE ug/ml FEU 0 28       Results from last 7 days   Lab Units 07/15/21  0446   LACTIC ACID mmol/L 1 2     Results from last 7 days   Lab Units 07/15/21  0440   BLOOD CULTURE  Received in Microbiology Lab  Culture in Progress     GRAM STAIN RESULT  Gram positive cocci in clusters*     ED Treatment:   Medication Administration from 07/15/2021 0054 to 07/15/2021 1344       Date/Time Order Dose Route Action     07/15/2021 0148 aspirin chewable tablet 324 mg 324 mg Oral Given     07/15/2021 0851 aspirin (ECOTRIN LOW STRENGTH) EC tablet 81 mg 81 mg Oral Given     07/15/2021 0851 escitalopram (LEXAPRO) tablet 10 mg 10 mg Oral Given     07/15/2021 0851 furosemide (LASIX) tablet 40 mg 40 mg Oral Given     07/15/2021 0657 levothyroxine tablet 125 mcg 125 mcg Oral Given     07/15/2021 0851 losartan (COZAAR) tablet 25 mg 25 mg Oral Given     07/15/2021 5442 hydrALAZINE (APRESOLINE) injection 5 mg 5 mg Intravenous Given     07/15/2021 0402 acetaminophen (TYLENOL) tablet 975 mg 975 mg Oral Given     07/15/2021 0851 nicotine (NICODERM CQ) 14 mg/24hr TD 24 hr patch 1 patch 1 patch Transdermal Medication Applied     07/15/2021 0403 nitroglycerin (NITROSTAT) SL tablet 0 4 mg 0 4 mg Sublingual Given     07/15/2021 0851 enoxaparin (LOVENOX) subcutaneous injection 40 mg 40 mg Subcutaneous Given     07/15/2021 1131 amLODIPine (NORVASC) tablet 10 mg 10 mg Oral Given        Past Medical History:   Diagnosis Date    History of MI (myocardial infarction)     History of TIA (transient ischemic attack)     Hypertension     Hypothyroidism     hypothyroidism    Morbid obesity with BMI of 45 0-49 9, adult ([facility] )     TIA (transient ischemic attack)      Present on Admission:   Chest pain   SIRS (systemic inflammatory response syndrome) ([facility])      Admitting Diagnosis: Chest pain [R07 9]  Hypertensive urgency [I16 0]  Noncompliance [Z91 19]  Age/Sex: 39 y o  female  Admission Orders:  Scheduled Medications:  amLODIPine, 10 mg, Oral, Daily  amLODIPine, 10 mg, Oral, Daily  aspirin, 81 mg, Oral, Daily  atorvastatin, 40 mg, Oral, Daily With Dinner  enoxaparin, 40 mg, Subcutaneous, Daily  escitalopram, 10 mg, Oral, Daily  ezetimibe, 10 mg, Oral, HS  furosemide, 40 mg, Oral, Daily  levothyroxine, 125 mcg, Oral, Early Morning  losartan, 25 mg, Oral, Daily  nicotine, 1 patch, Transdermal, Daily      Continuous IV Infusions:     PRN Meds:  acetaminophen, 650 mg, Oral, Q6H PRN  aluminum-magnesium hydroxide-simethicone, 30 mL, Oral, Q6H PRN  hydrALAZINE, 5 mg, Intravenous, Q6H PRN  7/15 x1  lidocaine, 1 patch, Topical, Daily PRN  meclizine, 12 5 mg, Oral, Q12H PRN  nitroglycerin, 0 4 mg, Sublingual, Q5 Min PRN  sodium chloride (PF), 3 mL, Intravenous, Q1H PRN    Tele       Network Utilization Review Department  ATTENTION: Please call with any questions or concerns to [phone] and carefully listen to the prompts so that you are directed to the right person  All voicemails are confidential   Jasper Corado all requests for admission clinical reviews, approved or denied determinations and any other requests to dedicated fax number below belonging to the campus where the patient is receiving treatment   List of dedicated fax numbers for the Facilities:  [address] DENIALS (Administrative/Medical Necessity) [phone]   [address] (Maternity/NICU/Pediatrics) [phone]   [address] [place] [address] [address] [place] [phone] [place] [address] Ashlee Buckner [address] [facility] [place] [address] [phone]

## 2021-07-16 NOTE — PLAN OF CARE
Problem: Potential for Falls  Goal: Patient will remain free of falls  Description: INTERVENTIONS:  - Educate patient/family on patient safety including physical limitations  - Instruct patient to call for assistance with activity   - Consult OT/PT to assist with strengthening/mobility   - Keep Call bell within reach  - Keep bed low and locked with side rails adjusted as appropriate  - Keep care items and personal belongings within reach  - Initiate and maintain comfort rounds  - Make Fall Risk Sign visible to staff  - Offer Toileting every  Hours, in advance of need  - Initiate/Maintain alarm  - Obtain necessary fall risk management equipment:   - Apply yellow socks and bracelet for high fall risk patients  - Consider moving patient to room near nurses station  7/16/2021 1606 by Pablo Lambert RN  Outcome: Adequate for Discharge  7/16/2021 0757 by Pablo Lambert RN  Outcome: Progressing     Problem: PAIN - ADULT  Goal: Verbalizes/displays adequate comfort level or baseline comfort level  Description: Interventions:  - Encourage patient to monitor pain and request assistance  - Assess pain using appropriate pain scale  - Administer analgesics based on type and severity of pain and evaluate response  - Implement non-pharmacological measures as appropriate and evaluate response  - Consider cultural and social influences on pain and pain management  - Notify physician/advanced practitioner if interventions unsuccessful or patient reports new pain  7/16/2021 1606 by Pablo Lambert RN  Outcome: Adequate for Discharge  7/16/2021 0757 by Pablo Lambert RN  Outcome: Progressing     Problem: INFECTION - ADULT  Goal: Absence or prevention of progression during hospitalization  Description: INTERVENTIONS:  - Assess and monitor for signs and symptoms of infection  - Monitor lab/diagnostic results  - Monitor all insertion sites, i e  indwelling lines, tubes, and drains  - Monitor endotracheal if appropriate and nasal secretions for changes in amount and color  - [place] appropriate cooling/warming therapies per order  - Administer medications as ordered  - Instruct and encourage patient and family to use good hand hygiene technique  - Identify and instruct in appropriate isolation precautions for identified infection/condition  7/16/2021 1606 by Gilberto Martinez RN  Outcome: Adequate for Discharge  7/16/2021 0757 by Gilberto Martinez RN  Outcome: Progressing  Goal: Absence of fever/infection during neutropenic period  Description: INTERVENTIONS:  - Monitor WBC    7/16/2021 1606 by Gilberto Martinez RN  Outcome: Adequate for Discharge  7/16/2021 0757 by Gilberto Martinez RN  Outcome: Progressing     Problem: SAFETY ADULT  Goal: Patient will remain free of falls  Description: INTERVENTIONS:  - Educate patient/family on patient safety including physical limitations  - Instruct patient to call for assistance with activity   - Consult OT/PT to assist with strengthening/mobility   - Keep Call bell within reach  - Keep bed low and locked with side rails adjusted as appropriate  - Keep care items and personal belongings within reach  - Initiate and maintain comfort rounds  - Make Fall Risk Sign visible to staff  - Offer Toileting every  Hours, in advance of need  - Initiate/Maintain alarm  - Obtain necessary fall risk management equipment:   - Apply yellow socks and bracelet for high fall risk patients  - Consider moving patient to room near nurses station  7/16/2021 1606 by Gilberto Martinez RN  Outcome: Adequate for Discharge  7/16/2021 0757 by Gilberto Martinez RN  Outcome: Progressing  Goal: Maintain or return to baseline ADL function  Description: INTERVENTIONS:  -  Assess patient's ability to carry out ADLs; assess patient's baseline for ADL function and identify physical deficits which impact ability to perform ADLs (bathing, care of mouth/teeth, toileting, grooming, dressing, etc )  - Assess/evaluate cause of self-care deficits   - Assess range of motion  - Assess patient's mobility; develop plan if impaired  - Assess patient's need for assistive devices and provide as appropriate  - Encourage maximum independence but intervene and supervise when necessary  - Involve family in performance of ADLs  - Assess for home care needs following discharge   - Consider OT consult to assist with ADL evaluation and planning for discharge  - Provide patient education as appropriate  7/16/2021 1606 by Christina Dobson RN  Outcome: Adequate for Discharge  7/16/2021 0757 by Christina Dobson RN  Outcome: Progressing  Goal: Maintains/Returns to pre admission functional level  Description: INTERVENTIONS:  - Perform BMAT or MOVE assessment daily    - Set and communicate daily mobility goal to care team and patient/family/caregiver  - Collaborate with rehabilitation services on mobility goals if consulted  - Perform Range of Motion  times a day  - Reposition patient every  hours    - Dangle patient  times a day  - Stand patient  times a day  - Ambulate patient  times a day  - Out of bed to chair  times a day   - Out of bed for meal times a day  - Out of bed for toileting  - Record patient progress and toleration of activity level   7/16/2021 1606 by Christina Dobson RN  Outcome: Adequate for Discharge  7/16/2021 0757 by Christina Dobson RN  Outcome: Progressing     Problem: DISCHARGE PLANNING  Goal: Discharge to home or other facility with appropriate resources  Description: INTERVENTIONS:  - Identify barriers to discharge w/patient and caregiver  - Arrange for needed discharge resources and transportation as appropriate  - Identify discharge learning needs (meds, wound care, etc )  - Arrange for interpretive services to assist at discharge as needed  - Refer to Case Management Department for coordinating discharge planning if the patient needs post-hospital services based on physician/advanced practitioner order or complex needs related to functional status, cognitive ability, or social support system  7/16/2021 1606 by Aliza Valencia RN  Outcome: Adequate for Discharge  7/16/2021 0757 by Aliza Valencia RN  Outcome: Progressing     Problem: Knowledge Deficit  Goal: Patient/family/caregiver demonstrates understanding of disease process, treatment plan, medications, and discharge instructions  Description: Complete learning assessment and assess knowledge base    Interventions:  - Provide teaching at level of understanding  - Provide teaching via preferred learning methods  7/16/2021 1606 by Aliza Valencia RN  Outcome: Adequate for Discharge  7/16/2021 0757 by Aliza Valencia RN  Outcome: Progressing

## 2021-07-16 NOTE — ASSESSMENT & PLAN NOTE
· Does not appear to be infected, no source are identified  · One set of blood cultures have come back positive with Gram-positive cocci in clusters, coagulase test is pending  As the patient does not meet SIRS criteria at this time and the patient is clinically stable she has been cleared for discharge  Repeat blood cultures have been obtained and will be followed and the patient will be informed    Patient is completely in agreement with the plan

## 2021-07-16 NOTE — ASSESSMENT & PLAN NOTE
· Resolved  · Initially presenting with blood pressure 218/107 most likely related to stopping blood pressure medications on her own a month ago  · Increase losartan to 100 mg at home dose  · Start on hydralazine 10 mg q 8 hours at home  · Resume chlorthalidone  · DC Lasix at discharge  · Continue amlodipine 10 mg

## 2021-07-16 NOTE — DISCHARGE SUMMARY
[address] [facility]  Discharge- Mague Cunha 1975, 39 y o  female MRN: [id number]  Unit/Bed#: -01 Encounter: [id number]  Primary Care Provider: Scot Warner DO   Date and time admitted to hospital: 7/15/2021  1:09 AM    SIRS (systemic inflammatory response syndrome) ([address] )  Assessment & Plan  · Does not appear to be infected, no source are identified  · One set of blood cultures have come back positive with Gram-positive cocci in clusters, coagulase test is pending  As the patient does not meet SIRS criteria at this time and the patient is clinically stable she has been cleared for discharge  Repeat blood cultures have been obtained and will be followed and the patient will be informed  Patient is completely in agreement with the plan    Hypertensive urgency  Assessment & Plan  · Resolved  · Initially presenting with blood pressure 218/107 most likely related to stopping blood pressure medications on her own a month ago  · Increase losartan to 100 mg at home dose  · Start on hydralazine 10 mg q 8 hours at home  · Resume chlorthalidone  · DC Lasix at discharge  · Continue amlodipine 10 mg    * Chest pain  Assessment & Plan  · Resolved  · CARLI score 2, chest x-ray negative  · Initial EKG revealing normal sinus rhythm, prolonged QTC  · Initial troponin negative, continue to trend 2 more times q 3 hours with EKG  · Telemetry  · Given 324 mg aspirin x1 in ED will continue home aspirin 81 mg p o  Q d  And statin  · P r n   Sublingual nitroglycerin, lidocaine patch for pain        Medical Problems     Resolved Problems  Date Reviewed: 7/16/2021    None              Discharging Physician / Practitioner: Jeanine Morgan MD  PCP: Scot Warner DO  Admission Date:   Admission Orders (From admission, onward)     Ordered        07/15/21 0211  Place in Observation  Once                   Discharge Date: 07/16/21    Consultations During Hospital Stay:  · None    Procedures Performed: · None    Significant Findings / Test Results:   · 1/2 home blood culture Gram stain positive for Gram-positive cocci in clusters    Incidental Findings:   · As above     Test Results Pending at Discharge (will require follow up): · Blood cultures     Outpatient Tests Requested:  · None    Complications:  None    Reason for Admission:  Chest pain    Hospital Course:   Dahiana Juarez is a 39 y o  female patient who originally presented to the hospital on 7/15/2021 due to chest pain  Found to be in hypertensive urgency  Treated with the above-mentioned medications  Blood pressure is much stable she will require close follow-up with her outpatient providers  Regular blood pressure check is also necessary  1/2 sets of blood cultures was positive for GPC in clusters  No further results available at this time  Other results will be available tomorrow and the patient does not want to stay till tomorrow  Patient does not have any fever or other SIRS criteria and no other focus of infection is evident clinically  Repeat blood cultures have been obtained and will be followed closely and the patient will be called with any abnormal results  She is in agreement with the above plan  Please see above list of diagnoses and related plan for additional information       Condition at Discharge: stable    Discharge Day Visit / Exam:   Subjective:  I am feeling fine  Vitals: Blood Pressure: 165/98 (07/16/21 1300)  Pulse: 81 (07/16/21 0914)  Temperature: 97 5 °F (36 4 °C) (07/16/21 0914)  Temp Source: Oral (07/15/21 0115)  Respirations: 20 (07/15/21 2234)  Height: 5' 7" (170 2 cm) (07/15/21 0115)  Weight - Scale: (!) 138 kg (304 lb) (07/15/21 0115)  SpO2: 93 % (07/16/21 0914)  Exam:   Physical Exam General- Awake, alert and oriented x 3, looks comfortable  HEENT- Normocephalic, atraumatic, oral mucosa- moist  Neck- Supple, No carotid bruit, no JVD  CVS- Normal S1/ S2, Regular rate and rhythm, No murmur, No edema  Respiratory system- B/L clear breath sounds, no wheezing  Abdomen- Soft, Non distended, no tenderness, Bowel sound- present 4 quads  Genitourinary- No suprapubic tenderness, No CVA tenderness  Skin- No new bruise or rash  Musculoskeletal- No gross deformity  Psych- No acute psychosis  CNS- CN II- XII grossly intact, No acute focal neurologic deficit noted      Discussion with Family: Patient has been updated thoroughly  Patient to update family  Discharge instructions/Information to patient and family:   See after visit summary for information provided to patient and family  Provisions for Follow-Up Care:  See after visit summary for information related to follow-up care and any pertinent home health orders  Disposition:   Home    Planned Readmission:  No     Discharge Statement:  I spent 45 minutes discharging the patient  This time was spent on the day of discharge  I had direct contact with the patient on the day of discharge  Greater than 50% of the total time was spent examining patient, answering all patient questions, arranging and discussing plan of care with patient as well as directly providing post-discharge instructions  Additional time then spent on discharge activities  Discharge Medications:  See after visit summary for reconciled discharge medications provided to patient and/or family        **Please Note: This note may have been constructed using a voice recognition system**

## 2021-07-16 NOTE — ASSESSMENT & PLAN NOTE
· Resolved  · CARLI score 2, chest x-ray negative  · Initial EKG revealing normal sinus rhythm, prolonged QTC  · Initial troponin negative, continue to trend 2 more times q 3 hours with EKG  · Telemetry  · Given 324 mg aspirin x1 in ED will continue home aspirin 81 mg p o  Q d  And statin  · P r n   Sublingual nitroglycerin, lidocaine patch for pain

## 2021-07-16 NOTE — PLAN OF CARE
Problem: Potential for Falls  Goal: Patient will remain free of falls  Description: INTERVENTIONS:  - Educate patient/family on patient safety including physical limitations  - Instruct patient to call for assistance with activity   - Consult OT/PT to assist with strengthening/mobility   - Keep Call bell within reach  - Keep bed low and locked with side rails adjusted as appropriate  - Keep care items and personal belongings within reach  - Initiate and maintain comfort rounds  - Make Fall Risk Sign visible to staff  - Offer Toileting every    Hours, in advance of need  - Initiate/Maintain   alarm  - Obtain necessary fall risk management equipment:     - Apply yellow socks and bracelet for high fall risk patients  - Consider moving patient to room near nurses station  Outcome: Progressing     Problem: PAIN - ADULT  Goal: Verbalizes/displays adequate comfort level or baseline comfort level  Description: Interventions:  - Encourage patient to monitor pain and request assistance  - Assess pain using appropriate pain scale  - Administer analgesics based on type and severity of pain and evaluate response  - Implement non-pharmacological measures as appropriate and evaluate response  - Consider cultural and social influences on pain and pain management  - Notify physician/advanced practitioner if interventions unsuccessful or patient reports new pain  Outcome: Progressing     Problem: INFECTION - ADULT  Goal: Absence or prevention of progression during hospitalization  Description: INTERVENTIONS:  - Assess and monitor for signs and symptoms of infection  - Monitor lab/diagnostic results  - Monitor all insertion sites, i e  indwelling lines, tubes, and drains  - Monitor endotracheal if appropriate and nasal secretions for changes in amount and color  - [place] appropriate cooling/warming therapies per order  - Administer medications as ordered  - Instruct and encourage patient and family to use good hand hygiene technique  - Identify and instruct in appropriate isolation precautions for identified infection/condition  Outcome: Progressing  Goal: Absence of fever/infection during neutropenic period  Description: INTERVENTIONS:  - Monitor WBC    Outcome: Progressing     Problem: SAFETY ADULT  Goal: Patient will remain free of falls  Description: INTERVENTIONS:  - Educate patient/family on patient safety including physical limitations  - Instruct patient to call for assistance with activity   - Consult OT/PT to assist with strengthening/mobility   - Keep Call bell within reach  - Keep bed low and locked with side rails adjusted as appropriate  - Keep care items and personal belongings within reach  - Initiate and maintain comfort rounds  - Make Fall Risk Sign visible to staff  - Offer Toileting every    Hours, in advance of need  - Initiate/Maintain   alarm  - Obtain necessary fall risk management equipment:     - Apply yellow socks and bracelet for high fall risk patients  - Consider moving patient to room near nurses station  Outcome: Progressing  Goal: Maintain or return to baseline ADL function  Description: INTERVENTIONS:  -  Assess patient's ability to carry out ADLs; assess patient's baseline for ADL function and identify physical deficits which impact ability to perform ADLs (bathing, care of mouth/teeth, toileting, grooming, dressing, etc )  - Assess/evaluate cause of self-care deficits   - Assess range of motion  - Assess patient's mobility; develop plan if impaired  - Assess patient's need for assistive devices and provide as appropriate  - Encourage maximum independence but intervene and supervise when necessary  - Involve family in performance of ADLs  - Assess for home care needs following discharge   - Consider OT consult to assist with ADL evaluation and planning for discharge  - Provide patient education as appropriate  Outcome: Progressing  Goal: Maintains/Returns to pre admission functional level  Description: INTERVENTIONS:  - Perform BMAT or MOVE assessment daily    - Set and communicate daily mobility goal to care team and patient/family/caregiver  - Collaborate with rehabilitation services on mobility goals if consulted  - Perform Range of Motion      times a day  - Reposition patient every  hours  - Dangle patient  times a day  - Stand patient times a day  - Ambulate patient  times a day  - Out of bed to chair  times a day   - Out of bed for meal times a day  - Out of bed for toileting  - Record patient progress and toleration of activity level   Outcome: Progressing     Problem: DISCHARGE PLANNING  Goal: Discharge to home or other facility with appropriate resources  Description: INTERVENTIONS:  - Identify barriers to discharge w/patient and caregiver  - Arrange for needed discharge resources and transportation as appropriate  - Identify discharge learning needs (meds, wound care, etc )  - Arrange for interpretive services to assist at discharge as needed  - Refer to Case Management Department for coordinating discharge planning if the patient needs post-hospital services based on physician/advanced practitioner order or complex needs related to functional status, cognitive ability, or social support system  Outcome: Progressing     Problem: Knowledge Deficit  Goal: Patient/family/caregiver demonstrates understanding of disease process, treatment plan, medications, and discharge instructions  Description: Complete learning assessment and assess knowledge base    Interventions:  - Provide teaching at level of understanding  - Provide teaching via preferred learning methods  Outcome: Progressing

## 2021-07-17 LAB
BACTERIA BLD CULT: ABNORMAL
GRAM STN SPEC: ABNORMAL

## 2021-07-17 NOTE — QUICK NOTE
I called Jimenez Lizama on 7/17/2021 at 4:07 PM regarding her blood culture results  She did not answer her phone but I left a VM     Described that her blood cultures have grown coagulase negative staph which is a contaminant and she will not need antibiotics for it especially because she didn't have any signs or symptoms of infection  I also tried calling her   Danielle Gutierrez on [phone] but he did not answer as well

## 2021-07-19 NOTE — UTILIZATION REVIEW
Notification of Discharge   This is a Notification of Discharge from our facility [address]  Please be advised that this patient has been discharge from our facility  Below you will find the admission and discharge date and time including the patients disposition  UTILIZATION REVIEW CONTACT:  Christina Ko  Utilization   Network Utilization Review Department  Phone: [phone] x carefully listen to the prompts  All voicemails are confidential   Email: [email or web address]     PHYSICIAN ADVISORY SERVICES:  FOR [id number] REVIEW - MEDICAL NECESSITY DENIAL  Phone: [phone]  Fax: [phone]  Email: [email or web address]     PRESENTATION DATE: 7/15/2021  1:09 AM  OBERVATION ADMISSION DATE:   INPATIENT ADMISSION DATE: N/A N/A   DISCHARGE DATE: 7/16/2021  4:06 PM  DISPOSITION: Home/Self Care Home/Self Care      IMPORTANT INFORMATION:  Send all requests for admission clinical reviews, approved or denied determinations and any other requests to dedicated fax number below belonging to the campus where the patient is receiving treatment   List of dedicated fax numbers:  [address] [place] [address] DENIALS (Administrative/Medical Necessity) [phone]   [address] (Maternity/NICU/Pediatrics) [phone]   Basil Clark [phone]   [facility] [phone]   Laura Harpkins [phone]   Isayn Corbin [facility] [address] [facility] [phone]   [facility]  [phone]   [address] [facility] [address]  [address] [facility] [address] [facility] [phone]

## 2021-07-20 LAB — BACTERIA BLD CULT: NORMAL

## 2021-07-21 LAB
BACTERIA BLD CULT: NORMAL
BACTERIA BLD CULT: NORMAL

## 2022-05-27 ENCOUNTER — APPOINTMENT (EMERGENCY)
Dept: RADIOLOGY | Facility: HOSPITAL | Age: 47
End: 2022-05-27
Payer: COMMERCIAL

## 2022-05-27 ENCOUNTER — HOSPITAL ENCOUNTER (EMERGENCY)
Facility: HOSPITAL | Age: 47
Discharge: HOME | End: 2022-05-27
Attending: EMERGENCY MEDICINE
Payer: COMMERCIAL

## 2022-05-27 VITALS
HEIGHT: 67 IN | TEMPERATURE: 100.3 F | OXYGEN SATURATION: 98 % | DIASTOLIC BLOOD PRESSURE: 112 MMHG | HEART RATE: 98 BPM | SYSTOLIC BLOOD PRESSURE: 211 MMHG | RESPIRATION RATE: 20 BRPM | BODY MASS INDEX: 45.99 KG/M2 | WEIGHT: 293 LBS

## 2022-05-27 DIAGNOSIS — I10 HYPERTENSION: ICD-10-CM

## 2022-05-27 DIAGNOSIS — I16.0 HYPERTENSIVE URGENCY: ICD-10-CM

## 2022-05-27 DIAGNOSIS — I16.9 HYPERTENSIVE CRISIS: ICD-10-CM

## 2022-05-27 DIAGNOSIS — J11.1 INFLUENZA: Primary | ICD-10-CM

## 2022-05-27 DIAGNOSIS — I10 ESSENTIAL HYPERTENSION: ICD-10-CM

## 2022-05-27 LAB
FLUAV RNA RESP QL NAA+PROBE: POSITIVE
FLUBV RNA RESP QL NAA+PROBE: NEGATIVE
RSV RNA RESP QL NAA+PROBE: NEGATIVE
SARS-COV-2 RNA RESP QL NAA+PROBE: NEGATIVE

## 2022-05-27 PROCEDURE — 99283 EMERGENCY DEPT VISIT LOW MDM: CPT

## 2022-05-27 PROCEDURE — 71046 X-RAY EXAM CHEST 2 VIEWS: CPT

## 2022-05-27 PROCEDURE — 99284 EMERGENCY DEPT VISIT MOD MDM: CPT | Performed by: EMERGENCY MEDICINE

## 2022-05-27 PROCEDURE — 0241U HB NFCT DS VIR RESP RNA 4 TRGT: CPT | Performed by: EMERGENCY MEDICINE

## 2022-05-27 RX ORDER — ACETAMINOPHEN 325 MG/1
975 TABLET ORAL ONCE
Status: COMPLETED | OUTPATIENT
Start: 2022-05-27 | End: 2022-05-27

## 2022-05-27 RX ORDER — AMLODIPINE BESYLATE 5 MG/1
10 TABLET ORAL ONCE
Status: COMPLETED | OUTPATIENT
Start: 2022-05-27 | End: 2022-05-27

## 2022-05-27 RX ORDER — CHLORTHALIDONE 25 MG/1
25 TABLET ORAL DAILY
Qty: 30 TABLET | Refills: 0 | Status: SHIPPED | OUTPATIENT
Start: 2022-05-27 | End: 2022-06-26

## 2022-05-27 RX ORDER — CHLORTHALIDONE 25 MG/1
25 TABLET ORAL DAILY
Status: DISCONTINUED | OUTPATIENT
Start: 2022-05-27 | End: 2022-05-27 | Stop reason: HOSPADM

## 2022-05-27 RX ORDER — HYDRALAZINE HYDROCHLORIDE 10 MG/1
10 TABLET, FILM COATED ORAL EVERY 8 HOURS SCHEDULED
Qty: 90 TABLET | Refills: 0 | Status: SHIPPED | OUTPATIENT
Start: 2022-05-27 | End: 2022-06-26

## 2022-05-27 RX ORDER — AMLODIPINE BESYLATE 10 MG/1
10 TABLET ORAL DAILY
Qty: 30 TABLET | Refills: 0 | Status: SHIPPED | OUTPATIENT
Start: 2022-05-27 | End: 2022-06-26

## 2022-05-27 RX ORDER — HYDRALAZINE HYDROCHLORIDE 10 MG/1
10 TABLET, FILM COATED ORAL ONCE
Status: COMPLETED | OUTPATIENT
Start: 2022-05-27 | End: 2022-05-27

## 2022-05-27 RX ADMIN — HYDRALAZINE HYDROCHLORIDE 10 MG: 10 TABLET, FILM COATED ORAL at 14:37

## 2022-05-27 RX ADMIN — ACETAMINOPHEN 975 MG: 325 TABLET, FILM COATED ORAL at 14:37

## 2022-05-27 RX ADMIN — AMLODIPINE BESYLATE 10 MG: 5 TABLET ORAL at 14:37

## 2022-05-27 RX ADMIN — CHLORTHALIDONE 25 MG: 25 TABLET ORAL at 14:37

## 2022-05-27 NOTE — DISCHARGE INSTRUCTIONS
Restart your blood pressure medicines  Rest  Increase liquids  Tylenol if needed  Follow up with your provider or return increasing cough congestion worsening symptoms or any problems

## 2022-05-27 NOTE — ED PROVIDER NOTES
History  Chief Complaint   Patient presents with    Cough     Pt reports shes had a cough x 4 days, headache, body aches,and congestion  HPI patient is a 44-year-old female, reports cough and congestion fever headache and body aches over the last 4 days  Patient reports primarily hacking cough with some sputum production  She denies any acute shortness of breath  There is no chest pain  She denies any shortness of breath with exertion  There is no diaphoresis with exertion  Denies any rash  She denies any neck stiffness  Patient denies any abdominal pain or chest pain  Patient reports she discontinue her blood pressure medicines over a week ago, it would appear she ran out of medication  Past medical history of hypertension, history of TIA history of myocardial infarction, BC  Family history noncontributory  Social history smoker, denies drug abuse        Prior to Admission Medications   Prescriptions Last Dose Informant Patient Reported? Taking?    amLODIPine (NORVASC) 10 mg tablet   No No   Sig: Take 1 tablet (10 mg total) by mouth daily   amLODIPine (NORVASC) 10 mg tablet   No Yes   Sig: Take 1 tablet (10 mg total) by mouth daily   aspirin 81 MG tablet  Self Yes No   Sig: Take 81 mg by mouth daily   atorvastatin (LIPITOR) 40 mg tablet   No No   Sig: Take 1 tablet (40 mg total) by mouth daily with dinner   chlorthalidone 25 mg tablet   No No   Sig: Take 1 tablet (25 mg total) by mouth daily   chlorthalidone 25 mg tablet   No Yes   Sig: Take 1 tablet (25 mg total) by mouth daily   escitalopram (LEXAPRO) 10 mg tablet   No No   Sig: Take 1 tablet (10 mg total) by mouth daily   ezetimibe (ZETIA) 10 mg tablet  Self No No   Sig: Take 1 tablet (10 mg total) by mouth daily at bedtime   hydrALAZINE (APRESOLINE) 10 mg tablet   No No   Sig: Take 1 tablet (10 mg total) by mouth every 8 (eight) hours   hydrALAZINE (APRESOLINE) 10 mg tablet   No Yes   Sig: Take 1 tablet (10 mg total) by mouth every 8 (eight) hours   levothyroxine 125 mcg tablet   No No   Sig: Take 1 tablet (125 mcg total) by mouth daily in the early morning   losartan (COZAAR) 100 MG tablet   No No   Sig: Take 1 tablet (100 mg total) by mouth daily   meclizine (ANTIVERT) 12 5 MG tablet   Yes No   Sig: Take 12 5 mg by mouth every 12 (twelve) hours as needed for dizziness   potassium chloride (K-DUR,KLOR-CON) 20 mEq tablet   No No   Sig: Take 1 tablet (20 mEq total) by mouth daily      Facility-Administered Medications: None       Past Medical History:   Diagnosis Date    History of MI (myocardial infarction)     History of TIA (transient ischemic attack)     Hypertension     Hypothyroidism     hypothyroidism    Morbid obesity with BMI of 45 0-49 9, adult (HCC)     TIA (transient ischemic attack)        Past Surgical History:   Procedure Laterality Date    BREAST SURGERY Left     TUBAL LIGATION         Family History   Problem Relation Age of Onset    Heart disease Mother     Hypertension Mother     Diabetes Father      I have reviewed and agree with the history as documented  E-Cigarette/Vaping    E-Cigarette Use Never User      E-Cigarette/Vaping Substances    Nicotine No     THC No     CBD No     Flavoring No     Other No     Unknown No      Social History     Tobacco Use    Smoking status: Current Every Day Smoker     Packs/day: 0 50     Years: 30 00     Pack years: 15 00     Types: Cigarettes    Smokeless tobacco: Never Used   Vaping Use    Vaping Use: Never used   Substance Use Topics    Alcohol use: Not Currently     Comment: occasional    Drug use: No       Review of Systems   Constitutional: Negative for diaphoresis, fatigue and fever  HENT: Positive for congestion  Negative for ear pain, nosebleeds and sore throat  Eyes: Negative for photophobia, pain, discharge and visual disturbance  Respiratory: Positive for cough  Negative for choking, chest tightness, shortness of breath and wheezing      Cardiovascular: Negative for chest pain and palpitations  Gastrointestinal: Negative for abdominal distention, abdominal pain, diarrhea and vomiting  Genitourinary: Negative for dysuria, flank pain and frequency  Musculoskeletal: Negative for back pain, gait problem and joint swelling  Skin: Negative for color change and rash  Neurological: Negative for dizziness, syncope and headaches  Psychiatric/Behavioral: Negative for behavioral problems and confusion  The patient is not nervous/anxious  All other systems reviewed and are negative  Physical Exam  Physical Exam  Vitals and nursing note reviewed  Constitutional:       Appearance: She is well-developed  HENT:      Head: Normocephalic  Right Ear: External ear normal       Left Ear: External ear normal       Nose: Nose normal    Eyes:      General: Lids are normal       Pupils: Pupils are equal, round, and reactive to light  Cardiovascular:      Rate and Rhythm: Normal rate and regular rhythm  Pulses: Normal pulses  Heart sounds: Normal heart sounds  Pulmonary:      Effort: Pulmonary effort is normal  No respiratory distress  Breath sounds: Normal breath sounds  Abdominal:      General: Abdomen is flat  Bowel sounds are normal       Tenderness: There is no abdominal tenderness  Musculoskeletal:         General: No deformity  Normal range of motion  Cervical back: Normal range of motion and neck supple  Skin:     General: Skin is warm and dry  Neurological:      Mental Status: She is alert and oriented to person, place, and time  Pulse oximetry 100% on room air adequate oxygenation, there is no hypoxia  Blood pressure initially 244/113    Vital Signs  ED Triage Vitals [05/27/22 1233]   Temperature Pulse Respirations Blood Pressure SpO2   100 3 °F (37 9 °C) 105 22 (!) 244/113 100 %      Temp Source Heart Rate Source Patient Position - Orthostatic VS BP Location FiO2 (%)   Tympanic Monitor Sitting Left arm --      Pain Score       --           Vitals:    05/27/22 1233 05/27/22 1409   BP: (!) 244/113 (!) 211/112   Pulse: 105 98   Patient Position - Orthostatic VS: Sitting Lying         Visual Acuity      ED Medications  Medications   chlorthalidone tablet 25 mg (25 mg Oral Given 5/27/22 1437)   hydrALAZINE (APRESOLINE) tablet 10 mg (10 mg Oral Given 5/27/22 1437)   amLODIPine (NORVASC) tablet 10 mg (10 mg Oral Given 5/27/22 1437)   acetaminophen (TYLENOL) tablet 975 mg (975 mg Oral Given 5/27/22 1437)       Diagnostic Studies  Results Reviewed     Procedure Component Value Units Date/Time    COVID/FLU/RSV [[id number]]  (Abnormal) Collected: 05/27/22 1236    Lab Status: Final result Specimen: Nares from Nose Updated: 05/27/22 1351     SARS-CoV-2 Negative     INFLUENZA A PCR Positive     INFLUENZA B PCR Negative     RSV PCR Negative    Narrative:      FOR PEDIATRIC PATIENTS - copy/paste COVID Guidelines URL to browser: [email or web address]  [employer]    SARS-CoV-2 assay is a Nucleic Acid Amplification assay intended for the  qualitative detection of nucleic acid from SARS-CoV-2 in nasopharyngeal  swabs  Results are for the presumptive identification of SARS-CoV-2 RNA  Positive results are indicative of infection with SARS-CoV-2, the virus  causing COVID-19, but do not rule out bacterial infection or co-infection  with other viruses  Laboratories within the [place] and its  territories are required to report all positive results to the appropriate  public health authorities  Negative results do not preclude SARS-CoV-2  infection and should not be used as the sole basis for treatment or other  patient management decisions  Negative results must be combined with  clinical observations, patient history, and epidemiological information  This test has not been FDA cleared or approved  This test has been authorized by FDA under an Emergency Use Authorization  (EUA)   This test is only authorized for the duration of time the  declaration that circumstances exist justifying the authorization of the  emergency use of an in vitro diagnostic tests for detection of SARS-CoV-2  virus and/or diagnosis of COVID-19 infection under section 564(b)(1) of  the Act, 21 U  S C  774VCY-2(M)(9), unless the authorization is terminated  or revoked sooner  The test has been validated but independent review by FDA  and CLIA is pending  Test performed using [employer] GeneXpert: This RT-PCR assay targets N2,  a region unique to SARS-CoV-2  A conserved region in the E-gene was chosen  for pan-Sarbecovirus detection which includes SARS-CoV-2  XR chest 2 views   Final Result by Reji White MD (05/27 1609)      No acute cardiopulmonary disease  Workstation performed: [id number]                    Procedures  Procedures         ED Course      Chest x-ray: Chest x-ray showed a normal cardiac silhouette, no pneumothorax no infiltrates, No sign of pathology, interpreted by me, I was the primary   discussed with patient, influenza testing positive,out of the window for Tamiflu  No treatment indicated at this time  Patient does present with significant hypertension, restarted her medications  Repeat blood pressure was 200/100 at the time of discharge  SBIRT 20yo+    Flowsheet Row Most Recent Value   SBIRT (25 yo +)    In order to provide better care to our patients, we are screening all of our patients for alcohol and drug use  Would it be okay to ask you these screening questions? Yes Filed at: 05/27/2022 1402   Initial Alcohol Screen: US AUDIT-C     1  How often do you have a drink containing alcohol? 0 Filed at: 05/27/2022 1402   2  How many drinks containing alcohol do you have on a typical day you are drinking? 0 Filed at: 05/27/2022 1402   3a  Male UNDER 65: How often do you have five or more drinks on one occasion?  0 Filed at: 05/27/2022 1402 3b  FEMALE Any Age, or MALE 65+: How often do you have 4 or more drinks on one occassion? 0 Filed at: 05/27/2022 1402   Audit-C Score 0 Filed at: 05/27/2022 1402   THANH: How many times in the past year have you    Used an illegal drug or used a prescription medication for non-medical reasons? Never Filed at: 05/27/2022 1402                    [facility] medical decision making 42-year-old female presents emergency department with flu type symptoms, nontoxic, alert and awake, normal oximetry  No chest pain no acute shortness of breath  Chest x-ray showed no acute pathology  Patient was initially hypertensive she has discontinued all of her medicines over the last week or so  I gave the patient her medications back in her blood pressure began to improve  We discussed outpatient treatment follow-up we discussed indications to return      Disposition  Final diagnoses:   Influenza   Hypertension     Time reflects when diagnosis was documented in both MDM as applicable and the Disposition within this note     Time User Action Codes Description Comment    5/27/2022  2:20 PM Michael Collmirella [J11 1] Influenza     5/27/2022  2:20 PM Lauren Neighbours Add [I10] Hypertension     5/27/2022  2:20 PM Buck, [address] [place] Essential hypertension     5/27/2022  2:20 PM Ahmet [address] Essential hypertension     5/27/2022  2:20 PM Katherine [address] Essential hypertension     5/27/2022  2:20 PM Lauren Neighbours Add [I16 9] Hypertensive crisis     5/27/2022  2:20 PM Lauren Neighbours Modify [I10] Essential hypertension     5/27/2022  2:21 PM Lauren Neighbours Modify [I10] Essential hypertension     5/27/2022  2:21 PM Lauren Neighbours Add [I16 0] Hypertensive urgency     5/27/2022  2:21 PM Lauren Neighbours Modify [I10] Essential hypertension     5/27/2022  2:21 PM Lauren Neighbours Modify [I16 0] Hypertensive urgency     5/27/2022  2:21 PM Lauren Neighbours Modify [I10] Essential hypertension     5/27/2022  2:21 PM Vonda Carrington [I16 0] Hypertensive urgency       ED Disposition     ED Disposition   Discharge    Condition   Stable    Date/Time   Fri May 27, 2022  2:41 PM    Magdaleno Stinson discharge to home/self care                 Follow-up Information     Follow up With Specialties Details Why [address] [facility], [address] [place] [address] [id number] [place] [address]  N  [phone]            Discharge Medication List as of 5/27/2022  2:41 PM      CONTINUE these medications which have CHANGED    Details   amLODIPine (NORVASC) 10 mg tablet Take 1 tablet (10 mg total) by mouth daily, Starting Fri 5/27/2022, Until Sun 6/26/2022, Normal      chlorthalidone 25 mg tablet Take 1 tablet (25 mg total) by mouth daily, Starting Fri 5/27/2022, Until Sun 6/26/2022, Normal      hydrALAZINE (APRESOLINE) 10 mg tablet Take 1 tablet (10 mg total) by mouth every 8 (eight) hours, Starting Fri 5/27/2022, Until Sun 6/26/2022, Normal         CONTINUE these medications which have NOT CHANGED    Details   aspirin 81 MG tablet Take 81 mg by mouth daily, Historical Med      atorvastatin (LIPITOR) 40 mg tablet Take 1 tablet (40 mg total) by mouth daily with dinner, Starting Fri 7/16/2021, Until Thu 10/14/2021, Normal      escitalopram (LEXAPRO) 10 mg tablet Take 1 tablet (10 mg total) by mouth daily, Starting Fri 7/16/2021, Until Sun 8/15/2021, Normal      ezetimibe (ZETIA) 10 mg tablet Take 1 tablet (10 mg total) by mouth daily at bedtime, Starting Mon 1/6/2020, Until Sun 4/5/2020, Normal      levothyroxine 125 mcg tablet Take 1 tablet (125 mcg total) by mouth daily in the early morning, Starting Fri 7/16/2021, Until Sun 8/15/2021, Normal      losartan (COZAAR) 100 MG tablet Take 1 tablet (100 mg total) by mouth daily, Starting Fri 7/16/2021, Until Sun 8/15/2021, Normal      meclizine (ANTIVERT) 12 5 MG tablet Take 12 5 mg by mouth every 12 (twelve) hours as needed for dizziness, Historical Med      potassium chloride (K-DUR,KLOR-CON) 20 mEq tablet Take 1 tablet (20 mEq total) by mouth daily, Starting Fri 7/16/2021, Until Thu 10/14/2021, Normal             No discharge procedures on file      PDMP Review     None          ED Provider  Electronically Signed by           Holley Jaime MD  05/27/22 8886

## 2022-06-08 NOTE — PROGRESS NOTES
Occupational Therapy  Visit Type: treatment  Precautions:  Medical precautions:  fall risk and seizure precautions; standard precautions.  PMHx: Rectal CA stage IV adenocarcinoma, s/p diverting ileostomy and loop ileostomy anastamosis 11/21/19 with metastasis to lung (adenocarcinoma RUL biopsy) and now brain.    Pt to [facility] 6/6 for elective resection of right parietal mass.  Lines:       Lines in chart and on patient reviewed, precautions maintained throughout session.  Hearing: no hearing deficits  Vision:     Current vision: wears glasses only for reading  Safety Measures: bed alarm and chair alarm    SUBJECTIVE  Patient agreed to participate in therapy this date.  Pt reports she feels stronger today  Patient / Family Goal: maximize function, return to previous functional status and return home    Pain     At onset of session (out of 10): 0  RN informed on pain level    OBJECTIVE       Rest   BP: 106/52  HR: 58  SpO2: 99  Supplemental O2 (in L): RA    VSS stable during session. Level of consciousness: alert    Oriented to person, place, time and situation     Arousal alertness: appropriate responses to stimuli    Affect/Behavior: alert, cooperative and pleasant  Functional Communication/Cognition    Overall status:  Within functional limits    Form of communication:  Verbal   Attention span:  Appears intact    Commands: follows all commands and directions consistently.    Transition between tasks: transitions tasks without difficulty.    Safety judgement: good awareness of safety precautions.    Awareness of deficits: fully aware of deficits.  Hand Dominance: right  Balance (trials in sec unless otherwise indicated)  Sitting:   - Static:  modified independent    - Dynamic:  modified independent  Standing - Firm Surface - Eyes Open:    - Static: supervision   - Dynamic:  stand by assist      Transfers:    Assistive devices: gait belt    Sit to stand: supervision    Stand to sit: supervision    Functional  Progress Note - Adi Lorenzo 1975, 38 y o  female MRN: [id number]    Unit/Bed#: -01 Encounter: [id number]    Primary Care Provider: Belinda Perez DO   Date and time admitted to hospital: 4/8/2018  4:40 PM        * Headache   Assessment & Plan    Intractable  Questionable migraine associated with menstruation or due to hypertension or history of TIA  · CTA reviewed, no acute findings  · Headache protocol  · Neurology consult for further management - appreciate recommendations  I discussed with Dr Chau Brandon this morning, will give IV Depacon 500 mg x1 dose, start Topamax 25 mg daily  She has already received 2 doses of IV magnesium  · Discontinue Fioricet as this is not helping and she is for stress test  · Blood pressure is better, without improvement of the headache          Hypertensive urgency   Assessment & Plan    History of hypertension  Associated with headaches  · Noncompliant with BP meds  Clonidine increased to 0 3 mg t i d   · Continue Norvasc  · P r n  hydralazine  · Cardiology consult appreciated, for stress test tomorrow        Chest pain   Assessment & Plan    Stabbing with pressure  8/10  Nonradiating  · Has associated risk factors, previous MI, TIA hypertension, obesity, family history and chronic smoker  · Troponin (-), continue telemetry  · For stress test tomorrow, echo        Dependence on nicotine from cigarettes   Assessment & Plan    Smoking cessation  Nicotine patch  Obesity   Assessment & Plan    Weight loss, lifestyle modification, dietary measures initiated and encourage  Thyroid disease   Assessment & Plan    TSH significantly elevated > 90, will assess free T4, though patient is already on Synthroid  She freely admits that she is noncompliant with her medication in terms of taking it regularly or taking it as prescribed on an empty stomach    · Continue current dose levothyroxine  · Counseling given to the patient        VTE Pharmacologic Ambulation:    Assistance:stand by assist   Assistive device:none    Distance (ft): 300    Surface: even    Activities of Daily Living (ADLs):  Grooming/Oral Hygiene:     Grooming assist: supervision    Oral hygiene assist: supervision    Position: standing at sink    Assist needed for: wash/dry hands  Lower Body Dressing:     Footwear assistance: stand by assist    Footwear position: edge of bed    Assist needed for: don/doff left sock and don/doff right sock  Toilet transfer:     Assist: supervision    Device: gait belt  Toileting:     Assist: supervision      Interventions    Neuromuscular Re-Education: Pt given 3 visual targets to find in hallway. Pt able to recall 3/3 items and find with min verbal cuing. Pt given verbal cuing to negotiate through environmental barriers. Practiced item retrieval for grooming from various cabinet heights with SBA.   Training provided: ADL training, activity tolerance, bed mobility training, body mechanics, transfer training, safety training, energy conservation and compensatory techniquesPt educated regarding importance of OOB mobility. Pt encouraged to sit up in chair for all meals and ambulate to the bathroom for toileting needs. Pt further educated regarding pacing strategies and energy conservation strategies with functional application. Pt receptive to education.   Skilled input: verbal instruction/cues  Verbal Consent: Writer verbally educated and received verbal consent for hand placement, positioning of patient, and techniques to be performed today from patient for therapist position for techniques as described above and how they are pertinent to the patient's plan of care.        ASSESSMENT  Impairments: activity tolerance  Functional Limitations: bed mobility, functional mobility and toileting       Discharge Recommendations:   Recommendations for Discharge: OT IL: Patient requires  intermittent assistance to perform mobility and/or ADLs safely     Progress:  Prophylaxis:   Pharmacologic: Enoxaparin (Lovenox)  Mechanical VTE Prophylaxis in Place: Yes    Patient Centered Rounds: I have performed bedside rounds with nursing staff today  Discussions with Specialists or Other Care Team Provider:  Discussed with Neurology, Cardiology    Education and Discussions with Family / Patient:  Discussed with the patient regarding initiation of Topamax and 1 time dose IV Depacon, holding Fioricet for stress test tomorrow    Time Spent for Care: 30 minutes  More than 50% of total time spent on counseling and coordination of care as described above  Current Length of Stay: 0 day(s)    Current Patient Status: Observation   Certification Statement: The patient, admitted on an observation basis, will now require > 2 midnight hospital stay due to Patient with hypertensive urgency, persistent chronic headaches - no significant improvement in symptoms, she still awaits cardiac stress test, and MRI brain    Discharge Plan:  Not medically cleared for discharge at this time, pending further studies    Code Status: Level 1 - Full Code      Subjective:   Patient seen examined this morning, her headache is still at 8/10 at that time  It is unchanged, pulsating and vicelike at the same time  She denies any focal neurologic deficits, vision changes, or weakness  She still feels a little bit of tightness in the right cheek, but that is unchanged  Denies any rash  No palpitations or shortness of breath, she gets chest pain/headache with the elevated blood pressures  Denies any significant weight change recently, change in stools, or hair/skin/nails  Discussed with patient regarding her Synthroid, and proper technique of administration  She freely admits that she does not take it regularly and does not take it properly when she does      Objective:     Vitals:   Temp (24hrs), Av °F (36 7 °C), Min:98 °F (36 7 °C), Max:98 2 °F (36 8 °C)    HR:  [65-88] 68  Resp:  [16-18] 16  BP: (144-181)/(67-96) 177/94  SpO2:  [95 %-98 %] 97 %  Body mass index is 44 79 kg/m²  Input and Output Summary (last 24 hours): Intake/Output Summary (Last 24 hours) at 04/10/18 1555  Last data filed at 04/10/18 0835   Gross per 24 hour   Intake              710 ml   Output                0 ml   Net              710 ml       Physical Exam:     Physical Exam   Constitutional: She is oriented to person, place, and time  She appears well-developed and well-nourished  No distress  Pleasant, morbidly obese  female in no obvious distress   HENT:   Mouth/Throat: Oropharynx is clear and moist    Eyes: EOM are normal  Pupils are equal, round, and reactive to light  Cardiovascular: Normal rate, regular rhythm, S1 normal, S2 normal, normal heart sounds and intact distal pulses  No murmur heard  Pulmonary/Chest: Effort normal and breath sounds normal  No respiratory distress  She has no wheezes  She has no rales  Abdominal: Soft  Bowel sounds are normal  She exhibits no distension  There is no tenderness  Round   Neurological: She is alert and oriented to person, place, and time  No cranial nerve deficit  Coordination normal    Skin: No rash noted  Psychiatric: She has a normal mood and affect  Nursing note and vitals reviewed  Additional Data:     Labs:      Results from last 7 days  Lab Units 04/09/18  0324  04/08/18  1651   WBC Thousand/uL 9 61  --  12 27*   HEMOGLOBIN g/dL 12 3  --  14 0   HEMATOCRIT % 38 3  --  43 6   PLATELETS Thousands/uL 292  < > 336   NEUTROS PCT %  --   --  70   LYMPHS PCT %  --   --  22   MONOS PCT %  --   --  4   EOS PCT %  --   --  3   < > = values in this interval not displayed      Results from last 7 days  Lab Units 04/09/18  0324 04/08/18  1651   SODIUM mmol/L 138 137   POTASSIUM mmol/L 3 7 3 6   CHLORIDE mmol/L 106 102   CO2 mmol/L 24 27   BUN mg/dL 7 6   CREATININE mg/dL 0 77 0 91   CALCIUM mg/dL 8 2* 8 4   TOTAL PROTEIN g/dL  --  8 4*   BILIRUBIN progressing toward goals and improving as expected    • Skilled therapy is required to address these limitations in attempt to maximize the patient's independence.    Education Provided:   Learning Assessment:  - Primary learner: patient  - Are they ready to learn: yes  - Preferred learning style: verbal  Education provided during session:  - Receiving Education: patient  - Results of above outlined education: Verbalizes understanding and Demonstrates understanding    Patient at End of Session:   Location: in chair  Safety measures: call light within reach, equipment intact and lines intact  Handoff to: nurse and therapist    PLAN  Suggestions for next session as indicated:       Interventions: activity tolerance training, bed mobility training, ADL retraining, balance, body mechanics, HEP training, transfer training, therapeutic exercise, therapeutic activity, functional transfer training, energy conservation, positioning and compensatory technique education  Agreement to plan and goals: patient agrees with goals and treatment plan      GOALS  Long Term Goals: (to be met by time of discharge from hospital)  Grooming: Patient will complete grooming tasks in sitting, in standing and at sink modified independent.  Status: progressing/ongoing  Lower body dressing: Patient will complete lower body dressing in sitting and in standing modified independent.  Status: progressing/ongoing  Toileting: Patient will complete toileting modified independent.  Status: progressing/ongoing  Stand (even surface): Patient will stand on even surface for 10 Minutes, modified independent.   Status: progressing/ongoing  Toilet transfer: Patient will complete toilet transfer with modified independent. Status: progressing/ongoing        Documented in the chart in the following areas: Assessment. Plan.      Therapy procedure time and total treatment time can be found documented on the Time Entry flowsheet   TOTAL mg/dL  --  0 20   ALK PHOS U/L  --  128*   ALT U/L  --  34   AST U/L  --  43   GLUCOSE RANDOM mg/dL 105 100           * I Have Reviewed All Lab Data Listed Above  * Additional Pertinent Lab Tests Reviewed: Haritha 68 Admission Reviewed    Imaging:    Imaging Reports Reviewed Today Include:  MRI pending  Imaging Personally Reviewed by Myself Includes:  None    Recent Cultures (last 7 days):           Last 24 Hours Medication List:     Current Facility-Administered Medications:  amLODIPine 10 mg Oral Daily ALVIN Pruitt    aspirin 81 mg Oral Daily ALVIN Pruitt    atenolol 25 mg Oral Daily Michael Patricia MD    cloNIDine 0 3 mg Oral Q8H [facility] Micky Melendez MD    enoxaparin 40 mg Subcutaneous Daily ALVIN Pruitt    hydrALAZINE 5 mg Intravenous Q6H PRN Gustavo Carrion PA-C    levothyroxine 100 mcg Oral Early Morning ALVIN Pruitt    magnesium sulfate 2 g Intravenous Q24H [facility] ALVIN Pruitt Last Rate: Stopped (04/10/18 0935)   nicotine 1 patch Transdermal Daily ALVIN Pruitt    topiramate 25 mg Oral HS Gustavo Carrion PA-C         Today, Patient Was Seen By: Gusatvo Carrion PA-C    ** Please Note: Dictation voice to text software may have been used in the creation of this document   **

## 2022-08-20 ENCOUNTER — HOSPITAL ENCOUNTER (INPATIENT)
Facility: HOSPITAL | Age: 47
LOS: 1 days | Discharge: HOME | DRG: 199 | End: 2022-08-22
Attending: EMERGENCY MEDICINE | Admitting: INTERNAL MEDICINE
Payer: COMMERCIAL

## 2022-08-20 ENCOUNTER — APPOINTMENT (EMERGENCY)
Dept: CT IMAGING | Facility: HOSPITAL | Age: 47
DRG: 199 | End: 2022-08-20
Payer: COMMERCIAL

## 2022-08-20 ENCOUNTER — APPOINTMENT (OUTPATIENT)
Dept: RADIOLOGY | Facility: HOSPITAL | Age: 47
DRG: 199 | End: 2022-08-20
Payer: COMMERCIAL

## 2022-08-20 DIAGNOSIS — G43.109 MIGRAINE WITH AURA AND WITHOUT STATUS MIGRAINOSUS, NOT INTRACTABLE: ICD-10-CM

## 2022-08-20 DIAGNOSIS — E78.2 MIXED HYPERLIPIDEMIA: ICD-10-CM

## 2022-08-20 DIAGNOSIS — I16.1 HYPERTENSIVE EMERGENCY: Primary | ICD-10-CM

## 2022-08-20 DIAGNOSIS — F17.218 CIGARETTE NICOTINE DEPENDENCE WITH OTHER NICOTINE-INDUCED DISORDER: ICD-10-CM

## 2022-08-20 DIAGNOSIS — E03.9 ACQUIRED HYPOTHYROIDISM: ICD-10-CM

## 2022-08-20 DIAGNOSIS — I16.0 HYPERTENSIVE URGENCY: ICD-10-CM

## 2022-08-20 LAB
2HR DELTA HS TROPONIN: 0 NG/L
4HR DELTA HS TROPONIN: -1 NG/L
ALBUMIN SERPL BCP-MCNC: 3.7 G/DL (ref 3.5–5)
ALP SERPL-CCNC: 176 U/L (ref 46–116)
ALT SERPL W P-5'-P-CCNC: 55 U/L (ref 12–78)
ANION GAP SERPL CALCULATED.3IONS-SCNC: 12 MMOL/L (ref 4–13)
AST SERPL W P-5'-P-CCNC: 63 U/L (ref 5–45)
BASOPHILS # BLD AUTO: 0.03 THOUSANDS/ΜL (ref 0–0.1)
BASOPHILS NFR BLD AUTO: 0 % (ref 0–1)
BILIRUB SERPL-MCNC: 0.39 MG/DL (ref 0.2–1)
BUN SERPL-MCNC: 9 MG/DL (ref 5–25)
CALCIUM SERPL-MCNC: 9.2 MG/DL (ref 8.3–10.1)
CARDIAC TROPONIN I PNL SERPL HS: 8 NG/L
CARDIAC TROPONIN I PNL SERPL HS: 9 NG/L
CARDIAC TROPONIN I PNL SERPL HS: 9 NG/L
CHLORIDE SERPL-SCNC: 101 MMOL/L (ref 96–108)
CO2 SERPL-SCNC: 25 MMOL/L (ref 21–32)
CREAT SERPL-MCNC: 1.22 MG/DL (ref 0.6–1.3)
EOSINOPHIL # BLD AUTO: 0.2 THOUSAND/ΜL (ref 0–0.61)
EOSINOPHIL NFR BLD AUTO: 2 % (ref 0–6)
ERYTHROCYTE [DISTWIDTH] IN BLOOD BY AUTOMATED COUNT: 14.4 % (ref 11.6–15.1)
GFR SERPL CREATININE-BSD FRML MDRD: 54 ML/MIN/1.73SQ M
GLUCOSE SERPL-MCNC: 158 MG/DL (ref 65–140)
HCG SERPL QL: NEGATIVE
HCT VFR BLD AUTO: 45.6 % (ref 34.8–46.1)
HGB BLD-MCNC: 14.6 G/DL (ref 11.5–15.4)
IMM GRANULOCYTES # BLD AUTO: 0.06 THOUSAND/UL (ref 0–0.2)
IMM GRANULOCYTES NFR BLD AUTO: 1 % (ref 0–2)
LYMPHOCYTES # BLD AUTO: 2.42 THOUSANDS/ΜL (ref 0.6–4.47)
LYMPHOCYTES NFR BLD AUTO: 22 % (ref 14–44)
MCH RBC QN AUTO: 28.3 PG (ref 26.8–34.3)
MCHC RBC AUTO-ENTMCNC: 32 G/DL (ref 31.4–37.4)
MCV RBC AUTO: 89 FL (ref 82–98)
MONOCYTES # BLD AUTO: 0.38 THOUSAND/ΜL (ref 0.17–1.22)
MONOCYTES NFR BLD AUTO: 3 % (ref 4–12)
NEUTROPHILS # BLD AUTO: 8.03 THOUSANDS/ΜL (ref 1.85–7.62)
NEUTS SEG NFR BLD AUTO: 72 % (ref 43–75)
NRBC BLD AUTO-RTO: 0 /100 WBCS
PLATELET # BLD AUTO: 356 THOUSANDS/UL (ref 149–390)
PMV BLD AUTO: 10.6 FL (ref 8.9–12.7)
POTASSIUM SERPL-SCNC: 3.5 MMOL/L (ref 3.5–5.3)
PROT SERPL-MCNC: 8.7 G/DL (ref 6.4–8.4)
RBC # BLD AUTO: 5.15 MILLION/UL (ref 3.81–5.12)
SODIUM SERPL-SCNC: 138 MMOL/L (ref 135–147)
TSH SERPL DL<=0.05 MIU/L-ACNC: 197.65 UIU/ML (ref 0.45–4.5)
WBC # BLD AUTO: 11.12 THOUSAND/UL (ref 4.31–10.16)

## 2022-08-20 PROCEDURE — 84484 ASSAY OF TROPONIN QUANT: CPT | Performed by: EMERGENCY MEDICINE

## 2022-08-20 PROCEDURE — 85025 COMPLETE CBC W/AUTO DIFF WBC: CPT | Performed by: EMERGENCY MEDICINE

## 2022-08-20 PROCEDURE — 96374 THER/PROPH/DIAG INJ IV PUSH: CPT

## 2022-08-20 PROCEDURE — 84439 ASSAY OF FREE THYROXINE: CPT

## 2022-08-20 PROCEDURE — 93005 ELECTROCARDIOGRAM TRACING: CPT

## 2022-08-20 PROCEDURE — 84443 ASSAY THYROID STIM HORMONE: CPT

## 2022-08-20 PROCEDURE — 99291 CRITICAL CARE FIRST HOUR: CPT | Performed by: EMERGENCY MEDICINE

## 2022-08-20 PROCEDURE — 99220 PR INITIAL OBSERVATION CARE/DAY 70 MINUTES: CPT

## 2022-08-20 PROCEDURE — 96375 TX/PRO/DX INJ NEW DRUG ADDON: CPT

## 2022-08-20 PROCEDURE — 96376 TX/PRO/DX INJ SAME DRUG ADON: CPT

## 2022-08-20 PROCEDURE — 71046 X-RAY EXAM CHEST 2 VIEWS: CPT

## 2022-08-20 PROCEDURE — 84484 ASSAY OF TROPONIN QUANT: CPT

## 2022-08-20 PROCEDURE — 36415 COLL VENOUS BLD VENIPUNCTURE: CPT | Performed by: EMERGENCY MEDICINE

## 2022-08-20 PROCEDURE — 70498 CT ANGIOGRAPHY NECK: CPT

## 2022-08-20 PROCEDURE — 96361 HYDRATE IV INFUSION ADD-ON: CPT

## 2022-08-20 PROCEDURE — 99285 EMERGENCY DEPT VISIT HI MDM: CPT

## 2022-08-20 PROCEDURE — 70496 CT ANGIOGRAPHY HEAD: CPT

## 2022-08-20 PROCEDURE — 73590 X-RAY EXAM OF LOWER LEG: CPT

## 2022-08-20 PROCEDURE — 84703 CHORIONIC GONADOTROPIN ASSAY: CPT | Performed by: EMERGENCY MEDICINE

## 2022-08-20 PROCEDURE — 80053 COMPREHEN METABOLIC PANEL: CPT | Performed by: EMERGENCY MEDICINE

## 2022-08-20 RX ORDER — ACETAMINOPHEN 325 MG/1
650 TABLET ORAL EVERY 6 HOURS PRN
Status: DISCONTINUED | OUTPATIENT
Start: 2022-08-20 | End: 2022-08-22 | Stop reason: HOSPADM

## 2022-08-20 RX ORDER — LEVOTHYROXINE SODIUM 0.12 MG/1
125 TABLET ORAL
Status: DISCONTINUED | OUTPATIENT
Start: 2022-08-21 | End: 2022-08-22 | Stop reason: HOSPADM

## 2022-08-20 RX ORDER — LOSARTAN POTASSIUM 50 MG/1
100 TABLET ORAL ONCE
Status: COMPLETED | OUTPATIENT
Start: 2022-08-20 | End: 2022-08-20

## 2022-08-20 RX ORDER — METOCLOPRAMIDE HYDROCHLORIDE 5 MG/ML
10 INJECTION INTRAMUSCULAR; INTRAVENOUS ONCE
Status: COMPLETED | OUTPATIENT
Start: 2022-08-20 | End: 2022-08-20

## 2022-08-20 RX ORDER — PRAVASTATIN SODIUM 80 MG/1
80 TABLET ORAL
Status: DISCONTINUED | OUTPATIENT
Start: 2022-08-21 | End: 2022-08-22 | Stop reason: HOSPADM

## 2022-08-20 RX ORDER — ROSUVASTATIN CALCIUM 10 MG/1
10 TABLET, COATED ORAL DAILY
Status: ON HOLD | COMMUNITY
Start: 2022-03-23 | End: 2022-08-22 | Stop reason: SDUPTHER

## 2022-08-20 RX ORDER — ENOXAPARIN SODIUM 100 MG/ML
40 INJECTION SUBCUTANEOUS 2 TIMES DAILY
Status: DISCONTINUED | OUTPATIENT
Start: 2022-08-20 | End: 2022-08-22 | Stop reason: HOSPADM

## 2022-08-20 RX ORDER — MORPHINE SULFATE 4 MG/ML
4 INJECTION, SOLUTION INTRAMUSCULAR; INTRAVENOUS ONCE
Status: COMPLETED | OUTPATIENT
Start: 2022-08-20 | End: 2022-08-20

## 2022-08-20 RX ORDER — LABETALOL HYDROCHLORIDE 5 MG/ML
20 INJECTION, SOLUTION INTRAVENOUS ONCE
Status: COMPLETED | OUTPATIENT
Start: 2022-08-20 | End: 2022-08-20

## 2022-08-20 RX ORDER — LOSARTAN POTASSIUM 50 MG/1
100 TABLET ORAL DAILY
Status: DISCONTINUED | OUTPATIENT
Start: 2022-08-21 | End: 2022-08-22 | Stop reason: HOSPADM

## 2022-08-20 RX ORDER — DIPHENHYDRAMINE HYDROCHLORIDE 50 MG/ML
25 INJECTION INTRAMUSCULAR; INTRAVENOUS ONCE
Status: COMPLETED | OUTPATIENT
Start: 2022-08-20 | End: 2022-08-20

## 2022-08-20 RX ORDER — LOSARTAN POTASSIUM 100 MG/1
100 TABLET ORAL DAILY
Status: ON HOLD | COMMUNITY
End: 2022-08-22 | Stop reason: SDUPTHER

## 2022-08-20 RX ORDER — ACETAMINOPHEN 325 MG/1
975 TABLET ORAL ONCE
Status: COMPLETED | OUTPATIENT
Start: 2022-08-20 | End: 2022-08-20

## 2022-08-20 RX ADMIN — DIPHENHYDRAMINE HYDROCHLORIDE 25 MG: 50 INJECTION, SOLUTION INTRAMUSCULAR; INTRAVENOUS at 17:04

## 2022-08-20 RX ADMIN — ACETAMINOPHEN 650 MG: 325 TABLET, FILM COATED ORAL at 22:46

## 2022-08-20 RX ADMIN — MORPHINE SULFATE 4 MG: 4 INJECTION INTRAVENOUS at 18:44

## 2022-08-20 RX ADMIN — LABETALOL HYDROCHLORIDE 20 MG: 5 INJECTION, SOLUTION INTRAVENOUS at 19:19

## 2022-08-20 RX ADMIN — LOSARTAN POTASSIUM 100 MG: 50 TABLET, FILM COATED ORAL at 17:05

## 2022-08-20 RX ADMIN — METOCLOPRAMIDE 10 MG: 5 INJECTION, SOLUTION INTRAMUSCULAR; INTRAVENOUS at 17:05

## 2022-08-20 RX ADMIN — ACETAMINOPHEN 975 MG: 325 TABLET, FILM COATED ORAL at 17:05

## 2022-08-20 RX ADMIN — SODIUM CHLORIDE 1000 ML: 0.9 INJECTION, SOLUTION INTRAVENOUS at 17:05

## 2022-08-20 RX ADMIN — ENOXAPARIN SODIUM 40 MG: 40 INJECTION SUBCUTANEOUS at 21:59

## 2022-08-20 RX ADMIN — LABETALOL HYDROCHLORIDE 20 MG: 5 INJECTION, SOLUTION INTRAVENOUS at 18:12

## 2022-08-20 RX ADMIN — IOHEXOL 69 ML: 350 INJECTION, SOLUTION INTRAVENOUS at 17:43

## 2022-08-20 NOTE — ASSESSMENT & PLAN NOTE
· Continue home levothyroxine   · CT showing elarged thryoid gland which appears to extend superiorly in the retropharyngeal space which appears to cause narrowing of the airway at the level of the hyoid bone  · Reports occasional SOB; however denies any current difficulty breathing; not hypoxic on RA   · Will check TSH  · Maintain close airway and oxygenation monitoring  · Outpatient thyroid US f/u

## 2022-08-20 NOTE — ASSESSMENT & PLAN NOTE
· WBC 11 12  · Denies infectious s/s; monitor for development  · Elevation in setting of HTN vs thyroid enlargement/mass vs other cause   · AM CBC

## 2022-08-20 NOTE — ASSESSMENT & PLAN NOTE
· Reports short episodes (around a minute) of central nonradiating chest pain described as "caving in"  · Denies current chest pain  · CARLI 4  · Troponin 9; 2H delta: 0  · EKG appears non-ischemic   · Suspecting atypical chest pain in setting of HTN  · Tele monitoring  · Trend remainder of troponin/EKG overnight

## 2022-08-20 NOTE — ED PROVIDER NOTES
History  Chief Complaint   Patient presents with    Headache     Pt complains of headache since yesterday  Period of dizziness today while getting in shower  Struck right leg on tub  Patient is a 53-year-old female past medical history of hypertension, hypothyroid, TIA, hyperlipidemia presenting with headache  Patient states that for the last days she has had a headache to the top of her head which is intermittent and radiating to the back of her head and associated with photophobia, phonophobia, nausea but denies vomiting  Notes a history of similar headaches many years ago  Notes blurred vision in same timeframe  She notes an episode of lightheadedness today while getting into the shower and states that that has been intermittent since, not worse with head movement but worse with exertion  Patient states that she struck her anterior shin on the right side on the shower during this episode  She states that that was roughly 30 minutes to 1 hour ago and that time she had 1 minutes of central nonradiating chest discomfort which she describes as a feeling that her chest was caving in  Has not taken any medication for the symptoms at home and states that she has been noncompliant with her antihypertensive medications for the last week  She denies any fevers, shortness of breath, weakness, numbness or tingling, rashes, dysuria  Prior to Admission Medications   Prescriptions Last Dose Informant Patient Reported?  Taking?   levothyroxine 125 mcg tablet   No No   Sig: Take 1 tablet (125 mcg total) by mouth daily in the early morning   losartan (COZAAR) 100 MG tablet   Yes Yes   Sig: Take 100 mg by mouth daily   rosuvastatin (CRESTOR) 10 MG tablet   Yes Yes   Sig: Take 10 mg by mouth daily      Facility-Administered Medications: None       Past Medical History:   Diagnosis Date    History of MI (myocardial infarction)     History of TIA (transient ischemic attack)     Hypertension     Hypothyroidism     hypothyroidism    Morbid obesity with BMI of 45 0-49 9, adult (HCC)     TIA (transient ischemic attack)        Past Surgical History:   Procedure Laterality Date    BREAST SURGERY Left     TUBAL LIGATION         Family History   Problem Relation Age of Onset    Heart disease Mother     Hypertension Mother     Diabetes Father      I have reviewed and agree with the history as documented  E-Cigarette/Vaping    E-Cigarette Use Never User      E-Cigarette/Vaping Substances    Nicotine No     THC No     CBD No     Flavoring No     Other No     Unknown No      Social History     Tobacco Use    Smoking status: Current Every Day Smoker     Packs/day: 0 25     Years: 30 00     Pack years: 7 50     Types: Cigarettes    Smokeless tobacco: Never Used   Vaping Use    Vaping Use: Never used   Substance Use Topics    Alcohol use: Not Currently     Comment: occasional    Drug use: No       Review of Systems   All other systems reviewed and are negative  Physical Exam  Physical Exam  Vitals reviewed  Constitutional:       General: She is not in acute distress  Appearance: Normal appearance  She is not ill-appearing  HENT:      Mouth/Throat:      Mouth: Mucous membranes are moist    Eyes:      Extraocular Movements: Extraocular movements intact  Conjunctiva/sclera: Conjunctivae normal       Pupils: Pupils are equal, round, and reactive to light  Cardiovascular:      Rate and Rhythm: Normal rate and regular rhythm  Heart sounds: Normal heart sounds  Pulmonary:      Effort: Pulmonary effort is normal       Breath sounds: Normal breath sounds  Abdominal:      General: Abdomen is flat  Palpations: Abdomen is soft  Tenderness: There is no abdominal tenderness  Musculoskeletal:         General: No swelling  Normal range of motion  Cervical back: Neck supple  Right lower leg: No edema  Left lower leg: No edema        Comments: Tenderness the anterior shin, intact distal motor, sensation, pulses   Skin:     General: Skin is warm and dry  Neurological:      General: No focal deficit present  Mental Status: She is alert  Cranial Nerves: No cranial nerve deficit  Sensory: Sensory deficit present  Motor: No weakness        Coordination: Coordination normal       Comments: Patient has hesitant mildly unsteady gait, occasionally sways and grafts that for sports, subjectively decreased sensation to the left face   Psychiatric:         Mood and Affect: Mood normal          Vital Signs  ED Triage Vitals [08/20/22 1629]   Temperature Pulse Respirations Blood Pressure SpO2   98 2 °F (36 8 °C) (!) 107 (!) 24 (!) 182/88 97 %      Temp Source Heart Rate Source Patient Position - Orthostatic VS BP Location FiO2 (%)   Oral Monitor Sitting Right arm --      Pain Score       8           Vitals:    08/20/22 1900 08/20/22 1930 08/20/22 2010 08/20/22 2223   BP: (!) 171/84 158/71 127/65 136/73   Pulse: 79 80 78 85   Patient Position - Orthostatic VS: Sitting Lying           Visual Acuity      ED Medications  Medications   levothyroxine tablet 125 mcg (has no administration in time range)   losartan (COZAAR) tablet 100 mg (has no administration in time range)   pravastatin (PRAVACHOL) tablet 80 mg (has no administration in time range)   acetaminophen (TYLENOL) tablet 650 mg (650 mg Oral Given 8/20/22 2246)   enoxaparin (LOVENOX) subcutaneous injection 40 mg (40 mg Subcutaneous Given 8/20/22 2159)   nicotine (NICODERM CQ) 7 mg/24hr TD 24 hr patch 1 patch (has no administration in time range)   sodium chloride 0 9 % bolus 1,000 mL (1,000 mL Intravenous New Bag 8/20/22 1705)   metoclopramide (REGLAN) injection 10 mg (10 mg Intravenous Given 8/20/22 1705)   diphenhydrAMINE (BENADRYL) injection 25 mg (25 mg Intravenous Given 8/20/22 1704)   acetaminophen (TYLENOL) tablet 975 mg (975 mg Oral Given 8/20/22 1705)   losartan (COZAAR) tablet 100 mg (100 mg Oral Given 8/20/22 1705)   iohexol (OMNIPAQUE) 350 MG/ML injection (SINGLE-DOSE) 100 mL (69 mL Intravenous Given 8/20/22 1743)   labetalol (NORMODYNE) injection 20 mg (20 mg Intravenous Given 8/20/22 1812)   morphine injection 4 mg (4 mg Intravenous Given 8/20/22 1844)   labetalol (NORMODYNE) injection 20 mg (20 mg Intravenous Given 8/20/22 1919)       Diagnostic Studies  Results Reviewed     Procedure Component Value Units Date/Time    HS Troponin I 4hr [[id number]]  (Normal) Collected: 08/20/22 2124    Lab Status: Final result Specimen: Blood from Arm, Right Updated: 08/20/22 2207     hs TnI 4hr 8 ng/L      Delta 4hr hsTnI -1 ng/L     HS Troponin I 2hr [[id number]]  (Normal) Collected: 08/20/22 1843    Lab Status: Final result Specimen: Blood from Arm, Right Updated: 08/20/22 1916     hs TnI 2hr 9 ng/L      Delta 2hr hsTnI 0 ng/L     hCG, qualitative pregnancy [[id number]]  (Normal) Collected: 08/20/22 1638    Lab Status: Final result Specimen: Blood from Arm, Left Updated: 08/20/22 1729     Preg, Serum Negative    HS Troponin 0hr (reflex protocol) [[id number]]  (Normal) Collected: 08/20/22 1638    Lab Status: Final result Specimen: Blood from Arm, Left Updated: 08/20/22 1729     hs TnI 0hr 9 ng/L     Comprehensive metabolic panel [[id number]]  (Abnormal) Collected: 08/20/22 1638    Lab Status: Final result Specimen: Blood from Arm, Left Updated: 08/20/22 1723     Sodium 138 mmol/L      Potassium 3 5 mmol/L      Chloride 101 mmol/L      CO2 25 mmol/L      ANION GAP 12 mmol/L      BUN 9 mg/dL      Creatinine 1 22 mg/dL      Glucose 158 mg/dL      Calcium 9 2 mg/dL      AST 63 U/L      ALT 55 U/L      Alkaline Phosphatase 176 U/L      Total Protein 8 7 g/dL      Albumin 3 7 g/dL      Total Bilirubin 0 39 mg/dL      eGFR 54 ml/min/1 73sq m     Narrative:      [place] guidelines for Chronic Kidney Disease (CKD):     Stage 1 with normal or high GFR (GFR > 90 mL/min/1 73 square meters)    Stage 2 Mild CKD (GFR = 60-89 mL/min/1 73 square meters)    Stage 3A Moderate CKD (GFR = 45-59 mL/min/1 73 square meters)    Stage 3B Moderate CKD (GFR = 30-44 mL/min/1 73 square meters)    Stage 4 Severe CKD (GFR = 15-29 mL/min/1 73 square meters)    Stage 5 End Stage CKD (GFR <15 mL/min/1 73 square meters)  Note: GFR calculation is accurate only with a steady state creatinine    CBC and differential [[id number]]  (Abnormal) Collected: 08/20/22 1638    Lab Status: Final result Specimen: Blood from Arm, Left Updated: 08/20/22 1652     WBC 11 12 Thousand/uL      RBC 5 15 Million/uL      Hemoglobin 14 6 g/dL      Hematocrit 45 6 %      MCV 89 fL      MCH 28 3 pg      MCHC 32 0 g/dL      RDW 14 4 %      MPV 10 6 fL      Platelets 914 Thousands/uL      nRBC 0 /100 WBCs      Neutrophils Relative 72 %      Immat GRANS % 1 %      Lymphocytes Relative 22 %      Monocytes Relative 3 %      Eosinophils Relative 2 %      Basophils Relative 0 %      Neutrophils Absolute 8 03 Thousands/µL      Immature Grans Absolute 0 06 Thousand/uL      Lymphocytes Absolute 2 42 Thousands/µL      Monocytes Absolute 0 38 Thousand/µL      Eosinophils Absolute 0 20 Thousand/µL      Basophils Absolute 0 03 Thousands/µL                  CTA head and neck with and without contrast   Final Result by Qiana Benito MD (08/20 1909)      No evidence of acute intracranial hemorrhage  No evidence of hemodynamic significant stenosis, aneurysm or dissection  Enlarged thyroid gland appears to extend superiorly in the retropharyngeal space appears to cause narrowing of the airway at the level of the hyoid bone  Correlate clinically and follow-up with thyroid ultrasound to exclude non-thyroid mass  Findings were marked as "immediate"in Epic and will now be related to the ordering physician or covering clinical team by the radiology liaison                 Workstation performed: [id number]         XR chest 2 views   ED Interpretation by Wilman Mccain DO (08/20 1705)   NAD      Final Result by Avis Lambert MD (08/20 1957)      No acute cardiopulmonary disease  Workstation performed: [id number]         XR tibia fibula 2 views RIGHT   ED Interpretation by John Desir DO (08/20 1705)   NAD                 Procedures  ECG 12 Lead Documentation Only    Date/Time: 8/20/2022 4:48 PM  Performed by: John Desir DO  Authorized by: John Desir DO     ECG reviewed by me, the ED Provider: yes    Patient location:  ED  Previous ECG:     Previous ECG:  Compared to current    Similarity:  No change  Interpretation:     Interpretation: normal    Rate:     ECG rate assessment: normal    Rhythm:     Rhythm: sinus rhythm    Ectopy:     Ectopy: none    QRS:     QRS axis:  Normal    QRS intervals:  Normal  Conduction:     Conduction: normal    ST segments:     ST segments:  Normal  T waves:     T waves: normal      CriticalCare Time  Performed by: John Desir DO  Authorized by: John Desir DO     Critical care provider statement:     Critical care time (minutes):  30    Critical care was necessary to treat or prevent imminent or life-threatening deterioration of the following conditions:  Cardiac failure    Critical care was time spent personally by me on the following activities:  Obtaining history from patient or surrogate, development of treatment plan with patient or surrogate, evaluation of patient's response to treatment, examination of patient, interpretation of cardiac output measurements, ordering and performing treatments and interventions, ordering and review of laboratory studies, ordering and review of radiographic studies, review of old charts and re-evaluation of patient's condition             ED Course  ED Course as of 08/20/22 2248   Sat Aug 20, 2022   [address] [facility],[address] Patient with persistent symptoms, consistently elevated BP, will give labetalol and likely admit following CTA     1916 Patient with improving blood pressure, CTA shows no acute findings consistent with patient's symptoms, will admit  CARLI Risk Score    Flowsheet Row Most Recent Value   Age >= 72 1 Filed at: 08/20/2022 1946   Known CAD (stenosis >= 50%) 1 Filed at: 08/20/2022 1946   Recent (<=24 hrs) Service Angina 1 Filed at: 08/20/2022 1946   ST Deviation >= 0 5 mm 0 Filed at: 08/20/2022 1946   3+ CAD Risk Factors (FHx, HTN, HLP, DM, Smoker) 1 Filed at: 08/20/2022 1946   Aspirin Use Past 7 Days 0 Filed at: 08/20/2022 1946   Elevated Cardiac Markers 0 Filed at: 08/20/2022 1946   CARLI Risk Score (Calculated) 4 Filed at: 08/20/2022 1946                  [facility]  Number of Diagnoses or Management Options  Diagnosis management comments: Patient is a 28-year-old female past medical history of hypertension, hyperlipidemia, TA, hypothyroid presenting with headache, chest pain  Patient is well-appearing bedside stable vitals though hypertensive to the 666F systolic but in no acute distress with no focal abnormalities on neurologic exam however does appear to have hesitant intermittently unsteady gait, occasionally sways grafts bed for support but no abnormalities and cerebellar testing  Unclear whether gait is due to right calf pain versus dizziness  Will give migraine cocktail, obtain CTA of the head neck as well as cardiac workup, give patient's home blood pressure medication reassess    As patient has abnormal gait will discuss disposition with Neurology if workup unremarkable      Disposition  Final diagnoses:   Hypertensive emergency     Time reflects when diagnosis was documented in both MDM as applicable and the Disposition within this note     Time User Action Codes Description Comment    8/20/2022  7:35 PM Russell Cardoza Add [I16 1] Hypertensive emergency       ED Disposition     ED Disposition   Admit    Condition   Stable    Date/Time   Sat Aug 20, 2022  7:35 PM    Comment   Case was discussed with Grecia Layton and the patient's admission status was agreed to be Admission Status: observation status to the service of Dr Olimpia Stern   Follow-up Information    None         Current Discharge Medication List      CONTINUE these medications which have NOT CHANGED    Details   losartan (COZAAR) 100 MG tablet Take 100 mg by mouth daily      rosuvastatin (CRESTOR) 10 MG tablet Take 10 mg by mouth daily      levothyroxine 125 mcg tablet Take 1 tablet (125 mcg total) by mouth daily in the early morning  Qty: 30 tablet, Refills: 0    Associated Diagnoses: Hypertensive urgency             No discharge procedures on file      PDMP Review     None          ED Provider  Electronically Signed by           Tere Lee DO  08/20/22 1501

## 2022-08-20 NOTE — H&P
[address] 1975, 42 y o  female MRN: [id number]  Unit/Bed#: -01 Encounter: [id number]  Primary Care Provider: Maria Elena Hardin DO   Date and time admitted to hospital: 8/20/2022  4:16 PM    * Hypertensive urgency  Assessment & Plan  Patient reports intermittent headache with associated photophobia, phonophobia, blurred vision, and nausea for the past several days  Today she developed some lightheadedness when getting into the shower, which has remained intermittent since  Reports hitting her shin while getting out of the shower and developed central, non-radiating chest pain described as her chest "caving in" during this timeframe  Reports headache and chest pain continue to be intermittent at this time  Denies other symptom/complaint       BP (!) 171/84 (BP Location: Right arm)   Pulse 79   Temp 98 2 °F (36 8 °C) (Oral)   Resp 20   SpO2 94%     · Patient reports intermittent headache w/ associated photophobia, phonophobia, blurred vision and nausea for the past several days  · Today developed some lightheadedness when getting into shower; has remained intermittent   · Also reported shot episode of chest pain today as below   · /91 while in ED  · Given 100mg PO losartan (home dose) and 20mg IV labetolol x2 in ED   · Current /71  · Reports she has not taken her home HTN medication for the past week  · CTA head/neck without acute intracranial findings  · Neuro exam non-focal at this time  · Assuming BP elevation in setting of non-compliance w/ home HTN medications w/ possible endocrine contribution w/thryoid findings as below   · Continue home PO losartan 100mg   · PRN IV antihypertensives as appropriate   · Monitor BP per unit protocol     Acquired hypothyroidism  Assessment & Plan  · Continue home levothyroxine   · CT showing elarged thryoid gland which appears to extend superiorly in the retropharyngeal space which appears to cause narrowing of the airway at the level of the hyoid bone  · Reports occasional SOB; however denies any current difficulty breathing; not hypoxic on RA   · Will check TSH  · Maintain close airway and oxygenation monitoring  · Outpatient thyroid US f/u     Leukocytosis  Assessment & Plan  · WBC 11 12  · Denies infectious s/s; monitor for development  · Elevation in setting of HTN vs thyroid enlargement/mass vs other cause   · AM CBC    Chest pain  Assessment & Plan  · Reports short episodes (around a minute) of central nonradiating chest pain described as "caving in"  · Denies current chest pain  · CARLI 4  · Troponin 9; 2H delta: 0  · EKG appears non-ischemic   · Suspecting atypical chest pain in setting of HTN  · Tele monitoring  · Trend remainder of troponin/EKG overnight     Mixed hyperlipidemia  Assessment & Plan  · Continue home statin    VTE Pharmacologic Prophylaxis: VTE Score: 3 Moderate Risk (Score 3-4) - Pharmacological DVT Prophylaxis Ordered: enoxaparin (Lovenox)  Code Status: Level 1 - Full Code   Discussion with family: update in AM      Anticipated Length of Stay: Patient will be admitted on an observation basis with an anticipated length of stay of less than 2 midnights secondary to Hypertensive urgency   Total Time for Visit, including Counseling / Coordination of Care: 45 minutes Greater than 50% of this total time spent on direct patient counseling and coordination of care  Chief Complaint: headache, lightheadedness, and chest pain    History of Present Illness:  Tony Vivas is a 42 y o  female with a PMH of HTN, hypothyroidism, TIA hx, MI hx, and HLP who presents with headache, lightheadedness, and chest pain  Patient reports intermittent headache with associated photophobia, phonophobia, blurred vision, and nausea for the past several days  Today she developed some lightheadedness when getting into the shower, which has remained intermittent since   Reports hitting her shin while getting out of the shower and developed central, non-radiating chest pain described as her chest "caving in" during this timeframe  Reports headache and chest pain continue to be intermittent at this time  Denies other symptom/complaint  All questions answered at the bedside to the best of my ability  Review of Systems:  Review of Systems   Constitutional: Negative for activity change, appetite change, chills, diaphoresis, fatigue and fever  HENT: Negative for congestion, ear pain, nosebleeds and trouble swallowing  Eyes: Negative for pain and visual disturbance  Respiratory: Negative for apnea, cough, chest tightness, shortness of breath and wheezing  Cardiovascular: Positive for chest pain  Negative for palpitations and leg swelling  Gastrointestinal: Negative for abdominal distention, abdominal pain, blood in stool, constipation, diarrhea, nausea and vomiting  Endocrine: Negative for cold intolerance, heat intolerance and polyuria  Genitourinary: Negative for difficulty urinating, dysuria, flank pain and hematuria  Musculoskeletal: Negative for arthralgias, neck pain and neck stiffness  Skin: Negative for color change, rash and wound  Neurological: Positive for light-headedness and headaches  Negative for dizziness, tremors, syncope, weakness and numbness  Hematological: Negative for adenopathy  All other systems reviewed and are negative  Past Medical and Surgical History:   Past Medical History:   Diagnosis Date    History of MI (myocardial infarction)     History of TIA (transient ischemic attack)     Hypertension     Hypothyroidism     hypothyroidism    Morbid obesity with BMI of 45 0-49 9, [facility]     TIA (transient ischemic attack)        Past Surgical History:   Procedure Laterality Date    BREAST SURGERY Left     TUBAL LIGATION         Meds/Allergies:  Prior to Admission medications    Medication Sig Start Date End Date Taking?  Authorizing Provider   losartan (COZAAR) 100 MG tablet Take 100 mg by mouth daily   Yes Historical Provider, MD   rosuvastatin (CRESTOR) 10 MG tablet Take 10 mg by mouth daily 3/23/22  Yes Historical Provider, MD   levothyroxine 125 mcg tablet Take 1 tablet (125 mcg total) by mouth daily in the early morning 7/16/21 8/15/21  Karly Masters MD   amLODIPine (NORVASC) 10 mg tablet Take 1 tablet (10 mg total) by mouth daily 5/27/22 8/20/22  Makayla Navarrete MD   aspirin 81 MG tablet Take 81 mg by mouth daily  8/20/22  Historical Provider, MD   atorvastatin (LIPITOR) 40 mg tablet Take 1 tablet (40 mg total) by mouth daily with dinner 7/16/21 8/20/22  Karly Masters MD   chlorthalidone 25 mg tablet Take 1 tablet (25 mg total) by mouth daily 5/27/22 8/20/22  Makayla Navarrete MD   escitalopram (LEXAPRO) 10 mg tablet Take 1 tablet (10 mg total) by mouth daily 7/16/21 8/20/22  Karly Masters MD   ezetimibe (ZETIA) 10 mg tablet Take 1 tablet (10 mg total) by mouth daily at bedtime 1/6/20 8/20/22  ALVIN Umanzor   hydrALAZINE (APRESOLINE) 10 mg tablet Take 1 tablet (10 mg total) by mouth every 8 (eight) hours 5/27/22 8/20/22  Makayla Navarrete MD   losartan (COZAAR) 100 MG tablet Take 1 tablet (100 mg total) by mouth daily 7/16/21 8/20/22  Karly Masters MD   meclizine (ANTIVERT) 12 5 MG tablet Take 12 5 mg by mouth every 12 (twelve) hours as needed for dizziness  8/20/22  Historical Provider, MD   potassium chloride (K-DUR,KLOR-CON) 20 mEq tablet Take 1 tablet (20 mEq total) by mouth daily 7/16/21 8/20/22  Karly Masters MD     I have reviewed home medications with patient personally  Allergies:    Allergies   Allergen Reactions    Trimecaine Hallucinations    Toradol [Ketorolac Tromethamine] Anxiety       Social History:  Marital Status: /Civil Union   Occupation: N/A  Patient Pre-hospital Living Situation: Home  Patient Pre-hospital Level of Mobility: walks  Patient Pre-hospital Diet Restrictions: none  Substance Use History:   Social History     Substance and Sexual Activity Alcohol Use Not Currently    Comment: occasional     Social History     Tobacco Use   Smoking Status Current Every Day Smoker    Packs/day: 0 25    Years: 30 00    Pack years: 7 50    Types: Cigarettes   Smokeless Tobacco Never Used     Social History     Substance and Sexual Activity   Drug Use No       Family History:  Family History   Problem Relation Age of Onset    Heart disease Mother     Hypertension Mother     Diabetes Father        Physical Exam:     Vitals:   Blood Pressure: 127/65 (08/20/22 2010)  Pulse: 78 (08/20/22 2010)  Temperature: 98 2 °F (36 8 °C) (08/20/22 1629)  Temp Source: Oral (08/20/22 1629)  Respirations: 18 (08/20/22 1930)  SpO2: 92 % (08/20/22 2010)    Physical Exam  Vitals and nursing note reviewed  Constitutional:       General: She is not in acute distress  Appearance: Normal appearance  She is obese  HENT:      Head: Normocephalic and atraumatic  Right Ear: External ear normal       Left Ear: External ear normal       Nose: Nose normal       Mouth/Throat:      Mouth: Mucous membranes are moist    Eyes:      Pupils: Pupils are equal, round, and reactive to light  Cardiovascular:      Rate and Rhythm: Normal rate and regular rhythm  Pulses: Normal pulses  Heart sounds: Normal heart sounds  No murmur heard  Pulmonary:      Effort: Pulmonary effort is normal  No respiratory distress  Breath sounds: Normal breath sounds  No wheezing or rales  Chest:      Chest wall: No tenderness  Abdominal:      General: Bowel sounds are normal  There is no distension  Palpations: Abdomen is soft  There is no mass  Tenderness: There is no abdominal tenderness  There is no guarding  Musculoskeletal:         General: No swelling or tenderness  Cervical back: Normal range of motion and neck supple  No rigidity or tenderness  Right lower leg: No edema  Left lower leg: No edema  Skin:     General: Skin is warm and dry        Capillary Refill: Capillary refill takes less than 2 seconds  Findings: No lesion or rash  Neurological:      General: No focal deficit present  Mental Status: She is alert  Psychiatric:         Mood and Affect: Mood normal           Additional Data:     Lab Results:  Results from last 7 days   Lab Units 08/20/22  1638   WBC Thousand/uL 11 12*   HEMOGLOBIN g/dL 14 6   HEMATOCRIT % 45 6   PLATELETS Thousands/uL 356   NEUTROS PCT % 72   LYMPHS PCT % 22   MONOS PCT % 3*   EOS PCT % 2     Results from last 7 days   Lab Units 08/20/22  1638   SODIUM mmol/L 138   POTASSIUM mmol/L 3 5   CHLORIDE mmol/L 101   CO2 mmol/L 25   BUN mg/dL 9   CREATININE mg/dL 1 22   ANION GAP mmol/L 12   CALCIUM mg/dL 9 2   ALBUMIN g/dL 3 7   TOTAL BILIRUBIN mg/dL 0 39   ALK PHOS U/L 176*   ALT U/L 55   AST U/L 63*   GLUCOSE RANDOM mg/dL 158*                       Imaging: Reviewed radiology reports from this admission including: chest xray, xray(s) and CTA head/neck  CTA head and neck with and without contrast   Final Result by Tolu Castañeda MD (08/20 1909)      No evidence of acute intracranial hemorrhage  No evidence of hemodynamic significant stenosis, aneurysm or dissection  Enlarged thyroid gland appears to extend superiorly in the retropharyngeal space appears to cause narrowing of the airway at the level of the hyoid bone  Correlate clinically and follow-up with thyroid ultrasound to exclude non-thyroid mass  Findings were marked as "immediate"in Epic and will now be related to the ordering physician or covering clinical team by the radiology liaison  Workstation performed: [id number]         XR chest 2 views   ED Interpretation by Grey Cerda DO (08/20 1705)   NAD      Final Result by Arcadio Boast, MD (08/20 1957)      No acute cardiopulmonary disease                    Workstation performed: [id number]         XR tibia fibula 2 views RIGHT   ED Interpretation by Grey Cerda DO (08/20 4325)   NAD          EKG and Other Studies Reviewed on Admission:   · EKG: NSR  HR 93     ** Please Note: This note has been constructed using a voice recognition system   **

## 2022-08-21 PROBLEM — G43.909 MIGRAINE HEADACHE: Status: ACTIVE | Noted: 2022-08-21

## 2022-08-21 PROBLEM — R93.89 ABNORMAL CT SCAN: Status: ACTIVE | Noted: 2022-08-21

## 2022-08-21 LAB
ANION GAP SERPL CALCULATED.3IONS-SCNC: 9 MMOL/L (ref 4–13)
ATRIAL RATE: 93 BPM
BASOPHILS # BLD AUTO: 0.02 THOUSANDS/ΜL (ref 0–0.1)
BASOPHILS NFR BLD AUTO: 0 % (ref 0–1)
BUN SERPL-MCNC: 10 MG/DL (ref 5–25)
CALCIUM SERPL-MCNC: 9.1 MG/DL (ref 8.3–10.1)
CHLORIDE SERPL-SCNC: 104 MMOL/L (ref 96–108)
CO2 SERPL-SCNC: 25 MMOL/L (ref 21–32)
CREAT SERPL-MCNC: 1.09 MG/DL (ref 0.6–1.3)
EOSINOPHIL # BLD AUTO: 0.26 THOUSAND/ΜL (ref 0–0.61)
EOSINOPHIL NFR BLD AUTO: 3 % (ref 0–6)
ERYTHROCYTE [DISTWIDTH] IN BLOOD BY AUTOMATED COUNT: 14.5 % (ref 11.6–15.1)
GFR SERPL CREATININE-BSD FRML MDRD: 61 ML/MIN/1.73SQ M
GLUCOSE P FAST SERPL-MCNC: 134 MG/DL (ref 65–99)
GLUCOSE SERPL-MCNC: 134 MG/DL (ref 65–140)
HCT VFR BLD AUTO: 42.1 % (ref 34.8–46.1)
HGB BLD-MCNC: 13.4 G/DL (ref 11.5–15.4)
IMM GRANULOCYTES # BLD AUTO: 0.04 THOUSAND/UL (ref 0–0.2)
IMM GRANULOCYTES NFR BLD AUTO: 0 % (ref 0–2)
LYMPHOCYTES # BLD AUTO: 2.5 THOUSANDS/ΜL (ref 0.6–4.47)
LYMPHOCYTES NFR BLD AUTO: 28 % (ref 14–44)
MCH RBC QN AUTO: 28.3 PG (ref 26.8–34.3)
MCHC RBC AUTO-ENTMCNC: 31.8 G/DL (ref 31.4–37.4)
MCV RBC AUTO: 89 FL (ref 82–98)
MONOCYTES # BLD AUTO: 0.35 THOUSAND/ΜL (ref 0.17–1.22)
MONOCYTES NFR BLD AUTO: 4 % (ref 4–12)
NEUTROPHILS # BLD AUTO: 5.73 THOUSANDS/ΜL (ref 1.85–7.62)
NEUTS SEG NFR BLD AUTO: 65 % (ref 43–75)
NRBC BLD AUTO-RTO: 0 /100 WBCS
P AXIS: 1 DEGREES
PLATELET # BLD AUTO: 317 THOUSANDS/UL (ref 149–390)
PMV BLD AUTO: 10.6 FL (ref 8.9–12.7)
POTASSIUM SERPL-SCNC: 3.8 MMOL/L (ref 3.5–5.3)
PR INTERVAL: 182 MS
QRS AXIS: -1 DEGREES
QRSD INTERVAL: 92 MS
QT INTERVAL: 392 MS
QTC INTERVAL: 487 MS
RBC # BLD AUTO: 4.74 MILLION/UL (ref 3.81–5.12)
SODIUM SERPL-SCNC: 138 MMOL/L (ref 135–147)
T WAVE AXIS: 100 DEGREES
T4 FREE SERPL-MCNC: 0.48 NG/DL (ref 0.76–1.46)
VENTRICULAR RATE: 93 BPM
WBC # BLD AUTO: 8.9 THOUSAND/UL (ref 4.31–10.16)

## 2022-08-21 PROCEDURE — 93010 ELECTROCARDIOGRAM REPORT: CPT | Performed by: INTERNAL MEDICINE

## 2022-08-21 PROCEDURE — 80048 BASIC METABOLIC PNL TOTAL CA: CPT

## 2022-08-21 PROCEDURE — 85025 COMPLETE CBC W/AUTO DIFF WBC: CPT

## 2022-08-21 PROCEDURE — 99225 PR SBSQ OBSERVATION CARE/DAY 25 MINUTES: CPT | Performed by: INTERNAL MEDICINE

## 2022-08-21 RX ORDER — DIPHENHYDRAMINE HYDROCHLORIDE 50 MG/ML
25 INJECTION INTRAMUSCULAR; INTRAVENOUS EVERY 8 HOURS PRN
Status: DISCONTINUED | OUTPATIENT
Start: 2022-08-21 | End: 2022-08-22 | Stop reason: HOSPADM

## 2022-08-21 RX ORDER — METOCLOPRAMIDE HYDROCHLORIDE 5 MG/ML
10 INJECTION INTRAMUSCULAR; INTRAVENOUS EVERY 8 HOURS SCHEDULED
Status: DISCONTINUED | OUTPATIENT
Start: 2022-08-21 | End: 2022-08-22 | Stop reason: HOSPADM

## 2022-08-21 RX ORDER — CHLORTHALIDONE 25 MG/1
25 TABLET ORAL DAILY
Status: DISCONTINUED | OUTPATIENT
Start: 2022-08-21 | End: 2022-08-22 | Stop reason: HOSPADM

## 2022-08-21 RX ORDER — KETOROLAC TROMETHAMINE 30 MG/ML
30 INJECTION, SOLUTION INTRAMUSCULAR; INTRAVENOUS EVERY 8 HOURS
Status: DISCONTINUED | OUTPATIENT
Start: 2022-08-21 | End: 2022-08-22 | Stop reason: HOSPADM

## 2022-08-21 RX ADMIN — ENOXAPARIN SODIUM 40 MG: 40 INJECTION SUBCUTANEOUS at 08:37

## 2022-08-21 RX ADMIN — KETOROLAC TROMETHAMINE 30 MG: 30 INJECTION, SOLUTION INTRAMUSCULAR at 18:36

## 2022-08-21 RX ADMIN — KETOROLAC TROMETHAMINE 30 MG: 30 INJECTION, SOLUTION INTRAMUSCULAR at 10:20

## 2022-08-21 RX ADMIN — METOCLOPRAMIDE HYDROCHLORIDE 10 MG: 5 INJECTION INTRAMUSCULAR; INTRAVENOUS at 10:19

## 2022-08-21 RX ADMIN — LOSARTAN POTASSIUM 100 MG: 50 TABLET, FILM COATED ORAL at 08:36

## 2022-08-21 RX ADMIN — ACETAMINOPHEN 650 MG: 325 TABLET, FILM COATED ORAL at 08:41

## 2022-08-21 RX ADMIN — LEVOTHYROXINE SODIUM 125 MCG: 125 TABLET ORAL at 06:13

## 2022-08-21 RX ADMIN — PRAVASTATIN SODIUM 80 MG: 80 TABLET ORAL at 16:54

## 2022-08-21 RX ADMIN — ENOXAPARIN SODIUM 40 MG: 40 INJECTION SUBCUTANEOUS at 20:45

## 2022-08-21 RX ADMIN — DIPHENHYDRAMINE HYDROCHLORIDE 25 MG: 50 INJECTION, SOLUTION INTRAMUSCULAR; INTRAVENOUS at 10:19

## 2022-08-21 RX ADMIN — CHLORTHALIDONE 25 MG: 25 TABLET ORAL at 15:03

## 2022-08-21 RX ADMIN — DIPHENHYDRAMINE HYDROCHLORIDE 25 MG: 50 INJECTION, SOLUTION INTRAMUSCULAR; INTRAVENOUS at 18:37

## 2022-08-21 RX ADMIN — METOCLOPRAMIDE HYDROCHLORIDE 10 MG: 5 INJECTION INTRAMUSCULAR; INTRAVENOUS at 18:37

## 2022-08-21 RX ADMIN — NICOTINE 1 PATCH: 7 PATCH, EXTENDED RELEASE TRANSDERMAL at 08:36

## 2022-08-21 NOTE — PLAN OF CARE
Problem: PAIN - ADULT  Goal: Verbalizes/displays adequate comfort level or baseline comfort level  Description: Interventions:  - Encourage patient to monitor pain and request assistance  - Assess pain using appropriate pain scale  - Administer analgesics based on type and severity of pain and evaluate response  - Implement non-pharmacological measures as appropriate and evaluate response  - Consider cultural and social influences on pain and pain management  - Notify physician/advanced practitioner if interventions unsuccessful or patient reports new pain  Outcome: Progressing     Problem: SAFETY ADULT  Goal: Patient will remain free of falls  Description: INTERVENTIONS:  - Educate patient/family on patient safety including physical limitations  - Instruct patient to call for assistance with activity   - Consult OT/PT to assist with strengthening/mobility   - Keep Call bell within reach  - Keep bed low and locked with side rails adjusted as appropriate  - Keep care items and personal belongings within reach  - Initiate and maintain comfort rounds  - Make Fall Risk Sign visible to staff  - Apply yellow socks and bracelet for high fall risk patients  - Consider moving patient to room near nurses station  Outcome: Progressing     Problem: SAFETY ADULT  Goal: Maintain or return to baseline ADL function  Description: INTERVENTIONS:  -  Assess patient's ability to carry out ADLs; assess patient's baseline for ADL function and identify physical deficits which impact ability to perform ADLs (bathing, care of mouth/teeth, toileting, grooming, dressing, etc )  - Assess/evaluate cause of self-care deficits   - Assess range of motion  - Assess patient's mobility; develop plan if impaired  - Assess patient's need for assistive devices and provide as appropriate  - Encourage maximum independence but intervene and supervise when necessary  - Involve family in performance of ADLs  - Assess for home care needs following discharge - Consider OT consult to assist with ADL evaluation and planning for discharge  - Provide patient education as appropriate  Outcome: Progressing     Problem: SAFETY ADULT  Goal: Maintains/Returns to pre admission functional level  Description: INTERVENTIONS:  - Perform BMAT or MOVE assessment daily    - Set and communicate daily mobility goal to care team and patient/family/caregiver     - Collaborate with rehabilitation services on mobility goals if consulted  - Out of bed for toileting  - Record patient progress and toleration of activity level   Outcome: Progressing

## 2022-08-21 NOTE — UTILIZATION REVIEW
Initial Clinical Review    Admission: Date/Time/Statement:   Admission Orders (From admission, onward)     Ordered        08/20/22 1936  Place in Observation  Once                      08/21/22 1518  Inpatient Admission  Once        Transfer Service: Hospitalist       Question Answer Comment   Level of Care Med Surg    Estimated length of stay More than 2 Midnights    Certification I certify that inpatient services are medically necessary for this patient for a duration of greater than two midnights  See H&P and MD Progress Notes for additional information about the patient's course of treatment  08/21/22 1540   OBSERVATION  8/20 @  [facility] 8/21 @  1540  The patient will continue to require additional inpatient hospital stay due to Monitoring blood pressure and headache control       ED Arrival Information     Expected   -    Arrival   8/20/2022 16:06    Acuity   Urgent            Means of arrival   Walk-In    Escorted by   Self    Service   Hospitalist    Admission type   Urgent            Arrival complaint   headache,leg pain            Chief Complaint   Patient presents with    Headache     Pt complains of headache since yesterday  Period of dizziness today while getting in shower  Struck right leg on tub  Initial Presentation: 42 y o  female presents to ED from home with intermittent headache, photophobia, phonophobia, blurred vision and nausea  For past several days  Developed lightheadedness getting into shower the morning of admission,  Has been intermittent since  Reports hitting her shin while getting out of the shower and developed central, non-radiating chest pain described as her chest "caving in" during this timeframe  Chest pain and headache persist   PMH  Is   HTN, hypothyroidism, TIA, MI and HLP  EKG  Nonischemic  CARLI  4  Troponin  9, 2Hdelta:0  Ct scan shows enlarged thyroid gland  CTA head/neck unremarkable   BP  196/91, improved  To  158/71  After IV labetolol   X 2  Admit  Observation with Hypertensive urgency and plan is  Monitor labs  And  Vital signs,  IV  Antihypertensives as needed,  Tele,  trend troponin/EKG and continue home meds  8/21  IP ORDER  Persistent headache  Continue  IV toradol and IV reglan  BP still above goal          ED Triage Vitals [08/20/22 1629]   Temperature Pulse Respirations Blood Pressure SpO2   98 2 °F (36 8 °C) (!) 107 (!) 24 (!) 182/88 97 %      Temp Source Heart Rate Source Patient Position - Orthostatic VS BP Location FiO2 (%)   Oral Monitor Sitting Right arm --      Pain Score       8          Wt Readings from Last 1 Encounters:   08/20/22 (!) 137 kg (302 lb 14 6 oz)     Additional Vital Signs:   98 1 °F (36 7 °C) 82 -- 159/86 110 90 % -- --    08/20/22 2300 -- -- -- -- -- -- None (Room air) --   08/20/22 22:23:48 97 5 °F (36 4 °C) 85 21 136/73 94 94 % -- --   08/20/22 20:10:39 -- 78 -- 127/65 86 92 % -- --   08/20/22 2007 -- -- -- -- -- 96 % None (Room air) --   08/20/22 1930 -- 80 18 158/71 107 95 % None (Room air) Lying   08/20/22 1900 -- 79 20 171/84 Abnormal  120 94 % None (Room air) Sitting   08/20/22 1830 -- 79 20 185/88 Abnormal  126 97 % None (Room air) Sitting   08/20/22 1815 -- 88 24 Abnormal  189/86 Abnormal  138 99 % None (Room air) Sitting   08/20/22 1800 -- 85 30 Abnormal  180/96 Abnormal  128 96 % None (Room air) Sitting   08/20/22 1745 -- 86 22 182/85 Abnormal  126 98 % None (Room air) Sitting   08/20/22 1730 -- 90 20 196/91 Abnormal  130 98 % None (Room air) Sitting   08/20/22 1629 98 2 °F (36 8 °C) 107 Abnormal  24 Abnormal  182/88 Abnormal  -- 97 % None (Room air) Sitting       Pertinent Labs/Diagnostic Test Results:   EKG     NSR       Normal QRS      Normal T waves  CTA head and neck with and without contrast   Final Result by Emeli Huber MD (08/20 1909)      No evidence of acute intracranial hemorrhage  No evidence of hemodynamic significant stenosis, aneurysm or dissection        Enlarged thyroid gland appears to extend superiorly in the retropharyngeal space appears to cause narrowing of the airway at the level of the hyoid bone  Correlate clinically and follow-up with thyroid ultrasound to exclude non-thyroid mass  Findings were marked as "immediate"in Epic and will now be related to the ordering physician or covering clinical team by the radiology liaison  Workstation performed: [id number]         XR chest 2 views   ED Interpretation by Pati Butler DO (08/20 1705)   NAD      Final Result by Aura Covington MD (08/20 1957)      No acute cardiopulmonary disease                    Workstation performed: [id number]         XR tibia fibula 2 views RIGHT   ED Interpretation by Pati Butler DO (08/20 1705)   NAD            Results from last 7 days   Lab Units 08/21/22  0455 08/20/22  1638   WBC Thousand/uL 8 90 11 12*   HEMOGLOBIN g/dL 13 4 14 6   HEMATOCRIT % 42 1 45 6   PLATELETS Thousands/uL 317 356   NEUTROS ABS Thousands/µL 5 73 8 03*         Results from last 7 days   Lab Units 08/21/22  0455 08/20/22  1638   SODIUM mmol/L 138 138   POTASSIUM mmol/L 3 8 3 5   CHLORIDE mmol/L 104 101   CO2 mmol/L 25 25   ANION GAP mmol/L 9 12   BUN mg/dL 10 9   CREATININE mg/dL 1 09 1 22   EGFR ml/min/1 73sq m 61 54   CALCIUM mg/dL 9 1 9 2     Results from last 7 days   Lab Units 08/20/22  1638   AST U/L 63*   ALT U/L 55   ALK PHOS U/L 176*   TOTAL PROTEIN g/dL 8 7*   ALBUMIN g/dL 3 7   TOTAL BILIRUBIN mg/dL 0 39         Results from last 7 days   Lab Units 08/21/22  0455 08/20/22  1638   GLUCOSE RANDOM mg/dL 134 158*               Results from last 7 days   Lab Units 08/20/22  2124 08/20/22  1843 08/20/22  1638   HS TNI 0HR ng/L  --   --  9   HS TNI 2HR ng/L  --  9  --    HSTNI D2 ng/L  --  0  --    HS TNI 4HR ng/L 8  --   --    HSTNI D4 ng/L -1  --   --              Results from last 7 days   Lab Units 08/20/22 2124   TSH 3RD GENERATON uIU/mL 197 649*             ED Treatment:   Medication Administration from 08/20/2022 1606 to 08/20/2022 1958       Date/Time Order Dose Route Action Comments     08/20/2022 1705 sodium chloride 0 9 % bolus 1,000 mL 1,000 mL Intravenous New Bag      08/20/2022 1705 metoclopramide (REGLAN) injection 10 mg 10 mg Intravenous Given      08/20/2022 1704 diphenhydrAMINE (BENADRYL) injection 25 mg 25 mg Intravenous Given      08/20/2022 1705 acetaminophen (TYLENOL) tablet 975 mg 975 mg Oral Given      08/20/2022 1705 losartan (COZAAR) tablet 100 mg 100 mg Oral Given      08/20/2022 1743 iohexol (OMNIPAQUE) 350 MG/ML injection (SINGLE-DOSE) 100 mL 69 mL Intravenous Given      08/20/2022 1812 labetalol (NORMODYNE) injection 20 mg 20 mg Intravenous Given      08/20/2022 1844 morphine injection 4 mg 4 mg Intravenous Given      08/20/2022 1919 labetalol (NORMODYNE) injection 20 mg 20 mg Intravenous Given         Present on Admission:   Acquired hypothyroidism   Chest pain   Mixed hyperlipidemia   Hypertensive urgency   Leukocytosis   Abnormal CT scan      Admitting Diagnosis: Leg pain [M79 606]  Hypertensive emergency [I16 1]  Headache [R51 9]  Age/Sex: 42 y o  female  Admission Orders:  Scheduled Medications:  enoxaparin, 40 mg, Subcutaneous, BID  ketorolac, 30 mg, Intravenous, Q8H  levothyroxine, 125 mcg, Oral, Early Morning  losartan, 100 mg, Oral, Daily  metoclopramide, 10 mg, Intravenous, Q8H [address]  nicotine, 1 patch, Transdermal, Daily  pravastatin, 80 mg, Oral, Daily With Dinner      Continuous IV Infusions:     PRN Meds:  acetaminophen, 650 mg, Oral, Q6H PRN  diphenhydrAMINE, 25 mg, Intravenous, Q8H PRN      Network Utilization Review Department  ATTENTION: Please call with any questions or concerns to [phone] and carefully listen to the prompts so that you are directed to the right person   All voicemails are confidential   [facility] all requests for admission clinical reviews, approved or denied determinations and any other requests to dedicated fax number below belonging to the campus where the patient is receiving treatment   List of dedicated fax numbers for the Facilities:  [address] [place] [address] DENIALS (Administrative/Medical Necessity) [phone]   [address]  (Maternity/NICU/Pediatrics) [phone]   [address]  [id number] [place] [address] [address] [facility] [place] [address] [phone]

## 2022-08-21 NOTE — PLAN OF CARE
Problem: Potential for Falls  Goal: Patient will remain free of falls  Description: INTERVENTIONS:  - Educate patient/family on patient safety including physical limitations  - Instruct patient to call for assistance with activity   - Consult OT/PT to assist with strengthening/mobility   - Keep Call bell within reach  - Keep bed low and locked with side rails adjusted as appropriate  - Keep care items and personal belongings within reach  - Initiate and maintain comfort rounds  - Make Fall Risk Sign visible to staff  - Obtain necessary fall risk management equipment:   - Apply yellow socks and bracelet for high fall risk patients  - Consider moving patient to room near nurses station  Outcome: Progressing     Problem: PAIN - ADULT  Goal: Verbalizes/displays adequate comfort level or baseline comfort level  Description: Interventions:  - Encourage patient to monitor pain and request assistance  - Assess pain using appropriate pain scale  - Administer analgesics based on type and severity of pain and evaluate response  - Implement non-pharmacological measures as appropriate and evaluate response  - Consider cultural and social influences on pain and pain management  - Notify physician/advanced practitioner if interventions unsuccessful or patient reports new pain  Outcome: Progressing     Problem: INFECTION - ADULT  Goal: Absence or prevention of progression during hospitalization  Description: INTERVENTIONS:  - Assess and monitor for signs and symptoms of infection  - Monitor lab/diagnostic results  - Monitor all insertion sites, i e  indwelling lines, tubes, and drains  - Monitor endotracheal if appropriate and nasal secretions for changes in amount and color  - [place] appropriate cooling/warming therapies per order  - Administer medications as ordered  - Instruct and encourage patient and family to use good hand hygiene technique  - Identify and instruct in appropriate isolation precautions for identified infection/condition  Outcome: Progressing     Problem: SAFETY ADULT  Goal: Patient will remain free of falls  Description: INTERVENTIONS:  - Educate patient/family on patient safety including physical limitations  - Instruct patient to call for assistance with activity   - Consult OT/PT to assist with strengthening/mobility   - Keep Call bell within reach  - Keep bed low and locked with side rails adjusted as appropriate  - Keep care items and personal belongings within reach  - Initiate and maintain comfort rounds  - Make Fall Risk Sign visible to staff  - Obtain necessary fall risk management equipment:   - Apply yellow socks and bracelet for high fall risk patients  - Consider moving patient to room near nurses station  Outcome: Progressing  Goal: Maintain or return to baseline ADL function  Description: INTERVENTIONS:  -  Assess patient's ability to carry out ADLs; assess patient's baseline for ADL function and identify physical deficits which impact ability to perform ADLs (bathing, care of mouth/teeth, toileting, grooming, dressing, etc )  - Assess/evaluate cause of self-care deficits   - Assess range of motion  - Assess patient's mobility; develop plan if impaired  - Assess patient's need for assistive devices and provide as appropriate  - Encourage maximum independence but intervene and supervise when necessary  - Involve family in performance of ADLs  - Assess for home care needs following discharge   - Consider OT consult to assist with ADL evaluation and planning for discharge  - Provide patient education as appropriate  Outcome: Progressing  Goal: Maintains/Returns to pre admission functional level  Description: INTERVENTIONS:  - Perform BMAT or MOVE assessment daily    - Set and communicate daily mobility goal to care team and patient/family/caregiver     - Collaborate with rehabilitation services on mobility goals if consulted  - Ambulate patient Ad gunnar  - Out of bed to chair Ad gunnar   - Out of bed for meals Ad gunnar  - Out of bed for toileting  - Record patient progress and toleration of activity level   Outcome: Progressing     Problem: DISCHARGE PLANNING  Goal: Discharge to home or other facility with appropriate resources  Description: INTERVENTIONS:  - Identify barriers to discharge w/patient and caregiver  - Arrange for needed discharge resources and transportation as appropriate  - Identify discharge learning needs (meds, wound care, etc )  - Arrange for interpretive services to assist at discharge as needed  - Refer to Case Management Department for coordinating discharge planning if the patient needs post-hospital services based on physician/advanced practitioner order or complex needs related to functional status, cognitive ability, or social support system  Outcome: Progressing     Problem: Knowledge Deficit  Goal: Patient/family/caregiver demonstrates understanding of disease process, treatment plan, medications, and discharge instructions  Description: Complete learning assessment and assess knowledge base    Interventions:  - Provide teaching at level of understanding  - Provide teaching via preferred learning methods  Outcome: Progressing

## 2022-08-21 NOTE — ASSESSMENT & PLAN NOTE
· Start IV Toradol with Reglan and Benadryl every 8 hours and monitor response  · Follow-up with PCP for further management

## 2022-08-21 NOTE — ASSESSMENT & PLAN NOTE
· Likely secondary to hypertensive urgency  · No specific changes on ECG  · Troponin negative  · Chest pain resolved  · No events on telemetry

## 2022-08-21 NOTE — ASSESSMENT & PLAN NOTE
· "Enlarged thyroid gland appears to extend superiorly in the retropharyngeal space appears to cause narrowing of the airway at the level of the hyoid bone   Correlate clinically and follow-up with thyroid ultrasound to exclude non-thyroid mass  "  · Outpatient thyroid ultrasound

## 2022-08-21 NOTE — PLAN OF CARE
Problem: Potential for Falls  Goal: Patient will remain free of falls  Description: INTERVENTIONS:  - Educate patient/family on patient safety including physical limitations  - Instruct patient to call for assistance with activity   - Consult OT/PT to assist with strengthening/mobility   - Keep Call bell within reach  - Keep bed low and locked with side rails adjusted as appropriate  - Keep care items and personal belongings within reach  - Initiate and maintain comfort rounds  - Make Fall Risk Sign visible to staff  - Obtain necessary fall risk management equipment:  - Apply yellow socks and bracelet for high fall risk patients  - Consider moving patient to room near nurses station  Outcome: Progressing     Problem: PAIN - ADULT  Goal: Verbalizes/displays adequate comfort level or baseline comfort level  Description: Interventions:  - Encourage patient to monitor pain and request assistance  - Assess pain using appropriate pain scale  - Administer analgesics based on type and severity of pain and evaluate response  - Implement non-pharmacological measures as appropriate and evaluate response  - Consider cultural and social influences on pain and pain management  - Notify physician/advanced practitioner if interventions unsuccessful or patient reports new pain  Outcome: Progressing     Problem: INFECTION - ADULT  Goal: Absence or prevention of progression during hospitalization  Description: INTERVENTIONS:  - Assess and monitor for signs and symptoms of infection  - Monitor lab/diagnostic results  - Monitor all insertion sites, i e  indwelling lines, tubes, and drains  - Monitor endotracheal if appropriate and nasal secretions for changes in amount and color  - [place] appropriate cooling/warming therapies per order  - Administer medications as ordered  - Instruct and encourage patient and family to use good hand hygiene technique  - Identify and instruct in appropriate isolation precautions for identified infection/condition  Outcome: Progressing  Goal: Absence of fever/infection during neutropenic period  Description: INTERVENTIONS:  - Monitor WBC    Outcome: Progressing     Problem: SAFETY ADULT  Goal: Patient will remain free of falls  Description: INTERVENTIONS:  - Educate patient/family on patient safety including physical limitations  - Instruct patient to call for assistance with activity   - Consult OT/PT to assist with strengthening/mobility   - Keep Call bell within reach  - Keep bed low and locked with side rails adjusted as appropriate  - Keep care items and personal belongings within reach  - Initiate and maintain comfort rounds  - Make Fall Risk Sign visible to staff  - Obtain necessary fall risk management equipment:   - Apply yellow socks and bracelet for high fall risk patients  - Consider moving patient to room near nurses station  Outcome: Progressing

## 2022-08-21 NOTE — ASSESSMENT & PLAN NOTE
· Continue home levothyroxine   · CT showing elarged thryoid gland which appears to extend superiorly in the retropharyngeal space which appears to cause narrowing of the airway at the level of the hyoid bone  · Reports occasional SOB; however denies any current difficulty breathing; not hypoxic on RA   · TSH elevated, Follow-up on T4  · Maintain close airway and oxygenation monitoring  · Outpatient thyroid US f/u

## 2022-08-21 NOTE — ASSESSMENT & PLAN NOTE
· Secondary to medication noncompliance  Patient ran out of medication and did not have time to  refills  · Continue to monitor blood pressure    Currently still above goal  · Continue losartan home dose  · Start chlorthalidone 25 mg daily

## 2022-08-22 VITALS
OXYGEN SATURATION: 91 % | HEART RATE: 83 BPM | BODY MASS INDEX: 47.09 KG/M2 | SYSTOLIC BLOOD PRESSURE: 177 MMHG | WEIGHT: 293 LBS | RESPIRATION RATE: 18 BRPM | TEMPERATURE: 98.3 F | DIASTOLIC BLOOD PRESSURE: 91 MMHG | HEIGHT: 66 IN

## 2022-08-22 PROCEDURE — 99239 HOSP IP/OBS DSCHRG MGMT >30: CPT | Performed by: INTERNAL MEDICINE

## 2022-08-22 RX ORDER — DIPHENHYDRAMINE HCL 25 MG
25 TABLET ORAL EVERY 8 HOURS PRN
Qty: 10 TABLET | Refills: 0 | Status: SHIPPED | OUTPATIENT
Start: 2022-08-22 | End: 2022-09-01

## 2022-08-22 RX ORDER — LEVOTHYROXINE SODIUM 0.12 MG/1
125 TABLET ORAL
Qty: 30 TABLET | Refills: 0 | Status: SHIPPED | OUTPATIENT
Start: 2022-08-22 | End: 2022-09-21

## 2022-08-22 RX ORDER — TOPIRAMATE 25 MG/1
25 TABLET ORAL DAILY
Qty: 30 TABLET | Refills: 0 | Status: SHIPPED | OUTPATIENT
Start: 2022-08-22

## 2022-08-22 RX ORDER — CHLORTHALIDONE 25 MG/1
25 TABLET ORAL DAILY
Qty: 30 TABLET | Refills: 0 | Status: SHIPPED | OUTPATIENT
Start: 2022-08-22

## 2022-08-22 RX ORDER — ROSUVASTATIN CALCIUM 10 MG/1
10 TABLET, COATED ORAL DAILY
Qty: 30 TABLET | Refills: 0 | Status: SHIPPED | OUTPATIENT
Start: 2022-08-22

## 2022-08-22 RX ORDER — KETOROLAC TROMETHAMINE 10 MG/1
10 TABLET, FILM COATED ORAL EVERY 8 HOURS PRN
Qty: 15 TABLET | Refills: 0 | Status: SHIPPED | OUTPATIENT
Start: 2022-08-22 | End: 2022-08-27

## 2022-08-22 RX ORDER — LOSARTAN POTASSIUM 100 MG/1
100 TABLET ORAL DAILY
Qty: 30 TABLET | Refills: 0 | Status: SHIPPED | OUTPATIENT
Start: 2022-08-22

## 2022-08-22 RX ADMIN — KETOROLAC TROMETHAMINE 30 MG: 30 INJECTION, SOLUTION INTRAMUSCULAR at 10:24

## 2022-08-22 RX ADMIN — CHLORTHALIDONE 25 MG: 25 TABLET ORAL at 10:25

## 2022-08-22 RX ADMIN — DIPHENHYDRAMINE HYDROCHLORIDE 25 MG: 50 INJECTION, SOLUTION INTRAMUSCULAR; INTRAVENOUS at 02:01

## 2022-08-22 RX ADMIN — LEVOTHYROXINE SODIUM 125 MCG: 125 TABLET ORAL at 05:13

## 2022-08-22 RX ADMIN — METOCLOPRAMIDE HYDROCHLORIDE 10 MG: 5 INJECTION INTRAMUSCULAR; INTRAVENOUS at 02:02

## 2022-08-22 RX ADMIN — METOCLOPRAMIDE HYDROCHLORIDE 10 MG: 5 INJECTION INTRAMUSCULAR; INTRAVENOUS at 10:24

## 2022-08-22 RX ADMIN — NICOTINE 1 PATCH: 7 PATCH, EXTENDED RELEASE TRANSDERMAL at 10:36

## 2022-08-22 RX ADMIN — ENOXAPARIN SODIUM 40 MG: 40 INJECTION SUBCUTANEOUS at 10:23

## 2022-08-22 RX ADMIN — LOSARTAN POTASSIUM 100 MG: 50 TABLET, FILM COATED ORAL at 10:22

## 2022-08-22 RX ADMIN — KETOROLAC TROMETHAMINE 30 MG: 30 INJECTION, SOLUTION INTRAMUSCULAR at 02:02

## 2022-08-22 NOTE — PLAN OF CARE
Problem: Potential for Falls  Goal: Patient will remain free of falls  Description: INTERVENTIONS:  - Educate patient/family on patient safety including physical limitations  - Instruct patient to call for assistance with activity   - Consult OT/PT to assist with strengthening/mobility   - Keep Call bell within reach  - Keep bed low and locked with side rails adjusted as appropriate  - Keep care items and personal belongings within reach  - Initiate and maintain comfort rounds  - Make Fall Risk Sign visible to staff  - Apply yellow socks and bracelet for high fall risk patients  - Consider moving patient to room near nurses station  Outcome: Adequate for Discharge     Problem: PAIN - ADULT  Goal: Verbalizes/displays adequate comfort level or baseline comfort level  Description: Interventions:  - Encourage patient to monitor pain and request assistance  - Assess pain using appropriate pain scale  - Administer analgesics based on type and severity of pain and evaluate response  - Implement non-pharmacological measures as appropriate and evaluate response  - Consider cultural and social influences on pain and pain management  - Notify physician/advanced practitioner if interventions unsuccessful or patient reports new pain  Outcome: Adequate for Discharge     Problem: INFECTION - ADULT  Goal: Absence or prevention of progression during hospitalization  Description: INTERVENTIONS:  - Assess and monitor for signs and symptoms of infection  - Monitor lab/diagnostic results  - Monitor all insertion sites, i e  indwelling lines, tubes, and drains  - Monitor endotracheal if appropriate and nasal secretions for changes in amount and color  - [place] appropriate cooling/warming therapies per order  - Administer medications as ordered  - Instruct and encourage patient and family to use good hand hygiene technique  - Identify and instruct in appropriate isolation precautions for identified infection/condition  Outcome: Adequate for Discharge  Goal: Absence of fever/infection during neutropenic period  Description: INTERVENTIONS:  - Monitor WBC    Outcome: Adequate for Discharge     Problem: SAFETY ADULT  Goal: Patient will remain free of falls  Description: INTERVENTIONS:  - Educate patient/family on patient safety including physical limitations  - Instruct patient to call for assistance with activity   - Consult OT/PT to assist with strengthening/mobility   - Keep Call bell within reach  - Keep bed low and locked with side rails adjusted as appropriate  - Keep care items and personal belongings within reach  - Initiate and maintain comfort rounds  - Make Fall Risk Sign visible to staff  - Offer Toileting every 2 Hours, in advance of need  - Initiate/Maintain bed/chair alarm  - Apply yellow socks and bracelet for high fall risk patients  - Consider moving patient to room near nurses station  Outcome: Adequate for Discharge  Goal: Maintain or return to baseline ADL function  Description: INTERVENTIONS:  -  Assess patient's ability to carry out ADLs; assess patient's baseline for ADL function and identify physical deficits which impact ability to perform ADLs (bathing, care of mouth/teeth, toileting, grooming, dressing, etc )  - Assess/evaluate cause of self-care deficits   - Assess range of motion  - Assess patient's mobility; develop plan if impaired  - Assess patient's need for assistive devices and provide as appropriate  - Encourage maximum independence but intervene and supervise when necessary  - Involve family in performance of ADLs  - Assess for home care needs following discharge   - Consider OT consult to assist with ADL evaluation and planning for discharge  - Provide patient education as appropriate  Outcome: Adequate for Discharge  Goal: Maintains/Returns to pre admission functional level  Description: INTERVENTIONS:  - Perform BMAT or MOVE assessment daily    - Set and communicate daily mobility goal to care team and patient/family/caregiver  - Collaborate with rehabilitation services on mobility goals if consulted  - Perform Range of Motion 4 times a day  - Reposition patient every 2 hours  - Dangle patient 3 times a day  - Stand patient 3 times a day  - Ambulate patient 3 times a day  - Out of bed to chair 3 times a day   - Out of bed for meals 3 times a day  - Out of bed for toileting  - Record patient progress and toleration of activity level   Outcome: Adequate for Discharge     Problem: DISCHARGE PLANNING  Goal: Discharge to home or other facility with appropriate resources  Description: INTERVENTIONS:  - Identify barriers to discharge w/patient and caregiver  - Arrange for needed discharge resources and transportation as appropriate  - Identify discharge learning needs (meds, wound care, etc )  - Arrange for interpretive services to assist at discharge as needed  - Refer to Case Management Department for coordinating discharge planning if the patient needs post-hospital services based on physician/advanced practitioner order or complex needs related to functional status, cognitive ability, or social support system  Outcome: Adequate for Discharge     Problem: Knowledge Deficit  Goal: Patient/family/caregiver demonstrates understanding of disease process, treatment plan, medications, and discharge instructions  Description: Complete learning assessment and assess knowledge base    Interventions:  - Provide teaching at level of understanding  - Provide teaching via preferred learning methods  Outcome: Adequate for Discharge     Problem: MOBILITY - ADULT  Goal: Maintain or return to baseline ADL function  Description: INTERVENTIONS:  -  Assess patient's ability to carry out ADLs; assess patient's baseline for ADL function and identify physical deficits which impact ability to perform ADLs (bathing, care of mouth/teeth, toileting, grooming, dressing, etc )  - Assess/evaluate cause of self-care deficits   - Assess range of motion  - Assess patient's mobility; develop plan if impaired  - Assess patient's need for assistive devices and provide as appropriate  - Encourage maximum independence but intervene and supervise when necessary  - Involve family in performance of ADLs  - Assess for home care needs following discharge   - Consider OT consult to assist with ADL evaluation and planning for discharge  - Provide patient education as appropriate  Outcome: Adequate for Discharge  Goal: Maintains/Returns to pre admission functional level  Description: INTERVENTIONS:  - Perform BMAT or MOVE assessment daily    - Set and communicate daily mobility goal to care team and patient/family/caregiver  - Collaborate with rehabilitation services on mobility goals if consulted  - Perform Range of Motion 4 times a day  - Reposition patient every 2 hours    - Dangle patient 3 times a day  - Stand patient 3 times a day  - Ambulate patient 3 times a day  - Out of bed to chair 3 times a day   - Out of bed for meals 3 times a day  - Out of bed for toileting  - Record patient progress and toleration of activity level   Outcome: Adequate for Discharge

## 2022-08-22 NOTE — ASSESSMENT & PLAN NOTE
· Trial of Topamax for migraine prophylaxis on discharge with outpatient follow-up with PCP for further instructions

## 2022-08-22 NOTE — PROGRESS NOTES
[address] [facility]  Progress Note Eda Monet 1975, 42 y o  female MRN: [id number]  Unit/Bed#: -01 Encounter: [id number]  Primary Care Provider: Jose Cruz Quiroz DO   Date and time admitted to hospital: 8/20/2022  4:16 PM    * Hypertensive urgency  Assessment & Plan  · Secondary to medication noncompliance  Patient ran out of medication and did not have time to  refills  · Continue losartan home dose  · Continue chlorthalidone 25 mg daily  · Follow-up with PCP for further monitoring and adjustment of blood pressure    Migraine headache  Assessment & Plan  · Trial of Topamax for migraine prophylaxis on discharge with outpatient follow-up with PCP for further instructions    Abnormal CT scan  Assessment & Plan  · "Enlarged thyroid gland appears to extend superiorly in the retropharyngeal space appears to cause narrowing of the airway at the level of the hyoid bone   Correlate clinically and follow-up with thyroid ultrasound to exclude non-thyroid mass  "  · Outpatient thyroid ultrasound ordered    Leukocytosis  Assessment & Plan  · Resolved  · Likely reactive    Mixed hyperlipidemia  Assessment & Plan  · Continue home statin    Acquired hypothyroidism  Assessment & Plan  · Continue home levothyroxine   · CT showing elarged thryoid gland which appears to extend superiorly in the retropharyngeal space which appears to cause narrowing of the airway at the level of the hyoid bone  · Reports occasional SOB; however denies any current difficulty breathing; not hypoxic on RA   · Maintain close airway and oxygenation monitoring  · Outpatient thyroid US f/u   · Follow-up with PCP    Chest pain  Assessment & Plan  · Likely secondary to hypertensive urgency  · No specific changes on ECG  · Troponin negative  · Chest pain resolved  · No events on telemetry  · Outpatient echo ordered      Discharging Physician / Practitioner: Leandra Garner MD  PCP: Jose Cruz Quiroz DO  Admission Date:   Admission Orders (From admission, onward)     Ordered        08/21/22 1540  Inpatient Admission  Once            08/20/22 1936  Place in Observation  Once                      Discharge Date: 08/22/22    Medical Problems             Resolved Problems  Date Reviewed: 8/22/2022   None                 Consultations During Hospital Stay:  · None    Procedures Performed:   · None    Significant Findings / Test Results:   CTA head and neck with and without contrast    Result Date: 8/20/2022  Impression: No evidence of acute intracranial hemorrhage  No evidence of hemodynamic significant stenosis, aneurysm or dissection  Enlarged thyroid gland appears to extend superiorly in the retropharyngeal space appears to cause narrowing of the airway at the level of the hyoid bone  Correlate clinically and follow-up with thyroid ultrasound to exclude non-thyroid mass  Findings were marked as "immediate"in Epic and will now be related to the ordering physician or covering clinical team by the radiology liaison  Workstation performed: [id number]     XR chest 2 views    Result Date: 8/20/2022  Impression: No acute cardiopulmonary disease  Workstation performed: [id number]     XR tibia fibula 2 views RIGHT    Result Date: 8/21/2022  Impression: No acute osseous abnormality  This report is in agreement with the preliminary interpretation  Workstation performed: [id number]       XR chest 2 views    Result Date: 8/20/2022  Impression No acute cardiopulmonary disease  Workstation performed: [id number]        Incidental Findings:   · Enlarged thyroid, outpatient ultrasound of the thyroid ordered advised patient to follow-up with primary care doctor for further instructions and management    Test Results Pending at Discharge (will require follow up):    · None     Outpatient Tests Requested:  · Thyroid ultrasound  · Echo    Complications:  None    Reason for Admission:  Headache    Hospital Course:     Naty Moreland is a 42 y o  female patient who originally presented to the hospital on 8/20/2022 due to headache and found to have significant elevated blood pressure  CT head was normal and blood pressure improved with resuming home blood pressure medications  Patient had difficulty refilling her medication due to being busy taking care of her parent and kids  No organ dysfunction noted  Patient will be discharged with losartan and chlorthalidone and refill sent to her pharmacy and advised to follow-up with primary care doctor for further monitor blood pressure and further adjustment of medications if needed  She was also complaining of headache thought to be migraine and responded to Toradol therefore will discharge patient with trial of Topamax for migraine prophylaxis until she sees her primary care doctor for further instructions and management  Patient also noted to have elevated T4 and low TSH as she was not taking her thyroid medication due to running out of it therefore will resume her home medication does with outpatient follow-up with primary care doctor who can facilitate monitoring thyroid function test   Of note, patient noted to have enlargement of the thyroid and possible narrowing of airway therefore thyroid ultrasound was ordered to be done outpatient, patient did not have any respiratory distress or issues with breathing and maintained normal oxygen saturations on room air and on ambulation  Head patient expressed understanding that she needs to follow-up with her primary care doctor in regards to all of the above problems  Patient is medically stable for discharge today and she is agreeable to the discharge plan        Please see above list of diagnoses and related plan for additional information       Condition at Discharge: stable     Discharge Day Visit / Exam:     Subjective:  Denies any new complaints  Vitals: Blood Pressure: (!) 177/91 (08/22/22 1025)  Pulse: 83 (08/22/22 1025)  Temperature: 98 3 °F (36 8 °C) (08/22/22 0726)  Temp Source: Oral (08/21/22 0830)  Respirations: 18 (08/22/22 0726)  Height: 5' 6" (167 6 cm) (08/20/22 2054)  Weight - Scale: (!) 137 kg (302 lb 14 6 oz) (08/20/22 2054)  SpO2: 91 % (08/22/22 1025)  Exam:   Physical Exam  Vitals and nursing note reviewed  Constitutional:       General: She is not in acute distress  Appearance: She is well-developed  She is obese  She is not ill-appearing or diaphoretic  HENT:      Head: Normocephalic and atraumatic  Mouth/Throat:      Mouth: Mucous membranes are moist       Pharynx: Oropharynx is clear  Eyes:      General: No scleral icterus  Extraocular Movements: Extraocular movements intact  Conjunctiva/sclera: Conjunctivae normal    Cardiovascular:      Rate and Rhythm: Normal rate and regular rhythm  Heart sounds: No murmur heard  Pulmonary:      Effort: Pulmonary effort is normal  No respiratory distress  Breath sounds: Normal breath sounds  No wheezing or rales  Abdominal:      General: Bowel sounds are normal       Palpations: Abdomen is soft  Tenderness: There is no abdominal tenderness  Musculoskeletal:      Cervical back: Normal range of motion and neck supple  Right lower leg: No edema  Left lower leg: No edema  Skin:     General: Skin is warm and dry  Capillary Refill: Capillary refill takes less than 2 seconds  Neurological:      General: No focal deficit present  Mental Status: She is alert and oriented to person, place, and time  Psychiatric:         Mood and Affect: Mood normal          Behavior: Behavior normal          Discussion with Family:  Patient declined    Discharge instructions/Information to patient and family:   See after visit summary for information provided to patient and family  Provisions for Follow-Up Care:  See after visit summary for information related to follow-up care and any pertinent home health orders        Disposition:     Home        Planned Readmission:  No     Discharge Statement:  I spent 62 minutes discharging the patient  This time was spent on the day of discharge  I had direct contact with the patient on the day of discharge  Greater than 50% of the total time was spent examining patient, answering all patient questions, arranging and discussing plan of care with patient as well as directly providing post-discharge instructions  Additional time then spent on discharge activities  Discharge Medications:  See after visit summary for reconciled discharge medications provided to patient and family        ** Please Note: This note has been constructed using a voice recognition system **

## 2022-08-22 NOTE — INCIDENTAL FINDINGS
The following findings require follow up:  Radiographic finding   Finding: "Enlarged thyroid gland appears to extend superiorly in the retropharyngeal space appears to cause narrowing of the airway at the level of the hyoid bone   Correlate clinically and follow-up with thyroid ultrasound to exclude non-thyroid mass  "   Follow up required: Thyroid US   Follow up should be done within 4 week(s)    Please notify the following clinician to assist with the follow up:   Dr Oneida Moreira MD

## 2022-08-22 NOTE — UTILIZATION REVIEW
Inpatient Admission Authorization Request   NOTIFICATION OF INPATIENT ADMISSION/INPATIENT AUTHORIZATION REQUEST   SERVICING FACILITY:   [address] [place] [address]  Tax ID: [id number]  NPI: [id number]  Place of Service: Inpatient [address] [facility]  [address]  Place of Service Code: 24     ATTENDING PROVIDER:  Attending Name and NPI#: Papo Brewer Md [[id number]]  Address: [address] [place] [address]  Phone: [phone]     UTILIZATION REVIEW CONTACT:  Parminder Harrell Utilization   Network Utilization Review Department  Phone: [phone]  Fax [phone]  Email: [email or web address]     PHYSICIAN ADVISORY SERVICES:  FOR [id number] REVIEW - MEDICAL NECESSITY DENIAL  Phone: [phone]  Fax: [phone]  Email: [email or web address]     TYPE OF REQUEST:  Inpatient Status     ADMISSION INFORMATION:  ADMISSION DATE/TIME: 8/21/22  3:40 PM  PATIENT DIAGNOSIS CODE/DESCRIPTION:  Leg pain [M79 606]  Hypertensive emergency [I16 1]  Headache [R51 9]  DISCHARGE DATE/TIME: No discharge date for patient encounter  IMPORTANT INFORMATION:  Please contact Parminder Harrell directly with any questions or concerns regarding this request  Department voicemails are confidential     Send requests for admission clinical reviews, concurrent reviews, approvals, and administrative denials due to lack of clinical to fax [phone]

## 2022-08-22 NOTE — DISCHARGE SUMMARY
[address] [facility]  Discharge- Jayson Resendez 1975, 42 y o  female MRN: [id number]  Unit/Bed#: -01 Encounter: [id number]  Primary Care Provider: Johnie Centeno DO   Date and time admitted to hospital: 8/20/2022  4:16 PM    * Hypertensive urgency  Assessment & Plan  · Secondary to medication noncompliance  Patient ran out of medication and did not have time to  refills  · Continue losartan home dose  · Continue chlorthalidone 25 mg daily  · Follow-up with PCP for further monitoring and adjustment of blood pressure     Migraine headache  Assessment & Plan  · Trial of Topamax for migraine prophylaxis on discharge with outpatient follow-up with PCP for further instructions     Abnormal CT scan  Assessment & Plan  · "Enlarged thyroid gland appears to extend superiorly in the retropharyngeal space appears to cause narrowing of the airway at the level of the hyoid bone   Correlate clinically and follow-up with thyroid ultrasound to exclude non-thyroid mass  "  · Outpatient thyroid ultrasound ordered     Leukocytosis  Assessment & Plan  · Resolved  · Likely reactive     Mixed hyperlipidemia  Assessment & Plan  · Continue home statin     Acquired hypothyroidism  Assessment & Plan  · Continue home levothyroxine   · CT showing elarged thryoid gland which appears to extend superiorly in the retropharyngeal space which appears to cause narrowing of the airway at the level of the hyoid bone  · Reports occasional SOB; however denies any current difficulty breathing; not hypoxic on RA   · Maintain close airway and oxygenation monitoring  · Outpatient thyroid US f/u   · Follow-up with PCP     Chest pain  Assessment & Plan  · Likely secondary to hypertensive urgency  · No specific changes on ECG  · Troponin negative  · Chest pain resolved  · No events on telemetry  · Outpatient echo ordered        Discharging Physician / Practitioner: Jayson Joyner MD  PCP: Johnie Centeno DO  Admission Date:   Admission Orders (From admission, onward)              Ordered          08/21/22 1540   Inpatient Admission  Once              08/20/22 1936   Place in Observation  Once                          Discharge Date: 08/22/22         Medical Problems                  Resolved Problems  Date Reviewed: 8/22/2022   None                      Consultations During Hospital Stay:  · None     Procedures Performed:   · None     Significant Findings / Test Results:   CTA head and neck with and without contrast     Result Date: 8/20/2022  Impression: No evidence of acute intracranial hemorrhage  No evidence of hemodynamic significant stenosis, aneurysm or dissection  Enlarged thyroid gland appears to extend superiorly in the retropharyngeal space appears to cause narrowing of the airway at the level of the hyoid bone  Correlate clinically and follow-up with thyroid ultrasound to exclude non-thyroid mass  Findings were marked as "immediate"in Epic and will now be related to the ordering physician or covering clinical team by the radiology liaison  Workstation performed: [id number]      XR chest 2 views     Result Date: 8/20/2022  Impression: No acute cardiopulmonary disease  Workstation performed: [id number]      XR tibia fibula 2 views RIGHT     Result Date: 8/21/2022  Impression: No acute osseous abnormality  This report is in agreement with the preliminary interpretation  Workstation performed: [id number]         XR chest 2 views     Result Date: 8/20/2022  Impression No acute cardiopulmonary disease  Workstation performed: [id number]         Incidental Findings:   · Enlarged thyroid, outpatient ultrasound of the thyroid ordered advised patient to follow-up with primary care doctor for further instructions and management     Test Results Pending at Discharge (will require follow up):    · None     Outpatient Tests Requested:  · Thyroid ultrasound  · Echo     Complications:  None     Reason for Admission: Headache     Hospital Course:      Garrett Corbin is a 42 y o  female patient who originally presented to the hospital on 8/20/2022 due to headache and found to have significant elevated blood pressure  CT head was normal and blood pressure improved with resuming home blood pressure medications  Patient had difficulty refilling her medication due to being busy taking care of her parent and kids  No organ dysfunction noted  Patient will be discharged with losartan and chlorthalidone and refill sent to her pharmacy and advised to follow-up with primary care doctor for further monitor blood pressure and further adjustment of medications if needed  She was also complaining of headache thought to be migraine and responded to Toradol therefore will discharge patient with trial of Topamax for migraine prophylaxis until she sees her primary care doctor for further instructions and management  Patient also noted to have elevated T4 and low TSH as she was not taking her thyroid medication due to running out of it therefore will resume her home medication does with outpatient follow-up with primary care doctor who can facilitate monitoring thyroid function test   Of note, patient noted to have enlargement of the thyroid and possible narrowing of airway therefore thyroid ultrasound was ordered to be done outpatient, patient did not have any respiratory distress or issues with breathing and maintained normal oxygen saturations on room air and on ambulation  Head patient expressed understanding that she needs to follow-up with her primary care doctor in regards to all of the above problems    Patient is medically stable for discharge today and she is agreeable to the discharge plan           Please see above list of diagnoses and related plan for additional information       Condition at Discharge: stable      Discharge Day Visit / Exam:      Subjective:  Denies any new complaints  Vitals: Blood Pressure: (!) 177/91 (08/22/22 1025)  Pulse: 83 (08/22/22 1025)  Temperature: 98 3 °F (36 8 °C) (08/22/22 0726)  Temp Source: Oral (08/21/22 0830)  Respirations: 18 (08/22/22 0726)  Height: 5' 6" (167 6 cm) (08/20/22 2054)  Weight - Scale: (!) 137 kg (302 lb 14 6 oz) (08/20/22 2054)  SpO2: 91 % (08/22/22 1025)  Exam:   Physical Exam  Vitals and nursing note reviewed  Constitutional:       General: She is not in acute distress  Appearance: She is well-developed  She is obese  She is not ill-appearing or diaphoretic  HENT:      Head: Normocephalic and atraumatic  Mouth/Throat:      Mouth: Mucous membranes are moist       Pharynx: Oropharynx is clear  Eyes:      General: No scleral icterus  Extraocular Movements: Extraocular movements intact  Conjunctiva/sclera: Conjunctivae normal    Cardiovascular:      Rate and Rhythm: Normal rate and regular rhythm  Heart sounds: No murmur heard  Pulmonary:      Effort: Pulmonary effort is normal  No respiratory distress  Breath sounds: Normal breath sounds  No wheezing or rales  Abdominal:      General: Bowel sounds are normal       Palpations: Abdomen is soft  Tenderness: There is no abdominal tenderness  Musculoskeletal:      Cervical back: Normal range of motion and neck supple  Right lower leg: No edema  Left lower leg: No edema  Skin:     General: Skin is warm and dry  Capillary Refill: Capillary refill takes less than 2 seconds  Neurological:      General: No focal deficit present  Mental Status: She is alert and oriented to person, place, and time     Psychiatric:         Mood and Affect: Mood normal          Behavior: Behavior normal             Discussion with Family:  Patient declined     Discharge instructions/Information to patient and family:   See after visit summary for information provided to patient and family        Provisions for Follow-Up Care:  See after visit summary for information related to follow-up care and any pertinent home health orders        Disposition:      Home           Planned Readmission:  No     Discharge Statement:  I spent 62 minutes discharging the patient  This time was spent on the day of discharge  I had direct contact with the patient on the day of discharge  Greater than 50% of the total time was spent examining patient, answering all patient questions, arranging and discussing plan of care with patient as well as directly providing post-discharge instructions    Additional time then spent on discharge activities      Discharge Medications:  See after visit summary for reconciled discharge medications provided to patient and family        ** Please Note: This note has been constructed using a voice recognition system **

## 2022-08-22 NOTE — NURSING NOTE
SLIM Coby Areas made aware of patient's last bp reading of 177/91 pre medication administration  Patient also reporting mild headache and blurred vision which has improved per patient   Per Cassie, patient is still okay to be discharged,

## 2022-08-22 NOTE — ASSESSMENT & PLAN NOTE
· "Enlarged thyroid gland appears to extend superiorly in the retropharyngeal space appears to cause narrowing of the airway at the level of the hyoid bone   Correlate clinically and follow-up with thyroid ultrasound to exclude non-thyroid mass  "  · Outpatient thyroid ultrasound ordered

## 2022-08-22 NOTE — ASSESSMENT & PLAN NOTE
· Continue home levothyroxine   · CT showing elarged thryoid gland which appears to extend superiorly in the retropharyngeal space which appears to cause narrowing of the airway at the level of the hyoid bone  · Reports occasional SOB; however denies any current difficulty breathing; not hypoxic on RA   · Maintain close airway and oxygenation monitoring  · Outpatient thyroid US f/u   · Follow-up with PCP

## 2022-08-22 NOTE — ASSESSMENT & PLAN NOTE
· Likely secondary to hypertensive urgency  · No specific changes on ECG  · Troponin negative  · Chest pain resolved  · No events on telemetry  · Outpatient echo ordered

## 2022-08-22 NOTE — ASSESSMENT & PLAN NOTE
· Secondary to medication noncompliance    Patient ran out of medication and did not have time to  refills  · Continue losartan home dose  · Continue chlorthalidone 25 mg daily  · Follow-up with PCP for further monitoring and adjustment of blood pressure

## 2022-08-23 NOTE — UTILIZATION REVIEW
Notification of Discharge   This is a Notification of Discharge from our facility [address]  Please be advised that this patient has been discharge from our facility  Below you will find the admission and discharge date and time including the patients disposition  UTILIZATION REVIEW CONTACT:  Octavio Brewer  Utilization   Network Utilization Review Department  Phone: [phone] x carefully listen to the prompts  All voicemails are confidential   Email: [email or web address]     PHYSICIAN ADVISORY SERVICES:  FOR [id number] REVIEW - MEDICAL NECESSITY DENIAL  Phone: [phone]  Fax: [phone]  Email: [email or web address]     PRESENTATION DATE: 8/20/2022  4:16 PM  OBERVATION ADMISSION DATE: 08/20/2022  INPATIENT ADMISSION DATE: 8/21/22  3:40 PM   DISCHARGE DATE: 8/22/2022 11:41 AM  DISPOSITION: Home/Self Care Home/Self Care      IMPORTANT INFORMATION:  Send all requests for admission clinical reviews, approved or denied determinations and any other requests to dedicated fax number below belonging to the campus where the patient is receiving treatment   List of dedicated fax numbers:  [address] DENIALS (Administrative/Medical Necessity) [phone]   [address] (Maternity/NICU/Pediatrics) [phone]   Sharri Jaime [phone]   130 [facility] [phone]   [address] [place] [address] [phone]   2000 [facility] [address] [place] [address],[address] [phone]   [facility]  [phone]   [address] [place], [place] [address] [facility] 1000 [facility] [phone]

## 2022-10-27 ENCOUNTER — APPOINTMENT (EMERGENCY)
Dept: RADIOLOGY | Facility: HOSPITAL | Age: 47
End: 2022-10-27
Payer: COMMERCIAL

## 2022-10-27 ENCOUNTER — HOSPITAL ENCOUNTER (EMERGENCY)
Facility: HOSPITAL | Age: 47
Discharge: HOME | End: 2022-10-27
Attending: EMERGENCY MEDICINE
Payer: COMMERCIAL

## 2022-10-27 ENCOUNTER — APPOINTMENT (EMERGENCY)
Dept: CT IMAGING | Facility: HOSPITAL | Age: 47
End: 2022-10-27
Payer: COMMERCIAL

## 2022-10-27 VITALS
DIASTOLIC BLOOD PRESSURE: 78 MMHG | SYSTOLIC BLOOD PRESSURE: 167 MMHG | WEIGHT: 293 LBS | HEART RATE: 74 BPM | BODY MASS INDEX: 48.74 KG/M2 | RESPIRATION RATE: 28 BRPM | OXYGEN SATURATION: 100 % | TEMPERATURE: 98.1 F

## 2022-10-27 DIAGNOSIS — U07.1 COVID-19: Primary | ICD-10-CM

## 2022-10-27 LAB
2HR DELTA HS TROPONIN: -1 NG/L
ALBUMIN SERPL BCP-MCNC: 3.5 G/DL (ref 3.5–5)
ALP SERPL-CCNC: 154 U/L (ref 46–116)
ALT SERPL W P-5'-P-CCNC: 62 U/L (ref 12–78)
ANION GAP SERPL CALCULATED.3IONS-SCNC: 10 MMOL/L (ref 4–13)
AST SERPL W P-5'-P-CCNC: 87 U/L (ref 5–45)
BASOPHILS # BLD AUTO: 0.03 THOUSANDS/ÂΜL (ref 0–0.1)
BASOPHILS NFR BLD AUTO: 0 % (ref 0–1)
BILIRUB SERPL-MCNC: 0.35 MG/DL (ref 0.2–1)
BUN SERPL-MCNC: 7 MG/DL (ref 5–25)
CALCIUM SERPL-MCNC: 8.7 MG/DL (ref 8.3–10.1)
CARDIAC TROPONIN I PNL SERPL HS: 6 NG/L
CARDIAC TROPONIN I PNL SERPL HS: 7 NG/L
CHLORIDE SERPL-SCNC: 102 MMOL/L (ref 96–108)
CO2 SERPL-SCNC: 26 MMOL/L (ref 21–32)
CREAT SERPL-MCNC: 0.92 MG/DL (ref 0.6–1.3)
EOSINOPHIL # BLD AUTO: 0.06 THOUSAND/ÂΜL (ref 0–0.61)
EOSINOPHIL NFR BLD AUTO: 1 % (ref 0–6)
ERYTHROCYTE [DISTWIDTH] IN BLOOD BY AUTOMATED COUNT: 13.9 % (ref 11.6–15.1)
FLUAV RNA RESP QL NAA+PROBE: NEGATIVE
FLUBV RNA RESP QL NAA+PROBE: NEGATIVE
GFR SERPL CREATININE-BSD FRML MDRD: 75 ML/MIN/1.73SQ M
GLUCOSE SERPL-MCNC: 106 MG/DL (ref 65–140)
HCG SERPL QL: NEGATIVE
HCT VFR BLD AUTO: 45.1 % (ref 34.8–46.1)
HGB BLD-MCNC: 14.5 G/DL (ref 11.5–15.4)
IMM GRANULOCYTES # BLD AUTO: 0.03 THOUSAND/UL (ref 0–0.2)
IMM GRANULOCYTES NFR BLD AUTO: 0 % (ref 0–2)
LIPASE SERPL-CCNC: 115 U/L (ref 73–393)
LYMPHOCYTES # BLD AUTO: 2.03 THOUSANDS/ÂΜL (ref 0.6–4.47)
LYMPHOCYTES NFR BLD AUTO: 30 % (ref 14–44)
MCH RBC QN AUTO: 28.4 PG (ref 26.8–34.3)
MCHC RBC AUTO-ENTMCNC: 32.2 G/DL (ref 31.4–37.4)
MCV RBC AUTO: 88 FL (ref 82–98)
MONOCYTES # BLD AUTO: 0.4 THOUSAND/ÂΜL (ref 0.17–1.22)
MONOCYTES NFR BLD AUTO: 6 % (ref 4–12)
NEUTROPHILS # BLD AUTO: 4.19 THOUSANDS/ÂΜL (ref 1.85–7.62)
NEUTS SEG NFR BLD AUTO: 63 % (ref 43–75)
NRBC BLD AUTO-RTO: 0 /100 WBCS
PLATELET # BLD AUTO: 304 THOUSANDS/UL (ref 149–390)
PMV BLD AUTO: 10.4 FL (ref 8.9–12.7)
POTASSIUM SERPL-SCNC: 3.5 MMOL/L (ref 3.5–5.3)
PROT SERPL-MCNC: 8.3 G/DL (ref 6.4–8.4)
RBC # BLD AUTO: 5.1 MILLION/UL (ref 3.81–5.12)
RSV RNA RESP QL NAA+PROBE: NEGATIVE
SARS-COV-2 RNA RESP QL NAA+PROBE: POSITIVE
SODIUM SERPL-SCNC: 138 MMOL/L (ref 135–147)
WBC # BLD AUTO: 6.74 THOUSAND/UL (ref 4.31–10.16)

## 2022-10-27 PROCEDURE — 36415 COLL VENOUS BLD VENIPUNCTURE: CPT | Performed by: EMERGENCY MEDICINE

## 2022-10-27 PROCEDURE — 85025 COMPLETE CBC W/AUTO DIFF WBC: CPT | Performed by: EMERGENCY MEDICINE

## 2022-10-27 PROCEDURE — 93005 ELECTROCARDIOGRAM TRACING: CPT

## 2022-10-27 PROCEDURE — 71046 X-RAY EXAM CHEST 2 VIEWS: CPT

## 2022-10-27 PROCEDURE — 84484 ASSAY OF TROPONIN QUANT: CPT | Performed by: EMERGENCY MEDICINE

## 2022-10-27 PROCEDURE — 83690 ASSAY OF LIPASE: CPT | Performed by: EMERGENCY MEDICINE

## 2022-10-27 PROCEDURE — 84703 CHORIONIC GONADOTROPIN ASSAY: CPT | Performed by: EMERGENCY MEDICINE

## 2022-10-27 PROCEDURE — 80053 COMPREHEN METABOLIC PANEL: CPT | Performed by: EMERGENCY MEDICINE

## 2022-10-27 PROCEDURE — 70450 CT HEAD/BRAIN W/O DYE: CPT

## 2022-10-27 PROCEDURE — 0241U HB NFCT DS VIR RESP RNA 4 TRGT: CPT | Performed by: EMERGENCY MEDICINE

## 2022-10-27 PROCEDURE — G1004 CDSM NDSC: HCPCS

## 2022-10-27 RX ORDER — ACETAMINOPHEN 325 MG/1
650 TABLET ORAL ONCE
Status: COMPLETED | OUTPATIENT
Start: 2022-10-27 | End: 2022-10-27

## 2022-10-27 RX ORDER — ONDANSETRON 2 MG/ML
1 INJECTION INTRAMUSCULAR; INTRAVENOUS ONCE
Status: COMPLETED | OUTPATIENT
Start: 2022-10-27 | End: 2022-10-27

## 2022-10-27 RX ORDER — METOCLOPRAMIDE HYDROCHLORIDE 5 MG/ML
10 INJECTION INTRAMUSCULAR; INTRAVENOUS ONCE
Status: COMPLETED | OUTPATIENT
Start: 2022-10-27 | End: 2022-10-27

## 2022-10-27 RX ORDER — DIPHENHYDRAMINE HYDROCHLORIDE 50 MG/ML
25 INJECTION INTRAMUSCULAR; INTRAVENOUS ONCE
Status: COMPLETED | OUTPATIENT
Start: 2022-10-27 | End: 2022-10-27

## 2022-10-27 RX ORDER — SODIUM CHLORIDE 9 MG/ML
3 INJECTION INTRAVENOUS
Status: DISCONTINUED | OUTPATIENT
Start: 2022-10-27 | End: 2022-10-27 | Stop reason: HOSPADM

## 2022-10-27 RX ADMIN — ACETAMINOPHEN 650 MG: 325 TABLET, FILM COATED ORAL at 15:27

## 2022-10-27 RX ADMIN — SODIUM CHLORIDE 1000 ML: 0.9 INJECTION, SOLUTION INTRAVENOUS at 15:30

## 2022-10-27 RX ADMIN — DIPHENHYDRAMINE HYDROCHLORIDE 25 MG: 50 INJECTION, SOLUTION INTRAMUSCULAR; INTRAVENOUS at 15:26

## 2022-10-27 RX ADMIN — METOCLOPRAMIDE 10 MG: 5 INJECTION, SOLUTION INTRAMUSCULAR; INTRAVENOUS at 15:23

## 2022-10-27 NOTE — ED PROVIDER NOTES
History  Chief Complaint   Patient presents with   • Chest Pain   • Nausea     Pt arrives via EMS from urgent care where she was being seen for severe nausea that began yesterday; after arriving there she began having chest discomfort  Received aspirin and nitro en route to facility  42 y/o female, hx of HTN, HLD, and migraines, presents to the ED for flu like symptoms x 3 days  She states that she has had headache, cough, nausea/vomiting, body aches  States that she had subjective fever as well  Multiple sick contacts  Went to urgent care today and then developed chest pain while there  She describes the pain as mid sternal pressure that radiates to her jaw, intermittent since  Nothing Improves or worsens her symptoms  She reports that she was given aspirin and nitro in the ambulance but had no change in her symptoms  She is unvaccinated from covid  History provided by:  Patient  Chest Pain  Pain location:  Substernal area  Pain quality: pressure    Pain radiates to:  Does not radiate  Onset quality:  Sudden  Timing:  Intermittent  Progression:  Unchanged  Chronicity:  New  Relieved by:  None tried  Worsened by:  Nothing tried  Ineffective treatments:  None tried  Associated symptoms: cough, fever, nausea and vomiting    Associated symptoms: no abdominal pain, no headache, no numbness, no shortness of breath and no weakness    Nausea  The primary symptoms include fever, nausea and vomiting  Primary symptoms do not include abdominal pain, diarrhea, dysuria or rash  The illness does not include chills  Prior to Admission Medications   Prescriptions Last Dose Informant Patient Reported?  Taking?   chlorthalidone 25 mg tablet   No No   Sig: Take 1 tablet (25 mg total) by mouth daily   diphenhydrAMINE (BENADRYL) 25 mg tablet   No No   Sig: Take 1 tablet (25 mg total) by mouth every 8 (eight) hours as needed for itching or allergies for up to 10 days   ketorolac (TORADOL) 10 mg tablet   No No   Sig: Take 1 tablet (10 mg total) by mouth every 8 (eight) hours as needed for moderate pain or severe pain for up to 5 days   levothyroxine 125 mcg tablet   No No   Sig: Take 1 tablet (125 mcg total) by mouth daily in the early morning   losartan (COZAAR) 100 MG tablet   No No   Sig: Take 1 tablet (100 mg total) by mouth daily   nicotine (NICODERM CQ) 7 mg/24hr TD 24 hr patch   No No   Sig: Place 1 patch on the skin daily   rosuvastatin (CRESTOR) 10 MG tablet   No No   Sig: Take 1 tablet (10 mg total) by mouth daily   topiramate (Topamax) 25 mg tablet   No No   Sig: Take 1 tablet (25 mg total) by mouth daily      Facility-Administered Medications: None       Past Medical History:   Diagnosis Date   • History of MI (myocardial infarction)    • History of TIA (transient ischemic attack)    • Hypertension    • Hypothyroidism     hypothyroidism   • Morbid obesity with BMI of 45 0-49 9, adult (HCC)    • TIA (transient ischemic attack)        Past Surgical History:   Procedure Laterality Date   • BREAST SURGERY Left    • TUBAL LIGATION         Family History   Problem Relation Age of Onset   • Heart disease Mother    • Hypertension Mother    • Diabetes Father      I have reviewed and agree with the history as documented  E-Cigarette/Vaping   • E-Cigarette Use Never User      E-Cigarette/Vaping Substances   • Nicotine No    • THC No    • CBD No    • Flavoring No    • Other No    • Unknown No      Social History     Tobacco Use   • Smoking status: Current Every Day Smoker     Packs/day: 0 25     Years: 30 00     Pack years: 7 50     Types: Cigarettes   • Smokeless tobacco: Never Used   Vaping Use   • Vaping Use: Never used   Substance Use Topics   • Alcohol use: Not Currently     Comment: occasional   • Drug use: No       Review of Systems   Constitutional: Positive for fever  Negative for chills  HENT: Negative for congestion, ear pain and sore throat  Eyes: Negative for pain and visual disturbance     Respiratory: Positive for cough  Negative for shortness of breath and wheezing  Cardiovascular: Positive for chest pain  Negative for leg swelling  Gastrointestinal: Positive for nausea and vomiting  Negative for abdominal pain and diarrhea  Genitourinary: Negative for dysuria, frequency, hematuria and urgency  Musculoskeletal: Negative for neck pain and neck stiffness  Skin: Negative for rash and wound  Neurological: Negative for weakness, numbness and headaches  Psychiatric/Behavioral: Negative for agitation and confusion  All other systems reviewed and are negative  Physical Exam  Physical Exam  Vitals and nursing note reviewed  Constitutional:       Appearance: She is well-developed  HENT:      Head: Normocephalic and atraumatic  Eyes:      Pupils: Pupils are equal, round, and reactive to light  Cardiovascular:      Rate and Rhythm: Normal rate and regular rhythm  Pulmonary:      Effort: Pulmonary effort is normal       Breath sounds: Normal breath sounds  Abdominal:      General: Bowel sounds are normal       Palpations: Abdomen is soft  Musculoskeletal:         General: Normal range of motion  Cervical back: Normal range of motion and neck supple  Skin:     General: Skin is warm and dry  Neurological:      General: No focal deficit present  Mental Status: She is alert and oriented to person, place, and time        Comments: No focal deficits         Vital Signs  ED Triage Vitals [10/27/22 1337]   Temperature Pulse Respirations Blood Pressure SpO2   98 1 °F (36 7 °C) 82 18 155/84 98 %      Temp Source Heart Rate Source Patient Position - Orthostatic VS BP Location FiO2 (%)   Oral Monitor Sitting Right arm --      Pain Score       6           Vitals:    10/27/22 1400 10/27/22 1531 10/27/22 1733 10/27/22 1800   BP: (!) 174/117 (!) 182/97 158/74 167/78   Pulse: 80 77 70 74   Patient Position - Orthostatic VS: Lying            Visual Acuity      ED Medications  Medications ondansetron (FOR EMS ONLY) (ZOFRAN) 4 mg/2 mL injection 4 mg (0 mg Does not apply Given to EMS 10/27/22 1342)   metoclopramide (REGLAN) injection 10 mg (10 mg Intravenous Given 10/27/22 1523)   diphenhydrAMINE (BENADRYL) injection 25 mg (25 mg Intravenous Given 10/27/22 1526)   acetaminophen (TYLENOL) tablet 650 mg (650 mg Oral Given 10/27/22 1527)   sodium chloride 0 9 % bolus 1,000 mL (0 mL Intravenous Stopped 10/27/22 1803)       Diagnostic Studies  Results Reviewed     Procedure Component Value Units Date/Time    HS Troponin I 2hr [[id number]]  (Normal) Collected: 10/27/22 1710    Lab Status: Final result Specimen: Blood from Arm, Left Updated: 10/27/22 1755     hs TnI 2hr 6 ng/L      Delta 2hr hsTnI -1 ng/L     FLU/RSV/COVID - if FLU/RSV clinically relevant [[id number]]  (Abnormal) Collected: 10/27/22 1427    Lab Status: Final result Specimen: Nares from Nose Updated: 10/27/22 1512     SARS-CoV-2 Positive     INFLUENZA A PCR Negative     INFLUENZA B PCR Negative     RSV PCR Negative    Narrative:      FOR PEDIATRIC PATIENTS - copy/paste COVID Guidelines URL to browser: [email or web address] [email or web address]  ashx    SARS-CoV-2 assay is a Nucleic Acid Amplification assay intended for the  qualitative detection of nucleic acid from SARS-CoV-2 in nasopharyngeal  swabs  Results are for the presumptive identification of SARS-CoV-2 RNA  Positive results are indicative of infection with SARS-CoV-2, the virus  causing COVID-19, but do not rule out bacterial infection or co-infection  with other viruses  Laboratories within the [place] and its  territories are required to report all positive results to the appropriate  public health authorities  Negative results do not preclude SARS-CoV-2  infection and should not be used as the sole basis for treatment or other  patient management decisions   Negative results must be combined with  clinical observations, patient history, and epidemiological information  This test has not been FDA cleared or approved  This test has been authorized by FDA under an Emergency Use Authorization  (EUA)  This test is only authorized for the duration of time the  declaration that circumstances exist justifying the authorization of the  emergency use of an in vitro diagnostic tests for detection of SARS-CoV-2  virus and/or diagnosis of COVID-19 infection under section 564(b)(1) of  the Act, 21 U  S C  536CID-8(C)(1), unless the authorization is terminated  or revoked sooner  The test has been validated but independent review by FDA  and CLIA is pending  Test performed using [employer] GeneXpert: This RT-PCR assay targets N2,  a region unique to SARS-CoV-2  A conserved region in the E-gene was chosen  for pan-Sarbecovirus detection which includes SARS-CoV-2  According to CMS-2020-01-R, this platform meets the definition of high-throughput technology      Lipase [[id number]]  (Normal) Collected: 10/27/22 1425    Lab Status: Final result Specimen: Blood from Arm, Right Updated: 10/27/22 1501     Lipase 115 u/L     hCG, qualitative pregnancy [[id number]]  (Normal) Collected: 10/27/22 1425    Lab Status: Final result Specimen: Blood from Arm, Right Updated: 10/27/22 1501     Preg, Serum Negative    HS Troponin 0hr (reflex protocol) [[id number]]  (Normal) Collected: 10/27/22 1425    Lab Status: Final result Specimen: Blood from Arm, Right Updated: 10/27/22 1459     hs TnI 0hr 7 ng/L     Comprehensive metabolic panel [[id number]]  (Abnormal) Collected: 10/27/22 1425    Lab Status: Final result Specimen: Blood from Arm, Right Updated: 10/27/22 1455     Sodium 138 mmol/L      Potassium 3 5 mmol/L      Chloride 102 mmol/L      CO2 26 mmol/L      ANION GAP 10 mmol/L      BUN 7 mg/dL      Creatinine 0 92 mg/dL      Glucose 106 mg/dL      Calcium 8 7 mg/dL      AST 87 U/L      ALT 62 U/L      Alkaline Phosphatase 154 U/L      Total Protein 8 3 g/dL      Albumin 3 5 g/dL Total Bilirubin 0 35 mg/dL      eGFR 75 ml/min/1 73sq m     Narrative:      National Kidney Disease Foundation guidelines for Chronic Kidney Disease (CKD):   •  Stage 1 with normal or high GFR (GFR > 90 mL/min/1 73 square meters)  •  Stage 2 Mild CKD (GFR = 60-89 mL/min/1 73 square meters)  •  Stage 3A Moderate CKD (GFR = 45-59 mL/min/1 73 square meters)  •  Stage 3B Moderate CKD (GFR = 30-44 mL/min/1 73 square meters)  •  Stage 4 Severe CKD (GFR = 15-29 mL/min/1 73 square meters)  •  Stage 5 End Stage CKD (GFR <15 mL/min/1 73 square meters)  Note: GFR calculation is accurate only with a steady state creatinine    CBC and differential [[id number]] Collected: 10/27/22 1425    Lab Status: Final result Specimen: Blood from Arm, Right Updated: 10/27/22 1435     WBC 6 74 Thousand/uL      RBC 5 10 Million/uL      Hemoglobin 14 5 g/dL      Hematocrit 45 1 %      MCV 88 fL      MCH 28 4 pg      MCHC 32 2 g/dL      RDW 13 9 %      MPV 10 4 fL      Platelets 707 Thousands/uL      nRBC 0 /100 WBCs      Neutrophils Relative 63 %      Immat GRANS % 0 %      Lymphocytes Relative 30 %      Monocytes Relative 6 %      Eosinophils Relative 1 %      Basophils Relative 0 %      Neutrophils Absolute 4 19 Thousands/µL      Immature Grans Absolute 0 03 Thousand/uL      Lymphocytes Absolute 2 03 Thousands/µL      Monocytes Absolute 0 40 Thousand/µL      Eosinophils Absolute 0 06 Thousand/µL      Basophils Absolute 0 03 Thousands/µL                  X-ray chest 2 views   Final Result by Melchor Buitrago MD (10/27 5931)      No acute cardiopulmonary disease  Workstation performed: [id number]         CT head without contrast   Final Result by Melissa Tillman MD (10/27 5373)      1  No acute intracranial CT abnormality  2   New tiny chronic posterior right putamen lacunar infarct  Stable small chronic ischemia in the left frontal white matter and right caudate                 Workstation performed: [id number] Procedures  Procedures         ED Course                               SBIRT 22yo+    Flowsheet Row Most Recent Value   SBIRT (25 yo +)    In order to provide better care to our patients, we are screening all of our patients for alcohol and drug use  Would it be okay to ask you these screening questions? Yes Filed at: 10/27/2022 1437   Initial Alcohol Screen: US AUDIT-C     1  How often do you have a drink containing alcohol? 0 Filed at: 10/27/2022 1437   2  How many drinks containing alcohol do you have on a typical day you are drinking? 0 Filed at: 10/27/2022 1437   3a  Male UNDER 65: How often do you have five or more drinks on one occasion? 0 Filed at: 10/27/2022 1437   3b  FEMALE Any Age, or MALE 65+: How often do you have 4 or more drinks on one occassion? 0 Filed at: 10/27/2022 1437   Audit-C Score 0 Filed at: 10/27/2022 1437   THANH: How many times in the past year have you    Used an illegal drug or used a prescription medication for non-medical reasons? Never Filed at: 10/27/2022 1437                    MDM  Number of Diagnoses or Management Options  COVID-19: new and requires workup  Diagnosis management comments: Patient with flu like symptoms, chest pain, and headache -will get cardiac workup, ct head, and covid/ flu/ rsv testing  Will give migraine cocktail and reassess  Case signed out to Dr Margarita Eli pending delta trop/ ekg          Amount and/or Complexity of Data Reviewed  Clinical lab tests: ordered and reviewed  Tests in the radiology section of CPT®: ordered and reviewed  Tests in the medicine section of CPT®: ordered and reviewed  Discussion of test results with the performing providers: yes  Decide to obtain previous medical records or to obtain history from someone other than the patient: yes  Obtain history from someone other than the patient: yes  Review and summarize past medical records: yes  Discuss the patient with other providers: yes  Independent visualization of images, tracings, or specimens: yes    Patient Progress  Patient progress: improved      Disposition  Final diagnoses:   COVID-19     Time reflects when diagnosis was documented in both MDM as applicable and the Disposition within this note     Time User Action Codes Description Comment    10/27/2022  4:48 PM Winnie Montanez Add [U07 1] COVID-19       ED Disposition     ED Disposition   Discharge    Condition   Stable    Date/Time   Thu Oct 27, 2022  4:48 PM    Magdaleno Stinson discharge to home/self care                 Follow-up Information     Follow up With Specialties Details Why Contact Info Additional [address], DO Family Medicine Call in 1 day for follow up within 2-3 days [address] [facility] [address] [phone]       [address] [facility] Emergency Department Emergency Medicine Go to  immediately for any new or worsening symptoms Allison Tammi [address] [place] [address] Emergency Department, [address] [place] [address], [facility]          Discharge Medication List as of 10/27/2022  4:58 PM      CONTINUE these medications which have NOT CHANGED    Details   chlorthalidone 25 mg tablet Take 1 tablet (25 mg total) by mouth daily, Starting Mon 8/22/2022, Normal      diphenhydrAMINE (BENADRYL) 25 mg tablet Take 1 tablet (25 mg total) by mouth every 8 (eight) hours as needed for itching or allergies for up to 10 days, Starting Mon 8/22/2022, Until Thu 9/1/2022 at 2359, Normal      ketorolac (TORADOL) 10 mg tablet Take 1 tablet (10 mg total) by mouth every 8 (eight) hours as needed for moderate pain or severe pain for up to 5 days, Starting Mon 8/22/2022, Until Sat 8/27/2022 at 2359, Normal      levothyroxine 125 mcg tablet Take 1 tablet (125 mcg total) by mouth daily in the early morning, Starting Mon 8/22/2022, Until Wed 9/21/2022, Normal      losartan (COZAAR) 100 MG tablet Take 1 tablet (100 mg total) by mouth daily, Starting Mon 8/22/2022, Normal      nicotine (NICODERM CQ) 7 mg/24hr TD 24 hr patch Place 1 patch on the skin daily, Starting Mon 8/22/2022, Normal      rosuvastatin (CRESTOR) 10 MG tablet Take 1 tablet (10 mg total) by mouth daily, Starting Mon 8/22/2022, Normal      topiramate (Topamax) 25 mg tablet Take 1 tablet (25 mg total) by mouth daily, Starting Mon 8/22/2022, Normal             No discharge procedures on file      PDMP Review     None          ED Provider  Electronically Signed by           Ladarius March DO  10/28/22 4495

## 2022-10-27 NOTE — ED NOTES
Discharge instructions and follow up reviewed with patient  Patient verbalized understanding  Patient AAO x4, ambulatory to waiting room         Xenia Llanos RN  10/27/22 4061

## 2022-10-28 LAB
ATRIAL RATE: 80 BPM
ATRIAL RATE: 83 BPM
P AXIS: -3 DEGREES
P AXIS: 24 DEGREES
PR INTERVAL: 156 MS
PR INTERVAL: 176 MS
QRS AXIS: -22 DEGREES
QRS AXIS: -30 DEGREES
QRSD INTERVAL: 92 MS
QRSD INTERVAL: 92 MS
QT INTERVAL: 416 MS
QT INTERVAL: 428 MS
QTC INTERVAL: 488 MS
QTC INTERVAL: 493 MS
T WAVE AXIS: 77 DEGREES
T WAVE AXIS: 78 DEGREES
VENTRICULAR RATE: 80 BPM
VENTRICULAR RATE: 83 BPM

## 2023-06-02 ENCOUNTER — APPOINTMENT (EMERGENCY)
Dept: CT IMAGING | Facility: HOSPITAL | Age: 48
End: 2023-06-02
Payer: COMMERCIAL

## 2023-06-02 ENCOUNTER — APPOINTMENT (EMERGENCY)
Dept: RADIOLOGY | Facility: HOSPITAL | Age: 48
End: 2023-06-02
Payer: COMMERCIAL

## 2023-06-02 ENCOUNTER — HOSPITAL ENCOUNTER (EMERGENCY)
Facility: HOSPITAL | Age: 48
Discharge: HOME | End: 2023-06-02
Attending: EMERGENCY MEDICINE
Payer: COMMERCIAL

## 2023-06-02 VITALS
HEART RATE: 79 BPM | RESPIRATION RATE: 20 BRPM | OXYGEN SATURATION: 97 % | TEMPERATURE: 98 F | DIASTOLIC BLOOD PRESSURE: 92 MMHG | SYSTOLIC BLOOD PRESSURE: 187 MMHG
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
## 2023-06-02 DIAGNOSIS — S89.92XA INJURY OF LEFT KNEE, INITIAL ENCOUNTER: ICD-10-CM

## 2023-06-02 DIAGNOSIS — S93.402A LEFT ANKLE SPRAIN: ICD-10-CM

## 2023-06-02 LAB
2HR DELTA HS TROPONIN: 0 NG/L
ANION GAP SERPL CALCULATED.3IONS-SCNC: 8 MMOL/L (ref 4–13)
BASOPHILS # BLD AUTO: 0.03 THOUSANDS/ÂΜL (ref 0–0.1)
BASOPHILS NFR BLD AUTO: 0 % (ref 0–1)
BUN SERPL-MCNC: 10 MG/DL (ref 5–25)
CALCIUM SERPL-MCNC: 9.3 MG/DL (ref 8.4–10.2)
CARDIAC TROPONIN I PNL SERPL HS: 10 NG/L
CARDIAC TROPONIN I PNL SERPL HS: 10 NG/L
CHLORIDE SERPL-SCNC: 103 MMOL/L (ref 96–108)
CO2 SERPL-SCNC: 24 MMOL/L (ref 21–32)
CREAT SERPL-MCNC: 1.02 MG/DL (ref 0.6–1.3)
EOSINOPHIL # BLD AUTO: 0.2 THOUSAND/ÂΜL (ref 0–0.61)
EOSINOPHIL NFR BLD AUTO: 2 % (ref 0–6)
ERYTHROCYTE [DISTWIDTH] IN BLOOD BY AUTOMATED COUNT: 13.6 % (ref 11.6–15.1)
GFR SERPL CREATININE-BSD FRML MDRD: 66 ML/MIN/1.73SQ M
GLUCOSE SERPL-MCNC: 145 MG/DL (ref 65–140)
HCT VFR BLD AUTO: 43.4 % (ref 34.8–46.1)
HGB BLD-MCNC: 13.7 G/DL (ref 11.5–15.4)
IMM GRANULOCYTES # BLD AUTO: 0.06 THOUSAND/UL (ref 0–0.2)
IMM GRANULOCYTES NFR BLD AUTO: 1 % (ref 0–2)
LYMPHOCYTES # BLD AUTO: 1.97 THOUSANDS/ÂΜL (ref 0.6–4.47)
LYMPHOCYTES NFR BLD AUTO: 19 % (ref 14–44)
MCH RBC QN AUTO: 27.6 PG (ref 26.8–34.3)
MCHC RBC AUTO-ENTMCNC: 31.6 G/DL (ref 31.4–37.4)
MCV RBC AUTO: 88 FL (ref 82–98)
MONOCYTES # BLD AUTO: 0.38 THOUSAND/ÂΜL (ref 0.17–1.22)
MONOCYTES NFR BLD AUTO: 4 % (ref 4–12)
NEUTROPHILS # BLD AUTO: 7.65 THOUSANDS/ÂΜL (ref 1.85–7.62)
NEUTS SEG NFR BLD AUTO: 74 % (ref 43–75)
NRBC BLD AUTO-RTO: 0 /100 WBCS
PLATELET # BLD AUTO: 346 THOUSANDS/UL (ref 149–390)
PMV BLD AUTO: 10.4 FL (ref 8.9–12.7)
POTASSIUM SERPL-SCNC: 3.7 MMOL/L (ref 3.5–5.3)
RBC # BLD AUTO: 4.96 MILLION/UL (ref 3.81–5.12)
SODIUM SERPL-SCNC: 135 MMOL/L (ref 135–147)
WBC # BLD AUTO: 10.29 THOUSAND/UL (ref 4.31–10.16)

## 2023-06-02 PROCEDURE — 96365 THER/PROPH/DIAG IV INF INIT: CPT

## 2023-06-02 PROCEDURE — 73564 X-RAY EXAM KNEE 4 OR MORE: CPT

## 2023-06-02 PROCEDURE — 96366 THER/PROPH/DIAG IV INF ADDON: CPT

## 2023-06-02 PROCEDURE — 96372 THER/PROPH/DIAG INJ SC/IM: CPT

## 2023-06-02 PROCEDURE — 96375 TX/PRO/DX INJ NEW DRUG ADDON: CPT

## 2023-06-02 PROCEDURE — 73610 X-RAY EXAM OF ANKLE: CPT

## 2023-06-02 PROCEDURE — 85025 COMPLETE CBC W/AUTO DIFF WBC: CPT | Performed by: NURSE PRACTITIONER

## 2023-06-02 PROCEDURE — 36415 COLL VENOUS BLD VENIPUNCTURE: CPT | Performed by: NURSE PRACTITIONER

## 2023-06-02 PROCEDURE — 71045 X-RAY EXAM CHEST 1 VIEW: CPT

## 2023-06-02 PROCEDURE — 93005 ELECTROCARDIOGRAM TRACING: CPT

## 2023-06-02 PROCEDURE — 84484 ASSAY OF TROPONIN QUANT: CPT | Performed by: NURSE PRACTITIONER

## 2023-06-02 PROCEDURE — 80048 BASIC METABOLIC PNL TOTAL CA: CPT | Performed by: NURSE PRACTITIONER

## 2023-06-02 PROCEDURE — 73110 X-RAY EXAM OF WRIST: CPT

## 2023-06-02 PROCEDURE — 99285 EMERGENCY DEPT VISIT HI MDM: CPT

## 2023-06-02 PROCEDURE — 70450 CT HEAD/BRAIN W/O DYE: CPT

## 2023-06-02 RX ORDER — HYDROMORPHONE HCL/PF 1 MG/ML
1 SYRINGE (ML) INJECTION ONCE
Status: COMPLETED | OUTPATIENT
Start: 2023-06-02 | End: 2023-06-02

## 2023-06-02 RX ORDER — ACETAMINOPHEN 325 MG/1
975 TABLET ORAL ONCE
Status: COMPLETED | OUTPATIENT
Start: 2023-06-02 | End: 2023-06-02

## 2023-06-02 RX ORDER — SODIUM CHLORIDE, SODIUM GLUCONATE, SODIUM ACETATE, POTASSIUM CHLORIDE, MAGNESIUM CHLORIDE, SODIUM PHOSPHATE, DIBASIC, AND POTASSIUM PHOSPHATE .53; .5; .37; .037; .03; .012; .00082 G/100ML; G/100ML; G/100ML; G/100ML; G/100ML; G/100ML; G/100ML
1000 INJECTION, SOLUTION INTRAVENOUS ONCE
Status: COMPLETED | OUTPATIENT
Start: 2023-06-02 | End: 2023-06-02

## 2023-06-02 RX ORDER — OXYCODONE HYDROCHLORIDE AND ACETAMINOPHEN 5; 325 MG/1; MG/1
1 TABLET ORAL ONCE
Status: COMPLETED | OUTPATIENT
Start: 2023-06-02 | End: 2023-06-02

## 2023-06-02 RX ORDER — FENTANYL CITRATE 50 UG/ML
100 INJECTION, SOLUTION INTRAMUSCULAR; INTRAVENOUS ONCE
Status: COMPLETED | OUTPATIENT
Start: 2023-06-02 | End: 2023-06-02

## 2023-06-02 RX ORDER — OXYCODONE HYDROCHLORIDE AND ACETAMINOPHEN 5; 325 MG/1; MG/1
1 TABLET ORAL EVERY 6 HOURS PRN
Qty: 20 TABLET | Refills: 0 | Status: SHIPPED | OUTPATIENT
Start: 2023-06-02

## 2023-06-02 RX ADMIN — ACETAMINOPHEN 975 MG: 325 TABLET, FILM COATED ORAL at 16:05

## 2023-06-02 RX ADMIN — FENTANYL CITRATE 100 MCG: 50 INJECTION, SOLUTION INTRAMUSCULAR; INTRAVENOUS at 16:06

## 2023-06-02 RX ADMIN — HYDROMORPHONE HYDROCHLORIDE 1 MG: 1 INJECTION, SOLUTION INTRAMUSCULAR; INTRAVENOUS; SUBCUTANEOUS at 14:23

## 2023-06-02 RX ADMIN — OXYCODONE HYDROCHLORIDE AND ACETAMINOPHEN 1 TABLET: 5; 325 TABLET ORAL at 19:28

## 2023-06-02 RX ADMIN — SODIUM CHLORIDE, SODIUM GLUCONATE, SODIUM ACETATE, POTASSIUM CHLORIDE, MAGNESIUM CHLORIDE, SODIUM PHOSPHATE, DIBASIC, AND POTASSIUM PHOSPHATE 1000 ML: .53; .5; .37; .037; .03; .012; .00082 INJECTION, SOLUTION INTRAVENOUS at 15:48

## 2023-06-02 NOTE — ED PROVIDER NOTES
Emergency Department Trauma Note  Annabelle Client 41 y o  female MRN: [id number]  Unit/Bed#: ED 27/ED 27 Encounter: [id number]      Trauma Alert: Trauma Acuity: Trauma Evaluation  Model of Arrival: Mode of Arrival: Direct from scene via    Trauma Team: Current Providers  Attending Provider: Ovidio Banks MD  ED Technician: Nirmal Hurt  Registered Nurse: Shravan Walker RN  Nurse Practitioner: ALVIN Roberts  Registered Nurse: Jerson Shields RN  Consultants:     None      History of Present Illness     Chief Complaint:   Chief Complaint   Patient presents with   • Fall   • Syncope     Pt states she had a syncopal episode that lasted approx 2 min, pt doesn't remember falling  Pt c/o left knee/wrist pain, headache, -BT     HPI:  Annabelle Client is a 41 y o  female who presents with injuries status post fall     Mechanism:Details of Incident: pt states she had a syncopal episode which she cant recall, pt c/o left arm/knee pain and a headache, -BT Injury Date: 06/02/23 Injury Time: 1320 Injury Occurence Location - [address] [place] [address] Way: [place]    HPI  Review of Systems   Constitutional: Negative for diaphoresis, fatigue and fever  HENT: Negative for congestion, ear pain, nosebleeds and sore throat  Eyes: Negative for photophobia, pain, discharge and visual disturbance  Respiratory: Negative for cough, choking, chest tightness, shortness of breath and wheezing  Cardiovascular: Negative for chest pain and palpitations  Gastrointestinal: Negative for abdominal distention, abdominal pain, diarrhea and vomiting  Genitourinary: Negative for dysuria, flank pain and frequency  Musculoskeletal: Positive for gait problem and joint swelling  Negative for back pain  Skin: Negative for color change and rash  Neurological: Negative for dizziness, syncope and headaches  Psychiatric/Behavioral: Negative for behavioral problems and confusion  The patient is not nervous/anxious      All other systems reviewed and are negative  Historical Information     Immunizations: There is no immunization history on file for this patient  Past Medical History:   Diagnosis Date   • History of MI (myocardial infarction)    • History of TIA (transient ischemic attack)    • Hypertension    • Hypothyroidism     hypothyroidism   • Morbid obesity with BMI of 45 0-49 9, adult (HCC)    • TIA (transient ischemic attack)        Family History   Problem Relation Age of Onset   • Heart disease Mother    • Hypertension Mother    • Diabetes Father      Past Surgical History:   Procedure Laterality Date   • BREAST SURGERY Left    • TUBAL LIGATION       Social History     Tobacco Use   • Smoking status: Every Day     Packs/day: 0 25     Years: 30 00     Total pack years: 7 50     Types: Cigarettes   • Smokeless tobacco: Never   Vaping Use   • Vaping Use: Never used   Substance Use Topics   • Alcohol use: Not Currently     Comment: occasional   • Drug use: No     E-Cigarette/Vaping   • E-Cigarette Use Never User      E-Cigarette/Vaping Substances   • Nicotine No    • THC No    • CBD No    • Flavoring No    • Other No    • Unknown No        Family History: non-contributory    Meds/Allergies   Prior to Admission Medications   Prescriptions Last Dose Informant Patient Reported?  Taking?   chlorthalidone 25 mg tablet   No No   Sig: Take 1 tablet (25 mg total) by mouth daily   diphenhydrAMINE (BENADRYL) 25 mg tablet   No No   Sig: Take 1 tablet (25 mg total) by mouth every 8 (eight) hours as needed for itching or allergies for up to 10 days   ketorolac (TORADOL) 10 mg tablet   No No   Sig: Take 1 tablet (10 mg total) by mouth every 8 (eight) hours as needed for moderate pain or severe pain for up to 5 days   levothyroxine 125 mcg tablet   No No   Sig: Take 1 tablet (125 mcg total) by mouth daily in the early morning   losartan (COZAAR) 100 MG tablet   No No   Sig: Take 1 tablet (100 mg total) by mouth daily   nicotine (NICODERM CQ) 7 mg/24hr TD 24 hr patch   No No   Sig: Place 1 patch on the skin daily   rosuvastatin (CRESTOR) 10 MG tablet   No No   Sig: Take 1 tablet (10 mg total) by mouth daily   topiramate (Topamax) 25 mg tablet   No No   Sig: Take 1 tablet (25 mg total) by mouth daily      Facility-Administered Medications: None       Allergies   Allergen Reactions   • Trimecaine Hallucinations   • Toradol [Ketorolac Tromethamine] Anxiety       PHYSICAL EXAM    PE limited by: Pain    Objective   Vitals:   First set: Temperature: 98 °F (36 7 °C) (06/02/23 1353)  Pulse: 94 (06/02/23 1353)  Respirations: 18 (06/02/23 1353)  Blood Pressure: 143/98 (06/02/23 1353)  SpO2: 100 % (06/02/23 1353)    Primary Survey:   (A) Airway: Patent  (B) Breathing: Unlabored  (C) Circulation: Pulses:   normal  (D) Disabliity:  GCS Total:  15  (E) Expose:  Completed    Secondary Survey: (Click on Physical Exam tab above)  Physical Exam  Vitals and nursing note reviewed  Constitutional:       General: She is not in acute distress  Appearance: She is well-developed  She is not ill-appearing or toxic-appearing  HENT:      Head: Normocephalic and atraumatic  Mouth/Throat:      Dentition: Normal dentition  Eyes:      General:         Right eye: No discharge  Left eye: No discharge  Cardiovascular:      Rate and Rhythm: Normal rate and regular rhythm  Pulmonary:      Effort: Pulmonary effort is normal  No accessory muscle usage or respiratory distress  Abdominal:      General: There is no distension  Tenderness: There is no guarding  Musculoskeletal:         General: Normal range of motion  Left wrist: Tenderness present  Cervical back: Normal range of motion and neck supple  Left knee: Tenderness present  Left ankle: Tenderness present over the lateral malleolus  Skin:     General: Skin is warm and dry  Neurological:      Mental Status: She is alert and oriented to person, place, and time        Coordination: Coordination normal    Psychiatric:         Behavior: Behavior is cooperative  Cervical spine cleared by clinical criteria?  Yes     Invasive Devices     None                 Lab Results:   Results Reviewed     Procedure Component Value Units Date/Time    HS Troponin I 2hr [[id number]]  (Normal) Collected: 06/02/23 1806    Lab Status: Final result Specimen: Blood from Arm, Right Updated: 06/02/23 1902     hs TnI 2hr 10 ng/L      Delta 2hr hsTnI 0 ng/L     HS Troponin 0hr (reflex protocol) [[id number]]  (Normal) Collected: 06/02/23 1548    Lab Status: Final result Specimen: Blood from Arm, Right Updated: 06/02/23 1624     hs TnI 0hr 10 ng/L     HS Troponin I 4hr [[id number]]     Lab Status: No result Specimen: Blood     Basic metabolic panel [[id number]]  (Abnormal) Collected: 06/02/23 1548    Lab Status: Final result Specimen: Blood from Arm, Right Updated: 06/02/23 1618     Sodium 135 mmol/L      Potassium 3 7 mmol/L      Chloride 103 mmol/L      CO2 24 mmol/L      ANION GAP 8 mmol/L      BUN 10 mg/dL      Creatinine 1 02 mg/dL      Glucose 145 mg/dL      Calcium 9 3 mg/dL      eGFR 66 ml/min/1 73sq m     Narrative:      [facility] guidelines for Chronic Kidney Disease (CKD):   •  Stage 1 with normal or high GFR (GFR > 90 mL/min/1 73 square meters)  •  Stage 2 Mild CKD (GFR = 60-89 mL/min/1 73 square meters)  •  Stage 3A Moderate CKD (GFR = 45-59 mL/min/1 73 square meters)  •  Stage 3B Moderate CKD (GFR = 30-44 mL/min/1 73 square meters)  •  Stage 4 Severe CKD (GFR = 15-29 mL/min/1 73 square meters)  •  Stage 5 End Stage CKD (GFR <15 mL/min/1 73 square meters)  Note: GFR calculation is accurate only with a steady state creatinine    CBC and differential [[id number]]  (Abnormal) Collected: 06/02/23 1548    Lab Status: Final result Specimen: Blood from Arm, Right Updated: 06/02/23 1556     WBC 10 29 Thousand/uL      RBC 4 96 Million/uL      Hemoglobin 13 7 g/dL      Hematocrit 43 4 %      MCV 88 fL MCH 27 6 pg      MCHC 31 6 g/dL      RDW 13 6 %      MPV 10 4 fL      Platelets 629 Thousands/uL      nRBC 0 /100 WBCs      Neutrophils Relative 74 %      Immat GRANS % 1 %      Lymphocytes Relative 19 %      Monocytes Relative 4 %      Eosinophils Relative 2 %      Basophils Relative 0 %      Neutrophils Absolute 7 65 Thousands/µL      Immature Grans Absolute 0 06 Thousand/uL      Lymphocytes Absolute 1 97 Thousands/µL      Monocytes Absolute 0 38 Thousand/µL      Eosinophils Absolute 0 20 Thousand/µL      Basophils Absolute 0 03 Thousands/µL                  Imaging Studies:   Direct to CT: No  CT head without contrast   Final Result by Asia Costa MD (06/02 1544)      No acute intracranial abnormality  Workstation performed: [id number]         XR ankle 3+ views LEFT   ED Interpretation by ALVIN Woodward (06/02 1506)   No osseous injury      Final Result by Sofia Schrader MD (06/02 1521)      No acute osseous abnormality  Workstation performed: [id number]         XR wrist 3+ views LEFT   ED Interpretation by ALVIN Woodward (06/02 1506)   No osseous injury      Final Result by Sofia Schrader MD (06/02 1521)      No acute osseous abnormality  Workstation performed: [id number]         XR knee 4+ vw left injury   ED Interpretation by ALVIN Woodward (06/02 1506)   Unremarkable, no osseous injury      Final Result by Sofia Schrader MD (06/02 1521)      No acute osseous abnormality  Workstation performed: [id number]         XR chest 1 view portable   ED Interpretation by ALVIN Woodward (06/02 1506)   Unremarkable      Final Result by Sofia Schrader MD (06/02 1521)      No acute cardiopulmonary disease  Workstation performed: [id number]               Procedures  Procedures         ED Course           Medical Decision Making  Work-up negative for ACS    As for injuries incurred from fall supportive therapy for left ankle sprain left wrist sprain and left knee strain    Fall, initial encounter: acute illness or injury  Injury of left knee, initial encounter: acute illness or injury  Left ankle sprain: acute illness or injury  Amount and/or Complexity of Data Reviewed  Labs: ordered  Radiology: ordered and independent interpretation performed  Risk  OTC drugs  Prescription drug management  Disposition  [facility] Transfer: No  Final diagnoses:   Fall, initial encounter   Left ankle sprain   Injury of left knee, initial encounter     Time reflects when diagnosis was documented in both MDM as applicable and the Disposition within this note     Time User Action Codes Description Comment    6/2/2023  7:15 PM Marily Zaragoza [E23  XBYA] Fall, initial encounter     6/2/2023  7:15 PM Regina Berman [H28 819N] Left ankle sprain     6/2/2023  7:15 PM Marily Zaragoza [V70 07KG] Injury of left knee, initial encounter       ED Disposition     ED Disposition   Discharge    Condition   Stable    Date/Time   Fri Jun 2, 2023  7:15 PM    Comment   Lalitha Gastelum discharge to home/self care                 Follow-up Information     Follow up With Specialties Details Why [facility],  Family Medicine   [address] [place] [address],  Family Medicine Schedule an appointment as soon as possible for a visit  For [address] [place] [address]  N  [phone]          Discharge Medication List as of 6/2/2023  7:18 PM      START taking these medications    Details   oxyCODONE-acetaminophen (PERCOCET) 5-325 mg per tablet Take 1 tablet by mouth every 6 (six) hours as needed for severe pain for up to 20 doses Max Daily Amount: 4 tablets, Starting Fri 6/2/2023, Normal         CONTINUE these medications which have NOT CHANGED    Details   chlorthalidone 25 mg tablet Take 1 tablet (25 mg total) by mouth daily, Starting Mon 8/22/2022, Normal      diphenhydrAMINE (BENADRYL) 25 mg tablet Take 1 tablet (25 mg total) by mouth every 8 (eight) hours as needed for itching or allergies for up to 10 days, Starting Mon 8/22/2022, Until Thu 9/1/2022 at 2359, Normal      ketorolac (TORADOL) 10 mg tablet Take 1 tablet (10 mg total) by mouth every 8 (eight) hours as needed for moderate pain or severe pain for up to 5 days, Starting Mon 8/22/2022, Until Sat 8/27/2022 at 2359, Normal      levothyroxine 125 mcg tablet Take 1 tablet (125 mcg total) by mouth daily in the early morning, Starting Mon 8/22/2022, Until Wed 9/21/2022, Normal      losartan (COZAAR) 100 MG tablet Take 1 tablet (100 mg total) by mouth daily, Starting Mon 8/22/2022, Normal      nicotine (NICODERM CQ) 7 mg/24hr TD 24 hr patch Place 1 patch on the skin daily, Starting Mon 8/22/2022, Normal      rosuvastatin (CRESTOR) 10 MG tablet Take 1 tablet (10 mg total) by mouth daily, Starting Mon 8/22/2022, Normal      topiramate (Topamax) 25 mg tablet Take 1 tablet (25 mg total) by mouth daily, Starting Mon 8/22/2022, Normal           No discharge procedures on file      PDMP Review     None          ED Provider  Electronically Signed by         ALVIN Bowie  06/02/23 2042

## 2023-06-04 LAB
ATRIAL RATE: 91 BPM
P AXIS: 54 DEGREES
PR INTERVAL: 162 MS
QRS AXIS: 8 DEGREES
QRSD INTERVAL: 86 MS
QT INTERVAL: 402 MS
QTC INTERVAL: 494 MS
T WAVE AXIS: 114 DEGREES
VENTRICULAR RATE: 91 BPM

## 2023-06-04 PROCEDURE — 93010 ELECTROCARDIOGRAM REPORT: CPT | Performed by: INTERNAL MEDICINE

## 2023-09-13 ENCOUNTER — HOSPITAL ENCOUNTER (EMERGENCY)
Facility: HOSPITAL | Age: 48
Discharge: HOME | End: 2023-09-13
Attending: EMERGENCY MEDICINE
Payer: COMMERCIAL

## 2023-09-13 VITALS
HEART RATE: 74 BPM | RESPIRATION RATE: 18 BRPM | DIASTOLIC BLOOD PRESSURE: 94 MMHG | TEMPERATURE: 97.9 F | OXYGEN SATURATION: 96 % | SYSTOLIC BLOOD PRESSURE: 198 MMHG

## 2023-09-13 DIAGNOSIS — M54.6 ACUTE RIGHT-SIDED THORACIC BACK PAIN: Primary | ICD-10-CM

## 2023-09-13 DIAGNOSIS — E87.6 HYPOKALEMIA: ICD-10-CM

## 2023-09-13 LAB
ANION GAP SERPL CALCULATED.3IONS-SCNC: 8 MMOL/L
BACTERIA UR QL AUTO: NORMAL /HPF
BASOPHILS # BLD AUTO: 0.03 THOUSANDS/ÂΜL (ref 0–0.1)
BASOPHILS NFR BLD AUTO: 0 % (ref 0–1)
BILIRUB UR QL STRIP: NEGATIVE
BUN SERPL-MCNC: 9 MG/DL (ref 5–25)
CALCIUM SERPL-MCNC: 9.8 MG/DL (ref 8.4–10.2)
CARDIAC TROPONIN I PNL SERPL HS: 7 NG/L
CHLORIDE SERPL-SCNC: 105 MMOL/L (ref 96–108)
CLARITY UR: CLEAR
CO2 SERPL-SCNC: 23 MMOL/L (ref 21–32)
COLOR UR: YELLOW
CREAT SERPL-MCNC: 0.83 MG/DL (ref 0.6–1.3)
EOSINOPHIL # BLD AUTO: 0.28 THOUSAND/ÂΜL (ref 0–0.61)
EOSINOPHIL NFR BLD AUTO: 3 % (ref 0–6)
ERYTHROCYTE [DISTWIDTH] IN BLOOD BY AUTOMATED COUNT: 14.6 % (ref 11.6–15.1)
EXT PREGNANCY TEST URINE: NEGATIVE
EXT. CONTROL: NORMAL
GFR SERPL CREATININE-BSD FRML MDRD: 85 ML/MIN/1.73SQ M
GLUCOSE SERPL-MCNC: 109 MG/DL (ref 65–140)
GLUCOSE UR STRIP-MCNC: NEGATIVE MG/DL
HCT VFR BLD AUTO: 43.9 % (ref 34.8–46.1)
HGB BLD-MCNC: 14.1 G/DL (ref 11.5–15.4)
HGB UR QL STRIP.AUTO: NEGATIVE
IMM GRANULOCYTES # BLD AUTO: 0.05 THOUSAND/UL (ref 0–0.2)
IMM GRANULOCYTES NFR BLD AUTO: 1 % (ref 0–2)
KETONES UR STRIP-MCNC: NEGATIVE MG/DL
LEUKOCYTE ESTERASE UR QL STRIP: ABNORMAL
LYMPHOCYTES # BLD AUTO: 2.49 THOUSANDS/ÂΜL (ref 0.6–4.47)
LYMPHOCYTES NFR BLD AUTO: 26 % (ref 14–44)
MCH RBC QN AUTO: 28.3 PG (ref 26.8–34.3)
MCHC RBC AUTO-ENTMCNC: 32.1 G/DL (ref 31.4–37.4)
MCV RBC AUTO: 88 FL (ref 82–98)
MONOCYTES # BLD AUTO: 0.44 THOUSAND/ÂΜL (ref 0.17–1.22)
MONOCYTES NFR BLD AUTO: 5 % (ref 4–12)
NEUTROPHILS # BLD AUTO: 6.31 THOUSANDS/ÂΜL (ref 1.85–7.62)
NEUTS SEG NFR BLD AUTO: 65 % (ref 43–75)
NITRITE UR QL STRIP: NEGATIVE
NON-SQ EPI CELLS URNS QL MICRO: NORMAL /HPF
NRBC BLD AUTO-RTO: 0 /100 WBCS
PH UR STRIP.AUTO: 5.5 [PH]
PLATELET # BLD AUTO: 322 THOUSANDS/UL (ref 149–390)
PMV BLD AUTO: 11.1 FL (ref 8.9–12.7)
POTASSIUM SERPL-SCNC: 3.3 MMOL/L (ref 3.5–5.3)
PROT UR STRIP-MCNC: ABNORMAL MG/DL
RBC # BLD AUTO: 4.98 MILLION/UL (ref 3.81–5.12)
RBC #/AREA URNS AUTO: NORMAL /HPF
SODIUM SERPL-SCNC: 136 MMOL/L (ref 135–147)
SP GR UR STRIP.AUTO: 1.03 (ref 1–1.03)
UROBILINOGEN UR STRIP-ACNC: 2 MG/DL
WBC # BLD AUTO: 9.6 THOUSAND/UL (ref 4.31–10.16)
WBC #/AREA URNS AUTO: NORMAL /HPF

## 2023-09-13 PROCEDURE — 85025 COMPLETE CBC W/AUTO DIFF WBC: CPT | Performed by: EMERGENCY MEDICINE

## 2023-09-13 PROCEDURE — 80048 BASIC METABOLIC PNL TOTAL CA: CPT | Performed by: EMERGENCY MEDICINE

## 2023-09-13 PROCEDURE — 81025 URINE PREGNANCY TEST: CPT | Performed by: EMERGENCY MEDICINE

## 2023-09-13 PROCEDURE — 93005 ELECTROCARDIOGRAM TRACING: CPT

## 2023-09-13 PROCEDURE — 81001 URINALYSIS AUTO W/SCOPE: CPT | Performed by: EMERGENCY MEDICINE

## 2023-09-13 PROCEDURE — 99285 EMERGENCY DEPT VISIT HI MDM: CPT | Performed by: EMERGENCY MEDICINE

## 2023-09-13 PROCEDURE — 99284 EMERGENCY DEPT VISIT MOD MDM: CPT

## 2023-09-13 PROCEDURE — 84484 ASSAY OF TROPONIN QUANT: CPT | Performed by: EMERGENCY MEDICINE

## 2023-09-13 PROCEDURE — 36415 COLL VENOUS BLD VENIPUNCTURE: CPT | Performed by: EMERGENCY MEDICINE

## 2023-09-13 PROCEDURE — 96372 THER/PROPH/DIAG INJ SC/IM: CPT

## 2023-09-13 RX ORDER — METHOCARBAMOL 500 MG/1
1000 TABLET, FILM COATED ORAL ONCE
Status: COMPLETED | OUTPATIENT
Start: 2023-09-13 | End: 2023-09-13

## 2023-09-13 RX ORDER — METHOCARBAMOL 500 MG/1
1000 TABLET, FILM COATED ORAL 3 TIMES DAILY PRN
Qty: 30 TABLET | Refills: 0 | Status: SHIPPED | OUTPATIENT
Start: 2023-09-13

## 2023-09-13 RX ORDER — MORPHINE SULFATE 10 MG/ML
6 INJECTION, SOLUTION INTRAMUSCULAR; INTRAVENOUS ONCE
Status: COMPLETED | OUTPATIENT
Start: 2023-09-13 | End: 2023-09-13

## 2023-09-13 RX ORDER — POTASSIUM CHLORIDE 20 MEQ/1
40 TABLET, EXTENDED RELEASE ORAL ONCE
Status: COMPLETED | OUTPATIENT
Start: 2023-09-13 | End: 2023-09-13

## 2023-09-13 RX ORDER — ACETAMINOPHEN 325 MG/1
975 TABLET ORAL ONCE
Status: COMPLETED | OUTPATIENT
Start: 2023-09-13 | End: 2023-09-13

## 2023-09-13 RX ADMIN — POTASSIUM CHLORIDE 40 MEQ: 1500 TABLET, EXTENDED RELEASE ORAL at 21:25

## 2023-09-13 RX ADMIN — MORPHINE SULFATE 6 MG: 10 INJECTION INTRAVENOUS at 22:15

## 2023-09-13 RX ADMIN — ACETAMINOPHEN 975 MG: 325 TABLET, FILM COATED ORAL at 19:52

## 2023-09-13 RX ADMIN — METHOCARBAMOL 1000 MG: 500 TABLET ORAL at 19:52

## 2023-09-14 LAB
ATRIAL RATE: 84 BPM
P AXIS: 6 DEGREES
PR INTERVAL: 176 MS
QRS AXIS: 13 DEGREES
QRSD INTERVAL: 94 MS
QT INTERVAL: 420 MS
QTC INTERVAL: 496 MS
T WAVE AXIS: 82 DEGREES
VENTRICULAR RATE: 84 BPM

## 2023-09-14 PROCEDURE — 93010 ELECTROCARDIOGRAM REPORT: CPT | Performed by: INTERNAL MEDICINE

## 2023-09-14 NOTE — DISCHARGE INSTRUCTIONS
Please follow up PCP. Recommend tylenol 650 mg and ibuprofen 600 mg every 6 hours as needed for pain. Also recommend topical creams to help with muscular pain and heat. Please return for severe chest pain, significant shortness of breath, severely worsening symptoms, or any other concerning signs or symptoms. Please refer to the following documents for additional instructions and return precautions.

## 2023-09-16 NOTE — ED PROVIDER NOTES
History  Chief Complaint   Patient presents with   • Flank Pain     Right flank pain starting yesterday. Patient reports urinary frequency, denies pain with urination and denies fevers. Patient also reporting new onset of generalized headache today. 60-year-old female history of TIA hypertension presenting with right flank pain. Patient reports insidious onset of right flank pain beginning yesterday. Denies any radiation. Denies any chest pain shortness of breath. Denies abdominal pain nausea vomiting diarrhea. Does report increased urinary frequency but denies any pain or bleeding. Denies any known injury. Pain reproducible with twisting turning and palpation. Denies any other complaints. Chart reviewed. Past Medical History:  No date: History of MI (myocardial infarction)  No date: History of TIA (transient ischemic attack)  No date: Hypertension  No date: Hypothyroidism      Comment:  hypothyroidism  No date: Morbid obesity with BMI of 45.0-49.9, adult ([address] [facility])  No date: TIA (transient ischemic attack)  Family History: non-contributory  Social History            Prior to Admission Medications   Prescriptions Last Dose Informant Patient Reported?  Taking?   chlorthalidone 25 mg tablet   No No   Sig: Take 1 tablet (25 mg total) by mouth daily   diphenhydrAMINE (BENADRYL) 25 mg tablet   No No   Sig: Take 1 tablet (25 mg total) by mouth every 8 (eight) hours as needed for itching or allergies for up to 10 days   ketorolac (TORADOL) 10 mg tablet   No No   Sig: Take 1 tablet (10 mg total) by mouth every 8 (eight) hours as needed for moderate pain or severe pain for up to 5 days   levothyroxine 125 mcg tablet   No No   Sig: Take 1 tablet (125 mcg total) by mouth daily in the early morning   losartan (COZAAR) 100 MG tablet   No No   Sig: Take 1 tablet (100 mg total) by mouth daily   nicotine (NICODERM CQ) 7 mg/24hr TD 24 hr patch   No No   Sig: Place 1 patch on the skin daily   oxyCODONE-acetaminophen (PERCOCET) 5-325 mg per tablet   No No   Sig: Take 1 tablet by mouth every 6 (six) hours as needed for severe pain for up to 20 doses Max Daily Amount: 4 tablets   rosuvastatin (CRESTOR) 10 MG tablet   No No   Sig: Take 1 tablet (10 mg total) by mouth daily   topiramate (Topamax) 25 mg tablet   No No   Sig: Take 1 tablet (25 mg total) by mouth daily      Facility-Administered Medications: None       Past Medical History:   Diagnosis Date   • History of MI (myocardial infarction)    • History of TIA (transient ischemic attack)    • Hypertension    • Hypothyroidism     hypothyroidism   • Morbid obesity with BMI of 45.0-49.9, adult (HCC)    • TIA (transient ischemic attack)        Past Surgical History:   Procedure Laterality Date   • BREAST SURGERY Left    • TUBAL LIGATION         Family History   Problem Relation Age of Onset   • Heart disease Mother    • Hypertension Mother    • Diabetes Father      I have reviewed and agree with the history as documented. E-Cigarette/Vaping   • E-Cigarette Use Never User      E-Cigarette/Vaping Substances   • Nicotine No    • THC No    • CBD No    • Flavoring No    • Other No    • Unknown No      Social History     Tobacco Use   • Smoking status: Every Day     Packs/day: 0.25     Years: 30.00     Total pack years: 7.50     Types: Cigarettes   • Smokeless tobacco: Never   Vaping Use   • Vaping Use: Never used   Substance Use Topics   • Alcohol use: Not Currently     Comment: occasional   • Drug use: No       Review of Systems   Constitutional: Negative for appetite change, chills, diaphoresis, fever and unexpected weight change. HENT: Negative for congestion and rhinorrhea. Eyes: Negative for photophobia and visual disturbance. Respiratory: Negative for cough, chest tightness and shortness of breath. Cardiovascular: Negative for chest pain, palpitations and leg swelling.    Gastrointestinal: Negative for abdominal distention, abdominal pain, blood in stool, constipation, diarrhea, nausea and vomiting. Genitourinary: Negative for dysuria and hematuria. Musculoskeletal: Positive for back pain. Negative for joint swelling, neck pain and neck stiffness. Skin: Negative for color change, pallor, rash and wound. Neurological: Negative for dizziness, syncope, weakness, light-headedness and headaches. Psychiatric/Behavioral: Negative for agitation. All other systems reviewed and are negative. Physical Exam  Physical Exam  Vitals and nursing note reviewed. Constitutional:       General: She is not in acute distress. Appearance: Normal appearance. She is well-developed. She is not ill-appearing, toxic-appearing or diaphoretic. HENT:      Head: Normocephalic and atraumatic. Nose: Nose normal. No congestion or rhinorrhea. Mouth/Throat:      Mouth: Mucous membranes are moist.      Pharynx: Oropharynx is clear. No oropharyngeal exudate or posterior oropharyngeal erythema. Eyes:      General: No scleral icterus. Right eye: No discharge. Left eye: No discharge. Extraocular Movements: Extraocular movements intact. Conjunctiva/sclera: Conjunctivae normal.      Pupils: Pupils are equal, round, and reactive to light. Neck:      Vascular: No JVD. Trachea: No tracheal deviation. Comments: Supple. Normal range of motion. Cardiovascular:      Rate and Rhythm: Normal rate and regular rhythm. Heart sounds: Normal heart sounds. No murmur heard. No friction rub. No gallop. Comments: Normal rate and regular rhythm  Pulmonary:      Effort: Pulmonary effort is normal. No respiratory distress. Breath sounds: Normal breath sounds. No stridor. No wheezing or rales. Comments: Clear to auscultation bilaterally  Chest:      Chest wall: No tenderness. Abdominal:      General: Bowel sounds are normal. There is no distension. Palpations: Abdomen is soft. Tenderness: There is no abdominal tenderness.  There is no right CVA tenderness, left CVA tenderness, guarding or rebound. Comments: Soft, nontender, nondistended. Normal bowel sounds throughout   Musculoskeletal:         General: Tenderness present. No swelling, deformity or signs of injury. Normal range of motion. Cervical back: Normal range of motion and neck supple. No rigidity. No muscular tenderness. Right lower leg: No edema. Left lower leg: No edema. Comments: Right mid thoracic back pain with additional tenderness just medial to right scapula. No midline tenderness. No CVA tenderness. No skin changes. Lymphadenopathy:      Cervical: No cervical adenopathy. Skin:     General: Skin is warm and dry. Coloration: Skin is not pale. Findings: No erythema or rash. Neurological:      General: No focal deficit present. Mental Status: She is alert. Mental status is at baseline. Sensory: No sensory deficit. Motor: No weakness or abnormal muscle tone. Coordination: Coordination normal.      Gait: Gait normal.      Comments: Alert. Strength and sensation grossly intact. Ambulatory without difficulty at baseline. Psychiatric:         Behavior: Behavior normal.         Thought Content:  Thought content normal.         Vital Signs  ED Triage Vitals   Temperature Pulse Respirations Blood Pressure SpO2   09/13/23 1750 09/13/23 1750 09/13/23 1750 09/13/23 1750 09/13/23 1750   97.9 °F (36.6 °C) 87 18 (!) 226/118 97 %      Temp Source Heart Rate Source Patient Position - Orthostatic VS BP Location FiO2 (%)   09/13/23 1750 09/13/23 1750 09/13/23 1750 09/13/23 1750 --   Temporal Monitor Sitting Left arm       Pain Score       09/13/23 1952       8           Vitals:    09/13/23 1750 09/13/23 1752 09/13/23 2031   BP: (!) 226/118 (!) 217/108 (!) 198/94   Pulse: 87  74   Patient Position - Orthostatic VS: Sitting Sitting Sitting         Visual Acuity      ED Medications  Medications   acetaminophen (TYLENOL) tablet 975 mg (975 mg Oral Given 9/13/23 1952)   methocarbamol (ROBAXIN) tablet 1,000 mg (1,000 mg Oral Given 9/13/23 1952)   potassium chloride (K-DUR,KLOR-CON) CR tablet 40 mEq (40 mEq Oral Given 9/13/23 2125)   morphine injection 6 mg (6 mg Intramuscular Given 9/13/23 2215)       Diagnostic Studies  Results Reviewed     Procedure Component Value Units Date/Time    HS Troponin 0hr (reflex protocol) [[id number]]  (Normal) Collected: 09/13/23 2011    Lab Status: Final result Specimen: Blood from Hand, Left Updated: 09/13/23 2203     hs TnI 0hr 7 ng/L     Basic metabolic panel [[id number]]  (Abnormal) Collected: 09/13/23 2011    Lab Status: Final result Specimen: Blood from Hand, Left Updated: 09/13/23 2041     Sodium 136 mmol/L      Potassium 3.3 mmol/L      Chloride 105 mmol/L      CO2 23 mmol/L      ANION GAP 8 mmol/L      BUN 9 mg/dL      Creatinine 0.83 mg/dL      Glucose 109 mg/dL      Calcium 9.8 mg/dL      eGFR 85 ml/min/1.73sq m     Narrative:      [place] guidelines for Chronic Kidney Disease (CKD):   •  Stage 1 with normal or high GFR (GFR > 90 mL/min/1.73 square meters)  •  Stage 2 Mild CKD (GFR = 60-89 mL/min/1.73 square meters)  •  Stage 3A Moderate CKD (GFR = 45-59 mL/min/1.73 square meters)  •  Stage 3B Moderate CKD (GFR = 30-44 mL/min/1.73 square meters)  •  Stage 4 Severe CKD (GFR = 15-29 mL/min/1.73 square meters)  •  Stage 5 End Stage CKD (GFR <15 mL/min/1.73 square meters)  Note: GFR calculation is accurate only with a steady state creatinine    CBC and differential [[id number]] Collected: 09/13/23 2011    Lab Status: Final result Specimen: Blood from Hand, Left Updated: 09/13/23 2023     WBC 9.60 Thousand/uL      RBC 4.98 Million/uL      Hemoglobin 14.1 g/dL      Hematocrit 43.9 %      MCV 88 fL      MCH 28.3 pg      MCHC 32.1 g/dL      RDW 14.6 %      MPV 11.1 fL      Platelets 903 Thousands/uL      nRBC 0 /100 WBCs      Neutrophils Relative 65 %      Immat GRANS % 1 % Lymphocytes Relative 26 %      Monocytes Relative 5 %      Eosinophils Relative 3 %      Basophils Relative 0 %      Neutrophils Absolute 6.31 Thousands/µL      Immature Grans Absolute 0.05 Thousand/uL      Lymphocytes Absolute 2.49 Thousands/µL      Monocytes Absolute 0.44 Thousand/µL      Eosinophils Absolute 0.28 Thousand/µL      Basophils Absolute 0.03 Thousands/µL     POCT pregnancy, urine [[id number]]  (Normal) Resulted: 09/13/23 2009    Lab Status: Final result Specimen: Urine Updated: 09/13/23 2009     EXT Preg Test, Ur Negative     Control Valid    Urine Microscopic [[id number]]  (Normal) Collected: 09/13/23 1927    Lab Status: Final result Specimen: Urine Updated: 09/13/23 2004     RBC, UA 1-2 /hpf      WBC, UA 1-2 /hpf      Epithelial Cells Occasional /hpf      Bacteria, UA None Seen /hpf     UA w Reflex to Microscopic w Reflex to Culture [[id number]]  (Abnormal) Collected: 09/13/23 1927    Lab Status: Final result Specimen: Urine Updated: 09/13/23 2003     Color, UA Yellow     Clarity, UA Clear     Specific Gravity, UA 1.029     pH, UA 5.5     Leukocytes, UA Trace     Nitrite, UA Negative     Protein, UA Trace mg/dl      Glucose, UA Negative mg/dl      Ketones, UA Negative mg/dl      Urobilinogen, UA 2.0 mg/dl      Bilirubin, UA Negative     Occult Blood, UA Negative                 No orders to display              Procedures  Procedures         ED Course             HEART Risk Score    Flowsheet Row Most Recent Value   Heart Score Risk Calculator    History 0 Filed at: 09/13/2023 2200   ECG 1 Filed at: 09/13/2023 2200   Age 0 Filed at: 09/13/2023 2200   Risk Factors 2 Filed at: 09/13/2023 2200   Troponin 0 Filed at: 09/13/2023 2200   HEART Score 3 Filed at: 09/13/2023 2200                                      Medical Decision Making  37-year-old female history of TIA hypertension presenting with right flank pain.   Insidious onset right flank pain following muscular distribution with reproducible pain with twisting turning and palpation, likely musculoskeletal pain. Plan for cardiac evaluation including EKG and troponin. Basic labs. UA. Reassess. EKG interpreted me with normal sinus rhythm and nonspecific ST abnormality. Labs interpreted by me notable for potassium of 3.3. Repleted with p.o. potassium. UA no acute process. Some improvement after initial medications. Given additional IM medications with further improvement. Suspect likely musculoskeletal.  Prescription sent to pharmacy. Discussed results and recommendations. Advised follow up PCP. Medication recommendations. Given instructions and return precautions. Patient/family at bedside acknowledged understanding of all written and verbal instructions and return precautions. Discharged. Amount and/or Complexity of Data Reviewed  Labs: ordered. Risk  OTC drugs. Prescription drug management. Disposition  Final diagnoses:   Hypokalemia   Acute right-sided thoracic back pain     Time reflects when diagnosis was documented in both MDM as applicable and the Disposition within this note     Time User Action Codes Description Comment    9/13/2023  8:42 PM Albertine Balk Add [E87.6] Hypokalemia     9/13/2023  8:42 PM Albertine Balk Add [M54.6] Acute right-sided thoracic back pain     9/13/2023  8:42 PM Albertine Balk Modify [E87.6] Hypokalemia     9/13/2023  8:42 PM Loranger Windsor [M54.6] Acute right-sided thoracic back pain       ED Disposition     ED Disposition   Discharge    Condition   Stable    Date/Time   Wed Sep 13, 2023  9:55 PM    [facility] discharge to home/self care.                Follow-up Information     Follow up With Specialties Details Why [address], DO Family Medicine Schedule an appointment as soon as possible for a visit in 1 week  [address] [facility]  [phone]            Discharge Medication List as of 9/13/2023  9:55 PM      START taking these medications    Details methocarbamol (ROBAXIN) 500 mg tablet Take 2 tablets (1,000 mg total) by mouth 3 (three) times a day as needed for muscle spasms (back pain), Starting Wed 9/13/2023, Normal         CONTINUE these medications which have NOT CHANGED    Details   chlorthalidone 25 mg tablet Take 1 tablet (25 mg total) by mouth daily, Starting Mon 8/22/2022, Normal      diphenhydrAMINE (BENADRYL) 25 mg tablet Take 1 tablet (25 mg total) by mouth every 8 (eight) hours as needed for itching or allergies for up to 10 days, Starting Mon 8/22/2022, Until Thu 9/1/2022 at 2359, Normal      ketorolac (TORADOL) 10 mg tablet Take 1 tablet (10 mg total) by mouth every 8 (eight) hours as needed for moderate pain or severe pain for up to 5 days, Starting Mon 8/22/2022, Until Sat 8/27/2022 at 2359, Normal      levothyroxine 125 mcg tablet Take 1 tablet (125 mcg total) by mouth daily in the early morning, Starting Mon 8/22/2022, Until Wed 9/21/2022, Normal      losartan (COZAAR) 100 MG tablet Take 1 tablet (100 mg total) by mouth daily, Starting Mon 8/22/2022, Normal      nicotine (NICODERM CQ) 7 mg/24hr TD 24 hr patch Place 1 patch on the skin daily, Starting Mon 8/22/2022, Normal      oxyCODONE-acetaminophen (PERCOCET) 5-325 mg per tablet Take 1 tablet by mouth every 6 (six) hours as needed for severe pain for up to 20 doses Max Daily Amount: 4 tablets, Starting Fri 6/2/2023, Normal      rosuvastatin (CRESTOR) 10 MG tablet Take 1 tablet (10 mg total) by mouth daily, Starting Mon 8/22/2022, Normal      topiramate (Topamax) 25 mg tablet Take 1 tablet (25 mg total) by mouth daily, Starting Mon 8/22/2022, Normal             No discharge procedures on file.     PDMP Review     None          ED Provider  Electronically Signed by           Mora Silva MD  09/16/23 1826

## 2023-11-19 ENCOUNTER — APPOINTMENT (EMERGENCY)
Dept: RADIOLOGY | Facility: HOSPITAL | Age: 48
End: 2023-11-19
Payer: COMMERCIAL

## 2023-11-19 ENCOUNTER — APPOINTMENT (EMERGENCY)
Dept: CT IMAGING | Facility: HOSPITAL | Age: 48
End: 2023-11-19
Payer: COMMERCIAL

## 2023-11-19 ENCOUNTER — HOSPITAL ENCOUNTER (OUTPATIENT)
Facility: HOSPITAL | Age: 48
Setting detail: OBSERVATION
Discharge: HOME | End: 2023-11-21
Attending: EMERGENCY MEDICINE | Admitting: INTERNAL MEDICINE
Payer: COMMERCIAL

## 2023-11-19 DIAGNOSIS — I16.1 HYPERTENSIVE EMERGENCY: ICD-10-CM

## 2023-11-19 DIAGNOSIS — D72.829 LEUKOCYTOSIS: ICD-10-CM

## 2023-11-19 DIAGNOSIS — R70.0 ESR RAISED: ICD-10-CM

## 2023-11-19 DIAGNOSIS — R73.9 HYPERGLYCEMIA: ICD-10-CM

## 2023-11-19 DIAGNOSIS — I16.0 HYPERTENSIVE URGENCY: Primary | ICD-10-CM

## 2023-11-19 DIAGNOSIS — R51.9 ACUTE INTRACTABLE HEADACHE, UNSPECIFIED HEADACHE TYPE: ICD-10-CM

## 2023-11-19 LAB
2HR DELTA HS TROPONIN: 0 NG/L
4HR DELTA HS TROPONIN: 1 NG/L
ALBUMIN SERPL BCP-MCNC: 4 G/DL (ref 3.5–5)
ALP SERPL-CCNC: 159 U/L (ref 34–104)
ALT SERPL W P-5'-P-CCNC: 43 U/L (ref 7–52)
ANION GAP SERPL CALCULATED.3IONS-SCNC: 11 MMOL/L
APTT PPP: 30 SECONDS (ref 23–37)
AST SERPL W P-5'-P-CCNC: 110 U/L (ref 13–39)
BACTERIA UR QL AUTO: ABNORMAL /HPF
BASOPHILS # BLD AUTO: 0.03 THOUSANDS/ÂΜL (ref 0–0.1)
BASOPHILS NFR BLD AUTO: 0 % (ref 0–1)
BILIRUB SERPL-MCNC: 0.47 MG/DL (ref 0.2–1)
BILIRUB UR QL STRIP: NEGATIVE
BNP SERPL-MCNC: 63 PG/ML (ref 0–100)
BUN SERPL-MCNC: 11 MG/DL (ref 5–25)
CALCIUM SERPL-MCNC: 9.4 MG/DL (ref 8.4–10.2)
CARDIAC TROPONIN I PNL SERPL HS: 10 NG/L
CARDIAC TROPONIN I PNL SERPL HS: 9 NG/L
CARDIAC TROPONIN I PNL SERPL HS: 9 NG/L
CHLORIDE SERPL-SCNC: 105 MMOL/L (ref 96–108)
CLARITY UR: CLEAR
CO2 SERPL-SCNC: 20 MMOL/L (ref 21–32)
COLOR UR: YELLOW
CREAT SERPL-MCNC: 0.94 MG/DL (ref 0.6–1.3)
EOSINOPHIL # BLD AUTO: 0.28 THOUSAND/ÂΜL (ref 0–0.61)
EOSINOPHIL NFR BLD AUTO: 3 % (ref 0–6)
ERYTHROCYTE [DISTWIDTH] IN BLOOD BY AUTOMATED COUNT: 13.9 % (ref 11.6–15.1)
ERYTHROCYTE [SEDIMENTATION RATE] IN BLOOD: 62 MM/HOUR (ref 0–19)
FLUAV RNA RESP QL NAA+PROBE: NEGATIVE
FLUBV RNA RESP QL NAA+PROBE: NEGATIVE
GFR SERPL CREATININE-BSD FRML MDRD: 72 ML/MIN/1.73SQ M
GLUCOSE SERPL-MCNC: 149 MG/DL (ref 65–140)
GLUCOSE UR STRIP-MCNC: NEGATIVE MG/DL
HCG SERPL QL: NEGATIVE
HCT VFR BLD AUTO: 43.7 % (ref 34.8–46.1)
HGB BLD-MCNC: 14.4 G/DL (ref 11.5–15.4)
HGB UR QL STRIP.AUTO: NEGATIVE
IMM GRANULOCYTES # BLD AUTO: 0.06 THOUSAND/UL (ref 0–0.2)
IMM GRANULOCYTES NFR BLD AUTO: 1 % (ref 0–2)
INR PPP: 0.94 (ref 0.84–1.19)
KETONES UR STRIP-MCNC: NEGATIVE MG/DL
LEUKOCYTE ESTERASE UR QL STRIP: NEGATIVE
LYMPHOCYTES # BLD AUTO: 2.23 THOUSANDS/ÂΜL (ref 0.6–4.47)
LYMPHOCYTES NFR BLD AUTO: 20 % (ref 14–44)
MCH RBC QN AUTO: 29.4 PG (ref 26.8–34.3)
MCHC RBC AUTO-ENTMCNC: 33 G/DL (ref 31.4–37.4)
MCV RBC AUTO: 89 FL (ref 82–98)
MONOCYTES # BLD AUTO: 0.41 THOUSAND/ÂΜL (ref 0.17–1.22)
MONOCYTES NFR BLD AUTO: 4 % (ref 4–12)
MUCOUS THREADS UR QL AUTO: ABNORMAL
NEUTROPHILS # BLD AUTO: 8.01 THOUSANDS/ÂΜL (ref 1.85–7.62)
NEUTS SEG NFR BLD AUTO: 72 % (ref 43–75)
NITRITE UR QL STRIP: NEGATIVE
NON-SQ EPI CELLS URNS QL MICRO: ABNORMAL /HPF
NRBC BLD AUTO-RTO: 0 /100 WBCS
PH UR STRIP.AUTO: 5.5 [PH]
PLATELET # BLD AUTO: 339 THOUSANDS/UL (ref 149–390)
PMV BLD AUTO: 10.7 FL (ref 8.9–12.7)
POTASSIUM SERPL-SCNC: 3.9 MMOL/L (ref 3.5–5.3)
PROT SERPL-MCNC: 7.9 G/DL (ref 6.4–8.4)
PROT UR STRIP-MCNC: ABNORMAL MG/DL
PROTHROMBIN TIME: 13.1 SECONDS (ref 11.6–14.5)
RBC # BLD AUTO: 4.9 MILLION/UL (ref 3.81–5.12)
RBC #/AREA URNS AUTO: ABNORMAL /HPF
RSV RNA RESP QL NAA+PROBE: NEGATIVE
SARS-COV-2 RNA RESP QL NAA+PROBE: NEGATIVE
SODIUM SERPL-SCNC: 136 MMOL/L (ref 135–147)
SP GR UR STRIP.AUTO: >=1.05 (ref 1–1.03)
UROBILINOGEN UR STRIP-ACNC: <2 MG/DL
WBC # BLD AUTO: 11.02 THOUSAND/UL (ref 4.31–10.16)
WBC #/AREA URNS AUTO: ABNORMAL /HPF

## 2023-11-19 PROCEDURE — 99285 EMERGENCY DEPT VISIT HI MDM: CPT

## 2023-11-19 PROCEDURE — 85025 COMPLETE CBC W/AUTO DIFF WBC: CPT | Performed by: EMERGENCY MEDICINE

## 2023-11-19 PROCEDURE — 80053 COMPREHEN METABOLIC PANEL: CPT | Performed by: EMERGENCY MEDICINE

## 2023-11-19 PROCEDURE — 99285 EMERGENCY DEPT VISIT HI MDM: CPT | Performed by: EMERGENCY MEDICINE

## 2023-11-19 PROCEDURE — 85652 RBC SED RATE AUTOMATED: CPT | Performed by: EMERGENCY MEDICINE

## 2023-11-19 PROCEDURE — 36415 COLL VENOUS BLD VENIPUNCTURE: CPT | Performed by: EMERGENCY MEDICINE

## 2023-11-19 PROCEDURE — 83880 ASSAY OF NATRIURETIC PEPTIDE: CPT | Performed by: EMERGENCY MEDICINE

## 2023-11-19 PROCEDURE — 71045 X-RAY EXAM CHEST 1 VIEW: CPT

## 2023-11-19 PROCEDURE — 93005 ELECTROCARDIOGRAM TRACING: CPT

## 2023-11-19 PROCEDURE — 85610 PROTHROMBIN TIME: CPT | Performed by: EMERGENCY MEDICINE

## 2023-11-19 PROCEDURE — 85730 THROMBOPLASTIN TIME PARTIAL: CPT | Performed by: EMERGENCY MEDICINE

## 2023-11-19 PROCEDURE — 70498 CT ANGIOGRAPHY NECK: CPT

## 2023-11-19 PROCEDURE — 96365 THER/PROPH/DIAG IV INF INIT: CPT

## 2023-11-19 PROCEDURE — 0241U HB NFCT DS VIR RESP RNA 4 TRGT: CPT | Performed by: EMERGENCY MEDICINE

## 2023-11-19 PROCEDURE — 70496 CT ANGIOGRAPHY HEAD: CPT

## 2023-11-19 PROCEDURE — 84484 ASSAY OF TROPONIN QUANT: CPT | Performed by: EMERGENCY MEDICINE

## 2023-11-19 PROCEDURE — G1004 CDSM NDSC: HCPCS

## 2023-11-19 PROCEDURE — 84703 CHORIONIC GONADOTROPIN ASSAY: CPT | Performed by: EMERGENCY MEDICINE

## 2023-11-19 PROCEDURE — 99223 1ST HOSP IP/OBS HIGH 75: CPT | Performed by: INTERNAL MEDICINE

## 2023-11-19 PROCEDURE — 96375 TX/PRO/DX INJ NEW DRUG ADDON: CPT

## 2023-11-19 PROCEDURE — 81001 URINALYSIS AUTO W/SCOPE: CPT | Performed by: EMERGENCY MEDICINE

## 2023-11-19 RX ORDER — LABETALOL HYDROCHLORIDE 5 MG/ML
20 INJECTION, SOLUTION INTRAVENOUS ONCE
Status: COMPLETED | OUTPATIENT
Start: 2023-11-19 | End: 2023-11-19

## 2023-11-19 RX ORDER — LEVOTHYROXINE SODIUM 0.12 MG/1
125 TABLET ORAL
Status: DISCONTINUED | OUTPATIENT
Start: 2023-11-20 | End: 2023-11-21 | Stop reason: HOSPADM

## 2023-11-19 RX ORDER — PRAVASTATIN SODIUM 80 MG/1
80 TABLET ORAL
Status: DISCONTINUED | OUTPATIENT
Start: 2023-11-20 | End: 2023-11-19 | Stop reason: CLARIF

## 2023-11-19 RX ORDER — METOCLOPRAMIDE HYDROCHLORIDE 5 MG/ML
10 INJECTION INTRAMUSCULAR; INTRAVENOUS ONCE
Status: COMPLETED | OUTPATIENT
Start: 2023-11-19 | End: 2023-11-19

## 2023-11-19 RX ORDER — LABETALOL HYDROCHLORIDE 5 MG/ML
10 INJECTION, SOLUTION INTRAVENOUS EVERY 4 HOURS PRN
Status: DISCONTINUED | OUTPATIENT
Start: 2023-11-19 | End: 2023-11-21 | Stop reason: HOSPADM

## 2023-11-19 RX ORDER — MAGNESIUM SULFATE HEPTAHYDRATE 40 MG/ML
2 INJECTION, SOLUTION INTRAVENOUS
Status: DISCONTINUED | OUTPATIENT
Start: 2023-11-20 | End: 2023-11-21 | Stop reason: HOSPADM

## 2023-11-19 RX ORDER — TOPIRAMATE 25 MG/1
25 TABLET ORAL DAILY
Status: DISCONTINUED | OUTPATIENT
Start: 2023-11-20 | End: 2023-11-21 | Stop reason: HOSPADM

## 2023-11-19 RX ORDER — ACETAMINOPHEN 325 MG/1
650 TABLET ORAL EVERY 6 HOURS PRN
Status: DISCONTINUED | OUTPATIENT
Start: 2023-11-19 | End: 2023-11-21 | Stop reason: HOSPADM

## 2023-11-19 RX ORDER — ATORVASTATIN CALCIUM 20 MG/1
20 TABLET, FILM COATED ORAL
Status: DISCONTINUED | OUTPATIENT
Start: 2023-11-20 | End: 2023-11-21 | Stop reason: HOSPADM

## 2023-11-19 RX ORDER — LOSARTAN POTASSIUM 50 MG/1
100 TABLET ORAL DAILY
Status: DISCONTINUED | OUTPATIENT
Start: 2023-11-20 | End: 2023-11-21 | Stop reason: HOSPADM

## 2023-11-19 RX ORDER — SODIUM CHLORIDE 9 MG/ML
100 INJECTION, SOLUTION INTRAVENOUS ONCE
Status: DISCONTINUED | OUTPATIENT
Start: 2023-11-19 | End: 2023-11-19

## 2023-11-19 RX ORDER — METHYLPREDNISOLONE SODIUM SUCCINATE 125 MG/2ML
80 INJECTION, POWDER, LYOPHILIZED, FOR SOLUTION INTRAMUSCULAR; INTRAVENOUS ONCE
Status: COMPLETED | OUTPATIENT
Start: 2023-11-19 | End: 2023-11-19

## 2023-11-19 RX ORDER — HYDRALAZINE HYDROCHLORIDE 20 MG/ML
10 INJECTION INTRAMUSCULAR; INTRAVENOUS ONCE
Status: COMPLETED | OUTPATIENT
Start: 2023-11-19 | End: 2023-11-19

## 2023-11-19 RX ORDER — DIPHENHYDRAMINE HYDROCHLORIDE 50 MG/ML
25 INJECTION INTRAMUSCULAR; INTRAVENOUS ONCE
Status: COMPLETED | OUTPATIENT
Start: 2023-11-19 | End: 2023-11-19

## 2023-11-19 RX ORDER — MAGNESIUM SULFATE HEPTAHYDRATE 40 MG/ML
2 INJECTION, SOLUTION INTRAVENOUS ONCE
Status: COMPLETED | OUTPATIENT
Start: 2023-11-19 | End: 2023-11-19

## 2023-11-19 RX ORDER — SUMATRIPTAN 6 MG/.5ML
6 INJECTION, SOLUTION SUBCUTANEOUS
Status: DISCONTINUED | OUTPATIENT
Start: 2023-11-19 | End: 2023-11-21 | Stop reason: HOSPADM

## 2023-11-19 RX ORDER — METOCLOPRAMIDE HYDROCHLORIDE 5 MG/ML
10 INJECTION INTRAMUSCULAR; INTRAVENOUS EVERY 8 HOURS SCHEDULED
Status: DISCONTINUED | OUTPATIENT
Start: 2023-11-19 | End: 2023-11-21 | Stop reason: HOSPADM

## 2023-11-19 RX ORDER — DIPHENHYDRAMINE HYDROCHLORIDE 50 MG/ML
25 INJECTION INTRAMUSCULAR; INTRAVENOUS EVERY 8 HOURS SCHEDULED
Status: DISCONTINUED | OUTPATIENT
Start: 2023-11-19 | End: 2023-11-21 | Stop reason: HOSPADM

## 2023-11-19 RX ORDER — NICOTINE 21 MG/24HR
1 PATCH, TRANSDERMAL 24 HOURS TRANSDERMAL DAILY
Status: DISCONTINUED | OUTPATIENT
Start: 2023-11-19 | End: 2023-11-21 | Stop reason: HOSPADM

## 2023-11-19 RX ORDER — NICOTINE 21 MG/24HR
1 PATCH, TRANSDERMAL 24 HOURS TRANSDERMAL DAILY
Status: DISCONTINUED | OUTPATIENT
Start: 2023-11-20 | End: 2023-11-19

## 2023-11-19 RX ORDER — ACETAMINOPHEN 10 MG/ML
1000 INJECTION, SOLUTION INTRAVENOUS ONCE
Status: COMPLETED | OUTPATIENT
Start: 2023-11-19 | End: 2023-11-19

## 2023-11-19 RX ORDER — CHLORTHALIDONE 25 MG/1
25 TABLET ORAL DAILY
Status: DISCONTINUED | OUTPATIENT
Start: 2023-11-20 | End: 2023-11-21 | Stop reason: HOSPADM

## 2023-11-19 RX ADMIN — HYDRALAZINE HYDROCHLORIDE 10 MG: 20 INJECTION INTRAMUSCULAR; INTRAVENOUS at 22:36

## 2023-11-19 RX ADMIN — DIPHENHYDRAMINE HYDROCHLORIDE 25 MG: 50 INJECTION, SOLUTION INTRAMUSCULAR; INTRAVENOUS at 22:36

## 2023-11-19 RX ADMIN — METOCLOPRAMIDE 10 MG: 5 INJECTION, SOLUTION INTRAMUSCULAR; INTRAVENOUS at 15:55

## 2023-11-19 RX ADMIN — ACETAMINOPHEN 1000 MG: 10 INJECTION INTRAVENOUS at 18:33

## 2023-11-19 RX ADMIN — METOCLOPRAMIDE 10 MG: 5 INJECTION, SOLUTION INTRAMUSCULAR; INTRAVENOUS at 22:36

## 2023-11-19 RX ADMIN — METHYLPREDNISOLONE SODIUM SUCCINATE 80 MG: 125 INJECTION, POWDER, FOR SOLUTION INTRAMUSCULAR; INTRAVENOUS at 20:22

## 2023-11-19 RX ADMIN — DIPHENHYDRAMINE HYDROCHLORIDE 25 MG: 50 INJECTION, SOLUTION INTRAMUSCULAR; INTRAVENOUS at 15:55

## 2023-11-19 RX ADMIN — NICOTINE 1 PATCH: 14 PATCH, EXTENDED RELEASE TRANSDERMAL at 22:37

## 2023-11-19 RX ADMIN — IOHEXOL 85 ML: 350 INJECTION, SOLUTION INTRAVENOUS at 18:06

## 2023-11-19 RX ADMIN — MAGNESIUM SULFATE HEPTAHYDRATE 2 G: 40 INJECTION, SOLUTION INTRAVENOUS at 16:31

## 2023-11-19 RX ADMIN — LABETALOL HYDROCHLORIDE 20 MG: 5 INJECTION, SOLUTION INTRAVENOUS at 20:14

## 2023-11-19 NOTE — ED PROVIDER NOTES
Pt Name: Berkley Leija  MRN: [id number]  [address] 1975  Age/Sex: 57 y.o. female  Date of evaluation: 11/19/2023  PCP: Luis Vizcaino    Chief Complaint   Patient presents with    Headache     Pt c/o headache x 3 days, pt reports running out of her BP medication and having high readings at home, pt states she has been overwhelmed with caring for her mother on hospice and has not been caring for herself. Hx of stroke and MI         HPI    57 y.o. female presenting with 3 days of headache. Patient states this headache began 3 days ago, is currently severe, dull, radiating throughout the head, worse with lights or noise, unchanged with ibuprofen taken at home, has been gradually worsening over that time. Patient also complains of shortness of breath, most noticeable with exertion. She denies chest pain, trauma, fevers, numbness, weakness, change in speech or vision, prior similar headaches. Patient states that she has a history of a prior MI and TIA, has previously diagnosed hypertension but has not been taking her medications for the past 4 months as she has been taking care of her mother who is ill and on home hospice. Patient also has not taken her blood pressure recently. Patient is a current everyday smoker.       HPI      Past Medical and Surgical History    Past Medical History:   Diagnosis Date    History of MI (myocardial infarction)     History of TIA (transient ischemic attack)     Hypertension     Hypothyroidism     hypothyroidism    Morbid obesity with BMI of 45.0-49.9, adult (HCC)     TIA (transient ischemic attack)        Past Surgical History:   Procedure Laterality Date    BREAST SURGERY Left     TUBAL LIGATION         Family History   Problem Relation Age of Onset    Heart disease Mother     Hypertension Mother     Diabetes Father        Social History     Tobacco Use    Smoking status: Every Day     Packs/day: 0.50     Years: 30.00     Total pack years: 15.00 Types: Cigarettes    Smokeless tobacco: Never   Vaping Use    Vaping Use: Never used   Substance Use Topics    Alcohol use: Not Currently     Comment: occasional    Drug use: No           Allergies    Allergies   Allergen Reactions    Trimecaine Hallucinations    Toradol [Ketorolac Tromethamine] Anxiety       Home Medications    Prior to Admission medications    Medication Sig Start Date End Date Taking?  Authorizing Provider   chlorthalidone 25 mg tablet Take 1 tablet (25 mg total) by mouth daily 8/22/22   Festus Jansen MD   diphenhydrAMINE (BENADRYL) 25 mg tablet Take 1 tablet (25 mg total) by mouth every 8 (eight) hours as needed for itching or allergies for up to 10 days 8/22/22 9/1/22  Festus Jansen MD   ketorolac (TORADOL) 10 mg tablet Take 1 tablet (10 mg total) by mouth every 8 (eight) hours as needed for moderate pain or severe pain for up to 5 days 8/22/22 8/27/22  Festus Jansen MD   levothyroxine 125 mcg tablet Take 1 tablet (125 mcg total) by mouth daily in the early morning 8/22/22 9/21/22  Festus Jansen MD   losartan (COZAAR) 100 MG tablet Take 1 tablet (100 mg total) by mouth daily 8/22/22   Festus Jansen MD   methocarbamol (ROBAXIN) 500 mg tablet Take 2 tablets (1,000 mg total) by mouth 3 (three) times a day as needed for muscle spasms (back pain) 9/13/23   Eric London MD   nicotine (NICODERM CQ) 7 mg/24hr TD 24 hr patch Place 1 patch on the skin daily 8/22/22   Festus Jansen MD   oxyCODONE-acetaminophen (PERCOCET) 5-325 mg per tablet Take 1 tablet by mouth every 6 (six) hours as needed for severe pain for up to 20 doses Max Daily Amount: 4 tablets 6/2/23   ALVIN Askew   rosuvastatin (CRESTOR) 10 MG tablet Take 1 tablet (10 mg total) by mouth daily 8/22/22   Festus Jansen MD   topiramate (Topamax) 25 mg tablet Take 1 tablet (25 mg total) by mouth daily 8/22/22   Festus Jansen MD           Review of Systems    Review of Systems   Constitutional:  Negative for activity change, chills and fever. HENT:  Negative for drooling and facial swelling. Eyes:  Negative for pain, discharge and visual disturbance. Respiratory:  Positive for shortness of breath. Negative for apnea, cough, chest tightness and wheezing. Cardiovascular:  Negative for chest pain and leg swelling. Gastrointestinal:  Negative for abdominal pain, constipation, diarrhea, nausea and vomiting. Genitourinary:  Negative for difficulty urinating, dysuria and urgency. Musculoskeletal:  Negative for arthralgias, back pain and gait problem. Skin:  Negative for color change and rash. Neurological:  Positive for headaches. Negative for dizziness, speech difficulty and weakness. Psychiatric/Behavioral:  Negative for agitation, behavioral problems and confusion. All other systems reviewed and negative. Physical Exam      ED Triage Vitals [11/19/23 1525]   Temperature Pulse Respirations Blood Pressure SpO2   99 °F (37.2 °C) 94 20 (!) 262/138 100 %      Temp Source Heart Rate Source Patient Position - Orthostatic VS BP Location FiO2 (%)   Temporal Monitor Sitting Left arm --      Pain Score       7               Physical Exam  Vitals and nursing note reviewed. Constitutional:       General: She is not in acute distress. Appearance: She is well-developed. She is ill-appearing. She is not toxic-appearing or diaphoretic. HENT:      Head: Normocephalic and atraumatic. Right Ear: External ear normal.      Left Ear: External ear normal.      Nose: Nose normal. No congestion or rhinorrhea. Mouth/Throat:      Mouth: Mucous membranes are moist.      Pharynx: Oropharynx is clear. No oropharyngeal exudate or posterior oropharyngeal erythema. Eyes:      Conjunctiva/sclera: Conjunctivae normal.      Pupils: Pupils are equal, round, and reactive to light. Cardiovascular:      Rate and Rhythm: Normal rate and regular rhythm. Pulses: Normal pulses. Heart sounds: Normal heart sounds.  No murmur heard. No friction rub. No gallop. Pulmonary:      Effort: Pulmonary effort is normal. No respiratory distress. Breath sounds: Normal breath sounds. No wheezing or rales. Abdominal:      General: There is no distension. Palpations: Abdomen is soft. Tenderness: There is no abdominal tenderness. There is no guarding or rebound. Musculoskeletal:         General: No deformity. Normal range of motion. Cervical back: Normal range of motion and neck supple. Right lower leg: Edema present. Left lower leg: Edema present. Comments: Trace edema bilateral lower extremities   Skin:     General: Skin is warm and dry. Capillary Refill: Capillary refill takes less than 2 seconds. Findings: No erythema or rash. Neurological:      Mental Status: She is alert and oriented to person, place, and time. Cranial Nerves: No cranial nerve deficit. Sensory: No sensory deficit. Motor: No weakness. Coordination: Coordination normal.      Gait: Gait normal.      Comments: Cranial nerves II through XII intact, 5/5 strength in all extremities, normal speech and coordination. No visual field cuts. NIH stroke scale of 0   Psychiatric:         Behavior: Behavior normal.         Thought Content: Thought content normal.         Judgment: Judgment normal.              Diagnostic Results  EKG Interpretation 1606    Rate:  87  BPM  Rhythm:  Normal Sinus Rhythm   Axis:  Normal   Intervals: Normal, no blocks, QTc  519 ms  Q waves: Q waves in septal leads  T waves:  Normal   ST segments:  No significant elevations or depressions     Impression:  Normal sinus rhythm with old septal infarct and prolonged QTc but without evidence of acute ischemia or significant arrhythmia      EKG for comparison: EKG dated 13 September 2023 similar in character no major changes other QT somewhat shorter on that study. EKG interpreted by me.      Repeat EKG at 2017 after administration of magnesium similar in character although QTc shortened to 454 on that study    Labs:    Results Reviewed       Procedure Component Value Units Date/Time    HS Troponin I 4hr [[id number]]  (Normal) Collected: 11/19/23 1946    Lab Status: Final result Specimen: Blood from Arm, Right Updated: 11/19/23 2026     hs TnI 4hr 10 ng/L      Delta 4hr hsTnI 1 ng/L     HS Troponin I 2hr [[id number]]  (Normal) Collected: 11/19/23 1823    Lab Status: Final result Specimen: Blood from Arm, Right Updated: 11/19/23 1856     hs TnI 2hr 9 ng/L      Delta 2hr hsTnI 0 ng/L     hCG, qualitative pregnancy [[id number]]  (Normal) Collected: 11/19/23 1555    Lab Status: Final result Specimen: Blood from Arm, Right Updated: 11/19/23 1802     Preg, Serum Negative    Comprehensive metabolic panel [[id number]]  (Abnormal) Collected: 11/19/23 1555    Lab Status: Final result Specimen: Blood from Arm, Right Updated: 11/19/23 1731     Sodium 136 mmol/L      Potassium 3.9 mmol/L      Chloride 105 mmol/L      CO2 20 mmol/L      ANION GAP 11 mmol/L      BUN 11 mg/dL      Creatinine 0.94 mg/dL      Glucose 149 mg/dL      Calcium 9.4 mg/dL       U/L      ALT 43 U/L      Alkaline Phosphatase 159 U/L      Total Protein 7.9 g/dL      Albumin 4.0 g/dL      Total Bilirubin 0.47 mg/dL      eGFR 72 ml/min/1.73sq m     Narrative:      [facility] guidelines for Chronic Kidney Disease (CKD):     Stage 1 with normal or high GFR (GFR > 90 mL/min/1.73 square meters)    Stage 2 Mild CKD (GFR = 60-89 mL/min/1.73 square meters)    Stage 3A Moderate CKD (GFR = 45-59 mL/min/1.73 square meters)    Stage 3B Moderate CKD (GFR = 30-44 mL/min/1.73 square meters)    Stage 4 Severe CKD (GFR = 15-29 mL/min/1.73 square meters)    Stage 5 End Stage CKD (GFR <15 mL/min/1.73 square meters)  Note: GFR calculation is accurate only with a steady state creatinine    FLU/RSV/COVID - if FLU/RSV clinically relevant [[id number]]  (Normal) Collected: 11/19/23 0804 Lab Status: Final result Specimen: Nares from Nose Updated: 11/19/23 1647     SARS-CoV-2 Negative     INFLUENZA A PCR Negative     INFLUENZA B PCR Negative     RSV PCR Negative    Narrative:      FOR PEDIATRIC PATIENTS - copy/paste COVID Guidelines URL to browser: [email or web address]    SARS-CoV-2 assay is a Nucleic Acid Amplification assay intended for the  qualitative detection of nucleic acid from SARS-CoV-2 in nasopharyngeal  swabs. Results are for the presumptive identification of SARS-CoV-2 RNA. Positive results are indicative of infection with SARS-CoV-2, the virus  causing COVID-19, but do not rule out bacterial infection or co-infection  with other viruses. Laboratories within the [facility] and its  territories are required to report all positive results to the appropriate  public health authorities. Negative results do not preclude SARS-CoV-2  infection and should not be used as the sole basis for treatment or other  patient management decisions. Negative results must be combined with  clinical observations, patient history, and epidemiological information. This test has not been FDA cleared or approved. This test has been authorized by FDA under an Emergency Use Authorization  (EUA). This test is only authorized for the duration of time the  declaration that circumstances exist justifying the authorization of the  emergency use of an in vitro diagnostic tests for detection of SARS-CoV-2  virus and/or diagnosis of COVID-19 infection under section 564(b)(1) of  the Act, 21 U. S.C. 953GQQ-1(P)(2), unless the authorization is terminated  or revoked sooner. The test has been validated but independent review by FDA  and CLIA is pending. Test performed using [employer]: This RT-PCR assay targets N2,  a region unique to SARS-CoV-2.  A conserved region in the E-gene was chosen  for pan-Sarbecovirus detection which includes SARS-CoV-2. According to CMS-2020-01-R, this platform meets the definition of high-throughput technology.     HS Troponin 0hr (reflex protocol) [[id number]]  (Normal) Collected: 11/19/23 1555    Lab Status: Final result Specimen: Blood from Arm, Right Updated: 11/19/23 1640     hs TnI 0hr 9 ng/L     B-Type Natriuretic Peptide(BNP) [[id number]]  (Normal) Collected: 11/19/23 1555    Lab Status: Final result Specimen: Blood from Arm, Right Updated: 11/19/23 1639     BNP 63 pg/mL     Sedimentation rate, automated [[id number]]  (Abnormal) Collected: 11/19/23 1555    Lab Status: Final result Specimen: Blood from Arm, Right Updated: 11/19/23 1624     Sed Rate 62 mm/hour     Protime-INR [[id number]]  (Normal) Collected: 11/19/23 1555    Lab Status: Final result Specimen: Blood from Arm, Right Updated: 11/19/23 1622     Protime 13.1 seconds      INR 0.94    APTT [[id number]]  (Normal) Collected: 11/19/23 1555    Lab Status: Final result Specimen: Blood from Arm, Right Updated: 11/19/23 1622     PTT 30 seconds     CBC and differential [[id number]]  (Abnormal) Collected: 11/19/23 1555    Lab Status: Final result Specimen: Blood from Arm, Right Updated: 11/19/23 1606     WBC 11.02 Thousand/uL      RBC 4.90 Million/uL      Hemoglobin 14.4 g/dL      Hematocrit 43.7 %      MCV 89 fL      MCH 29.4 pg      MCHC 33.0 g/dL      RDW 13.9 %      MPV 10.7 fL      Platelets 901 Thousands/uL      nRBC 0 /100 WBCs      Neutrophils Relative 72 %      Immat GRANS % 1 %      Lymphocytes Relative 20 %      Monocytes Relative 4 %      Eosinophils Relative 3 %      Basophils Relative 0 %      Neutrophils Absolute 8.01 Thousands/µL      Immature Grans Absolute 0.06 Thousand/uL      Lymphocytes Absolute 2.23 Thousands/µL      Monocytes Absolute 0.41 Thousand/µL      Eosinophils Absolute 0.28 Thousand/µL      Basophils Absolute 0.03 Thousands/µL     UA (URINE) with reflex to Scope [[id number]]     Lab Status: No result Specimen: Urine             All labs reviewed and utilized in the medical decision making process    Radiology:    CTA head and neck with and without contrast   Final Result         1. No intracranial hemorrhage, infarct or mass. 2. No hemodynamically significant stenosis, dissection or occlusion of the carotid or vertebral arteries or major vessels of the [place]. No intracranial aneurysm. Workstation performed: [id number]         XR chest 1 view portable    (Results Pending)       No acute cardiopulmonary process or osseous abnormality on my interpretation of cxr        All radiology studies independently viewed by me and interpreted by the radiologist.    Procedure    Procedures        ED Course of Care and Re-Assessments      Work-up as above overall reassuring, elevated ESR noted, with patient felt relatively young for temporal arteritis and no visual changes noted although that entity or other autoimmune process not ruled out. Despite multiple agents, patient stated no improvement of headache, patient has significant hypertension which eventually yielded to pain control and labetalol.     Medications   nicotine (NICODERM CQ) 14 mg/24hr TD 24 hr patch 1 patch (has no administration in time range)   acetaminophen (TYLENOL) tablet 650 mg (has no administration in time range)   metoclopramide (REGLAN) injection 10 mg (has no administration in time range)   diphenhydrAMINE (BENADRYL) injection 25 mg (has no administration in time range)   SUMAtriptan (IMITREX) subcutaneous injection 6 mg (has no administration in time range)   magnesium sulfate 2 g/50 mL IVPB (premix) 2 g (has no administration in time range)   sodium chloride 0.9 % infusion (has no administration in time range)   labetalol (NORMODYNE) injection 10 mg (has no administration in time range)   metoclopramide (REGLAN) injection 10 mg (10 mg Intravenous Given 11/19/23 1555)   diphenhydrAMINE (BENADRYL) injection 25 mg (25 mg Intravenous Given 11/19/23 1555) magnesium sulfate 2 g/50 mL IVPB (premix) 2 g (0 g Intravenous Stopped 11/19/23 1701)   iohexol (OMNIPAQUE) 350 MG/ML injection (MULTI-DOSE) 85 mL (85 mL Intravenous Given 11/19/23 1806)   acetaminophen (Ofirmev) injection 1,000 mg (0 mg Intravenous Stopped 11/19/23 1848)   labetalol (NORMODYNE) injection 20 mg (20 mg Intravenous Given 11/19/23 2014)   methylPREDNISolone sodium succinate (Solu-MEDROL) injection 80 mg (80 mg Intravenous Given 11/19/23 2022)           FINAL IMPRESSION    Final diagnoses:   Acute intractable headache, unspecified headache type   Hypertensive urgency   Hyperglycemia   Leukocytosis   ESR raised         DISPOSITION/PLAN    Presentation as above with intractable headache and severe hypertension the setting of hyperglycemia leukocytosis and elevated ESR. Vital signs reassuring other than significant hypertension, examination nonspecific, nonfocal neurologic exam with NIH stroke scale of 0. Initial blood work and neuroimaging as above. After multiple treatments in ER, blood pressure lowered but headache not improved, patient's baseline blood pressure is unknown and rapid further lowering of the blood pressure felt to be relatively contraindicated with concern for chronic severe hypertension. Admitted to internal medicine for further care, hemodynamically stable at that time.   Time reflects when diagnosis was documented in both MDM as applicable and the Disposition within this note       Time User Action Codes Description Comment    11/19/2023  9:05 PM Ashanti Hand Add [R51.9] Acute intractable headache, unspecified headache type     11/19/2023  9:05 PM Flordia Baskin T Add [I16.0] Hypertensive urgency     11/19/2023  9:05 PM Ashanti Hand Add [R73.9] Hyperglycemia     11/19/2023  9:06 PM Ashanti Hand Add [D72.829] Leukocytosis     11/19/2023  9:06 PM Ashanti Hand Add [R70.0] ESR raised     11/19/2023  9:06 PM Flordia Baskin T Modify [R51.9] Acute intractable headache, unspecified headache type     11/19/2023  9:06 PM Andrew Lucina ALBRIGHT Modify [I16.0] Hypertensive urgency           ED Disposition       ED Disposition   Admit    Condition   Stable    Date/Time   Sun Nov 19, 2023  9:05 PM    Comment   Case was discussed with Syed and the patient's admission status was agreed to be Admission Status: observation status to the service of Dr. Jens Vazquez . Follow-up Information    None           PATIENT REFERRED TO:    No follow-up provider specified.     DISCHARGE MEDICATIONS:    Current Discharge Medication List        CONTINUE these medications which have NOT CHANGED    Details   chlorthalidone 25 mg tablet Take 1 tablet (25 mg total) by mouth daily  Qty: 30 tablet, Refills: 0    Associated Diagnoses: Hypertensive emergency      diphenhydrAMINE (BENADRYL) 25 mg tablet Take 1 tablet (25 mg total) by mouth every 8 (eight) hours as needed for itching or allergies for up to 10 days  Qty: 10 tablet, Refills: 0    Associated Diagnoses: Migraine with aura and without status migrainosus, not intractable      ketorolac (TORADOL) 10 mg tablet Take 1 tablet (10 mg total) by mouth every 8 (eight) hours as needed for moderate pain or severe pain for up to 5 days  Qty: 15 tablet, Refills: 0    Associated Diagnoses: Migraine with aura and without status migrainosus, not intractable      levothyroxine 125 mcg tablet Take 1 tablet (125 mcg total) by mouth daily in the early morning  Qty: 30 tablet, Refills: 0    Associated Diagnoses: Hypertensive urgency      losartan (COZAAR) 100 MG tablet Take 1 tablet (100 mg total) by mouth daily  Qty: 30 tablet, Refills: 0    Associated Diagnoses: Hypertensive emergency      methocarbamol (ROBAXIN) 500 mg tablet Take 2 tablets (1,000 mg total) by mouth 3 (three) times a day as needed for muscle spasms (back pain)  Qty: 30 tablet, Refills: 0    Associated Diagnoses: Acute right-sided thoracic back pain      nicotine (NICODERM CQ) 7 mg/24hr TD 24 hr patch Place 1 patch on the skin daily  Qty: 28 patch, Refills: 0    Associated Diagnoses: Cigarette nicotine dependence with other nicotine-induced disorder      oxyCODONE-acetaminophen (PERCOCET) 5-325 mg per tablet Take 1 tablet by mouth every 6 (six) hours as needed for severe pain for up to 20 doses Max Daily Amount: 4 tablets  Qty: 20 tablet, Refills: 0    Associated Diagnoses: Fall, initial encounter; Left ankle sprain; Injury of left knee, initial encounter      rosuvastatin (CRESTOR) 10 MG tablet Take 1 tablet (10 mg total) by mouth daily  Qty: 30 tablet, Refills: 0    Associated Diagnoses: Mixed hyperlipidemia      topiramate (Topamax) 25 mg tablet Take 1 tablet (25 mg total) by mouth daily  Qty: 30 tablet, Refills: 0    Associated Diagnoses: Migraine with aura and without status migrainosus, not intractable             No discharge procedures on file. Mj Sun MD    Portions of the record may have been created with voice recognition software. Occasional wrong word or "sound alike" substitutions may have occurred due to the inherent limitations of voice recognition software.   Please read the chart carefully and recognize, using context, where substitutions have occurred     Mj Sun MD  11/19/23 2122

## 2023-11-20 LAB
ANION GAP SERPL CALCULATED.3IONS-SCNC: 7 MMOL/L
ATRIAL RATE: 76 BPM
ATRIAL RATE: 87 BPM
BASOPHILS # BLD AUTO: 0.02 THOUSANDS/ÂΜL (ref 0–0.1)
BASOPHILS NFR BLD AUTO: 0 % (ref 0–1)
BUN SERPL-MCNC: 9 MG/DL (ref 5–25)
CALCIUM SERPL-MCNC: 9.5 MG/DL (ref 8.4–10.2)
CHLORIDE SERPL-SCNC: 106 MMOL/L (ref 96–108)
CO2 SERPL-SCNC: 22 MMOL/L (ref 21–32)
CREAT SERPL-MCNC: 0.82 MG/DL (ref 0.6–1.3)
EOSINOPHIL # BLD AUTO: 0 THOUSAND/ÂΜL (ref 0–0.61)
EOSINOPHIL NFR BLD AUTO: 0 % (ref 0–6)
ERYTHROCYTE [DISTWIDTH] IN BLOOD BY AUTOMATED COUNT: 14 % (ref 11.6–15.1)
EST. AVERAGE GLUCOSE BLD GHB EST-MCNC: 134 MG/DL
GFR SERPL CREATININE-BSD FRML MDRD: 86 ML/MIN/1.73SQ M
GLUCOSE P FAST SERPL-MCNC: 203 MG/DL (ref 65–99)
GLUCOSE SERPL-MCNC: 203 MG/DL (ref 65–140)
HBA1C MFR BLD: 6.3 %
HCT VFR BLD AUTO: 43.9 % (ref 34.8–46.1)
HGB BLD-MCNC: 14.7 G/DL (ref 11.5–15.4)
IMM GRANULOCYTES # BLD AUTO: 0.08 THOUSAND/UL (ref 0–0.2)
IMM GRANULOCYTES NFR BLD AUTO: 1 % (ref 0–2)
LYMPHOCYTES # BLD AUTO: 0.93 THOUSANDS/ÂΜL (ref 0.6–4.47)
LYMPHOCYTES NFR BLD AUTO: 9 % (ref 14–44)
MCH RBC QN AUTO: 29.8 PG (ref 26.8–34.3)
MCHC RBC AUTO-ENTMCNC: 33.5 G/DL (ref 31.4–37.4)
MCV RBC AUTO: 89 FL (ref 82–98)
MONOCYTES # BLD AUTO: 0.11 THOUSAND/ÂΜL (ref 0.17–1.22)
MONOCYTES NFR BLD AUTO: 1 % (ref 4–12)
NEUTROPHILS # BLD AUTO: 9.13 THOUSANDS/ÂΜL (ref 1.85–7.62)
NEUTS SEG NFR BLD AUTO: 89 % (ref 43–75)
NRBC BLD AUTO-RTO: 0 /100 WBCS
P AXIS: 21 DEGREES
P AXIS: 53 DEGREES
PLATELET # BLD AUTO: 356 THOUSANDS/UL (ref 149–390)
PMV BLD AUTO: 10.7 FL (ref 8.9–12.7)
POTASSIUM SERPL-SCNC: 3.9 MMOL/L (ref 3.5–5.3)
PR INTERVAL: 192 MS
PR INTERVAL: 194 MS
QRS AXIS: 45 DEGREES
QRS AXIS: 52 DEGREES
QRSD INTERVAL: 92 MS
QRSD INTERVAL: 94 MS
QT INTERVAL: 404 MS
QT INTERVAL: 432 MS
QTC INTERVAL: 454 MS
QTC INTERVAL: 519 MS
RBC # BLD AUTO: 4.94 MILLION/UL (ref 3.81–5.12)
SODIUM SERPL-SCNC: 135 MMOL/L (ref 135–147)
T WAVE AXIS: 262 DEGREES
T WAVE AXIS: 35 DEGREES
VENTRICULAR RATE: 76 BPM
VENTRICULAR RATE: 87 BPM
WBC # BLD AUTO: 10.27 THOUSAND/UL (ref 4.31–10.16)

## 2023-11-20 PROCEDURE — 80048 BASIC METABOLIC PNL TOTAL CA: CPT | Performed by: INTERNAL MEDICINE

## 2023-11-20 PROCEDURE — 85025 COMPLETE CBC W/AUTO DIFF WBC: CPT | Performed by: INTERNAL MEDICINE

## 2023-11-20 PROCEDURE — 83036 HEMOGLOBIN GLYCOSYLATED A1C: CPT | Performed by: INTERNAL MEDICINE

## 2023-11-20 PROCEDURE — 99232 SBSQ HOSP IP/OBS MODERATE 35: CPT

## 2023-11-20 PROCEDURE — 93010 ELECTROCARDIOGRAM REPORT: CPT | Performed by: INTERNAL MEDICINE

## 2023-11-20 RX ORDER — AMLODIPINE BESYLATE 5 MG/1
5 TABLET ORAL DAILY
Status: DISCONTINUED | OUTPATIENT
Start: 2023-11-20 | End: 2023-11-20

## 2023-11-20 RX ORDER — AMLODIPINE BESYLATE 10 MG/1
10 TABLET ORAL DAILY
Status: DISCONTINUED | OUTPATIENT
Start: 2023-11-21 | End: 2023-11-21 | Stop reason: HOSPADM

## 2023-11-20 RX ADMIN — AMLODIPINE BESYLATE 5 MG: 5 TABLET ORAL at 12:02

## 2023-11-20 RX ADMIN — DIPHENHYDRAMINE HYDROCHLORIDE 25 MG: 50 INJECTION, SOLUTION INTRAMUSCULAR; INTRAVENOUS at 22:11

## 2023-11-20 RX ADMIN — ATORVASTATIN CALCIUM 20 MG: 20 TABLET, FILM COATED ORAL at 15:40

## 2023-11-20 RX ADMIN — MAGNESIUM SULFATE HEPTAHYDRATE 2 G: 40 INJECTION, SOLUTION INTRAVENOUS at 08:54

## 2023-11-20 RX ADMIN — CHLORTHALIDONE 25 MG: 25 TABLET ORAL at 08:59

## 2023-11-20 RX ADMIN — NICOTINE 1 PATCH: 14 PATCH, EXTENDED RELEASE TRANSDERMAL at 08:59

## 2023-11-20 RX ADMIN — TOPIRAMATE 25 MG: 25 TABLET, FILM COATED ORAL at 08:59

## 2023-11-20 RX ADMIN — DIPHENHYDRAMINE HYDROCHLORIDE 25 MG: 50 INJECTION, SOLUTION INTRAMUSCULAR; INTRAVENOUS at 06:02

## 2023-11-20 RX ADMIN — DIPHENHYDRAMINE HYDROCHLORIDE 25 MG: 50 INJECTION, SOLUTION INTRAMUSCULAR; INTRAVENOUS at 15:40

## 2023-11-20 RX ADMIN — METOCLOPRAMIDE 10 MG: 5 INJECTION, SOLUTION INTRAMUSCULAR; INTRAVENOUS at 06:02

## 2023-11-20 RX ADMIN — METOCLOPRAMIDE 10 MG: 5 INJECTION, SOLUTION INTRAMUSCULAR; INTRAVENOUS at 22:11

## 2023-11-20 RX ADMIN — LEVOTHYROXINE SODIUM 125 MCG: 125 TABLET ORAL at 06:02

## 2023-11-20 RX ADMIN — LOSARTAN POTASSIUM 100 MG: 50 TABLET, FILM COATED ORAL at 08:59

## 2023-11-20 RX ADMIN — METOCLOPRAMIDE 10 MG: 5 INJECTION, SOLUTION INTRAMUSCULAR; INTRAVENOUS at 15:40

## 2023-11-20 RX ADMIN — ACETAMINOPHEN 650 MG: 325 TABLET, FILM COATED ORAL at 22:11

## 2023-11-20 NOTE — PLAN OF CARE
Problem: PAIN - ADULT  Goal: Verbalizes/displays adequate comfort level or baseline comfort level  Description: Interventions:  - Encourage patient to monitor pain and request assistance  - Assess pain using appropriate pain scale  - Administer analgesics based on type and severity of pain and evaluate response  - Implement non-pharmacological measures as appropriate and evaluate response  - Consider cultural and social influences on pain and pain management  - Notify physician/advanced practitioner if interventions unsuccessful or patient reports new pain  Outcome: Progressing     Problem: INFECTION - ADULT  Goal: Absence or prevention of progression during hospitalization  Description: INTERVENTIONS:  - Assess and monitor for signs and symptoms of infection  - Monitor lab/diagnostic results  - Monitor all insertion sites, i.e. indwelling lines, tubes, and drains  - Monitor endotracheal if appropriate and nasal secretions for changes in amount and color  - [place] appropriate cooling/warming therapies per order  - Administer medications as ordered  - Instruct and encourage patient and family to use good hand hygiene technique  - Identify and instruct in appropriate isolation precautions for identified infection/condition  Outcome: Progressing  Goal: Absence of fever/infection during neutropenic period  Description: INTERVENTIONS:  - Monitor WBC    Outcome: Progressing     Problem: SAFETY ADULT  Goal: Patient will remain free of falls  Description: INTERVENTIONS:  - Educate patient/family on patient safety including physical limitations  - Instruct patient to call for assistance with activity   - Consult OT/PT to assist with strengthening/mobility   - Keep Call bell within reach  - Keep bed low and locked with side rails adjusted as appropriate  - Keep care items and personal belongings within reach  - Initiate and maintain comfort rounds  - Make Fall Risk Sign visible to staff  - Offer Toileting every 2 Hours, in advance of need  - Initiate/Maintain bed alarm  - Obtain necessary fall risk management equipment: bed alarm, nonskid socks  - Apply yellow socks and bracelet for high fall risk patients  - Consider moving patient to room near nurses station  Outcome: Progressing  Goal: Maintain or return to baseline ADL function  Description: INTERVENTIONS:  -  Assess patient's ability to carry out ADLs; assess patient's baseline for ADL function and identify physical deficits which impact ability to perform ADLs (bathing, care of mouth/teeth, toileting, grooming, dressing, etc.)  - Assess/evaluate cause of self-care deficits   - Assess range of motion  - Assess patient's mobility; develop plan if impaired  - Assess patient's need for assistive devices and provide as appropriate  - Encourage maximum independence but intervene and supervise when necessary  - Involve family in performance of ADLs  - Assess for home care needs following discharge   - Consider OT consult to assist with ADL evaluation and planning for discharge  - Provide patient education as appropriate  Outcome: Progressing  Goal: Maintains/Returns to pre admission functional level  Description: INTERVENTIONS:  - Perform AM-PAC 6 Click Basic Mobility/ Daily Activity assessment daily.  - Set and communicate daily mobility goal to care team and patient/family/caregiver. - Collaborate with rehabilitation services on mobility goals if consulted  - Perform Range of Motion 4 times a day. - Reposition patient every 4 hours.   - Dangle patient 4 times a day  - Stand patient 4 times a day  - Ambulate patient 4 times a day  - Out of bed to chair 3 times a day   - Out of bed for meals 3 times a day  - Out of bed for toileting  - Record patient progress and toleration of activity level   Outcome: Progressing     Problem: DISCHARGE PLANNING  Goal: Discharge to home or other facility with appropriate resources  Description: INTERVENTIONS:  - Identify barriers to discharge w/patient and caregiver  - Arrange for needed discharge resources and transportation as appropriate  - Identify discharge learning needs (meds, wound care, etc.)  - Arrange for interpretive services to assist at discharge as needed  - Refer to Case Management Department for coordinating discharge planning if the patient needs post-hospital services based on physician/advanced practitioner order or complex needs related to functional status, cognitive ability, or social support system  Outcome: Progressing     Problem: Knowledge Deficit  Goal: Patient/family/caregiver demonstrates understanding of disease process, treatment plan, medications, and discharge instructions  Description: Complete learning assessment and assess knowledge base.   Interventions:  - Provide teaching at level of understanding  - Provide teaching via preferred learning methods  Outcome: Progressing

## 2023-11-20 NOTE — ASSESSMENT & PLAN NOTE
Presented with hypertensive urgency with SBP noted to be 260 on arrival was symptomatic with severe headache similar to her previous migraines   reports has not taken her blood pressure medications for 4 months   Blood pressure since significant improved with labetalol with SBP now noted to be approximately 160  we will restart home losartan, chlorthalidone  Labetalol as needed for SBP greater than 180

## 2023-11-20 NOTE — ED NOTES
Provider made aware of pt's recent blood pressure. Pt continues to c/o 7/10 headache.       Yovani Bryant RN  11/19/23 1954

## 2023-11-20 NOTE — UTILIZATION REVIEW
Initial Clinical Review    Admission: Date/Time/Statement:   Admission Orders (From admission, onward)       Ordered        11/19/23 2107  Place in Observation  Once                          Orders Placed This Encounter   Procedures    Place in Observation     Standing Status:   Standing     Number of Occurrences:   1     Order Specific Question:   Level of Care     Answer:   Med Surg [16]     ED Arrival Information       Expected   -    Arrival   11/19/2023 15:20    Acuity   Emergent              Means of arrival   Walk-In    Escorted by   Family Member    Service   Hospitalist    Admission type   Emergency              Arrival complaint   Headache             Chief Complaint   Patient presents with    Headache     Pt c/o headache x 3 days, pt reports running out of her BP medication and having high readings at home, pt states she has been overwhelmed with caring for her mother on hospice and has not been caring for herself. Hx of stroke and MI       Initial Presentation: 57 y.o. female to the ED from home with complaints of headache for 3 days. Admitted under observation for hypertensive urgency, migraine headache. H/O hypothyroidism, migraine disorder, hypertension, MI, TIA . Has not been taking medications recently. Head pain worse with noise, light. Has been caring for her mother who is under hospice care currently.      Date:     Day 2:      ED Triage Vitals [11/19/23 1525]   Temperature Pulse Respirations Blood Pressure SpO2   99 °F (37.2 °C) 94 20 (!) 262/138 100 %      Temp Source Heart Rate Source Patient Position - Orthostatic VS BP Location FiO2 (%)   Temporal Monitor Sitting Left arm --      Pain Score       7          Wt Readings from Last 1 Encounters:   11/19/23 136 kg (300 lb)     Additional Vital Signs:   Date/Time Temp Pulse Resp BP MAP (mmHg) SpO2 O2 Device Patient Position - Orthostatic VS   11/20/23 07:20:04 97.8 °F (36.6 °C) 82 18 171/98 Abnormal  122 96 % -- --   11/20/23 00:19:49 -- 91 -- 165/100 122 92 % -- --   11/19/23 21:43:56 98.1 °F (36.7 °C) 74 20 184/100 Abnormal  128 96 % -- --   11/19/23 2038 -- 77 20 164/77 -- 100 % None (Room air) Lying   11/19/23 2015 -- 79 20 212/95 Abnormal  137 98 % None (Room air) Lying   11/19/23 2000 -- 79 20 208/80 Abnormal  136 98 % None (Room air) Lying   11/19/23 1950 -- -- -- 216/103 Abnormal  -- -- -- Sitting   11/19/23 1945 -- 78 20 219/105 Abnormal  150 99 % None (Room air) --   11/19/23 1630 -- 93 20 191/83 Abnormal  119 98 % -- --   11/19/23 1600 -- 89 20 225/104 Abnormal  149 96 % -- --   11/19/23 1525 99 °F (37.2 °C) 94 20 262/138 Abnormal  184 100 % None (Room air) Sitting     Pertinent Labs/Diagnostic Test Results:   CTA head and neck with and without contrast   Final Result by Payal Padgett MD (11/19 1958)         1. No intracranial hemorrhage, infarct or mass. 2. No hemodynamically significant stenosis, dissection or occlusion of the carotid or vertebral arteries or major vessels of the [place]. No intracranial aneurysm.                   Workstation performed: [id number]         XR chest 1 view portable    (Results Pending)     Results from last 7 days   Lab Units 11/19/23  1555   SARS-COV-2  Negative     Results from last 7 days   Lab Units 11/20/23  0537 11/19/23  1555   WBC Thousand/uL 10.27* 11.02*   HEMOGLOBIN g/dL 14.7 14.4   HEMATOCRIT % 43.9 43.7   PLATELETS Thousands/uL 356 339   NEUTROS ABS Thousands/µL 9.13* 8.01*         Results from last 7 days   Lab Units 11/20/23  0537 11/19/23  1555   SODIUM mmol/L 135 136   POTASSIUM mmol/L 3.9 3.9   CHLORIDE mmol/L 106 105   CO2 mmol/L 22 20*   ANION GAP mmol/L 7 11   BUN mg/dL 9 11   CREATININE mg/dL 0.82 0.94   EGFR ml/min/1.73sq m 86 72   CALCIUM mg/dL 9.5 9.4     Results from last 7 days   Lab Units 11/19/23  1555   AST U/L 110*   ALT U/L 43   ALK PHOS U/L 159*   TOTAL PROTEIN g/dL 7.9   ALBUMIN g/dL 4.0   TOTAL BILIRUBIN mg/dL 0.47         Results from last 7 days   Lab Units 11/20/23  0537 11/19/23  1555   GLUCOSE RANDOM mg/dL 203* 149*           Results from last 7 days   Lab Units 11/19/23  1946 11/19/23  1823 11/19/23  1555   HS TNI 0HR ng/L  --   --  9   HS TNI 2HR ng/L  --  9  --    HSTNI D2 ng/L  --  0  --    HS TNI 4HR ng/L 10  --   --    HSTNI D4 ng/L 1  --   --          Results from last 7 days   Lab Units 11/19/23  1555   PROTIME seconds 13.1   INR  0.94   PTT seconds 30     Results from last 7 days   Lab Units 11/19/23  1555   BNP pg/mL 63         Results from last 7 days   Lab Units 11/19/23  1555   SED RATE mm/hour 62*       Results from last 7 days   Lab Units 11/19/23  2259   CLARITY UA  Clear   COLOR UA  Yellow   SPEC GRAV UA  >=1.050*   PH UA  5.5   GLUCOSE UA mg/dl Negative   KETONES UA mg/dl Negative   BLOOD UA  Negative   PROTEIN UA mg/dl 50 (1+)*   NITRITE UA  Negative   BILIRUBIN UA  Negative   UROBILINOGEN UA (BE) mg/dl <2.0   LEUKOCYTES UA  Negative   WBC UA /hpf 2-4*   RBC UA /hpf 4-10*   BACTERIA UA /hpf None Seen   EPITHELIAL CELLS WET PREP /hpf Occasional   MUCUS THREADS  Innumerable*     Results from last 7 days   Lab Units 11/19/23  1555   INFLUENZA A PCR  Negative   INFLUENZA B PCR  Negative   RSV PCR  Negative     ED Treatment:   Medication Administration from 11/19/2023 1520 to 11/19/2023 2138         Date/Time Order Dose Route Action Comments     11/19/2023 1555 EST metoclopramide (REGLAN) injection 10 mg 10 mg Intravenous Given --     11/19/2023 1555 EST diphenhydrAMINE (BENADRYL) injection 25 mg 25 mg Intravenous Given --     11/19/2023 1631 EST magnesium sulfate 2 g/50 mL IVPB (premix) 2 g 2 g Intravenous New Bag --     11/19/2023 1833 EST acetaminophen (Ofirmev) injection 1,000 mg 1,000 mg Intravenous New Bag --     11/19/2023 2014 EST labetalol (NORMODYNE) injection 20 mg 20 mg Intravenous Given --     11/19/2023 2022 EST methylPREDNISolone sodium succinate (Solu-MEDROL) injection 80 mg 80 mg Intravenous Given --          Past Medical History: Diagnosis Date    History of MI (myocardial infarction)     History of TIA (transient ischemic attack)     Hypertension     Hypothyroidism     hypothyroidism    Morbid obesity with BMI of 45.0-49.9, adult (HCC)     TIA (transient ischemic attack)          Admitting Diagnosis: Leukocytosis [D72.829]  ESR raised [R70.0]  Hyperglycemia [R73.9]  Hypertensive urgency [I16.0]  Acute intractable headache, unspecified headache type [R51.9]  Age/Sex: 57 y.o. female  Admission Orders:  Scheduled Medications:  atorvastatin, 20 mg, Oral, Daily With Dinner  chlorthalidone, 25 mg, Oral, Daily  diphenhydrAMINE, 25 mg, Intravenous, Q8H [address]  levothyroxine, 125 mcg, Oral, Early Morning  losartan, 100 mg, Oral, Daily  magnesium sulfate, 2 g, Intravenous, Q24H MARYA  metoclopramide, 10 mg, Intravenous, Q8H [address]  nicotine, 1 patch, Transdermal, Daily  topiramate, 25 mg, Oral, Daily      Continuous IV Infusions:     PRN Meds:  acetaminophen, 650 mg, Oral, Q6H PRN  labetalol, 10 mg, Intravenous, Q4H PRN  SUMAtriptan, 6 mg, Subcutaneous, Q1H PRN          Network Utilization Review Department  ATTENTION: Please call with any questions or concerns to [phone] and carefully listen to the prompts so that you are directed to the right person. All voicemails are confidential.   For Discharge needs, contact Care Management DC Support Team at [phone] opt. 2  Send all requests for admission clinical reviews, approved or denied determinations and any other requests to dedicated fax number below belonging to the campus where the patient is receiving treatment.  List of dedicated fax numbers for the Facilities:  [place] DENIALS (Administrative/Medical Necessity) [phone]   DISCHARGE SUPPORT TEAM (NETWORK) [id number] (Maternity/NICU/Pediatrics) [phone]   [address] [facility] Rainer Rodriguez [phone]   [address] [address] [facility] [address] [place] [address] [phone]

## 2023-11-20 NOTE — PROGRESS NOTES
[address]  Progress Note  Name: Aydin Velasquez  MRN: [id number]  Unit/Bed#: -01 I Date of Admission: 11/19/2023   Date of Service: 11/20/2023 I Hospital Day: 0    Assessment/Plan   * Hypertensive urgency  Assessment & Plan  Presented with hypertensive urgency with SBP noted to be 260 on arrival was symptomatic with severe headache similar to her previous migraines   reports has not taken her blood pressure medications for 4 months    SBP now noted to be approximately 170s to 180  we will restart home losartan, chlorthalidone, blood pressure remained to be high, add amlodipine and will likely have to increase dose in morning  Labetalol as needed for SBP greater than 180  Patient continues to have intermittent headaches    Hyperglycemia  Assessment & Plan  Blood glucose noted to be 159  A1c 6.3    Migraine headache  Assessment & Plan  Patient with history of migraines presenting with migraine headache that is slowly improving    Received migraine cocktail emergency department with Benadryl and IV steroids  We will continue with steroids and and Reglan  Can consider IV Tylenol tomorrow if needed    Mixed hyperlipidemia  Assessment & Plan  Continue statin    Morbid obesity with BMI of 45.0-49.9, adult ([address])  Assessment & Plan  Diet and exercise counseling provided    Thyroid disease  Assessment & Plan  Continue Synthroid    Dependence on nicotine from cigarettes  Assessment & Plan   Nicotine patch okay           VTE Pharmacologic Prophylaxis: VTE Score: 3 Moderate Risk (Score 3-4) - Pharmacological DVT Prophylaxis Contraindicated. Sequential Compression Devices Ordered. Mobility:   Basic Mobility Inpatient Raw Score: 22  JH-HLM Goal: 7: Walk 25 feet or more  JH-HLM Achieved: 3: Sit at edge of bed  [facility] Goal NOT achieved. Continue with multidisciplinary rounding and encourage appropriate mobility to improve upon [facility] goals.     Patient Centered Rounds: I performed bedside rounds with nursing staff today. Discussions with Specialists or Other Care Team Provider: JOSE G    Education and Discussions with Family / Patient: Updated  (daughter) at bedside. Total Time Spent on Date of Encounter in care of patient: 35 mins. This time was spent on one or more of the following: performing physical exam; counseling and coordination of care; obtaining or reviewing history; documenting in the medical record; reviewing/ordering tests, medications or procedures; communicating with other healthcare professionals and discussing with patient's family/caregivers. Current Length of Stay: 0 day(s)  Current Patient Status: Observation   Certification Statement: The patient will continue to require additional inpatient hospital stay due to continued symptomatic hypotension requiring adjustments in medications  Discharge Plan: Anticipate discharge tomorrow to home. Code Status: Level 1 - Full Code    Subjective:   Patient reports having a continued headache. She denies any chest pain/pressure, palpitations, numbness, nausea, shortness of breath, or chills. Objective:     Vitals:   Temp (24hrs), Av.8 °F (36.6 °C), Min:97.4 °F (36.3 °C), Max:98.1 °F (36.7 °C)    Temp:  [97.4 °F (36.3 °C)-98.1 °F (36.7 °C)] 97.4 °F (36.3 °C)  HR:  [72-93] 88  Resp:  [18-20] 19  BP: (164-225)/() 173/77  SpO2:  [84 %-100 %] 94 %  Body mass index is 48.42 kg/m². Input and Output Summary (last 24 hours): Intake/Output Summary (Last 24 hours) at 2023 1536  Last data filed at 2023 1300  Gross per 24 hour   Intake 930 ml   Output 150 ml   Net 780 ml       Physical Exam:   Physical Exam  Vitals and nursing note reviewed. Constitutional:       Appearance: She is obese. HENT:      Head: Normocephalic. Nose: Nose normal.      Mouth/Throat:      Mouth: Mucous membranes are moist.      Pharynx: Oropharynx is clear. Eyes:      General: No scleral icterus.      Conjunctiva/sclera: Conjunctivae normal.      Pupils: Pupils are equal, round, and reactive to light. Cardiovascular:      Rate and Rhythm: Normal rate and regular rhythm. Heart sounds: No murmur heard. No friction rub. No gallop. Pulmonary:      Effort: Pulmonary effort is normal. No respiratory distress. Breath sounds: Normal breath sounds. No stridor. No wheezing, rhonchi or rales. Abdominal:      General: Abdomen is flat. Palpations: Abdomen is soft. Musculoskeletal:         General: Normal range of motion. Cervical back: Normal range of motion and neck supple. Right lower leg: No edema. Left lower leg: No edema. Lymphadenopathy:      Cervical: No cervical adenopathy. Skin:     General: Skin is warm. Coloration: Skin is not jaundiced or pale. Findings: No bruising, erythema or lesion. Neurological:      General: No focal deficit present. Mental Status: She is alert and oriented to person, place, and time. Mental status is at baseline. Cranial Nerves: No cranial nerve deficit. Motor: No weakness. Psychiatric:         Mood and Affect: Mood normal.         Behavior: Behavior normal.         Thought Content:  Thought content normal.          Additional Data:     Labs:  Results from last 7 days   Lab Units 11/20/23  0537   WBC Thousand/uL 10.27*   HEMOGLOBIN g/dL 14.7   HEMATOCRIT % 43.9   PLATELETS Thousands/uL 356   NEUTROS PCT % 89*   LYMPHS PCT % 9*   MONOS PCT % 1*   EOS PCT % 0     Results from last 7 days   Lab Units 11/20/23  0537 11/19/23  1555   SODIUM mmol/L 135 136   POTASSIUM mmol/L 3.9 3.9   CHLORIDE mmol/L 106 105   CO2 mmol/L 22 20*   BUN mg/dL 9 11   CREATININE mg/dL 0.82 0.94   ANION GAP mmol/L 7 11   CALCIUM mg/dL 9.5 9.4   ALBUMIN g/dL  --  4.0   TOTAL BILIRUBIN mg/dL  --  0.47   ALK PHOS U/L  --  159*   ALT U/L  --  43   AST U/L  --  110*   GLUCOSE RANDOM mg/dL 203* 149*     Results from last 7 days   Lab Units 11/19/23  1555   INR  0.94 Results from last 7 days   Lab Units 11/20/23  0537   HEMOGLOBIN A1C % 6.3*           Lines/Drains:  Invasive Devices       Peripheral Intravenous Line  Duration             Peripheral IV 11/19/23 Right Antecubital <1 day                          Imaging: No pertinent imaging reviewed. Recent Cultures (last 7 days):         Last 24 Hours Medication List:   Current Facility-Administered Medications   Medication Dose Route Frequency Provider Last Rate    acetaminophen  650 mg Oral Q6H PRN Vern Main MD      [START ON 11/21/2023] amLODIPine  10 mg Oral Daily Angelina Ascencio PA-C      atorvastatin  20 mg Oral Daily With Gayle Duane, MD      chlorthalidone  25 mg Oral Daily Vern Main MD      diphenhydrAMINE  25 mg Intravenous Q8H [address] [facility] [facility], [place]      labetalol  10 mg Intravenous Q4H PRN Vern Main MD      levothyroxine  125 mcg Oral Early Morning Vern Main MD      losartan  100 mg Oral Daily Vern Main MD      magnesium sulfate  2 g Intravenous Q24H [address] Vern Main MD 2 g (11/20/23 0854)    metoclopramide  10 mg Intravenous Q8H [address], MD      nicotine  1 patch Transdermal Daily Vern Main MD      SUMAtriptan  6 mg Subcutaneous Q1H PRN Vern Main MD      topiramate  25 mg Oral Daily Manuela Montelongo MD          Today, Patient Was Seen By: Angelina Ascencio PA-C    **Please Note: This note may have been constructed using a voice recognition system. **

## 2023-11-20 NOTE — ASSESSMENT & PLAN NOTE
Patient with history of migraines presenting with migraine headache that is slowly improving    Received migraine cocktail emergency department with Benadryl and IV steroids  We will continue with steroids and and Reglan  Can consider IV Tylenol tomorrow if needed

## 2023-11-20 NOTE — ASSESSMENT & PLAN NOTE
Presented with hypertensive urgency with SBP noted to be 260 on arrival was symptomatic with severe headache similar to her previous migraines   reports has not taken her blood pressure medications for 4 months    SBP now noted to be approximately 170s to 180  we will restart home losartan, chlorthalidone, blood pressure remained to be high, add amlodipine and will likely have to increase dose in morning  Labetalol as needed for SBP greater than 180  Patient continues to have intermittent headaches

## 2023-11-20 NOTE — CASE MANAGEMENT
Case Management Assessment & Discharge Planning Note    Patient name Micha Garcia  Location [address] [facility] [place] 236/-[id number] MRN [id number]  : 1975 Date 2023       Current Admission Date: 2023  Current Admission Diagnosis:Hypertensive urgency   Patient Active Problem List    Diagnosis Date Noted    Hyperglycemia 2023    Abnormal CT scan 2022    Migraine headache 2022    SIRS (systemic inflammatory response syndrome) (HCC) 07/15/2021    SOB (shortness of breath) 2020    Hypokalemia 2020    Leukocytosis 2020    Acute hip pain, left 2020    Mixed hyperlipidemia 2020    Syncope 2020    Acquired hypothyroidism     Morbid obesity with BMI of 45.0-49.9, adult ([address])     Breast abscess 2019    Essential hypertension     Dependence on nicotine from cigarettes 2018    Obesity 2018    Thyroid disease 2018    Chest pain 2016    Hypertensive urgency 2016    Headache 2016      LOS (days): 0  Geometric Mean LOS (GMLOS) (days):   Days to GMLOS:     OBJECTIVE:              Current admission status: Observation       Preferred Pharmacy:   [address]  Phone: [phone] Fax: [phone]    Primary Care Provider: Earlis Moritz, DO    Primary Insurance: [facility]  Secondary Insurance:     ASSESSMENT:  [facility],[address], [address] Representative - Spouse   Primary Phone: [phone] (Mobile)                 Advance Directives  Does patient have a [address]?: No  Was patient offered paperwork?: Yes (provided)  Does patient have Advance Directives?: No  Was patient offered paperwork?: Yes (provided)  Primary Contact: Sara Mathew (Spouse)  [phone] (Mobile)         Readmission Root Cause  30 Day Readmission: No    Patient Information  Admitted from[de-identified] Home  Mental Status: Alert  During Assessment patient was accompanied by: Daughter  Assessment information provided by[de-identified] Patient  Primary Caregiver: Self  Support Systems: Spouse/significant other, [address] of Residence: [address] [facility] do you live in?: [facility] entry access options.  Select all that apply.: Stairs  Number of steps to enter home.: 4  Do the steps have railings?: Yes  Type of Current Residence: 2 story home, Other (Comment) (w finished basement)  Upon entering residence, is there a bedroom on the main floor (no further steps)?: No  A bedroom is located on the following floor levels of residence (select all that apply):: 2nd Floor  Upon entering residence, is there a bathroom on the main floor (no further steps)?: No  Indicate which floors of current residence have a bathroom (select all the apply):: 2nd Floor  Number of steps to 2nd floor from main floor: One Flight  Living Arrangements: Lives w/ Spouse/significant other, Lives w/ Son, Lives w/ Daughter, Other (Comment) (children ages 24, 23, 12, 10)  Is patient a ?: No    Activities of Daily Living Prior to Admission  Functional Status: Independent  Completes ADLs independently?: Yes  Ambulates independently?: Yes  Does patient use assisted devices?: No  Does patient currently own DME?: No  Does patient have a history of Outpatient Therapy (PT/OT)?: No  Does the patient have a history of Short-Term Rehab?: No  Does patient have a history of HHC?: No  Does patient currently have [address]?: No         Patient Information Continued  Income Source: Unemployed  Does patient have prescription coverage?: Yes  Does patient receive dialysis treatments?: No  Does patient have a history of substance abuse?: No  Does patient have a history of Mental Health Diagnosis?: No         Means of Transportation  [facility] D[facility] Self      Housing Stability: Not on file   Food Insecurity: Not on file   Transportation Needs: Not on file   Utilities: Not on file DISCHARGE DETAILS:    Discharge planning discussed with[de-identified] pt  Freedom of Choice: Yes  Comments - Freedom of Choice: Met w pt, introduced self and explained role of CM, including arranging DME, STR, home health, out pt tx. Advised pt that CM will make appropriate referrals based on treatment team recommendations and MD orders, and she can consider recommended plan of care and choose from available vendors. CM contacted family/caregiver?: No- see comments (pt alert and oriented adult)  Were Treatment Team discharge recommendations reviewed with patient/caregiver?:  (none at present)          Contacts  Patient Contacts: Mo Hagen (Spouse)  [phone] (Mobile)  Relationship to Patient[de-identified] [address]         Is the patient interested in [address] at discharge?: No    DME Referral Provided  Referral made for DME?: No    Other Referral/Resources/Interventions Provided:  Referral Comments: Pt assigned to OBV status for HtN urgency and migraine h/a. 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 Pt has two minors at home;  one child is 6, one is 12. Pt's spouse is a  and frequently on the road. Pt reports her two oldest children are very responsible and provide appropriate care and supervision to her mother and children while she is hospitalized. Pt hopes to feel better and return home soon.          Treatment Team Recommendation: Other (TBD)  Discharge Destination Plan[de-identified] Other (TBD)  Transport at Discharge : Family

## 2023-11-20 NOTE — PLAN OF CARE
Problem: PAIN - ADULT  Goal: Verbalizes/displays adequate comfort level or baseline comfort level  Description: Interventions:  - Encourage patient to monitor pain and request assistance  - Assess pain using appropriate pain scale  - Administer analgesics based on type and severity of pain and evaluate response  - Implement non-pharmacological measures as appropriate and evaluate response  - Consider cultural and social influences on pain and pain management  - Notify physician/advanced practitioner if interventions unsuccessful or patient reports new pain  Outcome: Progressing     Problem: INFECTION - ADULT  Goal: Absence or prevention of progression during hospitalization  Description: INTERVENTIONS:  - Assess and monitor for signs and symptoms of infection  - Monitor lab/diagnostic results  - Monitor all insertion sites, i.e. indwelling lines, tubes, and drains  - Monitor endotracheal if appropriate and nasal secretions for changes in amount and color  - Anna appropriate cooling/warming therapies per order  - Administer medications as ordered  - Instruct and encourage patient and family to use good hand hygiene technique  - Identify and instruct in appropriate isolation precautions for identified infection/condition  Outcome: Progressing  Goal: Absence of fever/infection during neutropenic period  Description: INTERVENTIONS:  - Monitor WBC    Outcome: Progressing     Problem: SAFETY ADULT  Goal: Patient will remain free of falls  Description: INTERVENTIONS:  - Educate patient/family on patient safety including physical limitations  - Instruct patient to call for assistance with activity   - Consult OT/PT to assist with strengthening/mobility   - Keep Call bell within reach  - Keep bed low and locked with side rails adjusted as appropriate  - Keep care items and personal belongings within reach  - Initiate and maintain comfort rounds  - Make Fall Risk Sign visible to staff  - Apply yellow socks and bracelet for high fall risk patients  - Consider moving patient to room near nurses station  Outcome: Progressing  Goal: Maintain or return to baseline ADL function  Description: INTERVENTIONS:  -  Assess patient's ability to carry out ADLs; assess patient's baseline for ADL function and identify physical deficits which impact ability to perform ADLs (bathing, care of mouth/teeth, toileting, grooming, dressing, etc.)  - Assess/evaluate cause of self-care deficits   - Assess range of motion  - Assess patient's mobility; develop plan if impaired  - Assess patient's need for assistive devices and provide as appropriate  - Encourage maximum independence but intervene and supervise when necessary  - Involve family in performance of ADLs  - Assess for home care needs following discharge   - Consider OT consult to assist with ADL evaluation and planning for discharge  - Provide patient education as appropriate  Outcome: Progressing  Goal: Maintains/Returns to pre admission functional level  Description: INTERVENTIONS:  - Perform AM-PAC 6 Click Basic Mobility/ Daily Activity assessment daily.  - Set and communicate daily mobility goal to care team and patient/family/caregiver. - Collaborate with rehabilitation services on mobility goals if consulted  - Perform Range of Motion 3 times a day.   - Dangle patient 3 times a day  - Stand patient 3 times a day  - Ambulate patient 3 times a day  - Out of bed to chair 3 times a day   - Out of bed for meals 3 times a day  - Out of bed for toileting  - Record patient progress and toleration of activity level   Outcome: Progressing     Problem: DISCHARGE PLANNING  Goal: Discharge to home or other facility with appropriate resources  Description: INTERVENTIONS:  - Identify barriers to discharge w/patient and caregiver  - Arrange for needed discharge resources and transportation as appropriate  - Identify discharge learning needs (meds, wound care, etc.)  - Arrange for interpretive services to assist at discharge as needed  - Refer to Case Management Department for coordinating discharge planning if the patient needs post-hospital services based on physician/advanced practitioner order or complex needs related to functional status, cognitive ability, or social support system  Outcome: Progressing     Problem: Knowledge Deficit  Goal: Patient/family/caregiver demonstrates understanding of disease process, treatment plan, medications, and discharge instructions  Description: Complete learning assessment and assess knowledge base.   Interventions:  - Provide teaching at level of understanding  - Provide teaching via preferred learning methods  Outcome: Progressing

## 2023-11-20 NOTE — H&P
[facility]  H&P  Name: Van Montano 57 y.o. female I MRN: [id number]  Unit/Bed#: ED 32 I Date of Admission: 11/19/2023   Date of Service: 11/19/2023 I Hospital Day: 0      Assessment/Plan   * Hypertensive urgency  Assessment & Plan  Presented with hypertensive urgency with SBP noted to be 260 on arrival was symptomatic with severe headache similar to her previous migraines   reports has not taken her blood pressure medications for 4 months   Blood pressure since significant improved with labetalol with SBP now noted to be approximately 160  we will restart home losartan, chlorthalidone  Labetalol as needed for SBP greater than 180    Hyperglycemia  Assessment & Plan  Blood glucose noted to be 159  We will check A1c    Migraine headache  Assessment & Plan  Patient with history of migraines presenting with migraine headache that is slowly improving    Received migraine cocktail emergency department with Benadryl and IV steroids  We will continue with steroids and and Reglan  Can consider IV Tylenol tomorrow if needed    Mixed hyperlipidemia  Assessment & Plan  Continue statin    Thyroid disease  Assessment & Plan  Continue Synthroid           Extubate  VTE Prophylaxis: Pharmacologic VTE Prophylaxis contraindicated due to low risk   / sequential compression device   Code Status: full code  POLST: There is no POLST form on file for this patient (pre-hospital)  Discussion with family: patient    Anticipated Length of Stay:  Patient will be admitted on an Emergency basis with an anticipated length of stay of  < 2 midnights. Justification for Hospital Stay: Hypertensive urgency    Total Time for Visit, including Counseling / Coordination of Care: 60 minutes. Greater than 50% of this total time spent on direct patient counseling and coordination of care.     Chief Complaint:   Headache    History of Present Illness:    Van Montano is a 57 y.o. female with past medical history of's past medical history significant hypothyroidism, migraine disorder, hypertension initially presented with headache. Patient reports headache that began approximate 3 days ago reports generalized severe headache that began approximately 3 days ago worse with light or noise similar to previous headaches. She otherwise denies any acute complaints. Denies chest pain, shortness of breath, cough, fevers, chills, abdominal pain, nausea, vomiting, diarrhea or any other complaints. Review of Systems:    Review of Systems   Constitutional:  Negative for activity change, appetite change, chills, diaphoresis, fever and unexpected weight change. HENT:  Negative for congestion, facial swelling and rhinorrhea. Eyes:  Negative for photophobia and visual disturbance. Respiratory:  Negative for cough, shortness of breath and wheezing. Cardiovascular:  Negative for chest pain and palpitations. Gastrointestinal:  Positive for nausea. Negative for abdominal pain, blood in stool, constipation, diarrhea and vomiting. Genitourinary:  Negative for decreased urine volume, difficulty urinating, dysuria, flank pain, frequency, hematuria and urgency. Musculoskeletal:  Negative for arthralgias, back pain, joint swelling and myalgias. Neurological:  Positive for headaches. Negative for dizziness, syncope, facial asymmetry, light-headedness and numbness. Psychiatric/Behavioral:  Negative for confusion and decreased concentration. The patient is not nervous/anxious.         Past Medical and Surgical History:     Past Medical History:   Diagnosis Date    History of MI (myocardial infarction)     History of TIA (transient ischemic attack)     Hypertension     Hypothyroidism     hypothyroidism    Morbid obesity with BMI of 45.0-49.9, adult (HCC)     TIA (transient ischemic attack)        Past Surgical History:   Procedure Laterality Date    BREAST SURGERY Left     TUBAL LIGATION         Meds/Allergies:    Prior to Admission medications    Medication Sig Start Date End Date Taking? Authorizing Provider   chlorthalidone 25 mg tablet Take 1 tablet (25 mg total) by mouth daily 8/22/22   Laura Pabon MD   diphenhydrAMINE (BENADRYL) 25 mg tablet Take 1 tablet (25 mg total) by mouth every 8 (eight) hours as needed for itching or allergies for up to 10 days 8/22/22 9/1/22  Laura Pabon MD   ketorolac (TORADOL) 10 mg tablet Take 1 tablet (10 mg total) by mouth every 8 (eight) hours as needed for moderate pain or severe pain for up to 5 days 8/22/22 8/27/22  Laura Pabon MD   levothyroxine 125 mcg tablet Take 1 tablet (125 mcg total) by mouth daily in the early morning 8/22/22 9/21/22  Laura Pabon MD   losartan (COZAAR) 100 MG tablet Take 1 tablet (100 mg total) by mouth daily 8/22/22   Laura Pabon MD   methocarbamol (ROBAXIN) 500 mg tablet Take 2 tablets (1,000 mg total) by mouth 3 (three) times a day as needed for muscle spasms (back pain) 9/13/23   Shirlene White MD   nicotine (NICODERM CQ) 7 mg/24hr TD 24 hr patch Place 1 patch on the skin daily 8/22/22   Laura Pabon MD   oxyCODONE-acetaminophen (PERCOCET) 5-325 mg per tablet Take 1 tablet by mouth every 6 (six) hours as needed for severe pain for up to 20 doses Max Daily Amount: 4 tablets 6/2/23   ALVIN Yu   rosuvastatin (CRESTOR) 10 MG tablet Take 1 tablet (10 mg total) by mouth daily 8/22/22   Laura Pabon MD   topiramate (Topamax) 25 mg tablet Take 1 tablet (25 mg total) by mouth daily 8/22/22   Laura Pabon MD     I have reviewed home medications with patient personally. Allergies:    Allergies   Allergen Reactions    Trimecaine Hallucinations    Toradol [Ketorolac Tromethamine] Anxiety       Social History:     Marital Status: /Civil Union     Patient Pre-hospital Living Situation: home  Patient Pre-hospital Level of Mobility: independent  Patient Pre-hospital Diet Restrictions: none  Substance Use History:   Social History     Substance and Sexual Activity   Alcohol Use Not Currently    Comment: occasional     Social History     Tobacco Use   Smoking Status Every Day    Packs/day: 0.50    Years: 30.00    Total pack years: 15.00    Types: Cigarettes   Smokeless Tobacco Never     Social History     Substance and Sexual Activity   Drug Use No       Family History:    Family History   Problem Relation Age of Onset    Heart disease Mother     Hypertension Mother     Diabetes Father        Physical Exam:     Vitals:   Blood Pressure: 164/77 (11/19/23 2038)  Pulse: 77 (11/19/23 2038)  Temperature: 99 °F (37.2 °C) (11/19/23 1525)  Temp Source: Temporal (11/19/23 1525)  Respirations: 20 (11/19/23 2038)  SpO2: 100 % (11/19/23 2038)    Physical Exam  Constitutional:       General: She is not in acute distress. Appearance: She is well-developed. She is not diaphoretic. HENT:      Head: Normocephalic and atraumatic. Nose: Nose normal.      Mouth/Throat:      Pharynx: No oropharyngeal exudate. Eyes:      General: No scleral icterus. Right eye: No discharge. Left eye: No discharge. Conjunctiva/sclera: Conjunctivae normal.   Neck:      Thyroid: No thyromegaly. Vascular: No JVD. Cardiovascular:      Rate and Rhythm: Normal rate and regular rhythm. Heart sounds: Normal heart sounds. No murmur heard. No friction rub. No gallop. Pulmonary:      Effort: Pulmonary effort is normal. No respiratory distress. Breath sounds: Normal breath sounds. No wheezing or rales. Chest:      Chest wall: No tenderness. Abdominal:      General: Bowel sounds are normal. There is no distension. Palpations: Abdomen is soft. Tenderness: There is no abdominal tenderness. There is no guarding or rebound. Musculoskeletal:         General: No tenderness or deformity. Normal range of motion. Cervical back: Normal range of motion and neck supple. Skin:     General: Skin is warm and dry. Findings: No erythema or rash. Neurological:      Mental Status: She is alert. Mental status is at baseline. Cranial Nerves: No cranial nerve deficit. Sensory: No sensory deficit. Motor: No abnormal muscle tone. Coordination: Coordination normal.           Additional Data:     Lab Results: I have personally reviewed pertinent reports. Results from last 7 days   Lab Units 11/19/23  1555   WBC Thousand/uL 11.02*   HEMOGLOBIN g/dL 14.4   HEMATOCRIT % 43.7   PLATELETS Thousands/uL 339   NEUTROS PCT % 72   LYMPHS PCT % 20   MONOS PCT % 4   EOS PCT % 3     Results from last 7 days   Lab Units 11/19/23  1555   SODIUM mmol/L 136   POTASSIUM mmol/L 3.9   CHLORIDE mmol/L 105   CO2 mmol/L 20*   BUN mg/dL 11   CREATININE mg/dL 0.94   ANION GAP mmol/L 11   CALCIUM mg/dL 9.4   ALBUMIN g/dL 4.0   TOTAL BILIRUBIN mg/dL 0.47   ALK PHOS U/L 159*   ALT U/L 43   AST U/L 110*   GLUCOSE RANDOM mg/dL 149*     Results from last 7 days   Lab Units 11/19/23  1555   INR  0.94                   Imaging: I have personally reviewed pertinent reports. CTA head and neck with and without contrast   Final Result by Lauren Vera MD (11/19 1958)         1. No intracranial hemorrhage, infarct or mass. 2. No hemodynamically significant stenosis, dissection or occlusion of the carotid or vertebral arteries or major vessels of the [place]. No intracranial aneurysm. Workstation performed: [id number]         XR chest 1 view portable    (Results Pending)       EKG, Pathology, and Other Studies Reviewed on Admission:   EKG:  reviewed    Allscripts / Epic Records Reviewed: Yes     ** Please Note: This note has been constructed using a voice recognition system.  **

## 2023-11-21 VITALS
TEMPERATURE: 98.2 F | RESPIRATION RATE: 20 BRPM | DIASTOLIC BLOOD PRESSURE: 99 MMHG | HEART RATE: 77 BPM | SYSTOLIC BLOOD PRESSURE: 149 MMHG | OXYGEN SATURATION: 97 % | HEIGHT: 66 IN | WEIGHT: 293 LBS | BODY MASS INDEX: 47.09 KG/M2

## 2023-11-21 PROCEDURE — 99239 HOSP IP/OBS DSCHRG MGMT >30: CPT

## 2023-11-21 RX ORDER — CHLORTHALIDONE 25 MG/1
25 TABLET ORAL DAILY
Qty: 30 TABLET | Refills: 0 | Status: SHIPPED | OUTPATIENT
Start: 2023-11-21 | End: 2023-12-21

## 2023-11-21 RX ORDER — METOPROLOL TARTRATE 50 MG/1
50 TABLET, FILM COATED ORAL EVERY 12 HOURS SCHEDULED
Qty: 60 TABLET | Refills: 0 | Status: SHIPPED | OUTPATIENT
Start: 2023-11-21 | End: 2023-12-21

## 2023-11-21 RX ORDER — METOPROLOL TARTRATE 50 MG/1
50 TABLET, FILM COATED ORAL EVERY 12 HOURS SCHEDULED
Status: DISCONTINUED | OUTPATIENT
Start: 2023-11-21 | End: 2023-11-21 | Stop reason: HOSPADM

## 2023-11-21 RX ORDER — LEVOTHYROXINE SODIUM 0.12 MG/1
125 TABLET ORAL
Qty: 30 TABLET | Refills: 0 | Status: SHIPPED | OUTPATIENT
Start: 2023-11-21 | End: 2023-12-21

## 2023-11-21 RX ORDER — LOSARTAN POTASSIUM 100 MG/1
100 TABLET ORAL DAILY
Qty: 30 TABLET | Refills: 0 | Status: SHIPPED | OUTPATIENT
Start: 2023-11-21

## 2023-11-21 RX ORDER — AMLODIPINE BESYLATE 10 MG/1
10 TABLET ORAL DAILY
Qty: 30 TABLET | Refills: 0 | Status: SHIPPED | OUTPATIENT
Start: 2023-11-22 | End: 2023-12-22

## 2023-11-21 RX ADMIN — SUMATRIPTAN 6 MG: 6 INJECTION, SOLUTION SUBCUTANEOUS at 10:25

## 2023-11-21 RX ADMIN — LEVOTHYROXINE SODIUM 125 MCG: 125 TABLET ORAL at 06:30

## 2023-11-21 RX ADMIN — NICOTINE 1 PATCH: 14 PATCH, EXTENDED RELEASE TRANSDERMAL at 08:55

## 2023-11-21 RX ADMIN — CHLORTHALIDONE 25 MG: 25 TABLET ORAL at 08:55

## 2023-11-21 RX ADMIN — DIPHENHYDRAMINE HYDROCHLORIDE 25 MG: 50 INJECTION, SOLUTION INTRAMUSCULAR; INTRAVENOUS at 06:25

## 2023-11-21 RX ADMIN — LOSARTAN POTASSIUM 100 MG: 50 TABLET, FILM COATED ORAL at 08:55

## 2023-11-21 RX ADMIN — TOPIRAMATE 25 MG: 25 TABLET, FILM COATED ORAL at 08:55

## 2023-11-21 RX ADMIN — METOPROLOL TARTRATE 50 MG: 50 TABLET, FILM COATED ORAL at 10:28

## 2023-11-21 RX ADMIN — AMLODIPINE BESYLATE 10 MG: 10 TABLET ORAL at 08:55

## 2023-11-21 RX ADMIN — MAGNESIUM SULFATE HEPTAHYDRATE 2 G: 40 INJECTION, SOLUTION INTRAVENOUS at 08:55

## 2023-11-21 RX ADMIN — METOCLOPRAMIDE 10 MG: 5 INJECTION, SOLUTION INTRAMUSCULAR; INTRAVENOUS at 06:25

## 2023-11-21 NOTE — ASSESSMENT & PLAN NOTE
Presented with hypertensive urgency with SBP noted to be 260 on arrival was symptomatic with severe headache similar to her previous migraines   reports has not taken her blood pressure medications for 4 months    SBP now noted to be approximately 170s to 180  we will restart home losartan, chlorthalidone, blood pressure remained to be high, added amlodipine 10 mg as well as Lopressor, recommending to follow-up with PCP to continue care and to further manage medication adjustments  Patient reports to provider that she no longer has headaches

## 2023-11-21 NOTE — PLAN OF CARE
Problem: PAIN - ADULT  Goal: Verbalizes/displays adequate comfort level or baseline comfort level  Description: Interventions:  - Encourage patient to monitor pain and request assistance  - Assess pain using appropriate pain scale  - Administer analgesics based on type and severity of pain and evaluate response  - Implement non-pharmacological measures as appropriate and evaluate response  - Consider cultural and social influences on pain and pain management  - Notify physician/advanced practitioner if interventions unsuccessful or patient reports new pain  Outcome: Progressing     Problem: INFECTION - ADULT  Goal: Absence or prevention of progression during hospitalization  Description: INTERVENTIONS:  - Assess and monitor for signs and symptoms of infection  - Monitor lab/diagnostic results  - Monitor all insertion sites, i.e. indwelling lines, tubes, and drains  - Monitor endotracheal if appropriate and nasal secretions for changes in amount and color  - [place] appropriate cooling/warming therapies per order  - Administer medications as ordered  - Instruct and encourage patient and family to use good hand hygiene technique  - Identify and instruct in appropriate isolation precautions for identified infection/condition  Outcome: Progressing

## 2023-11-21 NOTE — PLAN OF CARE
Problem: PAIN - ADULT  Goal: Verbalizes/displays adequate comfort level or baseline comfort level  Description: Interventions:  - Encourage patient to monitor pain and request assistance  - Assess pain using appropriate pain scale  - Administer analgesics based on type and severity of pain and evaluate response  - Implement non-pharmacological measures as appropriate and evaluate response  - Consider cultural and social influences on pain and pain management  - Notify physician/advanced practitioner if interventions unsuccessful or patient reports new pain  Outcome: Progressing     Problem: INFECTION - ADULT  Goal: Absence or prevention of progression during hospitalization  Description: INTERVENTIONS:  - Assess and monitor for signs and symptoms of infection  - Monitor lab/diagnostic results  - Monitor all insertion sites, i.e. indwelling lines, tubes, and drains  - Monitor endotracheal if appropriate and nasal secretions for changes in amount and color  - [place] appropriate cooling/warming therapies per order  - Administer medications as ordered  - Instruct and encourage patient and family to use good hand hygiene technique  - Identify and instruct in appropriate isolation precautions for identified infection/condition  Outcome: Progressing  Goal: Absence of fever/infection during neutropenic period  Description: INTERVENTIONS:  - Monitor WBC    Outcome: Progressing     Problem: SAFETY ADULT  Goal: Patient will remain free of falls  Description: INTERVENTIONS:  - Educate patient/family on patient safety including physical limitations  - Instruct patient to call for assistance with activity   - Consult OT/PT to assist with strengthening/mobility   - Keep Call bell within reach  - Keep bed low and locked with side rails adjusted as appropriate  - Keep care items and personal belongings within reach  - Initiate and maintain comfort rounds  - Make Fall Risk Sign visible to staff  - Apply yellow socks and bracelet for high fall risk patients  - Consider moving patient to room near nurses station  Outcome: Progressing  Goal: Maintain or return to baseline ADL function  Description: INTERVENTIONS:  -  Assess patient's ability to carry out ADLs; assess patient's baseline for ADL function and identify physical deficits which impact ability to perform ADLs (bathing, care of mouth/teeth, toileting, grooming, dressing, etc.)  - Assess/evaluate cause of self-care deficits   - Assess range of motion  - Assess patient's mobility; develop plan if impaired  - Assess patient's need for assistive devices and provide as appropriate  - Encourage maximum independence but intervene and supervise when necessary  - Involve family in performance of ADLs  - Assess for home care needs following discharge   - Consider OT consult to assist with ADL evaluation and planning for discharge  - Provide patient education as appropriate  Outcome: Progressing  Goal: Maintains/Returns to pre admission functional level  Description: INTERVENTIONS:  - Perform AM-PAC 6 Click Basic Mobility/ Daily Activity assessment daily.  - Set and communicate daily mobility goal to care team and patient/family/caregiver. - Collaborate with rehabilitation services on mobility goals if consulted  - Perform Range of Motion 3 times a day.   - Dangle patient 3 times a day  - Stand patient 3 times a day  - Ambulate patient 3 times a day  - Out of bed to chair 3 times a day   - Out of bed for meals 3 times a day  - Out of bed for toileting  - Record patient progress and toleration of activity level   Outcome: Progressing     Problem: DISCHARGE PLANNING  Goal: Discharge to home or other facility with appropriate resources  Description: INTERVENTIONS:  - Identify barriers to discharge w/patient and caregiver  - Arrange for needed discharge resources and transportation as appropriate  - Identify discharge learning needs (meds, wound care, etc.)  - Arrange for interpretive services to assist at discharge as needed  - Refer to Case Management Department for coordinating discharge planning if the patient needs post-hospital services based on physician/advanced practitioner order or complex needs related to functional status, cognitive ability, or social support system  Outcome: Progressing     Problem: Knowledge Deficit  Goal: Patient/family/caregiver demonstrates understanding of disease process, treatment plan, medications, and discharge instructions  Description: Complete learning assessment and assess knowledge base.   Interventions:  - Provide teaching at level of understanding  - Provide teaching via preferred learning methods  Outcome: Progressing

## 2023-11-21 NOTE — DISCHARGE SUMMARY
[address]  Discharge- Srikanth [facility] 1975, 57 y.o. female MRN: [id number]  Unit/Bed#: -01 Encounter: [id number]  Primary Care Provider: Praveen Wang DO   Date and time admitted to hospital: 11/19/2023  3:28 PM    * Hypertensive urgency  Assessment & Plan  Presented with hypertensive urgency with SBP noted to be 260 on arrival was symptomatic with severe headache similar to her previous migraines   reports has not taken her blood pressure medications for 4 months    SBP now noted to be approximately 170s to 180  we will restart home losartan, chlorthalidone, blood pressure remained to be high, added amlodipine 10 mg as well as Lopressor, recommending to follow-up with PCP to continue care and to further manage medication adjustments  Patient reports to provider that she no longer has headaches    Hyperglycemia  Assessment & Plan  Blood glucose noted to be 159  A1c 6.3    Migraine headache  Assessment & Plan  Patient with history of migraines presenting with migraine headache that is slowly improving    Received migraine cocktail emergency department with Benadryl and IV steroids  We will continue with steroids and and Reglan  Can consider IV Tylenol tomorrow if needed    Mixed hyperlipidemia  Assessment & Plan  Continue statin    Morbid obesity with BMI of 45.0-49.9, adult ([address])  Assessment & Plan  Diet and exercise counseling provided    Thyroid disease  Assessment & Plan  Continue Synthroid    Dependence on nicotine from cigarettes  Assessment & Plan   Nicotine patch okay      Medical Problems       Resolved Problems  Date Reviewed: 11/19/2023   None       Discharging Physician / Practitioner: Thom Frankel, PA-C  PCP: Praveen Wang DO  Admission Date:   Admission Orders (From admission, onward)       Ordered        11/19/23 2107  Place in Observation  Once                          Discharge Date: 11/21/23    Consultations During Hospital Stay:  None    Procedures Performed:   XR chest 1 view portable  Impression: No acute cardiopulmonary disease    CTA head and neck with and without contrast  Impression: 1. No intracranial hemorrhage, infarct or mass. 2. No hemodynamically significant stenosis, dissection or occlusion of the carotid or vertebral arteries or major vessels of the [place]. No intracranial aneurysm      Significant Findings / Test Results:   Hemoglobin A1c 6.3  BNP 63  UA not indicated for UTI    Incidental Findings:   None      Test Results Pending at Discharge (will require follow up): None     Outpatient Tests Requested:  None    Complications: None    Reason for Admission: Hypertensive urgency    Hospital Course:   Tri Melendez is a 57 y.o. female patient who originally presented to the hospital on 11/19/2023 due to a severe headache that began approximately 3 days prior to admission. She was found to have a hypertensive urgency with a systolic blood pressure of 260 on arrival.  She was symptomatic with severe headaches. The patient reportedly has not taken her blood pressure for the previous 4 months. The patient was given IV labetalol and systolic blood pressure improved to 160. She was then restarted on losartan and chlorthalidone. She was also given a migraine cocktail in the ED showed improvement in her migraine once blood pressure resolved. On 11/20 the patient continued to have systolic blood pressures in the 170s to 180s and continued to have a migraine. Additional blood pressure medication was added including amlodipine and metoprolol. On 11/21 the patient reported that the migraines have resolved her systolic blood pressure was 149 on time of DC. She will be discharged with amlodipine and metoprolol and encouraged to follow-up with her PCP for additional treatment and medication adjustments. At this time the patient is medically stable for discharge and agreeable for plan at discharge.   For further information regards course hospitalization, please see notes attached. Please see above list of diagnoses and related plan for additional information. Condition at Discharge: good    Discharge Day Visit / Exam:   Subjective: Patient reports feeling well and like to be discharged home. She denies chest pain/pressure, palpitations, lightheadedness, nausea, shortness of breath, chills, or headaches. Vitals: Blood Pressure: 149/99 (11/21/23 1200)  Pulse: 77 (11/21/23 1200)  Temperature: 98.2 °F (36.8 °C) (11/21/23 0713)  Temp Source: Oral (11/21/23 0713)  Respirations: 20 (11/21/23 0713)  Height: 5' 6" (167.6 cm) (11/19/23 2143)  Weight - Scale: 136 kg (300 lb) (11/19/23 2143)  SpO2: 97 % (11/21/23 1200)  Exam:   Physical Exam  Vitals and nursing note reviewed. Constitutional:       Appearance: She is obese. HENT:      Head: Normocephalic. Nose: Nose normal.      Mouth/Throat:      Mouth: Mucous membranes are moist.      Pharynx: Oropharynx is clear. Eyes:      General: No scleral icterus. Conjunctiva/sclera: Conjunctivae normal.      Pupils: Pupils are equal, round, and reactive to light. Cardiovascular:      Rate and Rhythm: Normal rate and regular rhythm. Heart sounds: No murmur heard. No friction rub. No gallop. Pulmonary:      Effort: Pulmonary effort is normal. No respiratory distress. Breath sounds: Normal breath sounds. No stridor. No wheezing, rhonchi or rales. Abdominal:      General: Abdomen is flat. Palpations: Abdomen is soft. Musculoskeletal:         General: Normal range of motion. Cervical back: Normal range of motion and neck supple. Right lower leg: No edema. Left lower leg: No edema. Lymphadenopathy:      Cervical: No cervical adenopathy. Skin:     General: Skin is warm. Coloration: Skin is not jaundiced or pale. Findings: No bruising, erythema or lesion. Neurological:      General: No focal deficit present.       Mental Status: She is alert and oriented to person, place, and time. Mental status is at baseline. Cranial Nerves: No cranial nerve deficit. Motor: No weakness. Psychiatric:         Mood and Affect: Mood normal.         Behavior: Behavior normal.         Thought Content: Thought content normal.          Discussion with Family: Patient declined call to . Discharge instructions/Information to patient and family:   See after visit summary for information provided to patient and family. Provisions for Follow-Up Care:  See after visit summary for information related to follow-up care and any pertinent home health orders. Mobility at time of Discharge:   Basic Mobility Inpatient Raw Score: 22  -HLM Goal: 7: Walk 25 feet or more  -HLM Achieved: 3: Sit at edge of bed  [facility] Goal NOT achieved. Continue to encourage mobility in post discharge setting. Disposition:   Home  Planned Readmission: No     Discharge Statement:  I spent 60 minutes discharging the patient. This time was spent on the day of discharge. I had direct contact with the patient on the day of discharge. Greater than 50% of the total time was spent examining patient, answering all patient questions, arranging and discussing plan of care with patient as well as directly providing post-discharge instructions. Additional time then spent on discharge activities. Discharge Medications:  See after visit summary for reconciled discharge medications provided to patient and/or family.       **Please Note: This note may have been constructed using a voice recognition system**

## 2023-11-21 NOTE — DISCHARGE INSTR - AVS FIRST PAGE
Please be aware I have added 2 additional medications to your blood pressure medication regimen. I have added amlodipine to be taken once a day starting tomorrow and metoprolol to be taken approximately every 12 hours starting tonight. Please follow-up with your PCP to continue to adjust medications and hopefully avoid any low blood pressure or high blood pressure occurrences in the future. Please see PCP within 1 week.

## 2023-11-22 NOTE — UTILIZATION REVIEW
NOTIFICATION OF ADMISSION DISCHARGE   This is a Notification of Discharge from [address] [facility]. Please be advised that this patient has been discharge from our facility. Below you will find the admission and discharge date and time including the patient’s disposition. UTILIZATION REVIEW CONTACT:  Leoan Brewer  Utilization   Network Utilization Review Department  Phone: [phone] x carefully listen to the prompts. All voicemails are confidential.  Email: [email or web address]     ADMISSION INFORMATION  PRESENTATION DATE: 11/19/2023  3:28 PM  OBERVATION ADMISSION DATE: 11/19/23  INPATIENT ADMISSION DATE: N/A N/A   DISCHARGE DATE: 11/21/2023  1:11 PM   DISPOSITION:Home/Self Care    Network Utilization Review Department  ATTENTION: Please call with any questions or concerns to [phone] and carefully listen to the prompts so that you are directed to the right person. All voicemails are confidential.   For Discharge needs, contact Care Management DC Support Team at [phone] opt. 2  Send all requests for admission clinical reviews, approved or denied determinations and any other requests to dedicated fax number below belonging to the campus where the patient is receiving treatment.  List of dedicated fax numbers for the Facilities:  [place] DENIALS (Administrative/Medical Necessity) [phone]   DISCHARGE SUPPORT TEAM (Network) [phone] [address] [facility] ([place], [place]) (Maternity/NICU/Pediatrics) [phone]   [address] [facility] [address] [place] [address] [address]  [place] [phone]

## 2024-05-17 ENCOUNTER — APPOINTMENT (EMERGENCY)
Dept: RADIOLOGY | Facility: HOSPITAL | Age: 49
End: 2024-05-17

## 2024-05-17 ENCOUNTER — HOSPITAL ENCOUNTER (OUTPATIENT)
Facility: HOSPITAL | Age: 49
Setting detail: OBSERVATION
Discharge: HOME | End: 2024-05-18
Attending: EMERGENCY MEDICINE | Admitting: FAMILY MEDICINE

## 2024-05-17 DIAGNOSIS — R07.9 CHEST PAIN, UNSPECIFIED TYPE: Primary | ICD-10-CM

## 2024-05-17 DIAGNOSIS — I16.0 HYPERTENSIVE URGENCY: ICD-10-CM

## 2024-05-17 LAB
2HR DELTA HS TROPONIN: -8 NG/L
ALBUMIN SERPL BCP-MCNC: 4.1 G/DL (ref 3.5–5)
ALP SERPL-CCNC: 170 U/L (ref 34–104)
ALT SERPL W P-5'-P-CCNC: 44 U/L (ref 7–52)
ANION GAP SERPL CALCULATED.3IONS-SCNC: 7 MMOL/L (ref 4–13)
AST SERPL W P-5'-P-CCNC: 110 U/L (ref 13–39)
ATRIAL RATE: 75 BPM
ATRIAL RATE: 88 BPM
BASOPHILS # BLD AUTO: 0.02 THOUSANDS/ÂΜL (ref 0–0.1)
BASOPHILS NFR BLD AUTO: 0 % (ref 0–1)
BILIRUB SERPL-MCNC: 0.47 MG/DL (ref 0.2–1)
BUN SERPL-MCNC: 7 MG/DL (ref 5–25)
CALCIUM SERPL-MCNC: 9.3 MG/DL (ref 8.4–10.2)
CARDIAC TROPONIN I PNL SERPL HS: 11 NG/L
CARDIAC TROPONIN I PNL SERPL HS: 19 NG/L
CHLORIDE SERPL-SCNC: 103 MMOL/L (ref 96–108)
CHOLEST SERPL-MCNC: 252 MG/DL
CO2 SERPL-SCNC: 24 MMOL/L (ref 21–32)
CREAT SERPL-MCNC: 0.76 MG/DL (ref 0.6–1.3)
D DIMER PPP FEU-MCNC: <0.27 UG/ML FEU
EOSINOPHIL # BLD AUTO: 0.3 THOUSAND/ÂΜL (ref 0–0.61)
EOSINOPHIL NFR BLD AUTO: 3 % (ref 0–6)
ERYTHROCYTE [DISTWIDTH] IN BLOOD BY AUTOMATED COUNT: 13.2 % (ref 11.6–15.1)
EXT PREGNANCY TEST URINE: NEGATIVE
EXT. CONTROL: NORMAL
GFR SERPL CREATININE-BSD FRML MDRD: 94 ML/MIN/1.73SQ M
GLUCOSE SERPL-MCNC: 108 MG/DL (ref 65–140)
HCT VFR BLD AUTO: 44.3 % (ref 34.8–46.1)
HDLC SERPL-MCNC: 34 MG/DL
HGB BLD-MCNC: 14.3 G/DL (ref 11.5–15.4)
IMM GRANULOCYTES # BLD AUTO: 0.07 THOUSAND/UL (ref 0–0.2)
IMM GRANULOCYTES NFR BLD AUTO: 1 % (ref 0–2)
LDLC SERPL CALC-MCNC: 170 MG/DL (ref 0–100)
LYMPHOCYTES # BLD AUTO: 2.26 THOUSANDS/ÂΜL (ref 0.6–4.47)
LYMPHOCYTES NFR BLD AUTO: 21 % (ref 14–44)
MAGNESIUM SERPL-MCNC: 2 MG/DL (ref 1.9–2.7)
MCH RBC QN AUTO: 28.8 PG (ref 26.8–34.3)
MCHC RBC AUTO-ENTMCNC: 32.3 G/DL (ref 31.4–37.4)
MCV RBC AUTO: 89 FL (ref 82–98)
MONOCYTES # BLD AUTO: 0.47 THOUSAND/ÂΜL (ref 0.17–1.22)
MONOCYTES NFR BLD AUTO: 4 % (ref 4–12)
NEUTROPHILS # BLD AUTO: 7.54 THOUSANDS/ÂΜL (ref 1.85–7.62)
NEUTS SEG NFR BLD AUTO: 71 % (ref 43–75)
NRBC BLD AUTO-RTO: 0 /100 WBCS
P AXIS: 17 DEGREES
P AXIS: 55 DEGREES
PLATELET # BLD AUTO: 364 THOUSANDS/UL (ref 149–390)
PMV BLD AUTO: 10.6 FL (ref 8.9–12.7)
POTASSIUM SERPL-SCNC: 4 MMOL/L (ref 3.5–5.3)
PR INTERVAL: 158 MS
PR INTERVAL: 180 MS
PROT SERPL-MCNC: 8.2 G/DL (ref 6.4–8.4)
QRS AXIS: 22 DEGREES
QRS AXIS: 46 DEGREES
QRSD INTERVAL: 90 MS
QRSD INTERVAL: 94 MS
QT INTERVAL: 404 MS
QT INTERVAL: 446 MS
QTC INTERVAL: 488 MS
QTC INTERVAL: 498 MS
RBC # BLD AUTO: 4.97 MILLION/UL (ref 3.81–5.12)
SODIUM SERPL-SCNC: 134 MMOL/L (ref 135–147)
T WAVE AXIS: 145 DEGREES
T WAVE AXIS: 37 DEGREES
TRIGL SERPL-MCNC: 240 MG/DL
VENTRICULAR RATE: 75 BPM
VENTRICULAR RATE: 88 BPM
WBC # BLD AUTO: 10.66 THOUSAND/UL (ref 4.31–10.16)

## 2024-05-17 PROCEDURE — 99285 EMERGENCY DEPT VISIT HI MDM: CPT | Performed by: EMERGENCY MEDICINE

## 2024-05-17 PROCEDURE — 84443 ASSAY THYROID STIM HORMONE: CPT | Performed by: FAMILY MEDICINE

## 2024-05-17 PROCEDURE — 85025 COMPLETE CBC W/AUTO DIFF WBC: CPT | Performed by: EMERGENCY MEDICINE

## 2024-05-17 PROCEDURE — 96365 THER/PROPH/DIAG IV INF INIT: CPT

## 2024-05-17 PROCEDURE — 99285 EMERGENCY DEPT VISIT HI MDM: CPT

## 2024-05-17 PROCEDURE — 93010 ELECTROCARDIOGRAM REPORT: CPT | Performed by: INTERNAL MEDICINE

## 2024-05-17 PROCEDURE — 81025 URINE PREGNANCY TEST: CPT | Performed by: EMERGENCY MEDICINE

## 2024-05-17 PROCEDURE — 71046 X-RAY EXAM CHEST 2 VIEWS: CPT

## 2024-05-17 PROCEDURE — 36415 COLL VENOUS BLD VENIPUNCTURE: CPT | Performed by: EMERGENCY MEDICINE

## 2024-05-17 PROCEDURE — 99223 1ST HOSP IP/OBS HIGH 75: CPT | Performed by: FAMILY MEDICINE

## 2024-05-17 PROCEDURE — 80061 LIPID PANEL: CPT | Performed by: FAMILY MEDICINE

## 2024-05-17 PROCEDURE — 83735 ASSAY OF MAGNESIUM: CPT | Performed by: EMERGENCY MEDICINE

## 2024-05-17 PROCEDURE — 96376 TX/PRO/DX INJ SAME DRUG ADON: CPT

## 2024-05-17 PROCEDURE — 84439 ASSAY OF FREE THYROXINE: CPT | Performed by: FAMILY MEDICINE

## 2024-05-17 PROCEDURE — 83036 HEMOGLOBIN GLYCOSYLATED A1C: CPT | Performed by: FAMILY MEDICINE

## 2024-05-17 PROCEDURE — 93005 ELECTROCARDIOGRAM TRACING: CPT

## 2024-05-17 PROCEDURE — 80053 COMPREHEN METABOLIC PANEL: CPT | Performed by: EMERGENCY MEDICINE

## 2024-05-17 PROCEDURE — 84484 ASSAY OF TROPONIN QUANT: CPT | Performed by: EMERGENCY MEDICINE

## 2024-05-17 PROCEDURE — 96375 TX/PRO/DX INJ NEW DRUG ADDON: CPT

## 2024-05-17 PROCEDURE — 85379 FIBRIN DEGRADATION QUANT: CPT | Performed by: EMERGENCY MEDICINE

## 2024-05-17 RX ORDER — LOSARTAN POTASSIUM 50 MG/1
100 TABLET ORAL DAILY
Status: DISCONTINUED | OUTPATIENT
Start: 2024-05-17 | End: 2024-05-18 | Stop reason: HOSPADM

## 2024-05-17 RX ORDER — ACETAMINOPHEN 325 MG/1
650 TABLET ORAL EVERY 6 HOURS PRN
Status: DISCONTINUED | OUTPATIENT
Start: 2024-05-17 | End: 2024-05-18 | Stop reason: HOSPADM

## 2024-05-17 RX ORDER — METHOCARBAMOL 500 MG/1
1000 TABLET, FILM COATED ORAL 3 TIMES DAILY PRN
Status: DISCONTINUED | OUTPATIENT
Start: 2024-05-17 | End: 2024-05-18 | Stop reason: HOSPADM

## 2024-05-17 RX ORDER — NITROGLYCERIN 0.4 MG/1
0.4 TABLET SUBLINGUAL
Status: DISCONTINUED | OUTPATIENT
Start: 2024-05-17 | End: 2024-05-18 | Stop reason: HOSPADM

## 2024-05-17 RX ORDER — ACETAMINOPHEN 10 MG/ML
1000 INJECTION, SOLUTION INTRAVENOUS ONCE
Status: COMPLETED | OUTPATIENT
Start: 2024-05-17 | End: 2024-05-17

## 2024-05-17 RX ORDER — METOPROLOL TARTRATE 50 MG/1
50 TABLET, FILM COATED ORAL EVERY 12 HOURS SCHEDULED
Status: DISCONTINUED | OUTPATIENT
Start: 2024-05-17 | End: 2024-05-18 | Stop reason: HOSPADM

## 2024-05-17 RX ORDER — LABETALOL HYDROCHLORIDE 5 MG/ML
10 INJECTION, SOLUTION INTRAVENOUS ONCE
Status: COMPLETED | OUTPATIENT
Start: 2024-05-17 | End: 2024-05-17

## 2024-05-17 RX ORDER — HYDRALAZINE HYDROCHLORIDE 20 MG/ML
10 INJECTION INTRAMUSCULAR; INTRAVENOUS EVERY 6 HOURS PRN
Status: DISCONTINUED | OUTPATIENT
Start: 2024-05-17 | End: 2024-05-18 | Stop reason: HOSPADM

## 2024-05-17 RX ORDER — ASPIRIN 81 MG/1
81 TABLET, CHEWABLE ORAL DAILY
Status: DISCONTINUED | OUTPATIENT
Start: 2024-05-18 | End: 2024-05-18 | Stop reason: HOSPADM

## 2024-05-17 RX ORDER — ASPIRIN 325 MG
325 TABLET ORAL ONCE
Status: COMPLETED | OUTPATIENT
Start: 2024-05-17 | End: 2024-05-17

## 2024-05-17 RX ORDER — NICOTINE 21 MG/24HR
1 PATCH, TRANSDERMAL 24 HOURS TRANSDERMAL DAILY
Status: DISCONTINUED | OUTPATIENT
Start: 2024-05-18 | End: 2024-05-18 | Stop reason: HOSPADM

## 2024-05-17 RX ORDER — ENOXAPARIN SODIUM 100 MG/ML
40 INJECTION SUBCUTANEOUS DAILY
Status: DISCONTINUED | OUTPATIENT
Start: 2024-05-18 | End: 2024-05-18 | Stop reason: HOSPADM

## 2024-05-17 RX ORDER — LEVOTHYROXINE SODIUM 0.12 MG/1
125 TABLET ORAL
Status: DISCONTINUED | OUTPATIENT
Start: 2024-05-18 | End: 2024-05-18 | Stop reason: HOSPADM

## 2024-05-17 RX ORDER — TOPIRAMATE 25 MG/1
25 TABLET ORAL DAILY
Status: DISCONTINUED | OUTPATIENT
Start: 2024-05-18 | End: 2024-05-18 | Stop reason: HOSPADM

## 2024-05-17 RX ORDER — PRAVASTATIN SODIUM 80 MG/1
80 TABLET ORAL
Status: DISCONTINUED | OUTPATIENT
Start: 2024-05-17 | End: 2024-05-18 | Stop reason: HOSPADM

## 2024-05-17 RX ADMIN — LABETALOL HYDROCHLORIDE 10 MG: 5 INJECTION, SOLUTION INTRAVENOUS at 14:30

## 2024-05-17 RX ADMIN — METOPROLOL TARTRATE 50 MG: 50 TABLET, FILM COATED ORAL at 18:31

## 2024-05-17 RX ADMIN — LABETALOL HYDROCHLORIDE 10 MG: 5 INJECTION, SOLUTION INTRAVENOUS at 12:41

## 2024-05-17 RX ADMIN — ASPIRIN 325 MG ORAL TABLET 325 MG: 325 PILL ORAL at 12:41

## 2024-05-17 RX ADMIN — ACETAMINOPHEN 1000 MG: 10 INJECTION INTRAVENOUS at 15:17

## 2024-05-17 RX ADMIN — LOSARTAN POTASSIUM 100 MG: 50 TABLET, FILM COATED ORAL at 18:31

## 2024-05-17 RX ADMIN — PRAVASTATIN SODIUM 80 MG: 80 TABLET ORAL at 18:31

## 2024-05-17 NOTE — ASSESSMENT & PLAN NOTE
Patient presented to the ER with chest pain and was found to have hypertensive urgency  Patient has received IV medication in the ER and the blood pressure was persistently elevated  Goal is to reduce blood pressure by 25%  Likely etiologies patient is noncompliance with her multiple antihypertensive medications  Will only resume losartan 100 mg and Lopressor 50 mg twice daily at this time.  Not initiating amlodipine and chlorothiazide  Will consider reinitiating amlodipine and chlorothiazide in the next 24 hours  Close monitoring of blood pressure, low-sodium diet  Patient counseled for greater than 3 minutes regarding medication compliance importance

## 2024-05-17 NOTE — PLAN OF CARE
Problem: PAIN - ADULT  Goal: Verbalizes/displays adequate comfort level or baseline comfort level  Description: Interventions:  - Encourage patient to monitor pain and request assistance  - Assess pain using appropriate pain scale  - Administer analgesics based on type and severity of pain and evaluate response  - Implement non-pharmacological measures as appropriate and evaluate response  - Consider cultural and social influences on pain and pain management  - Notify physician/advanced practitioner if interventions unsuccessful or patient reports new pain  Outcome: Progressing     Problem: INFECTION - ADULT  Goal: Absence or prevention of progression during hospitalization  Description: INTERVENTIONS:  - Assess and monitor for signs and symptoms of infection  - Monitor lab/diagnostic results  - Monitor all insertion sites, i.e. indwelling lines, tubes, and drains  - Monitor endotracheal if appropriate and nasal secretions for changes in amount and color  - [place] appropriate cooling/warming therapies per order  - Administer medications as ordered  - Instruct and encourage patient and family to use good hand hygiene technique  - Identify and instruct in appropriate isolation precautions for identified infection/condition  Outcome: Progressing  Goal: Absence of fever/infection during neutropenic period  Description: INTERVENTIONS:  - Monitor WBC    Outcome: Progressing     Problem: SAFETY ADULT  Goal: Patient will remain free of falls  Description: INTERVENTIONS:  - Educate patient/family on patient safety including physical limitations  - Instruct patient to call for assistance with activity   - Consult OT/PT to assist with strengthening/mobility   - Keep Call bell within reach  - Keep bed low and locked with side rails adjusted as appropriate  - Keep care items and personal belongings within reach  - Initiate and maintain comfort rounds  - Make Fall Risk Sign visible to staff  - Offer Toileting every  Hours,  in advance of need  - Initiate/Maintain alarm  - Obtain necessary fall risk management equipment:   - Apply yellow socks and bracelet for high fall risk patients  - Consider moving patient to room near nurses station  Outcome: Progressing  Goal: Maintain or return to baseline ADL function  Description: INTERVENTIONS:  -  Assess patient's ability to carry out ADLs; assess patient's baseline for ADL function and identify physical deficits which impact ability to perform ADLs (bathing, care of mouth/teeth, toileting, grooming, dressing, etc.)  - Assess/evaluate cause of self-care deficits   - Assess range of motion  - Assess patient's mobility; develop plan if impaired  - Assess patient's need for assistive devices and provide as appropriate  - Encourage maximum independence but intervene and supervise when necessary  - Involve family in performance of ADLs  - Assess for home care needs following discharge   - Consider OT consult to assist with ADL evaluation and planning for discharge  - Provide patient education as appropriate  Outcome: Progressing  Goal: Maintains/Returns to pre admission functional level  Description: INTERVENTIONS:  - Perform AM-PAC 6 Click Basic Mobility/ Daily Activity assessment daily.  - Set and communicate daily mobility goal to care team and patient/family/caregiver.   - Collaborate with rehabilitation services on mobility goals if consulted  - Perform Range of Motion  times a day.  - Reposition patient every  hours.  - Dangle patient  times a day  - Stand patient  times a day  - Ambulate patient  times a day  - Out of bed to chair  times a day   - Out of bed for meals times a day  - Out of bed for toileting  - Record patient progress and toleration of activity level   Outcome: Progressing     Problem: DISCHARGE PLANNING  Goal: Discharge to home or other facility with appropriate resources  Description: INTERVENTIONS:  - Identify barriers to discharge w/patient and caregiver  - Arrange for  needed discharge resources and transportation as appropriate  - Identify discharge learning needs (meds, wound care, etc.)  - Arrange for interpretive services to assist at discharge as needed  - Refer to Case Management Department for coordinating discharge planning if the patient needs post-hospital services based on physician/advanced practitioner order or complex needs related to functional status, cognitive ability, or social support system  Outcome: Progressing

## 2024-05-17 NOTE — H&P
[facility]  H&P  Name: Sadaf Chaves 48 y.o. female I MRN: [id number]  Unit/Bed#: -01 I Date of Admission: 5/17/2024   Date of Service: 5/17/2024 I Hospital Day: 0      Assessment & Plan   * Hypertensive urgency  Assessment & Plan  Patient presented to the ER with chest pain and was found to have hypertensive urgency  Patient has received IV medication in the ER and the blood pressure was persistently elevated  Goal is to reduce blood pressure by 25%  Likely etiologies patient is noncompliance with her multiple antihypertensive medications  Will only resume losartan 100 mg and Lopressor 50 mg twice daily at this time.  Not initiating amlodipine and chlorothiazide  Will consider reinitiating amlodipine and chlorothiazide in the next 24 hours  Close monitoring of blood pressure, low-sodium diet  Patient counseled for greater than 3 minutes regarding medication compliance importance    Chest pain  Assessment & Plan  Patient states she has a history of silent MI.  CARLI score 1.  Start aspirin and statin.  Check lipids and A1c levels  Currently troponins are negative  EKG does not show any acute elevations or depressions but there is some nonspecific ST-T wave changes noted in the septal and lateral leads  Chest pain appears likely secondary from hypertensive urgency and it has not resolved  If troponin does not trend up and the chest pain continues to stay resolved, recommend outpatient cardiology workup    Morbid obesity with BMI of 45.0-49.9, adult (HCC)  Assessment & Plan  Recommend incorporating a more whole foods plant-predominant diet along with decreasing consumption of red meats and processed foods  Per AHA guidelines, recommend moderate-vigorous intensity exercise for 30 minutes a day for 5 days a week or a total of 150 min/week  Counseled on lifestyle changes for >3 min      Acquired hypothyroidism  Assessment & Plan  Continue Synthroid 125 mcg p.o. daily.  Patient  noncompliant.  Check TSH and free T4    Dependence on nicotine from cigarettes  Assessment & Plan  Counseled for smoking cessation for greater than 3 minutes  Nicotine patches ordered           VTE Pharmacologic Prophylaxis:    Lovenox  Code Status: Level 1 - Full Code   Discussion with family:  No family at bedside, call will be made later on.     Anticipated Length of Stay: Patient will be admitted on an observation basis with an anticipated length of stay of less than 2 midnights secondary to hypertensive urgency and chest pain which will require close monitoring of blood pressures and IV medications and also following/trending troponin while patient is monitored on telemetry.    Total Time Spent on Date of Encounter in care of patient: 75 mins. This time was spent on one or more of the following: performing physical exam; counseling and coordination of care; obtaining or reviewing history; documenting in the medical record; reviewing/ordering tests, medications or procedures; communicating with other healthcare professionals and discussing with patient's family/caregivers.    Chief Complaint: Chest pain    History of Present Illness:  Sadaf Chaves is a 48 y.o. female with a PMH of uncontrolled hypertension, morbid obesity, nicotine dependence, silent MI in patient's words who presents with central chest pain, constant, feels like someone sitting on my chest nonradiating, nonreproducible, patient describes pain as heavy which is now resolved and started at 6 AM.  Denies any shortness of breath or diaphoresis or nausea or vomiting.  Complains of weakness and anxiety as she has a lot of stressors in her family right now.  She states her mom passed away in January and her brother is in the hospital right now and she has to take care of her kids.  Denies any suicidal ideations at this time.  On my encounter patient denies any headache or blurry vision or tingling numbness in any extremities or any motor  weakness    Review of Systems:  Review of Systems   Constitutional:  Positive for fatigue. Negative for chills and fever.   HENT:  Negative for ear pain and sore throat.    Eyes:  Negative for pain and visual disturbance.   Respiratory:  Positive for chest tightness. Negative for cough and shortness of breath.    Cardiovascular:  Positive for chest pain. Negative for palpitations.   Gastrointestinal:  Negative for abdominal pain and vomiting.   Genitourinary:  Negative for dysuria and hematuria.   Musculoskeletal:  Negative for arthralgias and back pain.   Skin:  Negative for color change and rash.   Neurological:  Negative for seizures and syncope.   Psychiatric/Behavioral:  Negative for agitation, behavioral problems and confusion. The patient is nervous/anxious.    All other systems reviewed and are negative.      Past Medical and Surgical History:   Past Medical History:   Diagnosis Date    History of MI (myocardial infarction)     History of TIA (transient ischemic attack)     Hypertension     Hypothyroidism     hypothyroidism    Morbid obesity with BMI of 45.0-49.9, adult (HCC)     TIA (transient ischemic attack)        Past Surgical History:   Procedure Laterality Date    BREAST SURGERY Left     TUBAL LIGATION         Meds/Allergies:  Prior to Admission medications    Medication Sig Start Date End Date Taking? Authorizing Provider   amLODIPine (NORVASC) 10 mg tablet Take 1 tablet (10 mg total) by mouth daily Do not start before November 22, 2023. 11/22/23 12/22/23  Manuel Ascencio PA-C   chlorthalidone 25 mg tablet Take 1 tablet (25 mg total) by mouth daily 11/21/23 12/21/23  Manuel Ascencio PA-C   diphenhydrAMINE (BENADRYL) 25 mg tablet Take 1 tablet (25 mg total) by mouth every 8 (eight) hours as needed for itching or allergies for up to 10 days 8/22/22 9/1/22  Michelle Matthews MD   ketorolac (TORADOL) 10 mg tablet Take 1 tablet (10 mg total) by mouth every 8 (eight) hours as needed for moderate pain  or severe pain for up to 5 days 8/22/22 8/27/22  Michelle Matthews MD   levothyroxine 125 mcg tablet Take 1 tablet (125 mcg total) by mouth daily in the early morning 11/21/23 12/21/23  Manuel Ascencio PA-C   losartan (COZAAR) 100 MG tablet Take 1 tablet (100 mg total) by mouth daily 11/21/23   Manuel Ascencio PA-C   methocarbamol (ROBAXIN) 500 mg tablet Take 2 tablets (1,000 mg total) by mouth 3 (three) times a day as needed for muscle spasms (back pain) 9/13/23   Lenny Quintero MD   metoprolol tartrate (LOPRESSOR) 50 mg tablet Take 1 tablet (50 mg total) by mouth every 12 (twelve) hours 11/21/23 12/21/23  Manuel Ascencio PA-C   nicotine (NICODERM CQ) 7 mg/24hr TD 24 hr patch Place 1 patch on the skin daily 8/22/22   Michelle Matthews MD   oxyCODONE-acetaminophen (PERCOCET) 5-325 mg per tablet Take 1 tablet by mouth every 6 (six) hours as needed for severe pain for up to 20 doses Max Daily Amount: 4 tablets 6/2/23   ALVIN Fernandez   rosuvastatin (CRESTOR) 10 MG tablet Take 1 tablet (10 mg total) by mouth daily 8/22/22   Michelle Matthews MD   topiramate (Topamax) 25 mg tablet Take 1 tablet (25 mg total) by mouth daily 8/22/22   Michelle Matthews MD     I have reviewed home medications with patient personally.    Allergies:   Allergies   Allergen Reactions    Trimecaine Hallucinations    Toradol [Ketorolac Tromethamine] Anxiety       Social History:  Marital Status: /Civil Union     Substance Use History:   Social History     Substance and Sexual Activity   Alcohol Use Not Currently    Comment: occasional     Social History     Tobacco Use   Smoking Status Every Day    Current packs/day: 0.50    Average packs/day: 0.5 packs/day for 30.0 years (15.0 ttl pk-yrs)    Types: Cigarettes   Smokeless Tobacco Never     Social History     Substance and Sexual Activity   Drug Use No       Family History:  Family History   Problem Relation Age of Onset    Heart disease Mother     Hypertension Mother     Diabetes  Father        Physical Exam:     Vitals:   Blood Pressure: (!) 190/100 (05/17/24 1811)  Pulse: 78 (05/17/24 1811)  Temperature: 97.6 °F (36.4 °C) (05/17/24 1811)  Temp Source: Oral (05/17/24 1226)  Respirations: 18 (05/17/24 1811)  SpO2: 98 % (05/17/24 1811)    Physical Exam General- Awake, alert and oriented x 3, looks anxious  HEENT- Normocephalic, atraumatic, oral mucosa- moist  Neck- Supple, No carotid bruit, no JVD  CVS-S1-S2 audible but distant likely secondary to body habitus, regular, no murmur audible  Respiratory system-diminished breath sounds bilaterally likely from body habitus, no wheezing rales or rhonchi audible  Abdomen- Soft, obese, non distended, no tenderness, Bowel sound- present 4 quads  Genitourinary- No suprapubic tenderness, No CVA tenderness  Skin- No new bruise or rash  Musculoskeletal- No gross deformity  Psych- No acute psychosis, appears anxious  CNS- CN II- XII grossly intact, No acute focal neurologic deficit noted      Additional Data:     Lab Results:  Results from last 7 days   Lab Units 05/17/24  1245   WBC Thousand/uL 10.66*   HEMOGLOBIN g/dL 14.3   HEMATOCRIT % 44.3   PLATELETS Thousands/uL 364   SEGS PCT % 71   LYMPHO PCT % 21   MONO PCT % 4   EOS PCT % 3     Results from last 7 days   Lab Units 05/17/24  1245   SODIUM mmol/L 134*   POTASSIUM mmol/L 4.0   CHLORIDE mmol/L 103   CO2 mmol/L 24   BUN mg/dL 7   CREATININE mg/dL 0.76   ANION GAP mmol/L 7   CALCIUM mg/dL 9.3   ALBUMIN g/dL 4.1   TOTAL BILIRUBIN mg/dL 0.47   ALK PHOS U/L 170*   ALT U/L 44   AST U/L 110*   GLUCOSE RANDOM mg/dL 108                       Lines/Drains:  Invasive Devices       Peripheral Intravenous Line  Duration             Peripheral IV 05/17/24 Left Antecubital <1 day                        Imaging: Personally reviewed the following imaging: chest xray  XR chest 2 views    (Results Pending)       EKG and Other Studies Reviewed on Admission:   EKG:  Normal sinus rhythm, nonspecific T wave abnormality  more pronounced in the lateral leads, septal infarct noted on EKG of 2 June 2023.    ** Please Note: This note has been constructed using a voice recognition system. **

## 2024-05-17 NOTE — ASSESSMENT & PLAN NOTE
Recommend incorporating a more whole foods plant-predominant diet along with decreasing consumption of red meats and processed foods  Per AHA guidelines, recommend moderate-vigorous intensity exercise for 30 minutes a day for 5 days a week or a total of 150 min/week  Counseled on lifestyle changes for >3 min

## 2024-05-17 NOTE — ED PROVIDER NOTES
"Pt Name: Sadaf Chaves  MRN: [id number]  Birthdate 1975  Age/Sex: 48 y.o. female  Date of evaluation: 5/17/2024  PCP: Ceferino Garcia DO    CHIEF COMPLAINT    Chief Complaint   Patient presents with    Chest Pain     Pt woke up with the feeling like \"someone is sitting on my chest\" pt also complains of weakness that started yesterday that has gotten worse over night          HPI and MDM    48 y.o. female presenting with chest pain.  Patient states since yesterday she has been feeling generalized weakness.  This morning around 6 AM she woke up with chest pressure, currently describes as a chest tightness, it has been constant.  Associated with nausea, no vomiting.  Also states she has had shortness of breath for a long time now.  No fevers or chills, no trauma.  No abdominal pain.  Mentions history of \"silent heart attack\".  No history of stents.  States her mom passed away in January, her brother is currently hospitalized, she has a lot of family stressors.  Has not been taking care of herself, has not taken her blood pressure medication for several months.      Differential diagnosis considered includes but not limited to ACS, PE, electrolyte abnormalities.  Doubt acute aortic pathology.    Per my independent interpretation of EKG, normal sinus rhythm rate of 88, narrow QRS, intervals reassuring, no STEMI.    Patient's blood pressure did decrease somewhat with IV labetalol.  However afterwards to go back up.  Blood work is reassuring.  Per my independent interpretation of chest x-ray, no acute cardiopulmonary processes.  Discussed with internal medicine for hospitalization.          Medications   levothyroxine tablet 125 mcg (has no administration in time range)   losartan (COZAAR) tablet 100 mg (has no administration in time range)   metoprolol tartrate (LOPRESSOR) tablet 50 mg (has no administration in time range)   pravastatin (PRAVACHOL) tablet 80 mg (has no administration in time range)   topiramate " (TOPAMAX) tablet 25 mg (has no administration in time range)   methocarbamol (ROBAXIN) tablet 1,000 mg (has no administration in time range)   nicotine (NICODERM CQ) 14 mg/24hr TD 24 hr patch 1 patch (has no administration in time range)   aspirin chewable tablet 81 mg (has no administration in time range)   nitroglycerin (NITROSTAT) SL tablet 0.4 mg (has no administration in time range)   enoxaparin (LOVENOX) subcutaneous injection 40 mg (has no administration in time range)   hydrALAZINE (APRESOLINE) injection 10 mg (has no administration in time range)   labetalol (NORMODYNE) injection 10 mg (10 mg Intravenous Given 5/17/24 1241)   aspirin tablet 325 mg (325 mg Oral Given 5/17/24 1241)   labetalol (NORMODYNE) injection 10 mg (10 mg Intravenous Given 5/17/24 1430)   acetaminophen (Ofirmev) injection 1,000 mg (0 mg Intravenous Stopped 5/17/24 1641)         Past Medical and Surgical History    Past Medical History:   Diagnosis Date    History of MI (myocardial infarction)     History of TIA (transient ischemic attack)     Hypertension     Hypothyroidism     hypothyroidism    Morbid obesity with BMI of 45.0-49.9, adult (HCC)     TIA (transient ischemic attack)        Past Surgical History:   Procedure Laterality Date    BREAST SURGERY Left     TUBAL LIGATION         Family History   Problem Relation Age of Onset    Heart disease Mother     Hypertension Mother     Diabetes Father        Social History     Tobacco Use    Smoking status: Every Day     Current packs/day: 0.50     Average packs/day: 0.5 packs/day for 30.0 years (15.0 ttl pk-yrs)     Types: Cigarettes    Smokeless tobacco: Never   Vaping Use    Vaping status: Never Used   Substance Use Topics    Alcohol use: Not Currently     Comment: occasional    Drug use: No           Allergies    Allergies   Allergen Reactions    Trimecaine Hallucinations    Toradol [Ketorolac Tromethamine] Anxiety       Home Medications    Prior to Admission medications    Medication  Sig Start Date End Date Taking? Authorizing Provider   amLODIPine (NORVASC) 10 mg tablet Take 1 tablet (10 mg total) by mouth daily Do not start before November 22, 2023. 11/22/23 12/22/23  Manuel Ascencio PA-C   chlorthalidone 25 mg tablet Take 1 tablet (25 mg total) by mouth daily 11/21/23 12/21/23  Manuel Ascencio PA-C   diphenhydrAMINE (BENADRYL) 25 mg tablet Take 1 tablet (25 mg total) by mouth every 8 (eight) hours as needed for itching or allergies for up to 10 days 8/22/22 9/1/22  Michelle Matthews MD   ketorolac (TORADOL) 10 mg tablet Take 1 tablet (10 mg total) by mouth every 8 (eight) hours as needed for moderate pain or severe pain for up to 5 days 8/22/22 8/27/22  Michelle Matthews MD   levothyroxine 125 mcg tablet Take 1 tablet (125 mcg total) by mouth daily in the early morning 11/21/23 12/21/23  Manuel Ascencio PA-C   losartan (COZAAR) 100 MG tablet Take 1 tablet (100 mg total) by mouth daily 11/21/23   Manuel Ascencio PA-C   methocarbamol (ROBAXIN) 500 mg tablet Take 2 tablets (1,000 mg total) by mouth 3 (three) times a day as needed for muscle spasms (back pain) 9/13/23   Lenny Quintero MD   metoprolol tartrate (LOPRESSOR) 50 mg tablet Take 1 tablet (50 mg total) by mouth every 12 (twelve) hours 11/21/23 12/21/23  Manuel Ascencio PA-C   nicotine (NICODERM CQ) 7 mg/24hr TD 24 hr patch Place 1 patch on the skin daily 8/22/22   Michelle Matthews MD   oxyCODONE-acetaminophen (PERCOCET) 5-325 mg per tablet Take 1 tablet by mouth every 6 (six) hours as needed for severe pain for up to 20 doses Max Daily Amount: 4 tablets 6/2/23   ALVIN Fernandez   rosuvastatin (CRESTOR) 10 MG tablet Take 1 tablet (10 mg total) by mouth daily 8/22/22   Michelle Matthews MD   topiramate (Topamax) 25 mg tablet Take 1 tablet (25 mg total) by mouth daily 8/22/22   Michelle Matthews MD           Physical Exam      ED Triage Vitals   Temperature Pulse Respirations Blood Pressure SpO2   05/17/24 1226 05/17/24 1226  05/17/24 1226 05/17/24 1226 05/17/24 1226   98.3 °F (36.8 °C) 85 20 (!) 228/108 98 %      Temp Source Heart Rate Source Patient Position - Orthostatic VS BP Location FiO2 (%)   05/17/24 1226 05/17/24 1226 05/17/24 1226 05/17/24 1226 --   Oral Monitor Sitting Left arm       Pain Score       05/17/24 1433       7               Physical Exam  Constitutional:       General: She is not in acute distress.     Appearance: She is obese.   HENT:      Head: Normocephalic and atraumatic.      Nose: Nose normal.      Mouth/Throat:      Mouth: Mucous membranes are moist.   Eyes:      Extraocular Movements: Extraocular movements intact.      Pupils: Pupils are equal, round, and reactive to light.   Cardiovascular:      Rate and Rhythm: Normal rate and regular rhythm.   Pulmonary:      Effort: No respiratory distress.      Breath sounds: Normal breath sounds. No wheezing.   Abdominal:      General: There is no distension.      Palpations: Abdomen is soft.      Tenderness: There is no abdominal tenderness.   Musculoskeletal:         General: No swelling or deformity. Normal range of motion.      Cervical back: Normal range of motion and neck supple.   Skin:     General: Skin is warm.      Findings: No erythema.   Neurological:      Mental Status: She is alert and oriented to person, place, and time. Mental status is at baseline.              Diagnostic Results      Labs:    Results Reviewed       Procedure Component Value Units Date/Time    POCT pregnancy, urine [[id number]]  (Normal) Resulted: 05/17/24 1650    Lab Status: Final result Updated: 05/17/24 1651     EXT Preg Test, Ur Negative     Control Valid    HS Troponin I 2hr [[id number]]  (Normal) Collected: 05/17/24 1441    Lab Status: Final result Specimen: Blood from Arm, Left Updated: 05/17/24 1545     hs TnI 2hr 11 ng/L      Delta 2hr hsTnI -8 ng/L     HS Troponin I 4hr [[id number]]     Lab Status: No result Specimen: Blood     D-Dimer [[id number]]  (Normal) Collected: 05/17/24  1245    Lab Status: Final result Specimen: Blood from Arm, Left Updated: 05/17/24 1324     D-Dimer, Quant <0.27 ug/ml FEU     HS Troponin 0hr (reflex protocol) [[id number]]  (Normal) Collected: 05/17/24 1245    Lab Status: Final result Specimen: Blood from Arm, Left Updated: 05/17/24 1314     hs TnI 0hr 19 ng/L     Comprehensive metabolic panel [[id number]]  (Abnormal) Collected: 05/17/24 1245    Lab Status: Final result Specimen: Blood from Arm, Left Updated: 05/17/24 1305     Sodium 134 mmol/L      Potassium 4.0 mmol/L      Chloride 103 mmol/L      CO2 24 mmol/L      ANION GAP 7 mmol/L      BUN 7 mg/dL      Creatinine 0.76 mg/dL      Glucose 108 mg/dL      Calcium 9.3 mg/dL       U/L      ALT 44 U/L      Alkaline Phosphatase 170 U/L      Total Protein 8.2 g/dL      Albumin 4.1 g/dL      Total Bilirubin 0.47 mg/dL      eGFR 94 ml/min/1.73sq m     Narrative:      [employer] Kid[employer] guidelines for Chronic Kidney Disease (CKD):     Stage 1 with normal or high GFR (GFR > 90 mL/min/1.73 square meters)    Stage 2 Mild CKD (GFR = 60-89 mL/min/1.73 square meters)    Stage 3A Moderate CKD (GFR = 45-59 mL/min/1.73 square meters)    Stage 3B Moderate CKD (GFR = 30-44 mL/min/1.73 square meters)    Stage 4 Severe CKD (GFR = 15-29 mL/min/1.73 square meters)    Stage 5 End Stage CKD (GFR <15 mL/min/1.73 square meters)  Note: GFR calculation is accurate only with a steady state creatinine    Magnesium [[id number]]  (Normal) Collected: 05/17/24 1245    Lab Status: Final result Specimen: Blood from Arm, Left Updated: 05/17/24 1305     Magnesium 2.0 mg/dL     CBC and differential [[id number]]  (Abnormal) Collected: 05/17/24 1245    Lab Status: Final result Specimen: Blood from Arm, Left Updated: 05/17/24 1258     WBC 10.66 Thousand/uL      RBC 4.97 Million/uL      Hemoglobin 14.3 g/dL      Hematocrit 44.3 %      MCV 89 fL      MCH 28.8 pg      MCHC 32.3 g/dL      RDW 13.2 %      MPV 10.6 fL      Platelets 364  Thousands/uL      nRBC 0 /100 WBCs      Segmented % 71 %      Immature Grans % 1 %      Lymphocytes % 21 %      Monocytes % 4 %      Eosinophils Relative 3 %      Basophils Relative 0 %      Absolute Neutrophils 7.54 Thousands/µL      Absolute Immature Grans 0.07 Thousand/uL      Absolute Lymphocytes 2.26 Thousands/µL      Absolute Monocytes 0.47 Thousand/µL      Eosinophils Absolute 0.30 Thousand/µL      Basophils Absolute 0.02 Thousands/µL             All labs reviewed and utilized in the medical decision making process    Radiology:    XR chest 2 views    (Results Pending)       All radiology studies independently viewed by me and interpreted by the radiologist.    Procedure    Procedures        FINAL IMPRESSION    Final diagnoses:   Chest pain, unspecified type   Hypertensive urgency         DISPOSITION    Time reflects when diagnosis was documented in both MDM as applicable and the Disposition within this note       Time User Action Codes Description Comment    5/17/2024  5:24 PM Marivel Hester [R07.9] Chest pain, unspecified type     5/17/2024  5:25 PM Marivel Hester Add [I16.0] Hypertensive urgency           ED Disposition       ED Disposition   Admit    Condition   Stable    Date/Time   Fri May 17, 2024  5:25 PM    Comment   Case was discussed with Dr. Issa Westfall and the patient's admission status was agreed to be Admission Status: observation status to the service of Dr. Issa Westfall.               Follow-up Information    None           PATIENT REFERRED TO:    No follow-up provider specified.    DISCHARGE MEDICATIONS:    Patient's Medications   Discharge Prescriptions    No medications on file       No discharge procedures on file.         Marivel Hester DO        This note was partially completed using voice recognition technology, and was scanned for gross errors; however some errors may still exist. Please contact the author with any questions or requests for clarification.      Marivel Hester,  DO  05/17/24 1729

## 2024-05-17 NOTE — ASSESSMENT & PLAN NOTE
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  CARLI score 1.  Start aspirin and statin.  Check lipids and A1c levels  Currently troponins are negative  EKG does not show any acute elevations or depressions but there is some nonspecific ST-T wave changes noted in the septal and lateral leads  Chest pain appears likely secondary from hypertensive urgency and it has not resolved  If troponin does not trend up and the chest pain continues to stay resolved, recommend outpatient cardiology workup

## 2024-05-18 VITALS
DIASTOLIC BLOOD PRESSURE: 89 MMHG | HEART RATE: 78 BPM | OXYGEN SATURATION: 97 % | RESPIRATION RATE: 16 BRPM | SYSTOLIC BLOOD PRESSURE: 175 MMHG | TEMPERATURE: 98.1 F

## 2024-05-18 LAB
ALBUMIN SERPL BCP-MCNC: 3.6 G/DL (ref 3.5–5)
ALP SERPL-CCNC: 147 U/L (ref 34–104)
ALT SERPL W P-5'-P-CCNC: 41 U/L (ref 7–52)
ANION GAP SERPL CALCULATED.3IONS-SCNC: 7 MMOL/L (ref 4–13)
AST SERPL W P-5'-P-CCNC: 108 U/L (ref 13–39)
BILIRUB SERPL-MCNC: 0.42 MG/DL (ref 0.2–1)
BUN SERPL-MCNC: 10 MG/DL (ref 5–25)
CALCIUM SERPL-MCNC: 8.9 MG/DL (ref 8.4–10.2)
CHLORIDE SERPL-SCNC: 105 MMOL/L (ref 96–108)
CO2 SERPL-SCNC: 24 MMOL/L (ref 21–32)
CREAT SERPL-MCNC: 0.78 MG/DL (ref 0.6–1.3)
ERYTHROCYTE [DISTWIDTH] IN BLOOD BY AUTOMATED COUNT: 13.3 % (ref 11.6–15.1)
EST. AVERAGE GLUCOSE BLD GHB EST-MCNC: 134 MG/DL
GFR SERPL CREATININE-BSD FRML MDRD: 91 ML/MIN/1.73SQ M
GLUCOSE SERPL-MCNC: 125 MG/DL (ref 65–140)
HBA1C MFR BLD: 6.3 %
HCT VFR BLD AUTO: 43.7 % (ref 34.8–46.1)
HGB BLD-MCNC: 13.5 G/DL (ref 11.5–15.4)
MAGNESIUM SERPL-MCNC: 1.9 MG/DL (ref 1.9–2.7)
MCH RBC QN AUTO: 28 PG (ref 26.8–34.3)
MCHC RBC AUTO-ENTMCNC: 30.9 G/DL (ref 31.4–37.4)
MCV RBC AUTO: 91 FL (ref 82–98)
PLATELET # BLD AUTO: 348 THOUSANDS/UL (ref 149–390)
PMV BLD AUTO: 10.8 FL (ref 8.9–12.7)
POTASSIUM SERPL-SCNC: 3.7 MMOL/L (ref 3.5–5.3)
PROT SERPL-MCNC: 7.4 G/DL (ref 6.4–8.4)
RBC # BLD AUTO: 4.82 MILLION/UL (ref 3.81–5.12)
SODIUM SERPL-SCNC: 136 MMOL/L (ref 135–147)
T4 FREE SERPL-MCNC: 0.41 NG/DL (ref 0.61–1.12)
TSH SERPL DL<=0.05 MIU/L-ACNC: 116.44 UIU/ML (ref 0.45–4.5)
WBC # BLD AUTO: 10.96 THOUSAND/UL (ref 4.31–10.16)

## 2024-05-18 PROCEDURE — 99239 HOSP IP/OBS DSCHRG MGMT >30: CPT | Performed by: INTERNAL MEDICINE

## 2024-05-18 PROCEDURE — 85027 COMPLETE CBC AUTOMATED: CPT | Performed by: FAMILY MEDICINE

## 2024-05-18 PROCEDURE — 80053 COMPREHEN METABOLIC PANEL: CPT | Performed by: NURSE PRACTITIONER

## 2024-05-18 PROCEDURE — 83735 ASSAY OF MAGNESIUM: CPT | Performed by: FAMILY MEDICINE

## 2024-05-18 RX ORDER — ASPIRIN 81 MG/1
81 TABLET, CHEWABLE ORAL DAILY
Qty: 30 TABLET | Refills: 0 | Status: SHIPPED | OUTPATIENT
Start: 2024-05-19

## 2024-05-18 RX ADMIN — LEVOTHYROXINE SODIUM 125 MCG: 125 TABLET ORAL at 04:47

## 2024-05-18 RX ADMIN — ACETAMINOPHEN 650 MG: 325 TABLET, FILM COATED ORAL at 04:47

## 2024-05-18 RX ADMIN — TOPIRAMATE 25 MG: 25 TABLET ORAL at 09:49

## 2024-05-18 RX ADMIN — LOSARTAN POTASSIUM 100 MG: 50 TABLET, FILM COATED ORAL at 09:48

## 2024-05-18 RX ADMIN — METOPROLOL TARTRATE 50 MG: 50 TABLET, FILM COATED ORAL at 09:48

## 2024-05-18 RX ADMIN — NICOTINE 1 PATCH: 14 PATCH, EXTENDED RELEASE TRANSDERMAL at 10:34

## 2024-05-18 RX ADMIN — ASPIRIN 81 MG 81 MG: 81 TABLET ORAL at 10:34

## 2024-05-18 RX ADMIN — ENOXAPARIN SODIUM 40 MG: 40 INJECTION SUBCUTANEOUS at 09:48

## 2024-05-18 NOTE — ASSESSMENT & PLAN NOTE
Patient presented to the ER with chest pain and was found to have hypertensive urgency  Patient has received IV medication in the ER and the blood pressure was persistently elevated  Likely secondary to noncompliance  Has since significantly improved with resumption of losartan and Lopressor  Will continue on discharge  Counseled on importance of compliance with blood pressure medications

## 2024-05-18 NOTE — ASSESSMENT & PLAN NOTE
Continue Synthroid  Noted to have noncompliance  TSH noted to be approximately 100  Counseled on importance of compliance  Will need repeat TSH in 4 to 6 weeks

## 2024-05-18 NOTE — ASSESSMENT & PLAN NOTE
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  CARLI score 1.    Likely secondary to hypertensive urgency  Chest pain is since resolved  EKG without any signs of acute ischemia  Troponins negative  Medically cleared for discharge

## 2024-05-18 NOTE — DISCHARGE SUMMARY
[facility]  Discharge- Sadaf Field 1975, 48 y.o. female MRN: [id number]  Unit/Bed#: -[facility] Encounter: [id number]  Primary Care Provider: Ceferino Garcia DO   Date and time admitted to hospital: 5/17/2024 12:21 PM    * Hypertensive urgency  Assessment & Plan  Patient presented to the ER with chest pain and was found to have hypertensive urgency  Patient has received IV medication in the ER and the blood pressure was persistently elevated  Likely secondary to noncompliance  Has since significantly improved with resumption of losartan and Lopressor  Will continue on discharge  Counseled on importance of compliance with blood pressure medications    Acquired hypothyroidism  Assessment & Plan  Continue Synthroid  Noted to have noncompliance  TSH noted to be approximately 100  Counseled on importance of compliance  Will need repeat TSH in 4 to 6 weeks    Dependence on nicotine from cigarettes  Assessment & Plan  Nicotine patch  Smoking cessation counseling provided     Chest pain  Assessment & Plan  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  CARLI score 1.    Likely secondary to hypertensive urgency  Chest pain is since resolved  EKG without any signs of acute ischemia  Troponins negative  Medically cleared for discharge      Discharging Physician / Practitioner: Vern Main MD  PCP: Ceferino Garcia DO  Admission Date:   Admission Orders (From admission, onward)       Ordered        05/17/24 1725  Place in Observation  Once                          Discharge Date: 05/18/24    Medical Problems       Resolved Problems  Date Reviewed: 5/18/2024   None         Consultations During Hospital Stay:  none    Procedures Performed:   none    Significant Findings / Test Results:   XR chest 2 views    Result Date: 5/18/2024  Impression: No acute cardiopulmonary disease. Workstation performed: [id number]      Incidental Findings:   none     Test Results Pending at Discharge (will require  follow up):   none     Outpatient Tests Requested:  none    Complications:  none    Reason for Admission: Hypertensive urgency is dictating    Hospital Course:     Sadaf Chaves is a 48 y.o. female patient who originally presented to the hospital on 5/17/2024 with past medical history significant hypertension, nicotine dependence, hypothyroidism initially presented with hypertensive urgency and chest pain.  Chest pain likely from hypertensive urgency.  Chest pain is since resolved with control of blood pressure.  Troponins remain negative.  EKG without any signs of acute ischemia.  Medically clear for discharge.  Will follow-up outpatient with primary care doctor.  Counseled on importance of compliance    Please see above list of diagnoses and related plan for additional information.     Condition at Discharge: stable     Discharge Day Visit / Exam:     Subjective: Denies chest pain, shortness of breath, cough or fevers, chills    Exam:   Physical Exam  Constitutional:       General: She is not in acute distress.     Appearance: She is well-developed. She is not diaphoretic.   HENT:      Head: Normocephalic and atraumatic.      Nose: Nose normal.      Mouth/Throat:      Mouth: Oropharynx is clear and moist.      Pharynx: No oropharyngeal exudate.   Eyes:      General: No scleral icterus.        Right eye: No discharge.         Left eye: No discharge.      Extraocular Movements: EOM normal.      Conjunctiva/sclera: Conjunctivae normal.   Neck:      Thyroid: No thyromegaly.      Vascular: No JVD.   Cardiovascular:      Rate and Rhythm: Normal rate and regular rhythm.      Heart sounds: Normal heart sounds. No murmur heard.     No friction rub. No gallop.   Pulmonary:      Effort: Pulmonary effort is normal. No respiratory distress.      Breath sounds: Normal breath sounds. No wheezing or rales.   Chest:      Chest wall: No tenderness.   Abdominal:      General: Bowel sounds are normal. There is no distension.       Palpations: Abdomen is soft.      Tenderness: There is no abdominal tenderness. There is no guarding or rebound.   Musculoskeletal:         General: No tenderness, deformity or edema. Normal range of motion.      Cervical back: Normal range of motion and neck supple.   Skin:     General: Skin is warm and dry.      Findings: No erythema or rash.   Neurological:      Mental Status: She is alert. Mental status is at baseline.      Cranial Nerves: No cranial nerve deficit.      Sensory: No sensory deficit.      Motor: No abnormal muscle tone.      Coordination: Coordination normal.   Psychiatric:         Mood and Affect: Mood and affect normal.           Discussion with Family: pt    Discharge instructions/Information to patient and family:   See after visit summary for information provided to patient and family.      Provisions for Follow-Up Care:  See after visit summary for information related to follow-up care and any pertinent home health orders.      Disposition:     Home    For Discharges to [facility]:   Not Applicable to this Patient - Not Applicable to this Patient    Planned Readmission: none     Discharge Statement:  I spent 60 minutes discharging the patient. This time was spent on the day of discharge. I had direct contact with the patient on the day of discharge. Greater than 50% of the total time was spent examining patient, answering all patient questions, arranging and discussing plan of care with patient as well as directly providing post-discharge instructions.  Additional time then spent on discharge activities.    Discharge Medications:  See after visit summary for reconciled discharge medications provided to patient and family.      ** Please Note: This note has been constructed using a voice recognition system **

## 2024-05-18 NOTE — QUICK NOTE
Patient with complaint of headache.  Will order as needed Tylenol noted mildly elevated AST will repeat CMP in a.m. to ensure this remains stable.

## 2024-09-05 ENCOUNTER — APPOINTMENT (EMERGENCY)
Dept: RADIOLOGY | Facility: HOSPITAL | Age: 49
End: 2024-09-05
Payer: COMMERCIAL

## 2024-09-05 ENCOUNTER — HOSPITAL ENCOUNTER (EMERGENCY)
Facility: HOSPITAL | Age: 49
Discharge: HOME | End: 2024-09-05
Attending: EMERGENCY MEDICINE
Payer: COMMERCIAL

## 2024-09-05 VITALS
RESPIRATION RATE: 18 BRPM | HEART RATE: 87 BPM | SYSTOLIC BLOOD PRESSURE: 227 MMHG | TEMPERATURE: 97 F | OXYGEN SATURATION: 100 % | DIASTOLIC BLOOD PRESSURE: 114 MMHG

## 2024-09-05 DIAGNOSIS — M25.562 LEFT KNEE PAIN: Primary | ICD-10-CM

## 2024-09-05 PROCEDURE — 99283 EMERGENCY DEPT VISIT LOW MDM: CPT

## 2024-09-05 PROCEDURE — 73564 X-RAY EXAM KNEE 4 OR MORE: CPT

## 2024-09-05 PROCEDURE — 99284 EMERGENCY DEPT VISIT MOD MDM: CPT

## 2024-09-05 RX ORDER — NAPROXEN 500 MG/1
500 TABLET ORAL 2 TIMES DAILY WITH MEALS
Qty: 30 TABLET | Refills: 0 | Status: SHIPPED | OUTPATIENT
Start: 2024-09-05

## 2024-09-05 RX ORDER — OXYCODONE HYDROCHLORIDE 5 MG/1
5 TABLET ORAL ONCE
Status: COMPLETED | OUTPATIENT
Start: 2024-09-05 | End: 2024-09-05

## 2024-09-05 RX ORDER — OXYCODONE HYDROCHLORIDE 5 MG/1
5 TABLET ORAL EVERY 6 HOURS PRN
Qty: 8 TABLET | Refills: 0 | Status: SHIPPED | OUTPATIENT
Start: 2024-09-05

## 2024-09-05 RX ADMIN — OXYCODONE HYDROCHLORIDE 5 MG: 5 TABLET ORAL at 13:06

## 2024-09-05 NOTE — ED PROVIDER NOTES
History  Chief Complaint   Patient presents with    Knee Pain     Patient report left knee pain starting yesterday, today she couldn't put weight on it and now it is swollen.     47 y/o female patient presents to the ER for evaluation of knee pain. Patient stated that she has been having left knee pain. Patient stated that she has been having left knee pain for the last two days. She stated that she woke up this morning with severe medial left knee pain. Patient reports that the pain is worse with weight bearing status. There is no ecchymosis, erythema, effusion or deformity noted. Neurovascularly intact, sensation equal bilaterally. No injury/trauma.          Prior to Admission Medications   Prescriptions Last Dose Informant Patient Reported? Taking?   aspirin 81 mg chewable tablet   No No   Sig: Chew 1 tablet (81 mg total) daily   levothyroxine 125 mcg tablet   No No   Sig: Take 1 tablet (125 mcg total) by mouth daily in the early morning   losartan (COZAAR) 100 MG tablet   No No   Sig: Take 1 tablet (100 mg total) by mouth daily   methocarbamol (ROBAXIN) 500 mg tablet   No No   Sig: Take 2 tablets (1,000 mg total) by mouth 3 (three) times a day as needed for muscle spasms (back pain)   metoprolol tartrate (LOPRESSOR) 50 mg tablet   No No   Sig: Take 1 tablet (50 mg total) by mouth every 12 (twelve) hours   nicotine (NICODERM CQ) 7 mg/24hr TD 24 hr patch   No No   Sig: Place 1 patch on the skin daily   oxyCODONE-acetaminophen (PERCOCET) 5-325 mg per tablet   No No   Sig: Take 1 tablet by mouth every 6 (six) hours as needed for severe pain for up to 20 doses Max Daily Amount: 4 tablets   rosuvastatin (CRESTOR) 10 MG tablet   No No   Sig: Take 1 tablet (10 mg total) by mouth daily   topiramate (Topamax) 25 mg tablet   No No   Sig: Take 1 tablet (25 mg total) by mouth daily      Facility-Administered Medications: None       Past Medical History:   Diagnosis Date    History of MI (myocardial infarction)     History of  TIA (transient ischemic attack)     Hypertension     Hypothyroidism     hypothyroidism    Morbid obesity with BMI of 45.0-49.9, adult (HCC)     TIA (transient ischemic attack)        Past Surgical History:   Procedure Laterality Date    BREAST SURGERY Left     TUBAL LIGATION         Family History   Problem Relation Age of Onset    Heart disease Mother     Hypertension Mother     Diabetes Father      I have reviewed and agree with the history as documented.    E-Cigarette/Vaping    E-Cigarette Use Never User      E-Cigarette/Vaping Substances    Nicotine No     THC No     CBD No     Flavoring No     Other No     Unknown No      Social History     Tobacco Use    Smoking status: Every Day     Current packs/day: 0.50     Average packs/day: 0.5 packs/day for 30.0 years (15.0 ttl pk-yrs)     Types: Cigarettes    Smokeless tobacco: Never   Vaping Use    Vaping status: Never Used   Substance Use Topics    Alcohol use: Not Currently     Comment: occasional    Drug use: No       Review of Systems   Constitutional:  Negative for chills and fever.   HENT:  Negative for ear pain and sore throat.    Eyes:  Negative for pain and visual disturbance.   Respiratory:  Negative for cough and shortness of breath.    Cardiovascular:  Negative for chest pain and palpitations.   Gastrointestinal:  Negative for abdominal pain and vomiting.   Genitourinary:  Negative for dysuria and hematuria.   Musculoskeletal:  Positive for arthralgias and gait problem. Negative for back pain.   Skin:  Negative for color change and rash.   Neurological:  Negative for seizures and syncope.   All other systems reviewed and are negative.      Physical Exam  Physical Exam  Vitals and nursing note reviewed.   Constitutional:       General: She is not in acute distress.     Appearance: She is well-developed. She is obese.   HENT:      Head: Normocephalic and atraumatic.      Right Ear: External ear normal.      Left Ear: External ear normal.   Eyes:       Conjunctiva/sclera: Conjunctivae normal.   Cardiovascular:      Rate and Rhythm: Normal rate and regular rhythm.      Pulses: Normal pulses.      Heart sounds: Normal heart sounds.   Pulmonary:      Effort: Pulmonary effort is normal. No respiratory distress.      Breath sounds: Normal breath sounds.   Abdominal:      Palpations: Abdomen is soft.      Tenderness: There is no abdominal tenderness.   Musculoskeletal:         General: No swelling.      Cervical back: Neck supple.      Left knee: Decreased range of motion. Tenderness present over the medial joint line.   Skin:     General: Skin is warm and dry.      Capillary Refill: Capillary refill takes less than 2 seconds.   Neurological:      Mental Status: She is alert and oriented to person, place, and time. Mental status is at baseline.   Psychiatric:         Mood and Affect: Mood normal.         Behavior: Behavior normal.         Vital Signs  ED Triage Vitals   Temperature Pulse Respirations Blood Pressure SpO2   09/05/24 1226 09/05/24 1226 09/05/24 1226 09/05/24 1226 09/05/24 1226   (!) 97 °F (36.1 °C) 87 18 (!) 114/11 100 %      Temp Source Heart Rate Source Patient Position - Orthostatic VS BP Location FiO2 (%)   09/05/24 1226 -- 09/05/24 1237 09/05/24 1237 --   Temporal  Sitting Left arm       Pain Score       09/05/24 1226       9           Vitals:    09/05/24 1226 09/05/24 1237   BP: (!) 114/11 (!) 227/114   Pulse: 87    Patient Position - Orthostatic VS:  Sitting         Visual Acuity      ED Medications  Medications   oxyCODONE (ROXICODONE) IR tablet 5 mg (5 mg Oral Given 9/5/24 1306)       Diagnostic Studies  Results Reviewed       None                   XR knee 4+ vw left injury    (Results Pending)              Procedures  Procedures         ED Course                                 SBIRT 20yo+      Flowsheet Row Most Recent Value   Initial Alcohol Screen: US AUDIT-C     1. How often do you have a drink containing alcohol? 0 Filed at: 09/05/2024 8790    2. How many drinks containing alcohol do you have on a typical day you are drinking?  0 Filed at: 09/05/2024 1353   3b. FEMALE Any Age, or MALE 65+: How often do you have 4 or more drinks on one occassion? 0 Filed at: 09/05/2024 1353   Audit-C Score 0 Filed at: 09/05/2024 1353   THANH: How many times in the past year have you...    Used an illegal drug or used a prescription medication for non-medical reasons? Never Filed at: 09/05/2024 1353                      Medical Decision Making  This patient presents with joint pain. I have low suspicion for fracture, dislocation, significant ligamentous injury, septic arthritis, gout or new autoimmune arthropathy.  Advised to follow-up with orthopedics. Patient given knee brace by RN and walker for comfort and support.  Multi modal regimen given for pain relief.  Elevated BP noted. Requested RN to repeat them prior to discharge, RN did not document. Patient did not have any end organ symptoms. She is known to have HTN. Did not take morning medications. Return to the ER symptoms worsens or questions or concerns arise at home.      Amount and/or Complexity of Data Reviewed  Radiology: ordered.    Risk  Prescription drug management.                 Disposition  Final diagnoses:   Left knee pain     Time reflects when diagnosis was documented in both MDM as applicable and the Disposition within this note       Time User Action Codes Description Comment    9/5/2024  1:52 PM Maria A Qiu Add [M25.562] Left knee pain           ED Disposition       ED Disposition   Discharge    Condition   Stable    Date/Time   Thu Sep 5, 2024 1351    Comment   Sadaf  discharge to home/self care.                   Follow-up Information       Follow up With Specialties Details Why Contact Info    Ceferino Garcia, DO Family Medicine   [address] [facility] [facility] [address]  [phone]      Festus Najera, DO Orthopedic Surgery   [address] [facility] [address] [facility]  [address]  [facility] [facility] [place]  [address]  [phone]              Discharge Medication List as of 9/5/2024  1:53 PM        START taking these medications    Details   naproxen (Naprosyn) 500 mg tablet Take 1 tablet (500 mg total) by mouth 2 (two) times a day with meals, Starting Thu 9/5/2024, Normal      oxyCODONE (Roxicodone) 5 immediate release tablet Take 1 tablet (5 mg total) by mouth every 6 (six) hours as needed for severe pain for up to 8 doses Max Daily Amount: 20 mg, Starting Thu 9/5/2024, Normal           CONTINUE these medications which have NOT CHANGED    Details   aspirin 81 mg chewable tablet Chew 1 tablet (81 mg total) daily, Starting Sun 5/19/2024, Normal      levothyroxine 125 mcg tablet Take 1 tablet (125 mcg total) by mouth daily in the early morning, Starting Tue 11/21/2023, Until Thu 12/21/2023, Normal      losartan (COZAAR) 100 MG tablet Take 1 tablet (100 mg total) by mouth daily, Starting Tue 11/21/2023, Normal      methocarbamol (ROBAXIN) 500 mg tablet Take 2 tablets (1,000 mg total) by mouth 3 (three) times a day as needed for muscle spasms (back pain), Starting Wed 9/13/2023, Normal      metoprolol tartrate (LOPRESSOR) 50 mg tablet Take 1 tablet (50 mg total) by mouth every 12 (twelve) hours, Starting Tue 11/21/2023, Until Thu 12/21/2023, Normal      nicotine (NICODERM CQ) 7 mg/24hr TD 24 hr patch Place 1 patch on the skin daily, Starting Mon 8/22/2022, Normal      oxyCODONE-acetaminophen (PERCOCET) 5-325 mg per tablet Take 1 tablet by mouth every 6 (six) hours as needed for severe pain for up to 20 doses Max Daily Amount: 4 tablets, Starting Fri 6/2/2023, Normal      rosuvastatin (CRESTOR) 10 MG tablet Take 1 tablet (10 mg total) by mouth daily, Starting Mon 8/22/2022, Normal      topiramate (Topamax) 25 mg tablet Take 1 tablet (25 mg total) by mouth daily, Starting Mon 8/22/2022, Normal             No discharge procedures on file.    PDMP Review       None            ED Provider  Electronically Signed by              ALVIN Lancaster  09/05/24 7300

## 2024-09-05 NOTE — DISCHARGE INSTRUCTIONS
Tylenol/Naproxen  Oxycodone- for severe pain, do not drive or drink alcohol while on medication  Rest, Ice, Compression, Elevation  Follow up with orthopedics  Return to ER if symptoms worsen

## 2024-09-22 ENCOUNTER — HOSPITAL ENCOUNTER (EMERGENCY)
Facility: HOSPITAL | Age: 49
Discharge: HOME | End: 2024-09-22
Attending: EMERGENCY MEDICINE
Payer: COMMERCIAL

## 2024-09-22 VITALS
RESPIRATION RATE: 20 BRPM | DIASTOLIC BLOOD PRESSURE: 110 MMHG | HEIGHT: 67 IN | OXYGEN SATURATION: 98 % | WEIGHT: 293 LBS | TEMPERATURE: 98.7 F | SYSTOLIC BLOOD PRESSURE: 212 MMHG | HEART RATE: 98 BPM | BODY MASS INDEX: 45.99 KG/M2

## 2024-09-22 DIAGNOSIS — I10 UNCONTROLLED HYPERTENSION: ICD-10-CM

## 2024-09-22 DIAGNOSIS — J40 BRONCHITIS: Primary | ICD-10-CM

## 2024-09-22 LAB
FLUAV AG UPPER RESP QL IA.RAPID: NEGATIVE
FLUBV AG UPPER RESP QL IA.RAPID: NEGATIVE
SARS-COV+SARS-COV-2 AG RESP QL IA.RAPID: NEGATIVE

## 2024-09-22 PROCEDURE — 94644 CONT INHLJ TX 1ST HOUR: CPT

## 2024-09-22 PROCEDURE — 99283 EMERGENCY DEPT VISIT LOW MDM: CPT

## 2024-09-22 PROCEDURE — 87811 SARS-COV-2 COVID19 W/OPTIC: CPT | Performed by: EMERGENCY MEDICINE

## 2024-09-22 PROCEDURE — 99284 EMERGENCY DEPT VISIT MOD MDM: CPT | Performed by: EMERGENCY MEDICINE

## 2024-09-22 PROCEDURE — 87804 INFLUENZA ASSAY W/OPTIC: CPT | Performed by: EMERGENCY MEDICINE

## 2024-09-22 PROCEDURE — 94760 N-INVAS EAR/PLS OXIMETRY 1: CPT

## 2024-09-22 RX ORDER — ALBUTEROL SULFATE 90 UG/1
2 INHALANT RESPIRATORY (INHALATION) EVERY 6 HOURS PRN
Qty: 8.5 G | Refills: 0 | Status: SHIPPED | OUTPATIENT
Start: 2024-09-22

## 2024-09-22 RX ORDER — ALBUTEROL SULFATE 5 MG/ML
10 SOLUTION RESPIRATORY (INHALATION) ONCE
Status: COMPLETED | OUTPATIENT
Start: 2024-09-22 | End: 2024-09-22

## 2024-09-22 RX ORDER — METOPROLOL TARTRATE 50 MG
50 TABLET ORAL ONCE
Status: COMPLETED | OUTPATIENT
Start: 2024-09-22 | End: 2024-09-22

## 2024-09-22 RX ORDER — HYDRALAZINE HYDROCHLORIDE 25 MG/1
25 TABLET, FILM COATED ORAL ONCE
Status: COMPLETED | OUTPATIENT
Start: 2024-09-22 | End: 2024-09-22

## 2024-09-22 RX ORDER — OXYCODONE HYDROCHLORIDE 5 MG/1
5 TABLET ORAL ONCE
Status: COMPLETED | OUTPATIENT
Start: 2024-09-22 | End: 2024-09-22

## 2024-09-22 RX ORDER — AZITHROMYCIN 500 MG/1
500 TABLET, FILM COATED ORAL ONCE
Status: COMPLETED | OUTPATIENT
Start: 2024-09-22 | End: 2024-09-22

## 2024-09-22 RX ORDER — PREDNISONE 50 MG/1
50 TABLET ORAL DAILY
Qty: 5 TABLET | Refills: 0 | Status: SHIPPED | OUTPATIENT
Start: 2024-09-22

## 2024-09-22 RX ORDER — SODIUM CHLORIDE FOR INHALATION 0.9 %
12 VIAL, NEBULIZER (ML) INHALATION ONCE
Status: COMPLETED | OUTPATIENT
Start: 2024-09-22 | End: 2024-09-22

## 2024-09-22 RX ORDER — AZITHROMYCIN 250 MG/1
TABLET, FILM COATED ORAL
Qty: 6 TABLET | Refills: 0 | Status: SHIPPED | OUTPATIENT
Start: 2024-09-22 | End: 2024-09-26

## 2024-09-22 RX ADMIN — ALBUTEROL SULFATE 10 MG: 2.5 SOLUTION RESPIRATORY (INHALATION) at 18:12

## 2024-09-22 RX ADMIN — PREDNISONE 50 MG: 20 TABLET ORAL at 18:48

## 2024-09-22 RX ADMIN — AZITHROMYCIN 500 MG: 500 TABLET, FILM COATED ORAL at 18:48

## 2024-09-22 RX ADMIN — IPRATROPIUM BROMIDE 1 MG: 0.5 SOLUTION RESPIRATORY (INHALATION) at 18:12

## 2024-09-22 RX ADMIN — METOPROLOL TARTRATE 50 MG: 50 TABLET, FILM COATED ORAL at 18:48

## 2024-09-22 RX ADMIN — ISODIUM CHLORIDE 12 ML: 0.03 SOLUTION RESPIRATORY (INHALATION) at 18:11

## 2024-09-22 RX ADMIN — OXYCODONE HYDROCHLORIDE 5 MG: 5 TABLET ORAL at 18:48

## 2024-09-22 RX ADMIN — HYDRALAZINE HYDROCHLORIDE 25 MG: 25 TABLET ORAL at 18:48

## 2024-09-22 NOTE — ED PROVIDER NOTES
1. Bronchitis    2. Uncontrolled hypertension      ED Disposition       ED Disposition   Discharge    Condition   Stable    Date/Time   Sun Sep 22, 2024  5:47 PM    Comment   Sadaf Chaves discharge to home/self care.                   Assessment & Plan       Medical Decision Making  This patient presents emergency department with a chief complaint of cough.  Differential diagnosis includes but not limited to: Upper respiratory infection, postnasal drip, acute bronchitis viral versus bacterial, pneumonia, pleural effusion, pleurisy, foreign body aspiration.        Problems Addressed:  Bronchitis: acute illness or injury  Uncontrolled hypertension: chronic illness or injury with exacerbation, progression, or side effects of treatment    Amount and/or Complexity of Data Reviewed  Labs: ordered. Decision-making details documented in ED Course.    Risk  OTC drugs.  Prescription drug management.                ED Course as of 09/24/24 2113   Sun Sep 22, 2024   1747 SARS COV Rapid Antigen: Negative   1747 Influenza A Rapid Antigen: Negative   1747 Influenza B Rapid Antigen: Negative       Medications   albuterol inhalation solution 10 mg (10 mg Nebulization Given 9/22/24 1812)   ipratropium (ATROVENT) 0.02 % inhalation solution 1 mg (1 mg Nebulization Given 9/22/24 1812)   sodium chloride 0.9 % inhalation solution 12 mL (12 mL Nebulization Given 9/22/24 1811)   predniSONE tablet 50 mg (50 mg Oral Given 9/22/24 1848)   metoprolol tartrate (LOPRESSOR) tablet 50 mg (50 mg Oral Given 9/22/24 1848)   hydrALAZINE (APRESOLINE) tablet 25 mg (25 mg Oral Given 9/22/24 1848)   azithromycin (ZITHROMAX) tablet 500 mg (500 mg Oral Given 9/22/24 1848)   oxyCODONE (ROXICODONE) IR tablet 5 mg (5 mg Oral Given 9/22/24 1848)       History of Present Illness       Sadaf Chaves is a 48 y.o.  year old female  Past Medical History:  No date: History of MI (myocardial infarction)  No date: History of TIA (transient ischemic attack)  No date:  Hypertension  No date: Hypothyroidism      Comment:  hypothyroidism  No date: Morbid obesity with BMI of 45.0-49.9, adult (HCC)  No date: TIA (transient ischemic attack)  Social History    Tobacco Use      Smoking status: Every Day        Packs/day: 0.50        Years: 0.5 packs/day for 30.0 years (15.0 ttl pk-yrs)        Types: Cigarettes      Smokeless tobacco: Never    Vaping Use      Vaping status: Never Used    Alcohol use: Not Currently      Comment: occasional    Drug use: No    Patient presents with:  Flu Symptoms: Patient arrived to ER c/o flu like s/s that started yesterday alongside Cp, runny nose and weakness. +GEORGES   Cough, that is hurting int he L lower ribs+ tender  Chills w/o fever  No phlegm with cough, but feels as if there is phlegm to come out.      Pt was out of BP meds for 2 days      History obtained directly from the PATIENT              History provided by:  Patient   used: No    Flu Symptoms  Presenting symptoms: cough and shortness of breath    Presenting symptoms: no fever, no sore throat and no vomiting    Associated symptoms: no chills and no ear pain        Review of Systems   Constitutional:  Negative for chills and fever.   HENT:  Negative for ear pain and sore throat.    Eyes:  Negative for pain and visual disturbance.   Respiratory:  Positive for cough, chest tightness, shortness of breath and wheezing.    Cardiovascular:  Positive for chest pain. Negative for palpitations.   Gastrointestinal:  Negative for abdominal pain and vomiting.   Genitourinary:  Negative for dysuria and hematuria.   Musculoskeletal:  Negative for arthralgias and back pain.   Skin:  Negative for color change and rash.   Neurological:  Negative for seizures and syncope.   All other systems reviewed and are negative.          Objective     ED Triage Vitals   Temperature Pulse Blood Pressure Respirations SpO2 Patient Position - Orthostatic VS   09/22/24 1710 09/22/24 1710 09/22/24 1710 09/22/24  1710 09/22/24 1710 09/22/24 1710   98.7 °F (37.1 °C) 98 (!) 218/116 20 99 % Sitting      Temp Source Heart Rate Source BP Location FiO2 (%) Pain Score    09/22/24 1710 09/22/24 1710 09/22/24 1710 -- 09/22/24 1848    Oral Monitor Left arm  10 - Worst Possible Pain        Physical Exam  Vitals reviewed.   Constitutional:       Appearance: Normal appearance. She is obese.   HENT:      Head: Normocephalic.      Right Ear: External ear normal.      Left Ear: External ear normal.      Mouth/Throat:      Mouth: Mucous membranes are moist.   Eyes:      Extraocular Movements: Extraocular movements intact.      Pupils: Pupils are equal, round, and reactive to light.   Cardiovascular:      Rate and Rhythm: Normal rate.      Pulses: Normal pulses.      Heart sounds: Normal heart sounds.   Pulmonary:      Effort: Pulmonary effort is normal.      Breath sounds: Wheezing and rhonchi present.   Chest:      Chest wall: Tenderness (L lower anterio chest wall) present.   Skin:     Capillary Refill: Capillary refill takes less than 2 seconds.   Neurological:      General: No focal deficit present.      Mental Status: She is alert and oriented to person, place, and time.   Psychiatric:         Mood and Affect: Mood normal.         Thought Content: Thought content normal.         Labs Reviewed   COVID-19/INFLUENZA A/B RAPID ANTIGEN (30 MIN.TAT) - Normal       Result Value    SARS COV Rapid Antigen Negative      Influenza A Rapid Antigen Negative      Influenza B Rapid Antigen Negative      Narrative:     This test has been performed using the Qu[employer] Jenny 2 FLU+SARS Antigen test under the Emergency Use Authorization (EUA). This test has been validated by the  and verified by the performing laboratory. The Jenny uses lateral flow immunofluorescent sandwich assay to detect SARS-COV, Influenza A and Influenza B Antigen.     The Quidel Jenny 2 SARS Antigen test does not differentiate between SARS-CoV and SARS-CoV-2.     Negative  results are presumptive and may be confirmed with a molecular assay, if necessary, for patient management. Negative results do not rule out SARS-CoV-2 or influenza infection and should not be used as the sole basis for treatment or patient management decisions. A negative test result may occur if the level of antigen in a sample is below the limit of detection of this test.     Positive results are indicative of the presence of viral antigens, but do not rule out bacterial infection or co-infection with other viruses.     All test results should be used as an adjunct to clinical observations and other information available to the provider.    FOR PEDIATRIC PATIENTS - copy/paste COVID Guidelines URL to browser: [email or web address]     No orders to display       Procedures    ED Medication and Procedure Management   Prior to Admission Medications   Prescriptions Last Dose Informant Patient Reported? Taking?   aspirin 81 mg chewable tablet   No No   Sig: Chew 1 tablet (81 mg total) daily   levothyroxine 125 mcg tablet   No No   Sig: Take 1 tablet (125 mcg total) by mouth daily in the early morning   losartan (COZAAR) 100 MG tablet   No No   Sig: Take 1 tablet (100 mg total) by mouth daily   methocarbamol (ROBAXIN) 500 mg tablet   No No   Sig: Take 2 tablets (1,000 mg total) by mouth 3 (three) times a day as needed for muscle spasms (back pain)   metoprolol tartrate (LOPRESSOR) 50 mg tablet   No No   Sig: Take 1 tablet (50 mg total) by mouth every 12 (twelve) hours   naproxen (Naprosyn) 500 mg tablet   No No   Sig: Take 1 tablet (500 mg total) by mouth 2 (two) times a day with meals   nicotine (NICODERM CQ) 7 mg/24hr TD 24 hr patch   No No   Sig: Place 1 patch on the skin daily   oxyCODONE (Roxicodone) 5 immediate release tablet   No No   Sig: Take 1 tablet (5 mg total) by mouth every 6 (six) hours as needed for severe pain for up to 8 doses Max Daily Amount: 20 mg    oxyCODONE-acetaminophen (PERCOCET) 5-325 mg per tablet   No No   Sig: Take 1 tablet by mouth every 6 (six) hours as needed for severe pain for up to 20 doses Max Daily Amount: 4 tablets   rosuvastatin (CRESTOR) 10 MG tablet   No No   Sig: Take 1 tablet (10 mg total) by mouth daily   topiramate (Topamax) 25 mg tablet   No No   Sig: Take 1 tablet (25 mg total) by mouth daily      Facility-Administered Medications: None     Discharge Medication List as of 9/22/2024  5:48 PM        START taking these medications    Details   albuterol (ProAir HFA) 90 mcg/act inhaler Inhale 2 puffs every 6 (six) hours as needed for wheezing, Starting Sun 9/22/2024, Normal      azithromycin (Zithromax Z-Cal) 250 mg tablet Take 2 tablets today then 1 tablet daily x 4 days, Normal      predniSONE 50 mg tablet Take 1 tablet (50 mg total) by mouth daily, Starting Sun 9/22/2024, Normal           CONTINUE these medications which have NOT CHANGED    Details   aspirin 81 mg chewable tablet Chew 1 tablet (81 mg total) daily, Starting Sun 5/19/2024, Normal      levothyroxine 125 mcg tablet Take 1 tablet (125 mcg total) by mouth daily in the early morning, Starting Tue 11/21/2023, Until Thu 12/21/2023, Normal      losartan (COZAAR) 100 MG tablet Take 1 tablet (100 mg total) by mouth daily, Starting Tue 11/21/2023, Normal      methocarbamol (ROBAXIN) 500 mg tablet Take 2 tablets (1,000 mg total) by mouth 3 (three) times a day as needed for muscle spasms (back pain), Starting Wed 9/13/2023, Normal      metoprolol tartrate (LOPRESSOR) 50 mg tablet Take 1 tablet (50 mg total) by mouth every 12 (twelve) hours, Starting Tue 11/21/2023, Until Thu 12/21/2023, Normal      naproxen (Naprosyn) 500 mg tablet Take 1 tablet (500 mg total) by mouth 2 (two) times a day with meals, Starting Thu 9/5/2024, Normal      nicotine (NICODERM CQ) 7 mg/24hr TD 24 hr patch Place 1 patch on the skin daily, Starting Mon 8/22/2022, Normal      oxyCODONE (Roxicodone) 5  immediate release tablet Take 1 tablet (5 mg total) by mouth every 6 (six) hours as needed for severe pain for up to 8 doses Max Daily Amount: 20 mg, Starting Thu 9/5/2024, Normal      oxyCODONE-acetaminophen (PERCOCET) 5-325 mg per tablet Take 1 tablet by mouth every 6 (six) hours as needed for severe pain for up to 20 doses Max Daily Amount: 4 tablets, Starting Fri 6/2/2023, Normal      rosuvastatin (CRESTOR) 10 MG tablet Take 1 tablet (10 mg total) by mouth daily, Starting Mon 8/22/2022, Normal      topiramate (Topamax) 25 mg tablet Take 1 tablet (25 mg total) by mouth daily, Starting Mon 8/22/2022, Normal           No discharge procedures on file.     David Martin MD  09/24/24 9457

## 2024-09-22 NOTE — DISCHARGE INSTRUCTIONS
A  personal message from Dr. David Martin,  Thank you so much for allowing me to care for you today.    I pride myself in the care and attention I give all my patients.  I hope you were a witness to this tonight.   If for any reason your condition does not improve or worsens, or you have a question that was not answered during your visit you can feel free to text me on my personal phone #  # [phone].   I will answer to your message and continue your care past your emergency room visit.     Please understand that although you are being discharged because your condition has been deemed stable and able to be managed on an outpatient setting. However your condition may worsen as part of the natural progression of the illness/condition, if this occurs please come back to the emergency department for a repeat evaluation.

## 2024-10-01 ENCOUNTER — HOSPITAL ENCOUNTER (EMERGENCY)
Facility: HOSPITAL | Age: 49
Discharge: HOME | End: 2024-10-01
Payer: COMMERCIAL

## 2024-10-01 ENCOUNTER — APPOINTMENT (EMERGENCY)
Dept: CT IMAGING | Facility: HOSPITAL | Age: 49
End: 2024-10-01
Payer: COMMERCIAL

## 2024-10-01 VITALS
RESPIRATION RATE: 20 BRPM | OXYGEN SATURATION: 99 % | SYSTOLIC BLOOD PRESSURE: 158 MMHG | TEMPERATURE: 97.8 F | HEART RATE: 104 BPM | DIASTOLIC BLOOD PRESSURE: 87 MMHG

## 2024-10-01 DIAGNOSIS — R07.81 RIB PAIN ON LEFT SIDE: ICD-10-CM

## 2024-10-01 DIAGNOSIS — R74.01 TRANSAMINITIS: ICD-10-CM

## 2024-10-01 DIAGNOSIS — R07.9 CHEST PAIN: Primary | ICD-10-CM

## 2024-10-01 DIAGNOSIS — K80.20 CHOLELITHIASIS: ICD-10-CM

## 2024-10-01 LAB
2HR DELTA HS TROPONIN: -3 NG/L
ALBUMIN SERPL BCG-MCNC: 3.8 G/DL (ref 3.5–5)
ALP SERPL-CCNC: 172 U/L (ref 34–104)
ALT SERPL W P-5'-P-CCNC: 71 U/L (ref 7–52)
ANION GAP SERPL CALCULATED.3IONS-SCNC: 6 MMOL/L (ref 4–13)
AST SERPL W P-5'-P-CCNC: 170 U/L (ref 13–39)
ATRIAL RATE: 82 BPM
BASOPHILS # BLD AUTO: 0.04 THOUSANDS/ÂΜL (ref 0–0.1)
BASOPHILS NFR BLD AUTO: 0 % (ref 0–1)
BILIRUB SERPL-MCNC: 0.56 MG/DL (ref 0.2–1)
BUN SERPL-MCNC: 8 MG/DL (ref 5–25)
CALCIUM SERPL-MCNC: 9.2 MG/DL (ref 8.4–10.2)
CARDIAC TROPONIN I PNL SERPL HS: 10 NG/L
CARDIAC TROPONIN I PNL SERPL HS: 13 NG/L
CHLORIDE SERPL-SCNC: 98 MMOL/L (ref 96–108)
CO2 SERPL-SCNC: 29 MMOL/L (ref 21–32)
CREAT SERPL-MCNC: 0.85 MG/DL (ref 0.6–1.3)
EOSINOPHIL # BLD AUTO: 0.32 THOUSAND/ÂΜL (ref 0–0.61)
EOSINOPHIL NFR BLD AUTO: 3 % (ref 0–6)
ERYTHROCYTE [DISTWIDTH] IN BLOOD BY AUTOMATED COUNT: 14.2 % (ref 11.6–15.1)
EXT PREGNANCY TEST URINE: NEGATIVE
EXT. CONTROL: NORMAL
GFR SERPL CREATININE-BSD FRML MDRD: 82 ML/MIN/1.73SQ M
GLUCOSE SERPL-MCNC: 263 MG/DL (ref 65–140)
HCT VFR BLD AUTO: 45.1 % (ref 34.8–46.1)
HGB BLD-MCNC: 14.7 G/DL (ref 11.5–15.4)
IMM GRANULOCYTES # BLD AUTO: 0.09 THOUSAND/UL (ref 0–0.2)
IMM GRANULOCYTES NFR BLD AUTO: 1 % (ref 0–2)
LIPASE SERPL-CCNC: 55 U/L (ref 11–82)
LYMPHOCYTES # BLD AUTO: 2.06 THOUSANDS/ÂΜL (ref 0.6–4.47)
LYMPHOCYTES NFR BLD AUTO: 20 % (ref 14–44)
MCH RBC QN AUTO: 28.9 PG (ref 26.8–34.3)
MCHC RBC AUTO-ENTMCNC: 32.6 G/DL (ref 31.4–37.4)
MCV RBC AUTO: 89 FL (ref 82–98)
MONOCYTES # BLD AUTO: 0.39 THOUSAND/ÂΜL (ref 0.17–1.22)
MONOCYTES NFR BLD AUTO: 4 % (ref 4–12)
NEUTROPHILS # BLD AUTO: 7.68 THOUSANDS/ÂΜL (ref 1.85–7.62)
NEUTS SEG NFR BLD AUTO: 72 % (ref 43–75)
NRBC BLD AUTO-RTO: 0 /100 WBCS
P AXIS: -18 DEGREES
PLATELET # BLD AUTO: 294 THOUSANDS/UL (ref 149–390)
PMV BLD AUTO: 10.5 FL (ref 8.9–12.7)
POTASSIUM SERPL-SCNC: 3.6 MMOL/L (ref 3.5–5.3)
PR INTERVAL: 182 MS
PROT SERPL-MCNC: 7.7 G/DL (ref 6.4–8.4)
QRS AXIS: 57 DEGREES
QRSD INTERVAL: 100 MS
QT INTERVAL: 438 MS
QTC INTERVAL: 511 MS
RBC # BLD AUTO: 5.08 MILLION/UL (ref 3.81–5.12)
SODIUM SERPL-SCNC: 133 MMOL/L (ref 135–147)
T WAVE AXIS: -38 DEGREES
VENTRICULAR RATE: 82 BPM
WBC # BLD AUTO: 10.58 THOUSAND/UL (ref 4.31–10.16)

## 2024-10-01 PROCEDURE — 99284 EMERGENCY DEPT VISIT MOD MDM: CPT

## 2024-10-01 PROCEDURE — 74177 CT ABD & PELVIS W/CONTRAST: CPT

## 2024-10-01 PROCEDURE — 99285 EMERGENCY DEPT VISIT HI MDM: CPT

## 2024-10-01 PROCEDURE — 93005 ELECTROCARDIOGRAM TRACING: CPT

## 2024-10-01 PROCEDURE — 71260 CT THORAX DX C+: CPT

## 2024-10-01 PROCEDURE — 84484 ASSAY OF TROPONIN QUANT: CPT

## 2024-10-01 PROCEDURE — 93010 ELECTROCARDIOGRAM REPORT: CPT | Performed by: INTERNAL MEDICINE

## 2024-10-01 PROCEDURE — 96374 THER/PROPH/DIAG INJ IV PUSH: CPT

## 2024-10-01 PROCEDURE — 36415 COLL VENOUS BLD VENIPUNCTURE: CPT

## 2024-10-01 PROCEDURE — 83690 ASSAY OF LIPASE: CPT

## 2024-10-01 PROCEDURE — 80053 COMPREHEN METABOLIC PANEL: CPT

## 2024-10-01 PROCEDURE — 96375 TX/PRO/DX INJ NEW DRUG ADDON: CPT

## 2024-10-01 PROCEDURE — 81025 URINE PREGNANCY TEST: CPT

## 2024-10-01 PROCEDURE — 85025 COMPLETE CBC W/AUTO DIFF WBC: CPT

## 2024-10-01 RX ORDER — IBUPROFEN 600 MG/1
600 TABLET, FILM COATED ORAL EVERY 6 HOURS PRN
Qty: 30 TABLET | Refills: 0 | Status: SHIPPED | OUTPATIENT
Start: 2024-10-01

## 2024-10-01 RX ORDER — ACETAMINOPHEN 10 MG/ML
1000 INJECTION, SOLUTION INTRAVENOUS ONCE
Status: COMPLETED | OUTPATIENT
Start: 2024-10-01 | End: 2024-10-01

## 2024-10-01 RX ADMIN — ACETAMINOPHEN 1000 MG: 1000 INJECTION, SOLUTION INTRAVENOUS at 12:21

## 2024-10-01 RX ADMIN — IOHEXOL 100 ML: 350 INJECTION, SOLUTION INTRAVENOUS at 12:31

## 2024-10-01 RX ADMIN — MORPHINE SULFATE 2 MG: 2 INJECTION, SOLUTION INTRAMUSCULAR; INTRAVENOUS at 12:22

## 2024-10-01 NOTE — DISCHARGE INSTRUCTIONS
Take ibuprofen every 6 hours as needed for rib pain  Use Lidoderm patches over left-side of ribs - leave on for 12 hours at a time and then leave off for 12 hours at a time  Please follow-up with general surgery and your PCP  Return to emergency department for any new or worsening symptoms.

## 2024-10-01 NOTE — ED PROVIDER NOTES
Final diagnoses:   Chest pain   Rib pain on left side   Transaminitis   Cholelithiasis     ED Disposition       ED Disposition   Discharge    Condition   Stable    Date/Time   Tue Oct 1, 2024  2:18 PM    Comment   Sadaf Field discharge to home/self care.                   Assessment & Plan       Medical Decision Making  49 yo female presenting with multiple complaints. Has been experiencing cough/congestion x 6 days. Now experiencing chest pain associated with her coughing. She has intermittent shortness of breath with ambulation. She has some left sided pain along her lower ribs, left upper quadrant, and left flank.     On exam overall-well appearing. Heart and lungs CTA. Abdomen soft, tender in LUQ and epigastric region. Left sided CVA tenderness.     Plan: CBC to evaluate for any significant leukocytosis or anemia. CMP for electrolyte disturbance, renal function, or bilary dysfunction. Lipase to evaluate for pancreatitis. Troponin for myocardial ischemia. ECG for dysrhythmias or ischemic changes. CT Chest for acute cardiopulmonary abnormality such as pleural effusion, pneumothorax, pneumonia, mass/tumor.  CT Abdomen/pelvis to evaluate for acute intra-abdominal infection or pathology such as gastritis, colitis, appendicitis, cholecystitis, pancreatitis, diverticulitis, pyelonephritis, urolithiasis, tumor/mass.      Labs with transaminitis that has been on previous lab work but is up trending. CT C/A/P negative for acute abnormality. Patient with cholelithiasis and elevated LFTs but no right-sided abdominal tenderness or concern for acute cholecystitis. She was given a referral to general surgery and advised to follow-up with her PCP within 1-2 weeks. She reports feeling significantly better at time of discharge. Advised close follow-up with PCP within 1 week. Patient was stable throughout ED course and at time of discharge. The management plan was discussed in detail. All questions answered. ED return  precautions discussed in detail. Patient is in agreement and understanding with this plan.      Amount and/or Complexity of Data Reviewed  Labs: ordered. Decision-making details documented in ED Course.  Radiology: ordered.    Risk  Prescription drug management.        ED Course as of 10/01/24 1629   Tue Oct 01, 2024   1155 CBC and differential(!)   1155 WBC(!): 10.58   1208 Comprehensive metabolic panel(!)   1208 LIPASE: 55   1209 PREGNANCY TEST URINE: Negative   1222 hs TnI 0hr: 13   1331 Patient reevaluated. Sleeping in recliner chair.    1331 Eosinophils %: 3   1401 Delta 2hr hsTnI: -3       Medications   acetaminophen (Ofirmev) injection 1,000 mg (0 mg Intravenous Stopped 10/1/24 1236)   morphine injection 2 mg (2 mg Intravenous Given 10/1/24 1222)   iohexol (OMNIPAQUE) 350 MG/ML injection (MULTI-DOSE) 100 mL (100 mL Intravenous Given 10/1/24 1231)       ED Risk Strat Scores                                               History of Present Illness       Chief Complaint   Patient presents with    Flank Pain    Abdominal Pain     Pt presents with c/o L sided flank pain/abdominal pain, pain increases with inspiration. Recently seen and treated for bronchitis. Denies N/V/D.        Past Medical History:   Diagnosis Date    History of MI (myocardial infarction)     History of TIA (transient ischemic attack)     Hypertension     Hypothyroidism     hypothyroidism    Morbid obesity with BMI of 45.0-49.9, adult (HCC)     TIA (transient ischemic attack)       Past Surgical History:   Procedure Laterality Date    BREAST SURGERY Left     TUBAL LIGATION        Family History   Problem Relation Age of Onset    Heart disease Mother     Hypertension Mother     Diabetes Father       Social History     Tobacco Use    Smoking status: Every Day     Current packs/day: 0.50     Average packs/day: 0.5 packs/day for 30.0 years (15.0 ttl pk-yrs)     Types: Cigarettes    Smokeless tobacco: Never   Vaping Use    Vaping status: Never Used    Substance Use Topics    Alcohol use: Not Currently     Comment: occasional    Drug use: No      E-Cigarette/Vaping    E-Cigarette Use Never User       E-Cigarette/Vaping Substances    Nicotine No     THC No     CBD No     Flavoring No     Other No     Unknown No       I have reviewed and agree with the history as documented.     Sadaf is a 47 yo female presenting with multiple complaints. She has had a cough for over a week and states she is starting to have significant chest pain associated with it. The pain is located under her left breast and radiates around to her back. She states it feels like its in her ribs. She has some shortness of breath with exertion but not at rest. She denies and history of heart or lung problems. She is also complaining of pain in her left flank that radiates to her left side upper abdomen. She denies associated nausea, vomiting, diarrhea, fevers, chills, urinary symptoms, headache, lightheadedness. She has not taken anything for her symptoms.       History provided by:  Patient   used: No        Review of Systems   Constitutional:  Negative for chills, diaphoresis, fatigue and fever.   HENT:  Positive for congestion. Negative for sore throat.    Eyes:  Negative for visual disturbance.   Respiratory:  Positive for cough and shortness of breath.    Cardiovascular:  Positive for chest pain. Negative for palpitations.   Gastrointestinal:  Negative for abdominal pain, diarrhea, nausea and vomiting.   Genitourinary:  Positive for flank pain. Negative for difficulty urinating and dysuria.   Musculoskeletal:  Negative for arthralgias and back pain.   Skin:  Negative for color change and pallor.   Neurological:  Negative for dizziness, light-headedness and headaches.   All other systems reviewed and are negative.          Objective       ED Triage Vitals   Temperature Pulse Blood Pressure Respirations SpO2 Patient Position - Orthostatic VS   10/01/24 1024 10/01/24 1024  10/01/24 1024 10/01/24 1024 10/01/24 1024 10/01/24 1219   97.8 °F (36.6 °C) 104 (!) 190/100 20 99 % Lying      Temp src Heart Rate Source BP Location FiO2 (%) Pain Score    -- -- 10/01/24 1219 -- 10/01/24 1222      Left arm  9      Vitals      Date and Time Temp Pulse SpO2 Resp BP Pain Score FACES Pain Rating User   10/01/24 1222 -- -- -- -- -- 9 -- NW   10/01/24 1219 -- -- -- -- 158/87 -- -- SN   10/01/24 1024 97.8 °F (36.6 °C) 104 99 % 20 190/100 -- -- GP            Physical Exam  Vitals and nursing note reviewed.   Constitutional:       General: She is not in acute distress.     Appearance: Normal appearance. She is obese. She is not ill-appearing.   HENT:      Head: Normocephalic and atraumatic.      Right Ear: External ear normal.      Left Ear: External ear normal.      Nose: Nose normal.      Mouth/Throat:      Mouth: Mucous membranes are moist.      Pharynx: Oropharynx is clear.   Eyes:      Extraocular Movements: Extraocular movements intact.      Conjunctiva/sclera: Conjunctivae normal.      Pupils: Pupils are equal, round, and reactive to light.   Cardiovascular:      Rate and Rhythm: Normal rate and regular rhythm.      Pulses: Normal pulses.      Heart sounds: Normal heart sounds.   Pulmonary:      Effort: Pulmonary effort is normal.      Breath sounds: No wheezing, rhonchi or rales.   Abdominal:      Palpations: Abdomen is soft.      Tenderness: There is abdominal tenderness in the epigastric area and left upper quadrant. There is left CVA tenderness. There is no right CVA tenderness, guarding or rebound. Negative signs include Butt's sign and McBurney's sign.   Musculoskeletal:         General: Normal range of motion.      Cervical back: Normal range of motion.   Skin:     General: Skin is warm and dry.      Capillary Refill: Capillary refill takes less than 2 seconds.   Neurological:      General: No focal deficit present.      Mental Status: She is alert. Mental status is at baseline.    Psychiatric:         Mood and Affect: Mood normal.         Behavior: Behavior normal.         Results Reviewed       Procedure Component Value Units Date/Time    HS Troponin I 2hr [[id number]]  (Normal) Collected: 10/01/24 1326    Lab Status: Final result Specimen: Blood from Arm, Right Updated: 10/01/24 1401     hs TnI 2hr 10 ng/L      Delta 2hr hsTnI -3 ng/L     HS Troponin 0hr (reflex protocol) [[id number]]  (Normal) Collected: 10/01/24 1145    Lab Status: Final result Specimen: Blood from Arm, Right Updated: 10/01/24 1213     hs TnI 0hr 13 ng/L     HS Troponin I 4hr [[id number]]     Lab Status: No result Specimen: Blood     POCT pregnancy, urine [[id number]]  (Normal) Resulted: 10/01/24 1209    Lab Status: Final result Updated: 10/01/24 1209     EXT Preg Test, Ur Negative     Control Valid    Comprehensive metabolic panel [[id number]]  (Abnormal) Collected: 10/01/24 1145    Lab Status: Final result Specimen: Blood from Arm, Right Updated: 10/01/24 1206     Sodium 133 mmol/L      Potassium 3.6 mmol/L      Chloride 98 mmol/L      CO2 29 mmol/L      ANION GAP 6 mmol/L      BUN 8 mg/dL      Creatinine 0.85 mg/dL      Glucose 263 mg/dL      Calcium 9.2 mg/dL       U/L      ALT 71 U/L      Alkaline Phosphatase 172 U/L      Total Protein 7.7 g/dL      Albumin 3.8 g/dL      Total Bilirubin 0.56 mg/dL      eGFR 82 ml/min/1.73sq m     Narrative:      [employer] guidelines for Chronic Kidney Disease (CKD):     Stage 1 with normal or high GFR (GFR > 90 mL/min/1.73 square meters)    Stage 2 Mild CKD (GFR = 60-89 mL/min/1.73 square meters)    Stage 3A Moderate CKD (GFR = 45-59 mL/min/1.73 square meters)    Stage 3B Moderate CKD (GFR = 30-44 mL/min/1.73 square meters)    Stage 4 Severe CKD (GFR = 15-29 mL/min/1.73 square meters)    Stage 5 End Stage CKD (GFR <15 mL/min/1.73 square meters)  Note: GFR calculation is accurate only with a steady state creatinine    Lipase [[id number]]  (Normal)  Collected: 10/01/24 1145    Lab Status: Final result Specimen: Blood from Arm, Right Updated: 10/01/24 1206     Lipase 55 u/L     CBC and differential [[id number]]  (Abnormal) Collected: 10/01/24 1145    Lab Status: Final result Specimen: Blood from Arm, Right Updated: 10/01/24 1152     WBC 10.58 Thousand/uL      RBC 5.08 Million/uL      Hemoglobin 14.7 g/dL      Hematocrit 45.1 %      MCV 89 fL      MCH 28.9 pg      MCHC 32.6 g/dL      RDW 14.2 %      MPV 10.5 fL      Platelets 294 Thousands/uL      nRBC 0 /100 WBCs      Segmented % 72 %      Immature Grans % 1 %      Lymphocytes % 20 %      Monocytes % 4 %      Eosinophils Relative 3 %      Basophils Relative 0 %      Absolute Neutrophils 7.68 Thousands/µL      Absolute Immature Grans 0.09 Thousand/uL      Absolute Lymphocytes 2.06 Thousands/µL      Absolute Monocytes 0.39 Thousand/µL      Eosinophils Absolute 0.32 Thousand/µL      Basophils Absolute 0.04 Thousands/µL             CT chest abdomen pelvis w contrast   Final Interpretation by Mathew Taveras MD (10/01 1256)      No acute cardiopulmonary normality.      No acute intra-abdominal abnormality.      Hepatic steatosis.      Cholelithiasis.               Workstation performed: [id number]             Procedures    ED Medication and Procedure Management   Prior to Admission Medications   Prescriptions Last Dose Informant Patient Reported? Taking?   albuterol (ProAir HFA) 90 mcg/act inhaler   No No   Sig: Inhale 2 puffs every 6 (six) hours as needed for wheezing   aspirin 81 mg chewable tablet   No No   Sig: Chew 1 tablet (81 mg total) daily   levothyroxine 125 mcg tablet   No No   Sig: Take 1 tablet (125 mcg total) by mouth daily in the early morning   losartan (COZAAR) 100 MG tablet   No No   Sig: Take 1 tablet (100 mg total) by mouth daily   methocarbamol (ROBAXIN) 500 mg tablet   No No   Sig: Take 2 tablets (1,000 mg total) by mouth 3 (three) times a day as needed for muscle spasms (back pain)    metoprolol tartrate (LOPRESSOR) 50 mg tablet   No No   Sig: Take 1 tablet (50 mg total) by mouth every 12 (twelve) hours   naproxen (Naprosyn) 500 mg tablet   No No   Sig: Take 1 tablet (500 mg total) by mouth 2 (two) times a day with meals   nicotine (NICODERM CQ) 7 mg/24hr TD 24 hr patch   No No   Sig: Place 1 patch on the skin daily   oxyCODONE (Roxicodone) 5 immediate release tablet   No No   Sig: Take 1 tablet (5 mg total) by mouth every 6 (six) hours as needed for severe pain for up to 8 doses Max Daily Amount: 20 mg   oxyCODONE-acetaminophen (PERCOCET) 5-325 mg per tablet   No No   Sig: Take 1 tablet by mouth every 6 (six) hours as needed for severe pain for up to 20 doses Max Daily Amount: 4 tablets   predniSONE 50 mg tablet   No No   Sig: Take 1 tablet (50 mg total) by mouth daily   rosuvastatin (CRESTOR) 10 MG tablet   No No   Sig: Take 1 tablet (10 mg total) by mouth daily   topiramate (Topamax) 25 mg tablet   No No   Sig: Take 1 tablet (25 mg total) by mouth daily      Facility-Administered Medications: None     Discharge Medication List as of 10/1/2024  2:23 PM        START taking these medications    Details   ibuprofen (MOTRIN) 600 mg tablet Take 1 tablet (600 mg total) by mouth every 6 (six) hours as needed for mild pain, Starting Tue 10/1/2024, Normal           CONTINUE these medications which have NOT CHANGED    Details   albuterol (ProAir HFA) 90 mcg/act inhaler Inhale 2 puffs every 6 (six) hours as needed for wheezing, Starting Sun 9/22/2024, Normal      aspirin 81 mg chewable tablet Chew 1 tablet (81 mg total) daily, Starting Sun 5/19/2024, Normal      levothyroxine 125 mcg tablet Take 1 tablet (125 mcg total) by mouth daily in the early morning, Starting Tue 11/21/2023, Until Thu 12/21/2023, Normal      losartan (COZAAR) 100 MG tablet Take 1 tablet (100 mg total) by mouth daily, Starting Tue 11/21/2023, Normal      methocarbamol (ROBAXIN) 500 mg tablet Take 2 tablets (1,000 mg total) by mouth  3 (three) times a day as needed for muscle spasms (back pain), Starting Wed 9/13/2023, Normal      metoprolol tartrate (LOPRESSOR) 50 mg tablet Take 1 tablet (50 mg total) by mouth every 12 (twelve) hours, Starting Tue 11/21/2023, Until Thu 12/21/2023, Normal      naproxen (Naprosyn) 500 mg tablet Take 1 tablet (500 mg total) by mouth 2 (two) times a day with meals, Starting Thu 9/5/2024, Normal      nicotine (NICODERM CQ) 7 mg/24hr TD 24 hr patch Place 1 patch on the skin daily, Starting Mon 8/22/2022, Normal      oxyCODONE (Roxicodone) 5 immediate release tablet Take 1 tablet (5 mg total) by mouth every 6 (six) hours as needed for severe pain for up to 8 doses Max Daily Amount: 20 mg, Starting Thu 9/5/2024, Normal      oxyCODONE-acetaminophen (PERCOCET) 5-325 mg per tablet Take 1 tablet by mouth every 6 (six) hours as needed for severe pain for up to 20 doses Max Daily Amount: 4 tablets, Starting Fri 6/2/2023, Normal      predniSONE 50 mg tablet Take 1 tablet (50 mg total) by mouth daily, Starting Sun 9/22/2024, Normal      rosuvastatin (CRESTOR) 10 MG tablet Take 1 tablet (10 mg total) by mouth daily, Starting Mon 8/22/2022, Normal      topiramate (Topamax) 25 mg tablet Take 1 tablet (25 mg total) by mouth daily, Starting Mon 8/22/2022, Normal             ED SEPSIS DOCUMENTATION   Time reflects when diagnosis was documented in both MDM as applicable and the Disposition within this note       Time User Action Codes Description Comment    10/1/2024  2:18 PM Kait Mendoza [R07.9] Chest pain     10/1/2024  2:19 PM Kait Mendoza [R07.81] Rib pain on left side     10/1/2024  2:19 PM Kait Mendoza [R74.01] Transaminitis     10/1/2024  2:20 PM Kait Mendoza [K80.20] Cholelithiasis                  Kait Mendoza PA-C  10/01/24 1026

## 2025-01-28 ENCOUNTER — HOSPITAL ENCOUNTER (EMERGENCY)
Facility: HOSPITAL | Age: 50
Discharge: HOME | End: 2025-01-28
Attending: EMERGENCY MEDICINE
Payer: COMMERCIAL

## 2025-01-28 ENCOUNTER — APPOINTMENT (EMERGENCY)
Dept: RADIOLOGY | Facility: HOSPITAL | Age: 50
End: 2025-01-28
Payer: COMMERCIAL

## 2025-01-28 VITALS
DIASTOLIC BLOOD PRESSURE: 104 MMHG | TEMPERATURE: 98.5 F | SYSTOLIC BLOOD PRESSURE: 177 MMHG | RESPIRATION RATE: 19 BRPM | HEART RATE: 89 BPM | OXYGEN SATURATION: 99 %

## 2025-01-28 DIAGNOSIS — M25.561 RIGHT KNEE PAIN: ICD-10-CM

## 2025-01-28 DIAGNOSIS — R63.1 POLYDIPSIA: ICD-10-CM

## 2025-01-28 DIAGNOSIS — R73.9 HYPERGLYCEMIA: ICD-10-CM

## 2025-01-28 DIAGNOSIS — R35.89 POLYURIA: Primary | ICD-10-CM

## 2025-01-28 DIAGNOSIS — R53.83 FATIGUE: ICD-10-CM

## 2025-01-28 LAB
2HR DELTA HS TROPONIN: -2 NG/L
ALBUMIN SERPL BCG-MCNC: 4.1 G/DL (ref 3.5–5)
ALP SERPL-CCNC: 207 U/L (ref 34–104)
ALT SERPL W P-5'-P-CCNC: 75 U/L (ref 7–52)
ANION GAP SERPL CALCULATED.3IONS-SCNC: 11 MMOL/L (ref 4–13)
AST SERPL W P-5'-P-CCNC: 185 U/L (ref 13–39)
ATRIAL RATE: 89 BPM
B-OH-BUTYR SERPL-MCNC: 0.06 MMOL/L (ref 0.02–0.27)
BACTERIA UR QL AUTO: NORMAL /HPF
BASE EX.OXY STD BLDV CALC-SCNC: 90.1 % (ref 60–80)
BASE EXCESS BLDV CALC-SCNC: -2.7 MMOL/L
BASOPHILS # BLD AUTO: 0.03 THOUSANDS/ΜL (ref 0–0.1)
BASOPHILS NFR BLD AUTO: 0 % (ref 0–1)
BILIRUB SERPL-MCNC: 0.63 MG/DL (ref 0.2–1)
BILIRUB UR QL STRIP: NEGATIVE
BUN SERPL-MCNC: 5 MG/DL (ref 5–25)
CALCIUM SERPL-MCNC: 8.9 MG/DL (ref 8.4–10.2)
CARDIAC TROPONIN I PNL SERPL HS: 11 NG/L (ref ?–50)
CARDIAC TROPONIN I PNL SERPL HS: 9 NG/L (ref ?–50)
CHLORIDE SERPL-SCNC: 95 MMOL/L (ref 96–108)
CLARITY UR: CLEAR
CO2 SERPL-SCNC: 24 MMOL/L (ref 21–32)
COLOR UR: ABNORMAL
CREAT SERPL-MCNC: 0.92 MG/DL (ref 0.6–1.3)
EOSINOPHIL # BLD AUTO: 0.16 THOUSAND/ΜL (ref 0–0.61)
EOSINOPHIL NFR BLD AUTO: 2 % (ref 0–6)
ERYTHROCYTE [DISTWIDTH] IN BLOOD BY AUTOMATED COUNT: 12.7 % (ref 11.6–15.1)
EST. AVERAGE GLUCOSE BLD GHB EST-MCNC: 232 MG/DL
GFR SERPL CREATININE-BSD FRML MDRD: 74 ML/MIN/1.73SQ M
GLUCOSE SERPL-MCNC: 372 MG/DL (ref 65–140)
GLUCOSE SERPL-MCNC: 499 MG/DL (ref 65–140)
GLUCOSE SERPL-MCNC: 518 MG/DL (ref 65–140)
GLUCOSE SERPL-MCNC: 545 MG/DL (ref 65–140)
GLUCOSE UR STRIP-MCNC: ABNORMAL MG/DL
HBA1C MFR BLD: 9.7 %
HCO3 BLDV-SCNC: 21.7 MMOL/L (ref 24–30)
HCT VFR BLD AUTO: 43.6 % (ref 34.8–46.1)
HGB BLD-MCNC: 14.4 G/DL (ref 11.5–15.4)
HGB UR QL STRIP.AUTO: NEGATIVE
IMM GRANULOCYTES # BLD AUTO: 0.04 THOUSAND/UL (ref 0–0.2)
IMM GRANULOCYTES NFR BLD AUTO: 1 % (ref 0–2)
KETONES UR STRIP-MCNC: NEGATIVE MG/DL
LEUKOCYTE ESTERASE UR QL STRIP: ABNORMAL
LIPASE SERPL-CCNC: 51 U/L (ref 11–82)
LYMPHOCYTES # BLD AUTO: 1.8 THOUSANDS/ΜL (ref 0.6–4.47)
LYMPHOCYTES NFR BLD AUTO: 22 % (ref 14–44)
MCH RBC QN AUTO: 28.9 PG (ref 26.8–34.3)
MCHC RBC AUTO-ENTMCNC: 33 G/DL (ref 31.4–37.4)
MCV RBC AUTO: 87 FL (ref 82–98)
MONOCYTES # BLD AUTO: 0.32 THOUSAND/ΜL (ref 0.17–1.22)
MONOCYTES NFR BLD AUTO: 4 % (ref 4–12)
NEUTROPHILS # BLD AUTO: 6.04 THOUSANDS/ΜL (ref 1.85–7.62)
NEUTS SEG NFR BLD AUTO: 71 % (ref 43–75)
NITRITE UR QL STRIP: NEGATIVE
NON-SQ EPI CELLS URNS QL MICRO: NORMAL /HPF
NRBC BLD AUTO-RTO: 0 /100 WBCS
O2 CT BLDV-SCNC: 19.9 ML/DL
P AXIS: 18 DEGREES
PCO2 BLDV: 36.7 MM HG (ref 42–50)
PH BLDV: 7.39 [PH] (ref 7.3–7.4)
PH UR STRIP.AUTO: 5 [PH]
PLATELET # BLD AUTO: 275 THOUSANDS/UL (ref 149–390)
PMV BLD AUTO: 11.9 FL (ref 8.9–12.7)
PO2 BLDV: 69.7 MM HG (ref 35–45)
POTASSIUM SERPL-SCNC: 3.8 MMOL/L (ref 3.5–5.3)
PR INTERVAL: 210 MS
PROT SERPL-MCNC: 8.1 G/DL (ref 6.4–8.4)
PROT UR STRIP-MCNC: NEGATIVE MG/DL
QRS AXIS: -15 DEGREES
QRSD INTERVAL: 94 MS
QT INTERVAL: 408 MS
QTC INTERVAL: 496 MS
RBC # BLD AUTO: 4.99 MILLION/UL (ref 3.81–5.12)
RBC #/AREA URNS AUTO: NORMAL /HPF
SODIUM SERPL-SCNC: 130 MMOL/L (ref 135–147)
SP GR UR STRIP.AUTO: 1.04 (ref 1–1.03)
T WAVE AXIS: 84 DEGREES
TSH SERPL DL<=0.05 MIU/L-ACNC: 110.44 UIU/ML (ref 0.45–4.5)
UROBILINOGEN UR STRIP-ACNC: <2 MG/DL
VENTRICULAR RATE: 89 BPM
WBC # BLD AUTO: 8.39 THOUSAND/UL (ref 4.31–10.16)
WBC #/AREA URNS AUTO: NORMAL /HPF

## 2025-01-28 PROCEDURE — 85025 COMPLETE CBC W/AUTO DIFF WBC: CPT

## 2025-01-28 PROCEDURE — 84439 ASSAY OF FREE THYROXINE: CPT

## 2025-01-28 PROCEDURE — 93005 ELECTROCARDIOGRAM TRACING: CPT

## 2025-01-28 PROCEDURE — 96372 THER/PROPH/DIAG INJ SC/IM: CPT

## 2025-01-28 PROCEDURE — 83690 ASSAY OF LIPASE: CPT

## 2025-01-28 PROCEDURE — 82948 REAGENT STRIP/BLOOD GLUCOSE: CPT

## 2025-01-28 PROCEDURE — 93010 ELECTROCARDIOGRAM REPORT: CPT | Performed by: INTERNAL MEDICINE

## 2025-01-28 PROCEDURE — 82805 BLOOD GASES W/O2 SATURATION: CPT

## 2025-01-28 PROCEDURE — 99284 EMERGENCY DEPT VISIT MOD MDM: CPT

## 2025-01-28 PROCEDURE — 82010 KETONE BODYS QUAN: CPT

## 2025-01-28 PROCEDURE — 81001 URINALYSIS AUTO W/SCOPE: CPT

## 2025-01-28 PROCEDURE — 36415 COLL VENOUS BLD VENIPUNCTURE: CPT

## 2025-01-28 PROCEDURE — 73560 X-RAY EXAM OF KNEE 1 OR 2: CPT

## 2025-01-28 PROCEDURE — 84484 ASSAY OF TROPONIN QUANT: CPT

## 2025-01-28 PROCEDURE — 71045 X-RAY EXAM CHEST 1 VIEW: CPT

## 2025-01-28 PROCEDURE — 84443 ASSAY THYROID STIM HORMONE: CPT

## 2025-01-28 PROCEDURE — 99285 EMERGENCY DEPT VISIT HI MDM: CPT

## 2025-01-28 PROCEDURE — 80053 COMPREHEN METABOLIC PANEL: CPT

## 2025-01-28 PROCEDURE — 83036 HEMOGLOBIN GLYCOSYLATED A1C: CPT

## 2025-01-28 RX ORDER — ACETAMINOPHEN 325 MG/1
975 TABLET ORAL ONCE
Status: COMPLETED | OUTPATIENT
Start: 2025-01-28 | End: 2025-01-28

## 2025-01-28 RX ADMIN — METFORMIN HYDROCHLORIDE 500 MG: 500 TABLET ORAL at 15:29

## 2025-01-28 RX ADMIN — DICLOFENAC SODIUM 2 G: 10 GEL TOPICAL at 15:30

## 2025-01-28 RX ADMIN — ACETAMINOPHEN 975 MG: 325 TABLET, FILM COATED ORAL at 15:29

## 2025-01-28 RX ADMIN — INSULIN HUMAN 10 UNITS: 100 INJECTION, SOLUTION PARENTERAL at 15:43

## 2025-01-28 NOTE — DISCHARGE INSTRUCTIONS
Begin taking metformin every 12 hours and follow-up with your primary care provider regarding elevated blood sugars/further management of diabetes. May continue to use 1000mg Tylenol every 8 hours as needed for knee pain. May also apply topical creams for additional pain relief. Follow-up with orthopedics for further evaluation of knee. Return to ED for any worsening symptoms.

## 2025-01-28 NOTE — ED PROVIDER NOTES
Time reflects when diagnosis was documented in both MDM as applicable and the Disposition within this note       Time User Action Codes Description Comment    1/28/2025  3:11 PM Naradko, Kandace Add [R35.89] Polyuria     1/28/2025  3:11 PM Naradko, Kandace Add [R63.1] Polydipsia     1/28/2025  3:12 PM Naradko, Kandace Add [R53.83] Fatigue     1/28/2025  3:12 PM Naradko, Kandace Add [M25.561] Right knee pain     1/28/2025  3:12 PM Naradko, Kandace Add [R73.9] Hyperglycemia           ED Disposition       ED Disposition   Discharge    Condition   Stable    Date/Time   Tue Jan 28, 2025  6:05 PM    Comment   Sadaf Field discharge to home/self care.                   Assessment & Plan       Medical Decision Making  Patient is a 49-year-old female presenting for evaluation of right knee pain, polyuria, and polydipsia.  On examination, patient is well-appearing and does not appear acute distress.  Vital signs showing a blood pressure of 225/120 and heart rate of 98.  Vitals rechecked and blood pressure now 171/78. Normal heart sounds.  No abdominal tenderness upon palpation.  No tenderness to right knee.  Patient able to flex knee but reports pain.  No pain with extension of knee.  No erythema or swelling noted.  2+ pedal/PT. patient requesting for her thyroid level to be rechecked during this visit.    Differentials include but are not limited to: ACS, arrhythmia, pneumonia, diabetes mellitus, thyroid dysfunction, urinary tract infection, fracture, sprain, and ligamental injury.    POCT glucose obtained in triage and was 499.  Glucose on blood work was 518.  Given patient does not have a history of diabetes, beta hydroxybutyrate and VBG sent to rule out diabetic ketoacidosis.  No abnormalities noted on VBG and beta hydroxybutyrate.  Sodium on CMP was 130, corrected to 137 given hyperglycemia.  CBC and lipase normal.  Initial troponin=11.  2-hour troponin=9. EKG showing normal sinus rhythm with heart rate of 89 and no  axis shift.  No significant change when compared to previous EKG on file. HEART score=3. TSH elevated at 110.435 which is consistent with her known diagnosis of hypothyroidism.    Urinalysis negative for urinary tract infection but did note a large amount of glucose.  Given polydipsia, polyuria, hyperglycemia, and glucose in urinalysis, suspect new diagnosis of diabetes mellitus.  Patient started on 500 mg metformin and first dose given in ED. 10 units of regular insulin was given subcutaneously for hyperglycemia.  Prior to discharge, blood sugar improved to 372 down from 545.  A1c sent for further diabetic management.  No pneumonia or cardiomegaly noted on my interpretation of x-ray.  X-rays of right knee did not show any osseous abnormality.  Discussed results with patient.    Advised patient that she should begin taking metformin 500 mg every 12 hours and should follow-up with her primary care provider regarding further management of diabetes mellitus.  Reviewed over side effects of metformin including GI upset/diarrhea as the most experienced side effects.  Patient provided with information on carbohydrate counting and dietary modifications.  Advised that she may continue to use Tylenol/topical creams for pain and should rest, ice, compress, and elevate her knee for additional pain relief.  If she continues to experience discomfort with her knee or feels popping, she should follow-up with her orthopedic provider for further imaging.  Encouraged follow-up with PCP and strict ED return precautions reviewed.  Patient verbalizes understanding of discharge instructions and follow care at this time.    Amount and/or Complexity of Data Reviewed  Labs: ordered. Decision-making details documented in ED Course.     Details: Reviewed   Radiology: ordered and independent interpretation performed.     Details: Reviewed     Risk  OTC drugs.  Prescription drug management.        ED Course as of 01/29/25 2157 Tue Jan 28, 2025    1249 CBC and differential  WNL   1249 Blood gas, venous(!)  Normal pH.   1312 GLUCOSE(!!): 518   1312 UA w Reflex to Microscopic w Reflex to Culture(!)  + glucose   1312 HS Troponin 0hr (reflex protocol)  Initial troponin=11.   1312 Comprehensive metabolic panel(!!)  Corrected sodium=137.   1313 Lipase  Normal.   1340 Urine Microscopic  Negative UTI   1343 No abnormalities noted on my interpretations of x-rays.   1343 Beta Hydroxybutyrate  Negative        Medications   metFORMIN (GLUCOPHAGE) tablet 500 mg (500 mg Oral Given 1/28/25 1529)   acetaminophen (TYLENOL) tablet 975 mg (975 mg Oral Given 1/28/25 1529)   Diclofenac Sodium (VOLTAREN) 1 % topical gel 2 g (2 g Topical Given 1/28/25 1530)   insulin regular (HumuLIN R,NovoLIN R) injection 10 Units (10 Units Subcutaneous Given 1/28/25 1543)       ED Risk Strat Scores   HEART Risk Score      Flowsheet Row Most Recent Value   Heart Score Risk Calculator    History 0 Filed at: 01/28/2025 1315   ECG 0 Filed at: 01/28/2025 1315   Age 1 Filed at: 01/28/2025 1315   Risk Factors 2 Filed at: 01/28/2025 1315   Troponin 0 Filed at: 01/28/2025 1315   HEART Score 3 Filed at: 01/28/2025 1315          HEART Risk Score      Flowsheet Row Most Recent Value   Heart Score Risk Calculator    History 0 Filed at: 01/28/2025 1315   ECG 0 Filed at: 01/28/2025 1315   Age 1 Filed at: 01/28/2025 1315   Risk Factors 2 Filed at: 01/28/2025 1315   Troponin 0 Filed at: 01/28/2025 1315   HEART Score 3 Filed at: 01/28/2025 1315                            SBIRT 20yo+      Flowsheet Row Most Recent Value   Initial Alcohol Screen: US AUDIT-C     1. How often do you have a drink containing alcohol? 0 Filed at: 01/28/2025 1145   2. How many drinks containing alcohol do you have on a typical day you are drinking?  0 Filed at: 01/28/2025 1145   3a. Male UNDER 65: How often do you have five or more drinks on one occasion? 0 Filed at: 01/28/2025 1145   3b. FEMALE Any Age, or MALE 65+: How often do you  have 4 or more drinks on one occassion? 0 Filed at: 01/28/2025 1144   Audit-C Score 0 Filed at: 01/28/2025 1149   THANH: How many times in the past year have you...    Used an illegal drug or used a prescription medication for non-medical reasons? Never Filed at: 01/28/2025 1149                            History of Present Illness       Chief Complaint   Patient presents with    Knee Injury     R knee pain since a fall Wednesday, also new increased urination and thirst    Urinary Frequency       Past Medical History:   Diagnosis Date    History of MI (myocardial infarction)     History of TIA (transient ischemic attack)     Hypertension     Hypothyroidism     hypothyroidism    Morbid obesity with BMI of 45.0-49.9, adult (HCC)     TIA (transient ischemic attack)       Past Surgical History:   Procedure Laterality Date    BREAST SURGERY Left     TUBAL LIGATION        Family History   Problem Relation Age of Onset    Heart disease Mother     Hypertension Mother     Diabetes Father       Social History     Tobacco Use    Smoking status: Every Day     Current packs/day: 0.50     Average packs/day: 0.5 packs/day for 30.0 years (15.0 ttl pk-yrs)     Types: Cigarettes    Smokeless tobacco: Never   Vaping Use    Vaping status: Never Used   Substance Use Topics    Alcohol use: Not Currently     Comment: occasional    Drug use: No      E-Cigarette/Vaping    E-Cigarette Use Never User       E-Cigarette/Vaping Substances    Nicotine No     THC No     CBD No     Flavoring No     Other No     Unknown No       I have reviewed and agree with the history as documented.     Patient is a 49-year-old female with a past medical history including hypertension, MI, TIA, and hypothyroidism. Presents today for evaluation of right knee pain and urinary frequency. Patient states that she slipped 5 days ago and had twisted her right knee. Felt a pop at that time and is unsure if she had also hit it off of the banister. She denies falling onto  her knee. Notes pain when bending or applying pressure to the knee and she feels like her knee is going to give out. She denies any pain at rest. Has attempted ibuprofen, heat, and ice without relief. No medications taken PTA. Patient also reports increased urinary frequency, increased thirst, and nausea x3 days. Has never been diagnosed with diabetes. She also notes some intermittent midsternal chest pressure that moves towards the left side starting yesterday. Discomfort does not radiate into her arm, neck, back, or jaw. Patient also notes some slight shortness of breath with ambulation but denies any at rest.       Urinary Frequency  Associated symptoms: chest pain, fatigue, nausea and shortness of breath    Associated symptoms: no abdominal pain, no diarrhea, no fever and no vomiting        Review of Systems   Constitutional:  Positive for fatigue. Negative for chills and fever.   Respiratory:  Positive for shortness of breath.    Cardiovascular:  Positive for chest pain.   Gastrointestinal:  Positive for nausea. Negative for abdominal pain, diarrhea and vomiting.   Endocrine: Positive for polydipsia and polyuria.   Genitourinary:  Positive for frequency and urgency. Negative for dysuria and hematuria.   All other systems reviewed and are negative.          Objective       ED Triage Vitals [01/28/25 1144]   Temperature Pulse Blood Pressure Respirations SpO2 Patient Position - Orthostatic VS   98.5 °F (36.9 °C) 98 (!) 225/120 22 99 % Sitting      Temp Source Heart Rate Source BP Location FiO2 (%) Pain Score    Temporal Monitor Left arm -- --      Vitals      Date and Time Temp Pulse SpO2 Resp BP Pain Score FACES Pain Rating User   01/28/25 1254 -- 89 99 % 19 177/104 -- -- SB   01/28/25 1235 -- -- -- -- 171/78 -- -- RN   01/28/25 1144 98.5 °F (36.9 °C) 98 99 % 22 225/120 -- -- GP            Physical Exam  Vitals and nursing note reviewed.   Constitutional:       Appearance: Normal appearance.   HENT:      Head:  "Normocephalic and atraumatic.   Cardiovascular:      Rate and Rhythm: Normal rate.      Heart sounds: Normal heart sounds.   Pulmonary:      Effort: Pulmonary effort is normal.      Breath sounds: Normal breath sounds.   Abdominal:      General: Bowel sounds are normal.      Palpations: Abdomen is soft.      Tenderness: There is no abdominal tenderness.   Musculoskeletal:         General: No tenderness.      Right knee: No swelling, erythema or bony tenderness. Normal range of motion. No tenderness.      Comments: No tenderness upon palpation of right knee. Able to bend and extend knee. No joint instability appreciated. No effusion or erythema. 2+ pedal/PT. Neurovascular and sensation intact.    Skin:     General: Skin is warm and dry.      Capillary Refill: Capillary refill takes less than 2 seconds.   Neurological:      General: No focal deficit present.      Mental Status: She is alert and oriented to person, place, and time.   Psychiatric:         Mood and Affect: Mood normal.         Behavior: Behavior normal.         Results Reviewed       Procedure Component Value Units Date/Time    T4, free [[id number]]  (Abnormal) Collected: 01/28/25 1237    Lab Status: Final result Specimen: Blood from Arm, Left Updated: 01/29/25 0040     Free T4 0.38 ng/dL     Narrative:        \"Therapeutic range for patients medicated with thyroid disorders: 0.61-1.24 ng/dL.\"    Hemoglobin A1C [[id number]]  (Abnormal) Collected: 01/28/25 1237    Lab Status: Final result Specimen: Blood from Arm, Left Updated: 01/28/25 2140     Hemoglobin A1C 9.7 %       mg/dl     Fingerstick Glucose (POCT) [[id number]]  (Abnormal) Collected: 01/28/25 1759    Lab Status: Final result Specimen: Blood Updated: 01/28/25 1800     POC Glucose 372 mg/dl     TSH, 3rd generation with Free T4 reflex [[id number]]  (Abnormal) Collected: 01/28/25 1237    Lab Status: Final result Specimen: Blood from Arm, Left Updated: 01/28/25 1749     TSH 3RD GENERATON 110.435 " uIU/mL     Fingerstick Glucose (POCT) [[id number]]  (Abnormal) Collected: 01/28/25 1636    Lab Status: Final result Specimen: Blood Updated: 01/28/25 1637     POC Glucose 545 mg/dl     HS Troponin I 2hr [[id number]]  (Normal) Collected: 01/28/25 1449    Lab Status: Final result Specimen: Blood from Arm, Left Updated: 01/28/25 1517     hs TnI 2hr 9 ng/L      Delta 2hr hsTnI -2 ng/L     Urine Microscopic [[id number]]  (Normal) Collected: 01/28/25 1257    Lab Status: Final result Specimen: Urine, Clean Catch Updated: 01/28/25 1312     RBC, UA 1-2 /hpf      WBC, UA 1-2 /hpf      Epithelial Cells Occasional /hpf      Bacteria, UA None Seen /hpf     UA w Reflex to Microscopic w Reflex to Culture [[id number]]  (Abnormal) Collected: 01/28/25 1257    Lab Status: Final result Specimen: Urine, Clean Catch Updated: 01/28/25 1309     Color, UA Light Yellow     Clarity, UA Clear     Specific Gravity, UA 1.043     pH, UA 5.0     Leukocytes, UA Elevated glucose may cause decreased leukocyte values. See urine microscopic for UWBC result     Nitrite, UA Negative     Protein, UA Negative mg/dl      Glucose, UA >=1000 (1%) mg/dl      Ketones, UA Negative mg/dl      Urobilinogen, UA <2.0 mg/dl      Bilirubin, UA Negative     Occult Blood, UA Negative    Comprehensive metabolic panel [[id number]]  (Abnormal) Collected: 01/28/25 1237    Lab Status: Final result Specimen: Blood from Arm, Left Updated: 01/28/25 1307     Sodium 130 mmol/L      Potassium 3.8 mmol/L      Chloride 95 mmol/L      CO2 24 mmol/L      ANION GAP 11 mmol/L      BUN 5 mg/dL      Creatinine 0.92 mg/dL      Glucose 518 mg/dL      Calcium 8.9 mg/dL       U/L      ALT 75 U/L      Alkaline Phosphatase 207 U/L      Total Protein 8.1 g/dL      Albumin 4.1 g/dL      Total Bilirubin 0.63 mg/dL      eGFR 74 ml/min/1.73sq m     Narrative:      [employer] guidelines for Chronic Kidney Disease (CKD):     Stage 1 with normal or high GFR (GFR > 90  mL/min/1.73 square meters)    Stage 2 Mild CKD (GFR = 60-89 mL/min/1.73 square meters)    Stage 3A Moderate CKD (GFR = 45-59 mL/min/1.73 square meters)    Stage 3B Moderate CKD (GFR = 30-44 mL/min/1.73 square meters)    Stage 4 Severe CKD (GFR = 15-29 mL/min/1.73 square meters)    Stage 5 End Stage CKD (GFR <15 mL/min/1.73 square meters)  Note: GFR calculation is accurate only with a steady state creatinine    HS Troponin 0hr (reflex protocol) [[id number]]  (Normal) Collected: 01/28/25 1237    Lab Status: Final result Specimen: Blood from Arm, Left Updated: 01/28/25 1306     hs TnI 0hr 11 ng/L     Lipase [[id number]]  (Normal) Collected: 01/28/25 1237    Lab Status: Final result Specimen: Blood from Arm, Left Updated: 01/28/25 1300     Lipase 51 u/L     Beta Hydroxybutyrate [[id number]]  (Normal) Collected: 01/28/25 1237    Lab Status: Final result Specimen: Blood from Arm, Left Updated: 01/28/25 1259     Beta- Hydroxybutyrate 0.06 mmol/L     CBC and differential [[id number]] Collected: 01/28/25 1237    Lab Status: Final result Specimen: Blood from Arm, Left Updated: 01/28/25 1245     WBC 8.39 Thousand/uL      RBC 4.99 Million/uL      Hemoglobin 14.4 g/dL      Hematocrit 43.6 %      MCV 87 fL      MCH 28.9 pg      MCHC 33.0 g/dL      RDW 12.7 %      MPV 11.9 fL      Platelets 275 Thousands/uL      nRBC 0 /100 WBCs      Segmented % 71 %      Immature Grans % 1 %      Lymphocytes % 22 %      Monocytes % 4 %      Eosinophils Relative 2 %      Basophils Relative 0 %      Absolute Neutrophils 6.04 Thousands/µL      Absolute Immature Grans 0.04 Thousand/uL      Absolute Lymphocytes 1.80 Thousands/µL      Absolute Monocytes 0.32 Thousand/µL      Eosinophils Absolute 0.16 Thousand/µL      Basophils Absolute 0.03 Thousands/µL     Blood gas, venous [[id number]]  (Abnormal) Collected: 01/28/25 1237    Lab Status: Final result Specimen: Blood from Arm, Left Updated: 01/28/25 1244     pH, Xiang 7.389     pCO2, Xiang 36.7 mm Hg      pO2,  Xiang 69.7 mm Hg      HCO3, Xiang 21.7 mmol/L      Base Excess, Xiang -2.7 mmol/L      O2 Content, Xiang 19.9 ml/dL      O2 HGB, VENOUS 90.1 %     Fingerstick Glucose (POCT) [[id number]]  (Abnormal) Collected: 01/28/25 1147    Lab Status: Final result Specimen: Blood Updated: 01/28/25 1148     POC Glucose 499 mg/dl             XR knee 1 or 2 views right   ED Interpretation by ALVIN Garvey (01/28 1342)   No fracture noted on my interpretation of x-rays.      Final Interpretation by Mathew Taveras MD (01/28 1552)      No acute osseous abnormality.         Computerized Assisted Algorithm (CAA) may have been used to analyze all applicable images.         Workstation performed: [id number]         XR chest 1 view portable   ED Interpretation by ALVIN Garvey (01/28 1342)   No obvious pneumonia or cardiomegaly noted on my interpretation of chest x-ray.      Final Interpretation by Christina Patel MD (01/28 1402)      No acute cardiopulmonary disease.            Workstation performed: [id number]             ECG 12 Lead Documentation Only    Date/Time: 1/28/2025 1:00 PM    Performed by: ALVIN Garvey  Authorized by: ALVIN Garvey    Indications / Diagnosis:  Chest pain/SOB  ECG reviewed by me, the ED Provider: yes    Patient location:  ED  Previous ECG:     Previous ECG:  Compared to current    Comparison ECG info:  NSR, HR=82    Similarity:  No change  Interpretation:     Interpretation: normal    Rate:     ECG rate:  89    ECG rate assessment: normal    Rhythm:     Rhythm: sinus rhythm    Ectopy:     Ectopy: none    QRS:     QRS axis:  Normal    QRS intervals:  Normal  Conduction:     Conduction: normal    ST segments:     ST segments:  Normal  T waves:     T waves: normal        ED Medication and Procedure Management   Prior to Admission Medications   Prescriptions Last Dose Informant Patient Reported? Taking?   albuterol (ProAir HFA) 90 mcg/act inhaler   No No   Sig:  Inhale 2 puffs every 6 (six) hours as needed for wheezing   aspirin 81 mg chewable tablet   No No   Sig: Chew 1 tablet (81 mg total) daily   ibuprofen (MOTRIN) 600 mg tablet   No No   Sig: Take 1 tablet (600 mg total) by mouth every 6 (six) hours as needed for mild pain   levothyroxine 125 mcg tablet   No No   Sig: Take 1 tablet (125 mcg total) by mouth daily in the early morning   losartan (COZAAR) 100 MG tablet   No No   Sig: Take 1 tablet (100 mg total) by mouth daily   methocarbamol (ROBAXIN) 500 mg tablet   No No   Sig: Take 2 tablets (1,000 mg total) by mouth 3 (three) times a day as needed for muscle spasms (back pain)   metoprolol tartrate (LOPRESSOR) 50 mg tablet   No No   Sig: Take 1 tablet (50 mg total) by mouth every 12 (twelve) hours   naproxen (Naprosyn) 500 mg tablet   No No   Sig: Take 1 tablet (500 mg total) by mouth 2 (two) times a day with meals   nicotine (NICODERM CQ) 7 mg/24hr TD 24 hr patch   No No   Sig: Place 1 patch on the skin daily   oxyCODONE (Roxicodone) 5 immediate release tablet   No No   Sig: Take 1 tablet (5 mg total) by mouth every 6 (six) hours as needed for severe pain for up to 8 doses Max Daily Amount: 20 mg   oxyCODONE-acetaminophen (PERCOCET) 5-325 mg per tablet   No No   Sig: Take 1 tablet by mouth every 6 (six) hours as needed for severe pain for up to 20 doses Max Daily Amount: 4 tablets   predniSONE 50 mg tablet   No No   Sig: Take 1 tablet (50 mg total) by mouth daily   rosuvastatin (CRESTOR) 10 MG tablet   No No   Sig: Take 1 tablet (10 mg total) by mouth daily   topiramate (Topamax) 25 mg tablet   No No   Sig: Take 1 tablet (25 mg total) by mouth daily      Facility-Administered Medications: None     Discharge Medication List as of 1/28/2025  6:07 PM        START taking these medications    Details   metFORMIN (GLUCOPHAGE) 500 mg tablet Take 1 tablet (500 mg total) by mouth 2 (two) times a day with meals, Starting Tue 1/28/2025, Normal           CONTINUE these  medications which have NOT CHANGED    Details   albuterol (ProAir HFA) 90 mcg/act inhaler Inhale 2 puffs every 6 (six) hours as needed for wheezing, Starting Sun 9/22/2024, Normal      aspirin 81 mg chewable tablet Chew 1 tablet (81 mg total) daily, Starting Sun 5/19/2024, Normal      ibuprofen (MOTRIN) 600 mg tablet Take 1 tablet (600 mg total) by mouth every 6 (six) hours as needed for mild pain, Starting Tue 10/1/2024, Normal      levothyroxine 125 mcg tablet Take 1 tablet (125 mcg total) by mouth daily in the early morning, Starting Tue 11/21/2023, Until Thu 12/21/2023, Normal      losartan (COZAAR) 100 MG tablet Take 1 tablet (100 mg total) by mouth daily, Starting Tue 11/21/2023, Normal      methocarbamol (ROBAXIN) 500 mg tablet Take 2 tablets (1,000 mg total) by mouth 3 (three) times a day as needed for muscle spasms (back pain), Starting Wed 9/13/2023, Normal      metoprolol tartrate (LOPRESSOR) 50 mg tablet Take 1 tablet (50 mg total) by mouth every 12 (twelve) hours, Starting Tue 11/21/2023, Until Thu 12/21/2023, Normal      naproxen (Naprosyn) 500 mg tablet Take 1 tablet (500 mg total) by mouth 2 (two) times a day with meals, Starting Thu 9/5/2024, Normal      nicotine (NICODERM CQ) 7 mg/24hr TD 24 hr patch Place 1 patch on the skin daily, Starting Mon 8/22/2022, Normal      oxyCODONE (Roxicodone) 5 immediate release tablet Take 1 tablet (5 mg total) by mouth every 6 (six) hours as needed for severe pain for up to 8 doses Max Daily Amount: 20 mg, Starting Thu 9/5/2024, Normal      oxyCODONE-acetaminophen (PERCOCET) 5-325 mg per tablet Take 1 tablet by mouth every 6 (six) hours as needed for severe pain for up to 20 doses Max Daily Amount: 4 tablets, Starting Fri 6/2/2023, Normal      predniSONE 50 mg tablet Take 1 tablet (50 mg total) by mouth daily, Starting Sun 9/22/2024, Normal      rosuvastatin (CRESTOR) 10 MG tablet Take 1 tablet (10 mg total) by mouth daily, Starting Mon 8/22/2022, Normal       topiramate (Topamax) 25 mg tablet Take 1 tablet (25 mg total) by mouth daily, Starting Mon 8/22/2022, Normal             ED SEPSIS DOCUMENTATION   Time reflects when diagnosis was documented in both MDM as applicable and the Disposition within this note       Time User Action Codes Description Comment    1/28/2025  3:11 PM Kandace Dash Add [R35.89] Polyuria     1/28/2025  3:11 PM Kandace Dash Add [R63.1] Polydipsia     1/28/2025  3:12 PM Kandace Dash Add [R53.83] Fatigue     1/28/2025  3:12 PM Kandace Dash Add [M25.561] Right knee pain     1/28/2025  3:12 PM Kandace Dash Add [R73.9] Hyperglycemia                  ALVIN Garvey  01/29/25 5728

## 2025-01-29 LAB — T4 FREE SERPL-MCNC: 0.38 NG/DL (ref 0.61–1.12)

## 2025-01-30 NOTE — ASSESSMENT & PLAN NOTE
Chronic, not at goal (unstable), labs ordered    Orders:    CBC with Auto Differential     Continue statin

## 2025-01-31 ENCOUNTER — OFFICE VISIT (OUTPATIENT)
Dept: OBGYN CLINIC | Facility: CLINIC | Age: 50
End: 2025-01-31
Payer: COMMERCIAL

## 2025-01-31 VITALS — WEIGHT: 293 LBS | BODY MASS INDEX: 45.99 KG/M2 | HEIGHT: 67 IN

## 2025-01-31 DIAGNOSIS — M25.561 ACUTE PAIN OF RIGHT KNEE: Primary | ICD-10-CM

## 2025-01-31 PROCEDURE — 99214 OFFICE O/P EST MOD 30 MIN: CPT | Performed by: FAMILY MEDICINE

## 2025-01-31 RX ORDER — NAPROXEN 500 MG/1
500 TABLET ORAL 2 TIMES DAILY WITH MEALS
Qty: 60 TABLET | Refills: 0 | Status: SHIPPED | OUTPATIENT
Start: 2025-01-31

## 2025-01-31 NOTE — PROGRESS NOTES
Subjective:    Chief Complaint   Patient presents with    Right Knee - Pain, Clicking, Locking, Swelling     Slipped at bottom of the steps and heard a popping sound       Sadaf Chaves is a 49 y.o. female complains of right knee pain. Onset of the symptoms was several days ago.  Mechanism of injury:  missed last step while wlaking down stair and felt a sharp pop in her knee, pain since the injury and difficutly with ambulation . Aggravating factors: walking  and weight bearing. Treatment to date: avoidance of offending activity. Symptoms have progressed to a point and plateaued.      The following portions were reviewed and updated as needed: allergies, current medications, past medical history, past social history, past surgical history and problem list.    Review of Systems   Constitutional: Negative for fever.   HENT: Negative for dental problem and headaches.    Eyes: Negative for vision loss.   Respiratory: Negative for cough and shortness of breath.    Cardiovascular: Negative for leg swelling and palpitations.   Gastrointestinal: Negative for constipation and diarrhea.   Genitourinary: Negative for bladder incontinence and difficulty urinating.   Musculoskeletal: Negative for back pain and difficulty walking.   Skin: Negative for rash and ulcer.   Neurological: Negative for dizziness and headaches.   Hem/Lymph/Immuno: Negative for blood clots. Does not bruise/bleed easily.   Psychiatric/Behavioral: Negative for confusion.         Objective:  General: no acute distress, non toxic, AAO x3   Skin: no skin changes, no rashes, no wounds or laceration  Vasculature: normal cap refill, no LE edema, normal popliteal and dorsalis pedis pulse  Neurologic:   Musculoskeletal: righ KNEE EXAM  Gait: limping gait negative, able to weight bear without difficulty  Inspection: No gross deformity, no redness or warmth   Effusion: mild   Medial joint line TTP: positive  Lateral joint line TTP: negative  ROM: Full flexion and  extension  Danyelle's: positive,   Thesalys: positive  Instability to varus/valgus stress: negative  Anterior Drawer: negative   Lachman's test: negative  Posterior Drawer: negative            Imaging:       Assessment/Plan:  1. Acute pain of right knee (Primary)  Clinical concern is that the patient may have suffered from acute medial meniscus tear.  I am recommending the patient follow-up with an MRI for further evaluation for possible meniscal injury.  She does report occasional locking and catching of the knee which raises suspicion for possible flipped fragment which may require surgical intervention.  She will follow-up after completion of the MRI study.  Continue in a hinged knee brace weightbearing as tolerated.  Begin naproxen 500 mg twice daily as needed for pain.  - Ambulatory Referral to Orthopedic Surgery  - MRI knee right  wo contrast; Future  - naproxen (Naprosyn) 500 mg tablet; Take 1 tablet (500 mg total) by mouth 2 (two) times a day with meals  Dispense: 60 tablet; Refill: 0

## 2025-02-22 DIAGNOSIS — M25.561 ACUTE PAIN OF RIGHT KNEE: ICD-10-CM

## 2025-02-24 RX ORDER — NAPROXEN 500 MG/1
500 TABLET ORAL 2 TIMES DAILY WITH MEALS
Qty: 60 TABLET | Refills: 0 | Status: SHIPPED | OUTPATIENT
Start: 2025-02-24

## 2025-02-24 NOTE — TELEPHONE ENCOUNTER
Good morning I called this patient to let her know about her RX, and to remind her of the MRI. She ask if it could be scheduled for her on any Thursday and then just let her know the date and time. Would you be able to do this for her.   Thank you.

## 2025-03-19 ENCOUNTER — APPOINTMENT (EMERGENCY)
Dept: CT IMAGING | Facility: HOSPITAL | Age: 50
End: 2025-03-19
Payer: COMMERCIAL

## 2025-03-19 ENCOUNTER — HOSPITAL ENCOUNTER (EMERGENCY)
Facility: HOSPITAL | Age: 50
Discharge: HOME | End: 2025-03-19
Attending: EMERGENCY MEDICINE | Admitting: EMERGENCY MEDICINE
Payer: COMMERCIAL

## 2025-03-19 VITALS
SYSTOLIC BLOOD PRESSURE: 145 MMHG | OXYGEN SATURATION: 97 % | TEMPERATURE: 98.2 F | HEART RATE: 82 BPM | DIASTOLIC BLOOD PRESSURE: 85 MMHG | RESPIRATION RATE: 19 BRPM

## 2025-03-19 DIAGNOSIS — E11.65 HYPERGLYCEMIA DUE TO DIABETES MELLITUS (HCC): Primary | ICD-10-CM

## 2025-03-19 LAB
ALBUMIN SERPL BCG-MCNC: 4.1 G/DL (ref 3.5–5)
ALP SERPL-CCNC: 173 U/L (ref 34–104)
ALT SERPL W P-5'-P-CCNC: 29 U/L (ref 7–52)
ANION GAP SERPL CALCULATED.3IONS-SCNC: 12 MMOL/L (ref 4–13)
AST SERPL W P-5'-P-CCNC: 73 U/L (ref 13–39)
B-OH-BUTYR SERPL-MCNC: 0.22 MMOL/L (ref 0.02–0.27)
BACTERIA UR QL AUTO: ABNORMAL /HPF
BASE EX.OXY STD BLDV CALC-SCNC: 72.8 % (ref 60–80)
BASE EXCESS BLDV CALC-SCNC: 4.3 MMOL/L
BASOPHILS # BLD AUTO: 0.02 THOUSANDS/ÂΜL (ref 0–0.1)
BASOPHILS NFR BLD AUTO: 0 % (ref 0–1)
BILIRUB SERPL-MCNC: 0.6 MG/DL (ref 0.2–1)
BILIRUB UR QL STRIP: NEGATIVE
BUN SERPL-MCNC: 10 MG/DL (ref 5–25)
CALCIUM SERPL-MCNC: 9.8 MG/DL (ref 8.4–10.2)
CHLORIDE SERPL-SCNC: 91 MMOL/L (ref 96–108)
CLARITY UR: CLEAR
CO2 SERPL-SCNC: 28 MMOL/L (ref 21–32)
COLOR UR: ABNORMAL
CREAT SERPL-MCNC: 0.74 MG/DL (ref 0.6–1.3)
EOSINOPHIL # BLD AUTO: 0.15 THOUSAND/ÂΜL (ref 0–0.61)
EOSINOPHIL NFR BLD AUTO: 1 % (ref 0–6)
ERYTHROCYTE [DISTWIDTH] IN BLOOD BY AUTOMATED COUNT: 12.3 % (ref 11.6–15.1)
EXT PREGNANCY TEST URINE: NEGATIVE
EXT. CONTROL: NORMAL
GFR SERPL CREATININE-BSD FRML MDRD: 96 ML/MIN/1.73SQ M
GLUCOSE SERPL-MCNC: 299 MG/DL (ref 65–140)
GLUCOSE SERPL-MCNC: 384 MG/DL (ref 65–140)
GLUCOSE SERPL-MCNC: 507 MG/DL (ref 65–140)
GLUCOSE SERPL-MCNC: 518 MG/DL (ref 65–140)
GLUCOSE UR STRIP-MCNC: ABNORMAL MG/DL
HCO3 BLDV-SCNC: 29.2 MMOL/L (ref 24–30)
HCT VFR BLD AUTO: 44.6 % (ref 34.8–46.1)
HGB BLD-MCNC: 14.8 G/DL (ref 11.5–15.4)
HGB UR QL STRIP.AUTO: ABNORMAL
IMM GRANULOCYTES # BLD AUTO: 0.05 THOUSAND/UL (ref 0–0.2)
IMM GRANULOCYTES NFR BLD AUTO: 1 % (ref 0–2)
KETONES UR STRIP-MCNC: NEGATIVE MG/DL
LACTATE SERPL-SCNC: 1.8 MMOL/L (ref 0.5–2)
LEUKOCYTE ESTERASE UR QL STRIP: ABNORMAL
LYMPHOCYTES # BLD AUTO: 2.5 THOUSANDS/ÂΜL (ref 0.6–4.47)
LYMPHOCYTES NFR BLD AUTO: 24 % (ref 14–44)
MCH RBC QN AUTO: 28.3 PG (ref 26.8–34.3)
MCHC RBC AUTO-ENTMCNC: 33.2 G/DL (ref 31.4–37.4)
MCV RBC AUTO: 85 FL (ref 82–98)
MONOCYTES # BLD AUTO: 0.45 THOUSAND/ÂΜL (ref 0.17–1.22)
MONOCYTES NFR BLD AUTO: 4 % (ref 4–12)
NEUTROPHILS # BLD AUTO: 7.2 THOUSANDS/ÂΜL (ref 1.85–7.62)
NEUTS SEG NFR BLD AUTO: 70 % (ref 43–75)
NITRITE UR QL STRIP: NEGATIVE
NON-SQ EPI CELLS URNS QL MICRO: ABNORMAL /HPF
NRBC BLD AUTO-RTO: 0 /100 WBCS
O2 CT BLDV-SCNC: 16 ML/DL
PCO2 BLDV: 44.3 MM HG (ref 42–50)
PH BLDV: 7.44 [PH] (ref 7.3–7.4)
PH UR STRIP.AUTO: 5 [PH]
PLATELET # BLD AUTO: 289 THOUSANDS/UL (ref 149–390)
PMV BLD AUTO: 11.9 FL (ref 8.9–12.7)
PO2 BLDV: 38.2 MM HG (ref 35–45)
POTASSIUM SERPL-SCNC: 3.1 MMOL/L (ref 3.5–5.3)
PROT SERPL-MCNC: 8.2 G/DL (ref 6.4–8.4)
PROT UR STRIP-MCNC: NEGATIVE MG/DL
RBC # BLD AUTO: 5.23 MILLION/UL (ref 3.81–5.12)
RBC #/AREA URNS AUTO: ABNORMAL /HPF
SODIUM SERPL-SCNC: 131 MMOL/L (ref 135–147)
SP GR UR STRIP.AUTO: 1.04 (ref 1–1.03)
T4 FREE SERPL-MCNC: 0.89 NG/DL (ref 0.61–1.12)
TSH SERPL DL<=0.05 MIU/L-ACNC: 33.75 UIU/ML (ref 0.45–4.5)
UROBILINOGEN UR STRIP-ACNC: <2 MG/DL
WBC # BLD AUTO: 10.37 THOUSAND/UL (ref 4.31–10.16)
WBC #/AREA URNS AUTO: ABNORMAL /HPF

## 2025-03-19 PROCEDURE — 96361 HYDRATE IV INFUSION ADD-ON: CPT

## 2025-03-19 PROCEDURE — 87086 URINE CULTURE/COLONY COUNT: CPT | Performed by: EMERGENCY MEDICINE

## 2025-03-19 PROCEDURE — 81001 URINALYSIS AUTO W/SCOPE: CPT | Performed by: EMERGENCY MEDICINE

## 2025-03-19 PROCEDURE — 80053 COMPREHEN METABOLIC PANEL: CPT | Performed by: EMERGENCY MEDICINE

## 2025-03-19 PROCEDURE — 84439 ASSAY OF FREE THYROXINE: CPT | Performed by: EMERGENCY MEDICINE

## 2025-03-19 PROCEDURE — 85025 COMPLETE CBC W/AUTO DIFF WBC: CPT | Performed by: EMERGENCY MEDICINE

## 2025-03-19 PROCEDURE — 96365 THER/PROPH/DIAG IV INF INIT: CPT

## 2025-03-19 PROCEDURE — 36415 COLL VENOUS BLD VENIPUNCTURE: CPT | Performed by: EMERGENCY MEDICINE

## 2025-03-19 PROCEDURE — 82948 REAGENT STRIP/BLOOD GLUCOSE: CPT

## 2025-03-19 PROCEDURE — 99285 EMERGENCY DEPT VISIT HI MDM: CPT | Performed by: EMERGENCY MEDICINE

## 2025-03-19 PROCEDURE — 84443 ASSAY THYROID STIM HORMONE: CPT | Performed by: EMERGENCY MEDICINE

## 2025-03-19 PROCEDURE — 81025 URINE PREGNANCY TEST: CPT | Performed by: EMERGENCY MEDICINE

## 2025-03-19 PROCEDURE — 82010 KETONE BODYS QUAN: CPT | Performed by: EMERGENCY MEDICINE

## 2025-03-19 PROCEDURE — 82805 BLOOD GASES W/O2 SATURATION: CPT | Performed by: EMERGENCY MEDICINE

## 2025-03-19 PROCEDURE — 96375 TX/PRO/DX INJ NEW DRUG ADDON: CPT

## 2025-03-19 PROCEDURE — 99285 EMERGENCY DEPT VISIT HI MDM: CPT

## 2025-03-19 PROCEDURE — 74177 CT ABD & PELVIS W/CONTRAST: CPT

## 2025-03-19 PROCEDURE — 83605 ASSAY OF LACTIC ACID: CPT | Performed by: EMERGENCY MEDICINE

## 2025-03-19 RX ORDER — INSULIN GLARGINE 100 [IU]/ML
20 INJECTION, SOLUTION SUBCUTANEOUS
Qty: 6 ML | Refills: 0 | Status: SHIPPED | OUTPATIENT
Start: 2025-03-19 | End: 2025-04-18

## 2025-03-19 RX ORDER — POTASSIUM CHLORIDE 1500 MG/1
40 TABLET, EXTENDED RELEASE ORAL ONCE
Status: COMPLETED | OUTPATIENT
Start: 2025-03-19 | End: 2025-03-19

## 2025-03-19 RX ADMIN — SODIUM CHLORIDE 1000 ML: 0.9 INJECTION, SOLUTION INTRAVENOUS at 14:51

## 2025-03-19 RX ADMIN — IOHEXOL 100 ML: 350 INJECTION, SOLUTION INTRAVENOUS at 15:55

## 2025-03-19 RX ADMIN — INSULIN HUMAN 8 UNITS: 100 INJECTION, SOLUTION PARENTERAL at 16:16

## 2025-03-19 RX ADMIN — POTASSIUM CHLORIDE 40 MEQ: 1500 TABLET, EXTENDED RELEASE ORAL at 17:25

## 2025-03-19 RX ADMIN — CEFTRIAXONE SODIUM 1000 MG: 10 INJECTION, POWDER, FOR SOLUTION INTRAVENOUS at 16:35

## 2025-03-19 RX ADMIN — SODIUM CHLORIDE 1000 ML: 0.9 INJECTION, SOLUTION INTRAVENOUS at 17:25

## 2025-03-19 NOTE — DISCHARGE INSTRUCTIONS
You were seen and evaluated today for flank pain + hyperglycemia.  Your test results demonstrated abnormal UA, urine culture is pending. NO evidence of DKA however blood glucose and thyroid levels are abnormal.     Please take all medications as instructed. Your lantus has been increased to 20 units very night. Follow up with your PCP as discussed. Check your blood glucose before all meals and at bedtime and provide log to your PCP  RETURN TO THE EMERGENCY DEPARTMENT if you develop new or worsening symptoms and are unable to see your PCP.

## 2025-03-19 NOTE — ED NOTES
Patient ambulated to the restroom and back to assigned room independently and with a steady gait.     Maryam Toledo RN  03/19/25 [id number]

## 2025-03-19 NOTE — ED NOTES
Pt verbalized understanding of discharge instructions; verbalized understanding of picking up prescriptions which were electronically sent to pharmacy of choice; ambulated out of the ED without assistance; uneventful ED visit     Adela Chahal RN  03/19/25 2906

## 2025-03-19 NOTE — CONSULTS
Contacted by ER team due to symptomatic hypoglycemia.    Medication list reviewed within epic, recommendation is to increase Lantus to 20 units nightly, patient should monitor Accu-Cheks before meals and at bedtime, should follow-up with PCP ideally by Monday of next week.    Would advise diabetic diet, would give some additional IV fluid at the discretion of treating ER team.

## 2025-03-19 NOTE — ED PROVIDER NOTES
Time reflects when diagnosis was documented in both MDM as applicable and the Disposition within this note       Time User Action Codes Description Comment    3/19/2025  5:19 PM Lani Johnson Add [E11.65] Hyperglycemia due to diabetes mellitus (HCC)           ED Disposition       ED Disposition   Discharge    Condition   Stable    Date/Time   Wed Mar 19, 2025  5:23 PM    Comment   Sadaf Field discharge to home/self care.                   Assessment & Plan       Medical Decision Making  Patient is a 49-year-old female who presents to the emergency department with multiple complaints.  She is complaining of hyperglycemia, increased urinary frequency, left flank pain, and an episode of urinary incontinence today.  She states that this is unusual for her.  She denies any dysuria or hematuria.  She does report that she has had chronic low back pain with radiation to both her lower extremities with minimal activity, however states that this pain is somewhat different.  She denies fever, chills, recent illness.  She reports ongoing issues with constipation as well.  Back pain in conjunction with urinary complaints and hyperglycemia is suggestive of UTI/pyelonephritis.  No injury, trauma, saddle anesthesia, urinary retention, or incontinence of stool that would suggest cauda equina, particularly based on the chronicity of her back pain. Patient denies hx of DKA or HNKK.   Ddx is broad and includes but is not limited to: UTI, pyelonephritis, symptomatic hyperglycemia, DKA.     Amount and/or Complexity of Data Reviewed  External Data Reviewed: notes.     Details: Seen by PCP in Feb. Started on lantus 10 at night at that time  Labs: ordered. Decision-making details documented in ED Course.  Radiology: ordered. Decision-making details documented in ED Course.    Risk  OTC drugs.  Prescription drug management.        ED Course as of 03/19/25 2230   Wed Mar 19, 2025   1504 Blood gas, venous(!)  No evidence of DKA   1505  Fingerstick Glucose (POCT)(!!)   1532 UA (URINE) with reflex to Scope(!)   1532 Urine Microscopic(!)   1532 Comprehensive metabolic panel(!!)   1532 Beta Hydroxybutyrate   1532 Lactic acid, plasma (w/reflex if result > 2.0)   1630 POCT pregnancy, urine   1630 Fingerstick Glucose (POCT)(!)   1646 CT abdomen pelvis with contrast   1713 Symptomatically improved. Discussed increased lantus dose re hyperglycemia. She is agreeable. Stable for d/c       Medications   sodium chloride 0.9 % bolus 1,000 mL (0 mL Intravenous Stopped 3/19/25 1636)   insulin regular (HumuLIN R,NovoLIN R) injection 8 Units (8 Units Intravenous Given 3/19/25 1616)   iohexol (OMNIPAQUE) 350 MG/ML injection (MULTI-DOSE) 100 mL (100 mL Intravenous Given 3/19/25 1555)   ceftriaxone (ROCEPHIN) 1 g/50 mL in dextrose IVPB (0 mg Intravenous Stopped 3/19/25 1726)   potassium chloride (Klor-Con M20) CR tablet 40 mEq (40 mEq Oral Given 3/19/25 1725)   sodium chloride 0.9 % bolus 1,000 mL (0 mL Intravenous Stopped 3/19/25 1833)       ED Risk Strat Scores                            SBIRT 20yo+      Flowsheet Row Most Recent Value   Initial Alcohol Screen: US AUDIT-C     1. How often do you have a drink containing alcohol? 0 Filed at: 03/19/2025 1458   2. How many drinks containing alcohol do you have on a typical day you are drinking?  0 Filed at: 03/19/2025 1458   3b. FEMALE Any Age, or MALE 65+: How often do you have 4 or more drinks on one occassion? 0 Filed at: 03/19/2025 1458   Audit-C Score 0 Filed at: 03/19/2025 1458   THANH: How many times in the past year have you...    Used an illegal drug or used a prescription medication for non-medical reasons? Never Filed at: 03/19/2025 1455                            History of Present Illness       Chief Complaint   Patient presents with    Weakness - Generalized     Pt c/o L side lower back pain for a couple days getting worse. Pt states weak for a couple days feels dehydrated and feels like BGL is high. Last   took insulin before coming in.        Past Medical History:   Diagnosis Date    History of MI (myocardial infarction)     History of TIA (transient ischemic attack)     Hypertension     Hypothyroidism     hypothyroidism    Morbid obesity with BMI of 45.0-49.9, adult (HCC)     TIA (transient ischemic attack)       Past Surgical History:   Procedure Laterality Date    BREAST SURGERY Left     TUBAL LIGATION        Family History   Problem Relation Age of Onset    Heart disease Mother     Hypertension Mother     Diabetes Father       Social History     Tobacco Use    Smoking status: Every Day     Current packs/day: 0.50     Average packs/day: 0.5 packs/day for 30.0 years (15.0 ttl pk-yrs)     Types: Cigarettes    Smokeless tobacco: Never   Vaping Use    Vaping status: Never Used   Substance Use Topics    Alcohol use: Not Currently     Comment: occasional    Drug use: No      E-Cigarette/Vaping    E-Cigarette Use Never User       E-Cigarette/Vaping Substances    Nicotine No     THC No     CBD No     Flavoring No     Other No     Unknown No       I have reviewed and agree with the history as documented.     She is complaining of hyperglycemia, increased urinary frequency, left flank pain, and an episode of urinary incontinence today.      History provided by:  Patient      Review of Systems   Genitourinary:  Positive for flank pain and frequency.           Objective       ED Triage Vitals   Temperature Pulse Blood Pressure Respirations SpO2 Patient Position - Orthostatic VS   03/19/25 1415 03/19/25 1415 03/19/25 1415 03/19/25 1415 03/19/25 1415 03/19/25 1415   98.7 °F (37.1 °C) 86 (!) 180/97 16 98 % Sitting      Temp Source Heart Rate Source BP Location FiO2 (%) Pain Score    03/19/25 1415 03/19/25 1415 03/19/25 1415 -- 03/19/25 1600    Oral Monitor Left arm  3      Vitals      Date and Time Temp Pulse SpO2 Resp BP Pain Score FACES Pain Rating User   03/19/25 1815 98.2 °F (36.8 °C) 82 97 % 19 145/85 -- -- TB    03/19/25 1600 -- 81 100 % 18 142/86 3 -- TB   03/19/25 1415 98.7 °F (37.1 °C) 86 98 % 16 180/97 -- -- LUDWIN            Physical Exam  Vitals and nursing note reviewed.   Constitutional:       General: She is not in acute distress.     Appearance: Normal appearance.   HENT:      Head: Normocephalic and atraumatic.      Right Ear: External ear normal.      Left Ear: External ear normal.      Nose: Nose normal.   Cardiovascular:      Rate and Rhythm: Normal rate and regular rhythm.   Pulmonary:      Effort: Pulmonary effort is normal.      Breath sounds: Normal breath sounds.   Abdominal:      General: There is no distension.      Palpations: Abdomen is soft.      Tenderness: There is no abdominal tenderness. There is right CVA tenderness and left CVA tenderness.   Musculoskeletal:      Right lower leg: No edema.      Left lower leg: No edema.   Skin:     General: Skin is warm and dry.   Neurological:      General: No focal deficit present.      Mental Status: She is alert and oriented to person, place, and time. Mental status is at baseline.   Psychiatric:         Behavior: Behavior normal.         Results Reviewed       Procedure Component Value Units Date/Time    Urine culture [[id number]] Collected: 03/19/25 1509    Lab Status: In process Specimen: Urine, Clean Catch Updated: 03/19/25 2141    Fingerstick Glucose (POCT) [[id number]]  (Abnormal) Collected: 03/19/25 1830    Lab Status: Final result Specimen: Blood Updated: 03/19/25 1834     POC Glucose 299 mg/dl     Fingerstick Glucose (POCT) [[id number]]  (Abnormal) Collected: 03/19/25 1613    Lab Status: Final result Specimen: Blood Updated: 03/19/25 1618     POC Glucose 384 mg/dl     TSH, 3rd generation with Free T4 reflex [[id number]]  (Abnormal) Collected: 03/19/25 1445    Lab Status: Final result Specimen: Blood from Arm, Left Updated: 03/19/25 1533     TSH 3RD GENERATON 33.749 uIU/mL     T4, free [[id number]] Collected: 03/19/25 1445    Lab Status: In process  Specimen: Blood from Arm, Left Updated: 03/19/25 1533    Comprehensive metabolic panel [[id number]]  (Abnormal) Collected: 03/19/25 1445    Lab Status: Final result Specimen: Blood from Arm, Left Updated: 03/19/25 1531     Sodium 131 mmol/L      Potassium 3.1 mmol/L      Chloride 91 mmol/L      CO2 28 mmol/L      ANION GAP 12 mmol/L      BUN 10 mg/dL      Creatinine 0.74 mg/dL      Glucose 507 mg/dL      Calcium 9.8 mg/dL      AST 73 U/L      ALT 29 U/L      Alkaline Phosphatase 173 U/L      Total Protein 8.2 g/dL      Albumin 4.1 g/dL      Total Bilirubin 0.60 mg/dL      eGFR 96 ml/min/1.73sq m     Narrative:      [employer] guidelines for Chronic Kidney Disease (CKD):     Stage 1 with normal or high GFR (GFR > 90 mL/min/1.73 square meters)    Stage 2 Mild CKD (GFR = 60-89 mL/min/1.73 square meters)    Stage 3A Moderate CKD (GFR = 45-59 mL/min/1.73 square meters)    Stage 3B Moderate CKD (GFR = 30-44 mL/min/1.73 square meters)    Stage 4 Severe CKD (GFR = 15-29 mL/min/1.73 square meters)    Stage 5 End Stage CKD (GFR <15 mL/min/1.73 square meters)  Note: GFR calculation is accurate only with a steady state creatinine    Urine Microscopic [[id number]]  (Abnormal) Collected: 03/19/25 1509    Lab Status: Final result Specimen: Urine, Clean Catch Updated: 03/19/25 1527     RBC, UA 2-4 /hpf      WBC, UA 2-4 /hpf      Epithelial Cells Occasional /hpf      Bacteria, UA None Seen /hpf     UA (URINE) with reflex to Scope [[id number]]  (Abnormal) Collected: 03/19/25 1509    Lab Status: Final result Specimen: Urine, Clean Catch Updated: 03/19/25 1525     Color, UA Light Yellow     Clarity, UA Clear     Specific Gravity, UA 1.038     pH, UA 5.0     Leukocytes, UA Elevated glucose may cause decreased leukocyte values. See urine microscopic for UWBC result     Nitrite, UA Negative     Protein, UA Negative mg/dl      Glucose, UA >=1000 (1%) mg/dl      Ketones, UA Negative mg/dl      Urobilinogen, UA <2.0  mg/dl      Bilirubin, UA Negative     Occult Blood, UA Moderate    Beta Hydroxybutyrate [[id number]]  (Normal) Collected: 03/19/25 1445    Lab Status: Final result Specimen: Blood from Arm, Left Updated: 03/19/25 1513     Beta- Hydroxybutyrate 0.22 mmol/L     Lactic acid, plasma (w/reflex if result > 2.0) [[id number]]  (Normal) Collected: 03/19/25 1445    Lab Status: Final result Specimen: Blood from Arm, Left Updated: 03/19/25 1513     LACTIC ACID 1.8 mmol/L     Narrative:      Result may be elevated if tourniquet was used during collection.    POCT pregnancy, urine [[id number]]  (Normal) Collected: 03/19/25 1511    Lab Status: Final result Updated: 03/19/25 1511     EXT Preg Test, Ur Negative     Control Valid    Blood gas, venous [[id number]]  (Abnormal) Collected: 03/19/25 1445    Lab Status: Final result Specimen: Blood from Arm, Left Updated: 03/19/25 1459     pH, Xiang 7.437     pCO2, Xiang 44.3 mm Hg      pO2, Xiang 38.2 mm Hg      HCO3, Xiang 29.2 mmol/L      Base Excess, Xiang 4.3 mmol/L      O2 Content, Xiang 16.0 ml/dL      O2 HGB, VENOUS 72.8 %     CBC and differential [[id number]]  (Abnormal) Collected: 03/19/25 1445    Lab Status: Final result Specimen: Blood from Arm, Left Updated: 03/19/25 1459     WBC 10.37 Thousand/uL      RBC 5.23 Million/uL      Hemoglobin 14.8 g/dL      Hematocrit 44.6 %      MCV 85 fL      MCH 28.3 pg      MCHC 33.2 g/dL      RDW 12.3 %      MPV 11.9 fL      Platelets 289 Thousands/uL      nRBC 0 /100 WBCs      Segmented % 70 %      Immature Grans % 1 %      Lymphocytes % 24 %      Monocytes % 4 %      Eosinophils Relative 1 %      Basophils Relative 0 %      Absolute Neutrophils 7.20 Thousands/µL      Absolute Immature Grans 0.05 Thousand/uL      Absolute Lymphocytes 2.50 Thousands/µL      Absolute Monocytes 0.45 Thousand/µL      Eosinophils Absolute 0.15 Thousand/µL      Basophils Absolute 0.02 Thousands/µL     Fingerstick Glucose (POCT) [[id number]]  (Abnormal) Collected: 03/19/25 1420     Lab Status: Final result Specimen: Blood Updated: 03/19/25 1421     POC Glucose 518 mg/dl             CT abdomen pelvis with contrast   Final Interpretation by London Walker MD (03/19 1620)      No acute CT findings in the abdomen or pelvis.      Hepatic steatosis.      Cholelithiasis.         Workstation performed: [id number]             Procedures    ED Medication and Procedure Management   Prior to Admission Medications   Prescriptions Last Dose Informant Patient Reported? Taking?   albuterol (ProAir HFA) 90 mcg/act inhaler  Self No No   Sig: Inhale 2 puffs every 6 (six) hours as needed for wheezing   aspirin 81 mg chewable tablet  Self No No   Sig: Chew 1 tablet (81 mg total) daily   levothyroxine 125 mcg tablet  Self No No   Sig: Take 1 tablet (125 mcg total) by mouth daily in the early morning   losartan (COZAAR) 100 MG tablet  Self No No   Sig: Take 1 tablet (100 mg total) by mouth daily   metFORMIN (GLUCOPHAGE) 500 mg tablet  Self No No   Sig: Take 1 tablet (500 mg total) by mouth 2 (two) times a day with meals   metoprolol tartrate (LOPRESSOR) 50 mg tablet  Self No No   Sig: Take 1 tablet (50 mg total) by mouth every 12 (twelve) hours   naproxen (NAPROSYN) 500 mg tablet   No No   Sig: take 1 tablet by mouth twice a day with meals   rosuvastatin (CRESTOR) 10 MG tablet  Self No No   Sig: Take 1 tablet (10 mg total) by mouth daily   topiramate (Topamax) 25 mg tablet  Self No No   Sig: Take 1 tablet (25 mg total) by mouth daily      Facility-Administered Medications: None     Discharge Medication List as of 3/19/2025  5:23 PM        START taking these medications    Details   Insulin Glargine Solostar (Lantus SoloStar) 100 UNIT/ML SOPN Inject 0.2 mL (20 Units total) under the skin daily at bedtime, Starting Wed 3/19/2025, Until Fri 4/18/2025, Normal           CONTINUE these medications which have NOT CHANGED    Details   albuterol (ProAir HFA) 90 mcg/act inhaler Inhale 2 puffs every 6 (six) hours as needed  for wheezing, Starting Sun 9/22/2024, Normal      aspirin 81 mg chewable tablet Chew 1 tablet (81 mg total) daily, Starting Sun 5/19/2024, Normal      levothyroxine 125 mcg tablet Take 1 tablet (125 mcg total) by mouth daily in the early morning, Starting Tue 11/21/2023, Until Fri 1/31/2025, Normal      losartan (COZAAR) 100 MG tablet Take 1 tablet (100 mg total) by mouth daily, Starting Tue 11/21/2023, Normal      metFORMIN (GLUCOPHAGE) 500 mg tablet Take 1 tablet (500 mg total) by mouth 2 (two) times a day with meals, Starting Tue 1/28/2025, Normal      metoprolol tartrate (LOPRESSOR) 50 mg tablet Take 1 tablet (50 mg total) by mouth every 12 (twelve) hours, Starting Tue 11/21/2023, Until Fri 1/31/2025, Normal      naproxen (NAPROSYN) 500 mg tablet take 1 tablet by mouth twice a day with meals, Starting Mon 2/24/2025, Normal      rosuvastatin (CRESTOR) 10 MG tablet Take 1 tablet (10 mg total) by mouth daily, Starting Mon 8/22/2022, Normal      topiramate (Topamax) 25 mg tablet Take 1 tablet (25 mg total) by mouth daily, Starting Mon 8/22/2022, Normal           No discharge procedures on file.  ED SEPSIS DOCUMENTATION   Time reflects when diagnosis was documented in both MDM as applicable and the Disposition within this note       Time User Action Codes Description Comment    3/19/2025  5:19 PM Lani Johnson Add [E11.65] Hyperglycemia due to diabetes mellitus (HCC)                  Lani Johnson MD  03/19/25 8324     Acute kidney injury

## 2025-03-19 NOTE — ED NOTES
Pt's daughter is at bedside; pt unable to provide ordered urine sample at this time;labeled specimen container at bedside; call bell within reach and educated on use; pt and daughter with no needs at this time; will continue to monitor     Adela Chahal RN  03/19/25 5215

## 2025-03-21 LAB — BACTERIA UR CULT: NORMAL

## 2025-03-22 DIAGNOSIS — M25.561 ACUTE PAIN OF RIGHT KNEE: ICD-10-CM

## 2025-03-24 DIAGNOSIS — M25.561 ACUTE PAIN OF RIGHT KNEE: ICD-10-CM

## 2025-03-24 RX ORDER — NAPROXEN 500 MG/1
500 TABLET ORAL 2 TIMES DAILY WITH MEALS
Qty: 60 TABLET | Refills: 0 | Status: SHIPPED | OUTPATIENT
Start: 2025-03-24

## 2025-03-25 RX ORDER — NAPROXEN 500 MG/1
500 TABLET ORAL 2 TIMES DAILY WITH MEALS
Qty: 200 TABLET | Refills: 0 | OUTPATIENT
Start: 2025-03-25

## 2025-04-17 DIAGNOSIS — M25.561 ACUTE PAIN OF RIGHT KNEE: ICD-10-CM

## 2025-04-24 RX ORDER — NAPROXEN 500 MG/1
500 TABLET ORAL 2 TIMES DAILY WITH MEALS
Qty: 60 TABLET | Refills: 0 | OUTPATIENT
Start: 2025-04-24

## 2025-05-12 ENCOUNTER — APPOINTMENT (EMERGENCY)
Dept: RADIOLOGY | Facility: HOSPITAL | Age: 50
End: 2025-05-12
Payer: COMMERCIAL

## 2025-05-12 ENCOUNTER — HOSPITAL ENCOUNTER (EMERGENCY)
Facility: HOSPITAL | Age: 50
Discharge: HOME | End: 2025-05-12
Attending: EMERGENCY MEDICINE
Payer: COMMERCIAL

## 2025-05-12 VITALS
RESPIRATION RATE: 18 BRPM | HEART RATE: 78 BPM | DIASTOLIC BLOOD PRESSURE: 64 MMHG | SYSTOLIC BLOOD PRESSURE: 132 MMHG | TEMPERATURE: 98.4 F | OXYGEN SATURATION: 100 %

## 2025-05-12 DIAGNOSIS — N61.0 CELLULITIS OF BREAST: ICD-10-CM

## 2025-05-12 DIAGNOSIS — R73.9 HYPERGLYCEMIA: Primary | ICD-10-CM

## 2025-05-12 LAB
2HR DELTA HS TROPONIN: 2 NG/L
ALBUMIN SERPL BCG-MCNC: 4.1 G/DL (ref 3.5–5)
ALP SERPL-CCNC: 117 U/L (ref 34–104)
ALT SERPL W P-5'-P-CCNC: 13 U/L (ref 7–52)
ANION GAP SERPL CALCULATED.3IONS-SCNC: 14 MMOL/L (ref 4–13)
AST SERPL W P-5'-P-CCNC: 46 U/L (ref 13–39)
BACTERIA UR QL AUTO: ABNORMAL /HPF
BASE EX.OXY STD BLDV CALC-SCNC: 89.2 % (ref 60–80)
BASE EXCESS BLDV CALC-SCNC: -2.7 MMOL/L
BASOPHILS # BLD AUTO: 0.04 THOUSANDS/ÂΜL (ref 0–0.1)
BASOPHILS NFR BLD AUTO: 0 % (ref 0–1)
BILIRUB SERPL-MCNC: 0.78 MG/DL (ref 0.2–1)
BILIRUB UR QL STRIP: NEGATIVE
BUN SERPL-MCNC: 14 MG/DL (ref 5–25)
CALCIUM SERPL-MCNC: 9.9 MG/DL (ref 8.4–10.2)
CARDIAC TROPONIN I PNL SERPL HS: 10 NG/L (ref ?–50)
CARDIAC TROPONIN I PNL SERPL HS: 8 NG/L (ref ?–50)
CHLORIDE SERPL-SCNC: 95 MMOL/L (ref 96–108)
CLARITY UR: ABNORMAL
CO2 SERPL-SCNC: 22 MMOL/L (ref 21–32)
COLOR UR: YELLOW
CREAT SERPL-MCNC: 1.25 MG/DL (ref 0.6–1.3)
EOSINOPHIL # BLD AUTO: 0.21 THOUSAND/ÂΜL (ref 0–0.61)
EOSINOPHIL NFR BLD AUTO: 2 % (ref 0–6)
ERYTHROCYTE [DISTWIDTH] IN BLOOD BY AUTOMATED COUNT: 13.2 % (ref 11.6–15.1)
EXT PREGNANCY TEST URINE: NEGATIVE
EXT. CONTROL: NORMAL
GFR SERPL CREATININE-BSD FRML MDRD: 51 ML/MIN/1.73SQ M
GLUCOSE SERPL-MCNC: 344 MG/DL (ref 65–140)
GLUCOSE SERPL-MCNC: 422 MG/DL (ref 65–140)
GLUCOSE SERPL-MCNC: 465 MG/DL (ref 65–140)
GLUCOSE UR STRIP-MCNC: ABNORMAL MG/DL
HCO3 BLDV-SCNC: 20.7 MMOL/L (ref 24–30)
HCT VFR BLD AUTO: 43.9 % (ref 34.8–46.1)
HGB BLD-MCNC: 14.5 G/DL (ref 11.5–15.4)
HGB UR QL STRIP.AUTO: NEGATIVE
IMM GRANULOCYTES # BLD AUTO: 0.07 THOUSAND/UL (ref 0–0.2)
IMM GRANULOCYTES NFR BLD AUTO: 1 % (ref 0–2)
KETONES UR STRIP-MCNC: ABNORMAL MG/DL
LEUKOCYTE ESTERASE UR QL STRIP: ABNORMAL
LYMPHOCYTES # BLD AUTO: 2.06 THOUSANDS/ÂΜL (ref 0.6–4.47)
LYMPHOCYTES NFR BLD AUTO: 20 % (ref 14–44)
MCH RBC QN AUTO: 28.8 PG (ref 26.8–34.3)
MCHC RBC AUTO-ENTMCNC: 33 G/DL (ref 31.4–37.4)
MCV RBC AUTO: 87 FL (ref 82–98)
MONOCYTES # BLD AUTO: 0.5 THOUSAND/ÂΜL (ref 0.17–1.22)
MONOCYTES NFR BLD AUTO: 5 % (ref 4–12)
NEUTROPHILS # BLD AUTO: 7.5 THOUSANDS/ÂΜL (ref 1.85–7.62)
NEUTS SEG NFR BLD AUTO: 72 % (ref 43–75)
NITRITE UR QL STRIP: NEGATIVE
NON-SQ EPI CELLS URNS QL MICRO: ABNORMAL /HPF
NRBC BLD AUTO-RTO: 0 /100 WBCS
O2 CT BLDV-SCNC: 20 ML/DL
PCO2 BLDV: 32.7 MM HG (ref 42–50)
PH BLDV: 7.42 [PH] (ref 7.3–7.4)
PH UR STRIP.AUTO: 5 [PH]
PLATELET # BLD AUTO: 245 THOUSANDS/UL (ref 149–390)
PMV BLD AUTO: 12.1 FL (ref 8.9–12.7)
PO2 BLDV: 83.8 MM HG (ref 35–45)
POTASSIUM SERPL-SCNC: 4 MMOL/L (ref 3.5–5.3)
PROT SERPL-MCNC: 7.7 G/DL (ref 6.4–8.4)
PROT UR STRIP-MCNC: NEGATIVE MG/DL
RBC # BLD AUTO: 5.04 MILLION/UL (ref 3.81–5.12)
RBC #/AREA URNS AUTO: ABNORMAL /HPF
SODIUM SERPL-SCNC: 131 MMOL/L (ref 135–147)
SP GR UR STRIP.AUTO: 1.04 (ref 1–1.03)
TSH SERPL DL<=0.05 MIU/L-ACNC: 138.76 UIU/ML (ref 0.45–4.5)
UROBILINOGEN UR STRIP-ACNC: <2 MG/DL
WBC # BLD AUTO: 10.38 THOUSAND/UL (ref 4.31–10.16)
WBC #/AREA URNS AUTO: ABNORMAL /HPF

## 2025-05-12 PROCEDURE — 81025 URINE PREGNANCY TEST: CPT | Performed by: EMERGENCY MEDICINE

## 2025-05-12 PROCEDURE — 93005 ELECTROCARDIOGRAM TRACING: CPT

## 2025-05-12 PROCEDURE — 84484 ASSAY OF TROPONIN QUANT: CPT

## 2025-05-12 PROCEDURE — 71046 X-RAY EXAM CHEST 2 VIEWS: CPT

## 2025-05-12 PROCEDURE — 36415 COLL VENOUS BLD VENIPUNCTURE: CPT

## 2025-05-12 PROCEDURE — 81001 URINALYSIS AUTO W/SCOPE: CPT | Performed by: EMERGENCY MEDICINE

## 2025-05-12 PROCEDURE — 82805 BLOOD GASES W/O2 SATURATION: CPT | Performed by: EMERGENCY MEDICINE

## 2025-05-12 PROCEDURE — 96375 TX/PRO/DX INJ NEW DRUG ADDON: CPT

## 2025-05-12 PROCEDURE — 82948 REAGENT STRIP/BLOOD GLUCOSE: CPT

## 2025-05-12 PROCEDURE — 85025 COMPLETE CBC W/AUTO DIFF WBC: CPT

## 2025-05-12 PROCEDURE — 99284 EMERGENCY DEPT VISIT MOD MDM: CPT | Performed by: EMERGENCY MEDICINE

## 2025-05-12 PROCEDURE — 87147 CULTURE TYPE IMMUNOLOGIC: CPT | Performed by: EMERGENCY MEDICINE

## 2025-05-12 PROCEDURE — 99285 EMERGENCY DEPT VISIT HI MDM: CPT

## 2025-05-12 PROCEDURE — 84443 ASSAY THYROID STIM HORMONE: CPT | Performed by: EMERGENCY MEDICINE

## 2025-05-12 PROCEDURE — 87086 URINE CULTURE/COLONY COUNT: CPT | Performed by: EMERGENCY MEDICINE

## 2025-05-12 PROCEDURE — 96365 THER/PROPH/DIAG IV INF INIT: CPT

## 2025-05-12 PROCEDURE — 84439 ASSAY OF FREE THYROXINE: CPT | Performed by: EMERGENCY MEDICINE

## 2025-05-12 PROCEDURE — 80053 COMPREHEN METABOLIC PANEL: CPT

## 2025-05-12 RX ORDER — CEPHALEXIN 500 MG/1
500 CAPSULE ORAL EVERY 6 HOURS SCHEDULED
Qty: 28 CAPSULE | Refills: 0 | Status: SHIPPED | OUTPATIENT
Start: 2025-05-12 | End: 2025-05-19

## 2025-05-12 RX ORDER — CEFAZOLIN SODIUM 2 G/50ML
2000 SOLUTION INTRAVENOUS ONCE
Status: COMPLETED | OUTPATIENT
Start: 2025-05-12 | End: 2025-05-12

## 2025-05-12 RX ADMIN — CEFAZOLIN SODIUM 2000 MG: 2 SOLUTION INTRAVENOUS at 17:14

## 2025-05-12 RX ADMIN — SODIUM CHLORIDE 1000 ML: 0.9 INJECTION, SOLUTION INTRAVENOUS at 16:46

## 2025-05-12 RX ADMIN — INSULIN HUMAN 5 UNITS: 100 INJECTION, SOLUTION PARENTERAL at 17:57

## 2025-05-12 NOTE — ED PROVIDER NOTES
Time reflects when diagnosis was documented in both MDM as applicable and the Disposition within this note       Time User Action Codes Description Comment    5/12/2025  6:54 PM Natalio Garcia Add [R73.9] Hyperglycemia     5/12/2025  6:54 PM Natalio Garcia Add [N61.0] Cellulitis of breast           ED Disposition       ED Disposition   Discharge    Condition   Stable    Date/Time   Mon May 12, 2025  6:54 PM    Comment   Sadaf Field discharge to home/self care.                   Assessment & Plan       Medical Decision Making  49-year-old female with fatigue, breast from [place] with cellulitis, elevated blood sugar.  Will screen for DKA, monitor cardiac labs, likely give fluids, insulin, antibiotics.  Recommend she take antibiotics and see breast surgeon if symptoms do not resolve with oral antibiotics.    Amount and/or Complexity of Data Reviewed  Labs: ordered.    Risk  OTC drugs.  Prescription drug management.             Medications   sodium chloride 0.9 % bolus 1,000 mL (0 mL Intravenous Stopped 5/12/25 1832)   ceFAZolin (ANCEF) IVPB (premix in dextrose) 2,000 mg 50 mL (0 mg Intravenous Stopped 5/12/25 1832)   insulin regular (HumuLIN R,NovoLIN R) injection 5 Units (5 Units Intravenous Given 5/12/25 1757)       ED Risk Strat Scores                    No data recorded        SBIRT 20yo+      Flowsheet Row Most Recent Value   Initial Alcohol Screen: US AUDIT-C     1. How often do you have a drink containing alcohol? 0 Filed at: 05/12/2025 1710   2. How many drinks containing alcohol do you have on a typical day you are drinking?  0 Filed at: 05/12/2025 1710   3a. Male UNDER 65: How often do you have five or more drinks on one occasion? 0 Filed at: 05/12/2025 1710   3b. FEMALE Any Age, or MALE 65+: How often do you have 4 or more drinks on one occassion? 0 Filed at: 05/12/2025 1710   Audit-C Score 0 Filed at: 05/12/2025 1710   THANH: How many times in the past year have you...    Used an illegal drug or used a  prescription medication for non-medical reasons? Never Filed at: 05/12/2025 1310                            History of Present Illness       Chief Complaint   Patient presents with    Weakness - Generalized     Pt arrives c/o increased weakness and fatigue x 1 week. Pts daughter reports approx 3 episodes of syncope. Denies falling and headstrike.        Past Medical History:   Diagnosis Date    History of MI (myocardial infarction)     History of TIA (transient ischemic attack)     Hypertension     Hypothyroidism     hypothyroidism    Morbid obesity with BMI of 45.0-49.9, adult (HCC)     TIA (transient ischemic attack)       Past Surgical History:   Procedure Laterality Date    BREAST SURGERY Left     TUBAL LIGATION        Family History   Problem Relation Age of Onset    Heart disease Mother     Hypertension Mother     Diabetes Father       Social History     Tobacco Use    Smoking status: Every Day     Current packs/day: 0.50     Average packs/day: 0.5 packs/day for 30.0 years (15.0 ttl pk-yrs)     Types: Cigarettes    Smokeless tobacco: Never   Vaping Use    Vaping status: Never Used   Substance Use Topics    Alcohol use: Not Currently     Comment: occasional    Drug use: No      E-Cigarette/Vaping    E-Cigarette Use Never User       E-Cigarette/Vaping Substances    Nicotine No     THC No     CBD No     Flavoring No     Other No     Unknown No       I have reviewed and agree with the history as documented.     49-year-old female presenting to the emergency department for evaluation of generalized weakness.  Patient had 5 to 6 days of generalized weakness and fatigue, several episodes of syncope.  No head strike.  No chest pain palpitations or shortness of breath.  Noted be hyperglycemic here.  She also describes rash on her right breast.  No fevers.  No nausea or vomiting.  Some decreased appetite.        Review of Systems   Constitutional:  Positive for appetite change and fatigue. Negative for chills and fever.    HENT:  Negative for sneezing and sore throat.    Eyes:  Negative for visual disturbance.   Respiratory:  Negative for cough, choking, chest tightness, shortness of breath and wheezing.    Cardiovascular:  Negative for chest pain and palpitations.   Gastrointestinal:  Negative for abdominal pain, constipation, diarrhea, nausea and vomiting.   Genitourinary:  Negative for difficulty urinating and dysuria.   Skin:  Positive for rash.   Neurological:  Positive for syncope. Negative for dizziness, weakness, light-headedness, numbness and headaches.   All other systems reviewed and are negative.          Objective       ED Triage Vitals   Temperature Pulse Blood Pressure Respirations SpO2 Patient Position - Orthostatic VS   05/12/25 1654 05/12/25 1454 05/12/25 1454 05/12/25 1454 05/12/25 1454 05/12/25 1730   98.4 °F (36.9 °C) 83 156/77 18 99 % Lying      Temp Source Heart Rate Source BP Location FiO2 (%) Pain Score    05/12/25 1654 05/12/25 1454 05/12/25 1730 -- --    Oral Monitor Right arm        Vitals      Date and Time Temp Pulse SpO2 Resp BP Pain Score FACES Pain Rating User   05/12/25 1730 -- 78 100 % 18 132/64 -- -- LM   05/12/25 1654 98.4 °F (36.9 °C) -- -- -- -- -- -- LM   05/12/25 1454 -- 83 99 % 18 156/77 -- -- MARGARET            Physical Exam  Vitals and nursing note reviewed. Exam conducted with a chaperone present.   Constitutional:       General: She is not in acute distress.     Appearance: She is well-developed. She is not diaphoretic.   HENT:      Head: Normocephalic and atraumatic.   Eyes:      Pupils: Pupils are equal, round, and reactive to light.   Neck:      Vascular: No JVD.      Trachea: No tracheal deviation.   Cardiovascular:      Rate and Rhythm: Normal rate and regular rhythm.      Heart sounds: Normal heart sounds. No murmur heard.     No friction rub. No gallop.   Pulmonary:      Effort: Pulmonary effort is normal. No respiratory distress.      Breath sounds: Normal breath sounds. No wheezing  or rales.   Abdominal:      General: Bowel sounds are normal. There is no distension.      Palpations: Abdomen is soft.      Tenderness: There is no abdominal tenderness. There is no guarding or rebound.   Skin:     General: Skin is warm and dry.      Coloration: Skin is not pale.      Comments: Small furuncle with associated cellulitis on the lateral aspect of the right breast   Neurological:      Mental Status: She is alert and oriented to person, place, and time.      Cranial Nerves: No cranial nerve deficit.      Motor: No abnormal muscle tone.   Psychiatric:         Behavior: Behavior normal.         Results Reviewed       Procedure Component Value Units Date/Time    TSH, 3rd generation with Free T4 reflex [[id number]]  (Abnormal) Collected: 05/12/25 1502    Lab Status: Final result Specimen: Blood from Arm, Right Updated: 05/12/25 2006     TSH 3RD GENERATON 138.764 uIU/mL     T4, free [[id number]] Collected: 05/12/25 1502    Lab Status: In process Specimen: Blood from Arm, Right Updated: 05/12/25 2006    Fingerstick Glucose (POCT) [[id number]]  (Abnormal) Collected: 05/12/25 1840    Lab Status: Final result Specimen: Blood Updated: 05/12/25 1841     POC Glucose 344 mg/dl     Fingerstick Glucose (POCT) [[id number]]  (Abnormal) Collected: 05/12/25 1751    Lab Status: Final result Specimen: Blood Updated: 05/12/25 1753     POC Glucose 422 mg/dl     HS Troponin I 2hr [[id number]]  (Normal) Collected: 05/12/25 1653    Lab Status: Final result Specimen: Blood from Arm, Left Updated: 05/12/25 1729     hs TnI 2hr 10 ng/L      Delta 2hr hsTnI 2 ng/L     Blood gas, venous [[id number]]  (Abnormal) Collected: 05/12/25 1653    Lab Status: Final result Specimen: Blood from Arm, Left Updated: 05/12/25 1720     pH, Xiang 7.420     pCO2, Xiang 32.7 mm Hg      pO2, Xiang 83.8 mm Hg      HCO3, Xiang 20.7 mmol/L      Base Excess, Xiang -2.7 mmol/L      O2 Content, Xiang 20.0 ml/dL      O2 HGB, VENOUS 89.2 %     Urine Microscopic [[id number]]   (Abnormal) Collected: 05/12/25 1655    Lab Status: Final result Specimen: Urine, Clean Catch Updated: 05/12/25 1715     RBC, UA 30-50 /hpf      WBC, UA 20-30 /hpf      Epithelial Cells Innumerable /hpf      Bacteria, UA None Seen /hpf     Urine culture [[id number]] Collected: 05/12/25 1655    Lab Status: In process Specimen: Urine, Clean Catch Updated: 05/12/25 1715    UA w Reflex to Microscopic w Reflex to Culture [[id number]]  (Abnormal) Collected: 05/12/25 1655    Lab Status: Final result Specimen: Urine, Clean Catch Updated: 05/12/25 1713     Color, UA Yellow     Clarity, UA Turbid     Specific Gravity, UA 1.040     pH, UA 5.0     Leukocytes, UA Large     Nitrite, UA Negative     Protein, UA Negative mg/dl      Glucose, UA >=1000 (1%) mg/dl      Ketones, UA 60 (2+) mg/dl      Urobilinogen, UA <2.0 mg/dl      Bilirubin, UA Negative     Occult Blood, UA Negative    POCT pregnancy, urine [[id number]]  (Normal) Collected: 05/12/25 1656    Lab Status: Final result Updated: 05/12/25 1656     EXT Preg Test, Ur Negative     Control Valid    Comprehensive metabolic panel [[id number]]  (Abnormal) Collected: 05/12/25 1502    Lab Status: Final result Specimen: Blood from Arm, Right Updated: 05/12/25 1612     Sodium 131 mmol/L      Potassium 4.0 mmol/L      Chloride 95 mmol/L      CO2 22 mmol/L      ANION GAP 14 mmol/L      BUN 14 mg/dL      Creatinine 1.25 mg/dL      Glucose 465 mg/dL      Calcium 9.9 mg/dL      AST 46 U/L      ALT 13 U/L      Alkaline Phosphatase 117 U/L      Total Protein 7.7 g/dL      Albumin 4.1 g/dL      Total Bilirubin 0.78 mg/dL      eGFR 51 ml/min/1.73sq m     Narrative:      [employer] guidelines for Chronic Kidney Disease (CKD):     Stage 1 with normal or high GFR (GFR > 90 mL/min/1.73 square meters)    Stage 2 Mild CKD (GFR = 60-89 mL/min/1.73 square meters)    Stage 3A Moderate CKD (GFR = 45-59 mL/min/1.73 square meters)    Stage 3B Moderate CKD (GFR = 30-44 mL/min/1.73  square meters)    Stage 4 Severe CKD (GFR = 15-29 mL/min/1.73 square meters)    Stage 5 End Stage CKD (GFR <15 mL/min/1.73 square meters)  Note: GFR calculation is accurate only with a steady state creatinine    HS Troponin 0hr (reflex protocol) [[id number]]  (Normal) Collected: 05/12/25 1502    Lab Status: Final result Specimen: Blood from Arm, Right Updated: 05/12/25 1531     hs TnI 0hr 8 ng/L     CBC and differential [[id number]]  (Abnormal) Collected: 05/12/25 1502    Lab Status: Final result Specimen: Blood from Arm, Right Updated: 05/12/25 1509     WBC 10.38 Thousand/uL      RBC 5.04 Million/uL      Hemoglobin 14.5 g/dL      Hematocrit 43.9 %      MCV 87 fL      MCH 28.8 pg      MCHC 33.0 g/dL      RDW 13.2 %      MPV 12.1 fL      Platelets 245 Thousands/uL      nRBC 0 /100 WBCs      Segmented % 72 %      Immature Grans % 1 %      Lymphocytes % 20 %      Monocytes % 5 %      Eosinophils Relative 2 %      Basophils Relative 0 %      Absolute Neutrophils 7.50 Thousands/µL      Absolute Immature Grans 0.07 Thousand/uL      Absolute Lymphocytes 2.06 Thousands/µL      Absolute Monocytes 0.50 Thousand/µL      Eosinophils Absolute 0.21 Thousand/µL      Basophils Absolute 0.04 Thousands/µL             XR chest 2 views    (Results Pending)       Procedures    ED Medication and Procedure Management   Prior to Admission Medications   Prescriptions Last Dose Informant Patient Reported? Taking?   albuterol (ProAir HFA) 90 mcg/act inhaler  Self No No   Sig: Inhale 2 puffs every 6 (six) hours as needed for wheezing   aspirin 81 mg chewable tablet  Self No No   Sig: Chew 1 tablet (81 mg total) daily   levothyroxine 125 mcg tablet  Self No No   Sig: Take 1 tablet (125 mcg total) by mouth daily in the early morning   losartan (COZAAR) 100 MG tablet  Self No No   Sig: Take 1 tablet (100 mg total) by mouth daily   metFORMIN (GLUCOPHAGE) 500 mg tablet  Self No No   Sig: Take 1 tablet (500 mg total) by mouth 2 (two) times a day with  meals   metoprolol tartrate (LOPRESSOR) 50 mg tablet  Self No No   Sig: Take 1 tablet (50 mg total) by mouth every 12 (twelve) hours   naproxen (NAPROSYN) 500 mg tablet   No No   Sig: take 1 tablet by mouth twice a day with meals   rosuvastatin (CRESTOR) 10 MG tablet  Self No No   Sig: Take 1 tablet (10 mg total) by mouth daily   topiramate (Topamax) 25 mg tablet  Self No No   Sig: Take 1 tablet (25 mg total) by mouth daily      Facility-Administered Medications: None     Discharge Medication List as of 5/12/2025  6:55 PM        START taking these medications    Details   cephalexin (KEFLEX) 500 mg capsule Take 1 capsule (500 mg total) by mouth every 6 (six) hours for 7 days, Starting Mon 5/12/2025, Until Mon 5/19/2025, Normal           CONTINUE these medications which have NOT CHANGED    Details   albuterol (ProAir HFA) 90 mcg/act inhaler Inhale 2 puffs every 6 (six) hours as needed for wheezing, Starting Sun 9/22/2024, Normal      aspirin 81 mg chewable tablet Chew 1 tablet (81 mg total) daily, Starting Sun 5/19/2024, Normal      levothyroxine 125 mcg tablet Take 1 tablet (125 mcg total) by mouth daily in the early morning, Starting Tue 11/21/2023, Until Fri 1/31/2025, Normal      losartan (COZAAR) 100 MG tablet Take 1 tablet (100 mg total) by mouth daily, Starting Tue 11/21/2023, Normal      metFORMIN (GLUCOPHAGE) 500 mg tablet Take 1 tablet (500 mg total) by mouth 2 (two) times a day with meals, Starting Tue 1/28/2025, Normal      metoprolol tartrate (LOPRESSOR) 50 mg tablet Take 1 tablet (50 mg total) by mouth every 12 (twelve) hours, Starting Tue 11/21/2023, Until Fri 1/31/2025, Normal      naproxen (NAPROSYN) 500 mg tablet take 1 tablet by mouth twice a day with meals, Starting Mon 3/24/2025, Normal      rosuvastatin (CRESTOR) 10 MG tablet Take 1 tablet (10 mg total) by mouth daily, Starting Mon 8/22/2022, Normal      topiramate (Topamax) 25 mg tablet Take 1 tablet (25 mg total) by mouth daily, Starting Mon  8/22/2022, Normal           No discharge procedures on file.  ED SEPSIS DOCUMENTATION   Time reflects when diagnosis was documented in both MDM as applicable and the Disposition within this note       Time User Action Codes Description Comment    5/12/2025  6:54 PM Natalio Garcia [R73.9] Hyperglycemia     5/12/2025  6:54 PM Natalio Garcia [N61.0] Cellulitis of breast                  Natalio Garcia MD  05/12/25 2018

## 2025-05-13 ENCOUNTER — RESULTS FOLLOW-UP (OUTPATIENT)
Dept: EMERGENCY DEPT | Facility: HOSPITAL | Age: 50
End: 2025-05-13

## 2025-05-13 LAB
ATRIAL RATE: 87 BPM
BACTERIA UR CULT: ABNORMAL
BACTERIA UR CULT: ABNORMAL
P AXIS: 66 DEGREES
PR INTERVAL: 182 MS
QRS AXIS: -26 DEGREES
QRSD INTERVAL: 90 MS
QT INTERVAL: 394 MS
QTC INTERVAL: 474 MS
T WAVE AXIS: 50 DEGREES
T4 FREE SERPL-MCNC: 0.46 NG/DL (ref 0.61–1.12)
VENTRICULAR RATE: 87 BPM

## 2025-05-13 PROCEDURE — 93010 ELECTROCARDIOGRAM REPORT: CPT | Performed by: INTERNAL MEDICINE

## 2025-05-21 ENCOUNTER — APPOINTMENT (EMERGENCY)
Dept: CT IMAGING | Facility: HOSPITAL | Age: 50
End: 2025-05-21
Payer: COMMERCIAL

## 2025-05-21 ENCOUNTER — APPOINTMENT (EMERGENCY)
Dept: RADIOLOGY | Facility: HOSPITAL | Age: 50
End: 2025-05-21
Payer: COMMERCIAL

## 2025-05-21 ENCOUNTER — HOSPITAL ENCOUNTER (OUTPATIENT)
Facility: HOSPITAL | Age: 50
Setting detail: OBSERVATION
Discharge: HOME | End: 2025-05-23
Attending: EMERGENCY MEDICINE | Admitting: INTERNAL MEDICINE
Payer: COMMERCIAL

## 2025-05-21 DIAGNOSIS — R29.90 STROKE-LIKE SYMPTOMS: ICD-10-CM

## 2025-05-21 DIAGNOSIS — R73.9 HYPERGLYCEMIA: Primary | ICD-10-CM

## 2025-05-21 DIAGNOSIS — E11.65 TYPE 2 DIABETES MELLITUS WITH HYPERGLYCEMIA, WITH LONG-TERM CURRENT USE OF INSULIN (HCC): ICD-10-CM

## 2025-05-21 DIAGNOSIS — Z72.0 TOBACCO USE: ICD-10-CM

## 2025-05-21 DIAGNOSIS — L29.9 ITCHING: ICD-10-CM

## 2025-05-21 DIAGNOSIS — R20.2 PARESTHESIAS: ICD-10-CM

## 2025-05-21 DIAGNOSIS — Z79.4 TYPE 2 DIABETES MELLITUS WITH HYPERGLYCEMIA, WITH LONG-TERM CURRENT USE OF INSULIN (HCC): ICD-10-CM

## 2025-05-21 LAB
2HR DELTA HS TROPONIN: -1 NG/L
4HR DELTA HS TROPONIN: 0 NG/L
ALBUMIN SERPL BCG-MCNC: 4 G/DL (ref 3.5–5)
ALP SERPL-CCNC: 126 U/L (ref 34–104)
ALT SERPL W P-5'-P-CCNC: 15 U/L (ref 7–52)
ANION GAP SERPL CALCULATED.3IONS-SCNC: 11 MMOL/L (ref 4–13)
AST SERPL W P-5'-P-CCNC: 54 U/L (ref 13–39)
ATRIAL RATE: 73 BPM
B-OH-BUTYR SERPL-MCNC: 1.41 MMOL/L (ref 0.2–0.6)
BACTERIA UR QL AUTO: ABNORMAL /HPF
BASE EX.OXY STD BLDV CALC-SCNC: 70.5 % (ref 60–80)
BASE EXCESS BLDV CALC-SCNC: -0.3 MMOL/L
BASOPHILS # BLD AUTO: 0.03 THOUSANDS/ÂΜL (ref 0–0.1)
BASOPHILS NFR BLD AUTO: 0 % (ref 0–1)
BILIRUB SERPL-MCNC: 0.69 MG/DL (ref 0.2–1)
BILIRUB UR QL STRIP: NEGATIVE
BUDDING YEAST: PRESENT
BUN SERPL-MCNC: 9 MG/DL (ref 5–25)
CALCIUM SERPL-MCNC: 9.4 MG/DL (ref 8.4–10.2)
CARDIAC TROPONIN I PNL SERPL HS: 10 NG/L (ref ?–50)
CARDIAC TROPONIN I PNL SERPL HS: 10 NG/L (ref ?–50)
CARDIAC TROPONIN I PNL SERPL HS: 9 NG/L (ref ?–50)
CHLORIDE SERPL-SCNC: 95 MMOL/L (ref 96–108)
CLARITY UR: CLEAR
CO2 SERPL-SCNC: 25 MMOL/L (ref 21–32)
COLOR UR: ABNORMAL
CREAT SERPL-MCNC: 0.86 MG/DL (ref 0.6–1.3)
EOSINOPHIL # BLD AUTO: 0.14 THOUSAND/ÂΜL (ref 0–0.61)
EOSINOPHIL NFR BLD AUTO: 2 % (ref 0–6)
ERYTHROCYTE [DISTWIDTH] IN BLOOD BY AUTOMATED COUNT: 13.4 % (ref 11.6–15.1)
EST. AVERAGE GLUCOSE BLD GHB EST-MCNC: 430 MG/DL
GFR SERPL CREATININE-BSD FRML MDRD: 80 ML/MIN/1.73SQ M
GLUCOSE SERPL-MCNC: 155 MG/DL (ref 65–140)
GLUCOSE SERPL-MCNC: 275 MG/DL (ref 65–140)
GLUCOSE SERPL-MCNC: 326 MG/DL (ref 65–140)
GLUCOSE SERPL-MCNC: 333 MG/DL (ref 65–140)
GLUCOSE SERPL-MCNC: 441 MG/DL (ref 65–140)
GLUCOSE SERPL-MCNC: 555 MG/DL (ref 65–140)
GLUCOSE SERPL-MCNC: 598 MG/DL (ref 65–140)
GLUCOSE UR STRIP-MCNC: ABNORMAL MG/DL
HBA1C MFR BLD: 16.6 %
HCG SERPL QL: NEGATIVE
HCO3 BLDV-SCNC: 24.1 MMOL/L (ref 24–30)
HCT VFR BLD AUTO: 43.9 % (ref 34.8–46.1)
HGB BLD-MCNC: 14.8 G/DL (ref 11.5–15.4)
HGB UR QL STRIP.AUTO: NEGATIVE
IMM GRANULOCYTES # BLD AUTO: 0.08 THOUSAND/UL (ref 0–0.2)
IMM GRANULOCYTES NFR BLD AUTO: 1 % (ref 0–2)
KETONES UR STRIP-MCNC: ABNORMAL MG/DL
LEUKOCYTE ESTERASE UR QL STRIP: ABNORMAL
LYMPHOCYTES # BLD AUTO: 1.53 THOUSANDS/ÂΜL (ref 0.6–4.47)
LYMPHOCYTES NFR BLD AUTO: 18 % (ref 14–44)
MCH RBC QN AUTO: 29.3 PG (ref 26.8–34.3)
MCHC RBC AUTO-ENTMCNC: 33.7 G/DL (ref 31.4–37.4)
MCV RBC AUTO: 87 FL (ref 82–98)
MONOCYTES # BLD AUTO: 0.48 THOUSAND/ÂΜL (ref 0.17–1.22)
MONOCYTES NFR BLD AUTO: 6 % (ref 4–12)
NEUTROPHILS # BLD AUTO: 6.15 THOUSANDS/ÂΜL (ref 1.85–7.62)
NEUTS SEG NFR BLD AUTO: 73 % (ref 43–75)
NITRITE UR QL STRIP: NEGATIVE
NON-SQ EPI CELLS URNS QL MICRO: ABNORMAL /HPF
NRBC BLD AUTO-RTO: 0 /100 WBCS
O2 CT BLDV-SCNC: 15.6 ML/DL
P AXIS: 47 DEGREES
PCO2 BLDV: 39.1 MM HG (ref 42–50)
PH BLDV: 7.41 [PH] (ref 7.3–7.4)
PH UR STRIP.AUTO: 5 [PH]
PLATELET # BLD AUTO: 235 THOUSANDS/UL (ref 149–390)
PLATELET # BLD AUTO: 247 THOUSANDS/UL (ref 149–390)
PMV BLD AUTO: 11.8 FL (ref 8.9–12.7)
PMV BLD AUTO: 11.9 FL (ref 8.9–12.7)
PO2 BLDV: 36 MM HG (ref 35–45)
POTASSIUM SERPL-SCNC: 3.8 MMOL/L (ref 3.5–5.3)
PR INTERVAL: 178 MS
PROT SERPL-MCNC: 7.8 G/DL (ref 6.4–8.4)
PROT UR STRIP-MCNC: ABNORMAL MG/DL
QRS AXIS: -41 DEGREES
QRSD INTERVAL: 96 MS
QT INTERVAL: 438 MS
QTC INTERVAL: 482 MS
RBC # BLD AUTO: 5.05 MILLION/UL (ref 3.81–5.12)
RBC #/AREA URNS AUTO: ABNORMAL /HPF
SODIUM SERPL-SCNC: 131 MMOL/L (ref 135–147)
SP GR UR STRIP.AUTO: >=1.05 (ref 1–1.03)
T WAVE AXIS: -8 DEGREES
UROBILINOGEN UR STRIP-ACNC: <2 MG/DL
VENTRICULAR RATE: 73 BPM
WBC # BLD AUTO: 8.41 THOUSAND/UL (ref 4.31–10.16)
WBC #/AREA URNS AUTO: ABNORMAL /HPF

## 2025-05-21 PROCEDURE — 70498 CT ANGIOGRAPHY NECK: CPT

## 2025-05-21 PROCEDURE — 83036 HEMOGLOBIN GLYCOSYLATED A1C: CPT | Performed by: INTERNAL MEDICINE

## 2025-05-21 PROCEDURE — 96365 THER/PROPH/DIAG IV INF INIT: CPT

## 2025-05-21 PROCEDURE — 96367 TX/PROPH/DG ADDL SEQ IV INF: CPT

## 2025-05-21 PROCEDURE — 84484 ASSAY OF TROPONIN QUANT: CPT | Performed by: EMERGENCY MEDICINE

## 2025-05-21 PROCEDURE — 93005 ELECTROCARDIOGRAM TRACING: CPT

## 2025-05-21 PROCEDURE — 85025 COMPLETE CBC W/AUTO DIFF WBC: CPT | Performed by: EMERGENCY MEDICINE

## 2025-05-21 PROCEDURE — 81001 URINALYSIS AUTO W/SCOPE: CPT | Performed by: INTERNAL MEDICINE

## 2025-05-21 PROCEDURE — 82010 KETONE BODYS QUAN: CPT | Performed by: EMERGENCY MEDICINE

## 2025-05-21 PROCEDURE — 84703 CHORIONIC GONADOTROPIN ASSAY: CPT | Performed by: EMERGENCY MEDICINE

## 2025-05-21 PROCEDURE — 85049 AUTOMATED PLATELET COUNT: CPT | Performed by: INTERNAL MEDICINE

## 2025-05-21 PROCEDURE — 99223 1ST HOSP IP/OBS HIGH 75: CPT | Performed by: INTERNAL MEDICINE

## 2025-05-21 PROCEDURE — 99406 BEHAV CHNG SMOKING 3-10 MIN: CPT | Performed by: INTERNAL MEDICINE

## 2025-05-21 PROCEDURE — 82948 REAGENT STRIP/BLOOD GLUCOSE: CPT

## 2025-05-21 PROCEDURE — 36415 COLL VENOUS BLD VENIPUNCTURE: CPT | Performed by: EMERGENCY MEDICINE

## 2025-05-21 PROCEDURE — 99284 EMERGENCY DEPT VISIT MOD MDM: CPT

## 2025-05-21 PROCEDURE — 80053 COMPREHEN METABOLIC PANEL: CPT | Performed by: EMERGENCY MEDICINE

## 2025-05-21 PROCEDURE — 84484 ASSAY OF TROPONIN QUANT: CPT | Performed by: INTERNAL MEDICINE

## 2025-05-21 PROCEDURE — 82805 BLOOD GASES W/O2 SATURATION: CPT | Performed by: EMERGENCY MEDICINE

## 2025-05-21 PROCEDURE — 99285 EMERGENCY DEPT VISIT HI MDM: CPT | Performed by: EMERGENCY MEDICINE

## 2025-05-21 PROCEDURE — 71046 X-RAY EXAM CHEST 2 VIEWS: CPT

## 2025-05-21 PROCEDURE — 96375 TX/PRO/DX INJ NEW DRUG ADDON: CPT

## 2025-05-21 PROCEDURE — 93010 ELECTROCARDIOGRAM REPORT: CPT | Performed by: INTERNAL MEDICINE

## 2025-05-21 PROCEDURE — 70496 CT ANGIOGRAPHY HEAD: CPT

## 2025-05-21 RX ORDER — NICOTINE 21 MG/24HR
1 PATCH, TRANSDERMAL 24 HOURS TRANSDERMAL DAILY
Status: DISCONTINUED | OUTPATIENT
Start: 2025-05-21 | End: 2025-05-23 | Stop reason: HOSPADM

## 2025-05-21 RX ORDER — DOCUSATE SODIUM 100 MG/1
100 CAPSULE, LIQUID FILLED ORAL 2 TIMES DAILY
Status: DISCONTINUED | OUTPATIENT
Start: 2025-05-21 | End: 2025-05-23 | Stop reason: HOSPADM

## 2025-05-21 RX ORDER — PRAVASTATIN SODIUM 80 MG/1
80 TABLET ORAL
Status: DISCONTINUED | OUTPATIENT
Start: 2025-05-21 | End: 2025-05-21

## 2025-05-21 RX ORDER — HYDROXYZINE HYDROCHLORIDE 10 MG/1
10 TABLET, FILM COATED ORAL EVERY 8 HOURS PRN
COMMUNITY
Start: 2025-01-31

## 2025-05-21 RX ORDER — ACETAMINOPHEN 325 MG/1
975 TABLET ORAL EVERY 6 HOURS PRN
Status: DISCONTINUED | OUTPATIENT
Start: 2025-05-21 | End: 2025-05-21

## 2025-05-21 RX ORDER — ACETAMINOPHEN 10 MG/ML
1000 INJECTION, SOLUTION INTRAVENOUS ONCE
Status: COMPLETED | OUTPATIENT
Start: 2025-05-21 | End: 2025-05-21

## 2025-05-21 RX ORDER — ALBUTEROL SULFATE 90 UG/1
2 INHALANT RESPIRATORY (INHALATION) EVERY 6 HOURS PRN
Status: DISCONTINUED | OUTPATIENT
Start: 2025-05-21 | End: 2025-05-23 | Stop reason: HOSPADM

## 2025-05-21 RX ORDER — ENOXAPARIN SODIUM 100 MG/ML
40 INJECTION SUBCUTANEOUS DAILY
Status: DISCONTINUED | OUTPATIENT
Start: 2025-05-21 | End: 2025-05-21

## 2025-05-21 RX ORDER — HYDROXYZINE HYDROCHLORIDE 25 MG/1
50 TABLET, FILM COATED ORAL EVERY 6 HOURS PRN
Status: DISCONTINUED | OUTPATIENT
Start: 2025-05-21 | End: 2025-05-23 | Stop reason: HOSPADM

## 2025-05-21 RX ORDER — ACETAMINOPHEN 325 MG/1
650 TABLET ORAL EVERY 4 HOURS PRN
Status: DISCONTINUED | OUTPATIENT
Start: 2025-05-21 | End: 2025-05-23 | Stop reason: HOSPADM

## 2025-05-21 RX ORDER — SODIUM CHLORIDE 9 MG/ML
100 INJECTION, SOLUTION INTRAVENOUS CONTINUOUS
Status: DISPENSED | OUTPATIENT
Start: 2025-05-21 | End: 2025-05-22

## 2025-05-21 RX ORDER — LORAZEPAM 2 MG/ML
0.5 INJECTION INTRAMUSCULAR EVERY 6 HOURS PRN
Status: DISCONTINUED | OUTPATIENT
Start: 2025-05-21 | End: 2025-05-22

## 2025-05-21 RX ORDER — NYSTATIN 100000 [USP'U]/G
POWDER TOPICAL 2 TIMES DAILY
Status: DISCONTINUED | OUTPATIENT
Start: 2025-05-21 | End: 2025-05-23 | Stop reason: HOSPADM

## 2025-05-21 RX ORDER — DIPHENHYDRAMINE HYDROCHLORIDE 50 MG/ML
25 INJECTION, SOLUTION INTRAMUSCULAR; INTRAVENOUS ONCE
Status: COMPLETED | OUTPATIENT
Start: 2025-05-21 | End: 2025-05-21

## 2025-05-21 RX ORDER — MAGNESIUM SULFATE HEPTAHYDRATE 40 MG/ML
2 INJECTION, SOLUTION INTRAVENOUS ONCE
Status: COMPLETED | OUTPATIENT
Start: 2025-05-21 | End: 2025-05-21

## 2025-05-21 RX ORDER — METOPROLOL TARTRATE 50 MG
50 TABLET ORAL EVERY 12 HOURS SCHEDULED
Status: DISCONTINUED | OUTPATIENT
Start: 2025-05-21 | End: 2025-05-23 | Stop reason: HOSPADM

## 2025-05-21 RX ORDER — LOSARTAN POTASSIUM 50 MG/1
100 TABLET ORAL DAILY
Status: DISCONTINUED | OUTPATIENT
Start: 2025-05-21 | End: 2025-05-23 | Stop reason: HOSPADM

## 2025-05-21 RX ORDER — MAGNESIUM HYDROXIDE/ALUMINUM HYDROXICE/SIMETHICONE 120; 1200; 1200 MG/30ML; MG/30ML; MG/30ML
30 SUSPENSION ORAL EVERY 6 HOURS PRN
Status: DISCONTINUED | OUTPATIENT
Start: 2025-05-21 | End: 2025-05-23 | Stop reason: HOSPADM

## 2025-05-21 RX ORDER — ASPIRIN 81 MG/1
81 TABLET, CHEWABLE ORAL DAILY
Status: DISCONTINUED | OUTPATIENT
Start: 2025-05-21 | End: 2025-05-23 | Stop reason: HOSPADM

## 2025-05-21 RX ORDER — ATORVASTATIN CALCIUM 40 MG/1
40 TABLET, FILM COATED ORAL EVERY EVENING
Status: DISCONTINUED | OUTPATIENT
Start: 2025-05-21 | End: 2025-05-23 | Stop reason: HOSPADM

## 2025-05-21 RX ORDER — ESCITALOPRAM OXALATE 10 MG/1
10 TABLET ORAL EVERY MORNING
COMMUNITY

## 2025-05-21 RX ORDER — ONDANSETRON 2 MG/ML
4 INJECTION INTRAMUSCULAR; INTRAVENOUS EVERY 6 HOURS PRN
Status: DISCONTINUED | OUTPATIENT
Start: 2025-05-21 | End: 2025-05-23 | Stop reason: HOSPADM

## 2025-05-21 RX ORDER — LABETALOL HYDROCHLORIDE 5 MG/ML
10 INJECTION, SOLUTION INTRAVENOUS EVERY 6 HOURS PRN
Status: DISCONTINUED | OUTPATIENT
Start: 2025-05-21 | End: 2025-05-23 | Stop reason: HOSPADM

## 2025-05-21 RX ORDER — ATORVASTATIN CALCIUM 40 MG/1
40 TABLET, FILM COATED ORAL DAILY
COMMUNITY
Start: 2025-01-31

## 2025-05-21 RX ORDER — LEVOTHYROXINE SODIUM 125 UG/1
125 TABLET ORAL
Status: DISCONTINUED | OUTPATIENT
Start: 2025-05-22 | End: 2025-05-23 | Stop reason: HOSPADM

## 2025-05-21 RX ORDER — TOPIRAMATE 25 MG/1
25 TABLET, FILM COATED ORAL DAILY
Status: DISCONTINUED | OUTPATIENT
Start: 2025-05-21 | End: 2025-05-23 | Stop reason: HOSPADM

## 2025-05-21 RX ORDER — DOCUSATE SODIUM 100 MG/1
100 CAPSULE, LIQUID FILLED ORAL 2 TIMES DAILY
Status: DISCONTINUED | OUTPATIENT
Start: 2025-05-21 | End: 2025-05-21 | Stop reason: SDUPTHER

## 2025-05-21 RX ORDER — PROPRANOLOL HCL 20 MG
20 TABLET ORAL 2 TIMES DAILY PRN
Status: DISCONTINUED | OUTPATIENT
Start: 2025-05-21 | End: 2025-05-21

## 2025-05-21 RX ORDER — METOCLOPRAMIDE HYDROCHLORIDE 5 MG/ML
10 INJECTION INTRAMUSCULAR; INTRAVENOUS ONCE
Status: COMPLETED | OUTPATIENT
Start: 2025-05-21 | End: 2025-05-21

## 2025-05-21 RX ORDER — SIMETHICONE 80 MG
80 TABLET,CHEWABLE ORAL 4 TIMES DAILY PRN
Status: DISCONTINUED | OUTPATIENT
Start: 2025-05-21 | End: 2025-05-23 | Stop reason: HOSPADM

## 2025-05-21 RX ORDER — HYDRALAZINE HYDROCHLORIDE 20 MG/ML
10 INJECTION INTRAMUSCULAR; INTRAVENOUS EVERY 6 HOURS PRN
Status: DISCONTINUED | OUTPATIENT
Start: 2025-05-21 | End: 2025-05-23 | Stop reason: HOSPADM

## 2025-05-21 RX ORDER — ENOXAPARIN SODIUM 100 MG/ML
40 INJECTION SUBCUTANEOUS EVERY 12 HOURS SCHEDULED
Status: DISCONTINUED | OUTPATIENT
Start: 2025-05-21 | End: 2025-05-23 | Stop reason: HOSPADM

## 2025-05-21 RX ORDER — ASPIRIN 81 MG/1
81 TABLET, CHEWABLE ORAL DAILY
Status: DISCONTINUED | OUTPATIENT
Start: 2025-05-22 | End: 2025-05-21 | Stop reason: SDUPTHER

## 2025-05-21 RX ADMIN — DOCUSATE SODIUM 100 MG: 100 CAPSULE, LIQUID FILLED ORAL at 17:11

## 2025-05-21 RX ADMIN — ATORVASTATIN CALCIUM 40 MG: 40 TABLET, FILM COATED ORAL at 17:37

## 2025-05-21 RX ADMIN — SODIUM CHLORIDE 100 ML/HR: 0.9 INJECTION, SOLUTION INTRAVENOUS at 17:38

## 2025-05-21 RX ADMIN — ASPIRIN 81 MG: 81 TABLET, CHEWABLE ORAL at 17:11

## 2025-05-21 RX ADMIN — SODIUM CHLORIDE 1000 ML: 0.9 INJECTION, SOLUTION INTRAVENOUS at 12:10

## 2025-05-21 RX ADMIN — ACETAMINOPHEN 1000 MG: 10 INJECTION INTRAVENOUS at 12:39

## 2025-05-21 RX ADMIN — NICOTINE 1 PATCH: 21 PATCH, EXTENDED RELEASE TRANSDERMAL at 17:11

## 2025-05-21 RX ADMIN — METOCLOPRAMIDE 10 MG: 5 INJECTION, SOLUTION INTRAMUSCULAR; INTRAVENOUS at 12:13

## 2025-05-21 RX ADMIN — MAGNESIUM SULFATE HEPTAHYDRATE 2 G: 40 INJECTION, SOLUTION INTRAVENOUS at 12:51

## 2025-05-21 RX ADMIN — ENOXAPARIN SODIUM 40 MG: 40 INJECTION SUBCUTANEOUS at 21:59

## 2025-05-21 RX ADMIN — SODIUM CHLORIDE 10 UNITS/HR: 9 INJECTION, SOLUTION INTRAVENOUS at 14:59

## 2025-05-21 RX ADMIN — NYSTATIN: 100000 POWDER TOPICAL at 17:12

## 2025-05-21 RX ADMIN — DIPHENHYDRAMINE HYDROCHLORIDE 25 MG: 50 INJECTION, SOLUTION INTRAMUSCULAR; INTRAVENOUS at 12:12

## 2025-05-21 RX ADMIN — IOHEXOL 100 ML: 350 INJECTION, SOLUTION INTRAVENOUS at 12:36

## 2025-05-21 RX ADMIN — TOPIRAMATE 25 MG: 25 TABLET, FILM COATED ORAL at 17:12

## 2025-05-21 NOTE — ASSESSMENT & PLAN NOTE
Lab Results   Component Value Date    [id number] 138.764 (H) 05/12/2025    FREET4 0.46 (L) 05/12/2025     Continue home meds

## 2025-05-21 NOTE — ASSESSMENT & PLAN NOTE
Episode of right-sided facial, upper and lower extremity numbness along with weakness starting at approximately 8 to 9 AM on day of admission.  Neuro History: Prior TIA    Blood Pressure: 122/77  EKG NSR  CT head negative for acute abnormality at this time  CTA showing atherosclerotic change in bilateral cervical carotid arteries (less than 50%)  Given history of TIA and sudden onset numbness and tingling with presence of poorly controlled diabetes, high suspicion for TIA/stroke    Admit under stroke pathway  MRI brain, TTE, monitor on tele  Lipid Panel, HbA1c  Atorvastatin 40 mg q.d., Aspirin 81 q.d.  Consult Neurology  PT/OT/Speech eval  Allow for permissive hypertension with -210 for 48 hours  Neuro checks per protocol

## 2025-05-21 NOTE — ASSESSMENT & PLAN NOTE
Tobacco Use: High Risk (5/12/2025)    Patient History     Smoking Tobacco Use: Every Day     Smokeless Tobacco Use: Never     Passive Exposure: Not on file     Tobacco cessation counseling provided for > 3 min  NRT ordered with patch and PRN nicorette gum

## 2025-05-21 NOTE — H&P
H&P - Hospitalist   Name: Sadaf Chaves 49 y.o. female I MRN: [id number]  Unit/Bed#: -01 I Date of Admission: 5/21/2025   Date of Service: 5/21/2025 I Hospital Day: 0     Assessment & Plan  Stroke-like symptoms  Episode of right-sided facial, upper and lower extremity numbness along with weakness starting at approximately 8 to 9 AM on day of admission.  Neuro History: Prior TIA    Blood Pressure: 122/77  EKG NSR  CT head negative for acute abnormality at this time  CTA showing atherosclerotic change in bilateral cervical carotid arteries (less than 50%)  Given history of TIA and sudden onset numbness and tingling with presence of poorly controlled diabetes, high suspicion for TIA/stroke    Admit under stroke pathway  MRI brain, TTE, monitor on tele  Lipid Panel, HbA1c  Atorvastatin 40 mg q.d., Aspirin 81 q.d.  Consult Neurology  PT/OT/Speech eval  Allow for permissive hypertension with -210 for 48 hours  Neuro checks per protocol    Type 2 diabetes mellitus with hyperglycemia, with long-term current use of insulin (HCC)  Lab Results   Component Value Date    HGBA1C 9.7 (H) 01/28/2025     Recent Labs     05/21/25  1124 05/21/25  1202 05/21/25  1453 05/21/25  1543   POCGLU 555*  --  441* 326*   GLUC  --  598*  --   --      Hold home regimen while inpatient and resume on discharge  Reportedly was on 20 u lantus HS and no short acting insulin  Diabetic diet  Insulin regimen  Non DKA insulin drip until able to transition to SQ dosing  Glucose checks q2h while on insulin drip  Goal -180 while admitted, adjusting insulin regimen as appropriate  Monitor for hypoglycemia and treat per protocol  Essential hypertension  Blood Pressure: 122/77    Continue home meds  Monitor blood pressure  PRN IV Labetalol and Hydralazine for SBP > 160  Acquired hypothyroidism  Lab Results   Component Value Date    WEN3BGQIBJHG 138.764 (H) 05/12/2025    FREET4 0.46 (L) 05/12/2025     Continue home meds  Morbid obesity with  BMI of 45.0-49.9, adult (HCC)    Recommend incorporating a more whole foods plant-predominant diet along with decreasing consumption of red meats and processed foods  Per AHA guidelines, recommend moderate-vigorous intensity exercise for 30 minutes a day for 5 days a week or a total of 150 min/week    Mixed hyperlipidemia  Lab Results   Component Value Date    CHOLESTEROL 252 (H) 05/17/2024    TRIG 240 (H) 05/17/2024    HDL 34 (L) 05/17/2024    LDLCALC 170 (H) 05/17/2024     Statin  Obtained recheck lipid panel  Tobacco use  Tobacco Use: High Risk (5/12/2025)    Patient History     Smoking Tobacco Use: Every Day     Smokeless Tobacco Use: Never     Passive Exposure: Not on file     Tobacco cessation counseling provided for > 3 min  NRT ordered with patch and PRN nicorette gum        VTE Pharmacologic Prophylaxis: VTE Score: 3 Moderate Risk (Score 3-4) - Pharmacological DVT Prophylaxis Ordered: enoxaparin (Lovenox).  Code Status: Level 1 - Full Code   Discussion with family: Updated  (daughter) at bedside.    Anticipated Length of Stay: Patient will be admitted on an observation basis with an anticipated length of stay of less than 2 midnights secondary to numbness, tingling, hyperglycemia DM2 poorly controlled.    History of Present Illness   Chief Complaint:   Chief Complaint   Patient presents with    Numbness     States she woke up with R hand numbness today, is newly diabetic and glucometer read high today, states she didn't give herself insulin      Sadaf Chaves is a 49 y.o. female with a PMH of hypothyroidism, morbid obesity, HTN, HLD, DM2 who presents with worsening numbness that began earlier in the morning which was described as pins-and-needles.  Also had associated blurred vision and noted to have mild headache.  Family at bedside also reported that patient's gait was abnormal and she had difficulty ambulating for extended period of time.  Presented to the ED for further workup.  Upon  further evaluation, patient noted that patient is on insulin regimen and remains compliant to recommended regimen.  CT obtained in the ED showed no large vessel occlusion, hemorrhage, edema, or mass.  Noted to be significantly hyperglycemic with elevated beta hydroxybutyrate but no presence of acidosis and blood gases.    On my evaluation, patient complaining of right upper and lower extremity weakness compared to left along with decreased sensation on right side of face along with right upper and lower extremities.  Admitted for additional evaluation workup under stroke pathway and management of hyperglycemia.    Review of Systems   Constitutional:  Negative for chills and fever.   HENT:  Negative for ear pain and sore throat.    Eyes:  Negative for pain and visual disturbance.   Respiratory:  Negative for cough and shortness of breath.    Cardiovascular:  Negative for chest pain and palpitations.   Gastrointestinal:  Negative for abdominal pain and vomiting.   Genitourinary:  Negative for dysuria and hematuria.   Musculoskeletal:  Negative for arthralgias and back pain.   Skin:  Negative for color change and rash.   Neurological:  Positive for light-headedness and headaches. Negative for seizures, syncope and facial asymmetry.   All other systems reviewed and are negative.    Historical Information   Past Medical History[1]  Past Surgical History[2]  Social History[3]  E-Cigarette/Vaping    E-Cigarette Use Never User      E-Cigarette/Vaping Substances    Nicotine No     THC No     CBD No     Flavoring No     Other No     Unknown No      Family History[4]  Social History:  Marital Status: /Civil Union   Patient Pre-hospital Living Situation: Home  Patient Pre-hospital Level of Mobility: unable to be assessed at time of evaluation  Patient Pre-hospital Diet Restrictions: none    Meds/Allergies   I have reviewed home medications using recent Epic encounter.  Prior to Admission medications    Medication Sig  Start Date End Date Taking? Authorizing Provider   albuterol (ProAir HFA) 90 mcg/act inhaler Inhale 2 puffs every 6 (six) hours as needed for wheezing 9/22/24   David Martin MD   aspirin 81 mg chewable tablet Chew 1 tablet (81 mg total) daily 5/19/24   Vern Main MD   levothyroxine 125 mcg tablet Take 1 tablet (125 mcg total) by mouth daily in the early morning 11/21/23 1/31/25  Manuel Ascencio PA-C   losartan (COZAAR) 100 MG tablet Take 1 tablet (100 mg total) by mouth daily 11/21/23   Manuel Ascencio PA-C   metFORMIN (GLUCOPHAGE) 500 mg tablet Take 1 tablet (500 mg total) by mouth 2 (two) times a day with meals 1/28/25   ALVIN Garvey   metoprolol tartrate (LOPRESSOR) 50 mg tablet Take 1 tablet (50 mg total) by mouth every 12 (twelve) hours 11/21/23 1/31/25  Manuel Ascencio PA-C   naproxen (NAPROSYN) 500 mg tablet take 1 tablet by mouth twice a day with meals 3/24/25   Vishnu Brice DO   rosuvastatin (CRESTOR) 10 MG tablet Take 1 tablet (10 mg total) by mouth daily 8/22/22   Michelle Matthews MD   topiramate (Topamax) 25 mg tablet Take 1 tablet (25 mg total) by mouth daily 8/22/22   Michelle Matthews MD     Allergies   Allergen Reactions    Trimecaine Hallucinations    Toradol [Ketorolac Tromethamine] Anxiety       Objective :  Temp:  [97.5 °F (36.4 °C)-98.6 °F (37 °C)] 97.5 °F (36.4 °C)  HR:  [66-88] 68  BP: (122-192)/() 122/77  Resp:  [18-20] 18  SpO2:  [94 %-100 %] 98 %  O2 Device: None (Room air)    Physical Exam  Vitals and nursing note reviewed.   Constitutional:       General: She is not in acute distress.     Appearance: She is morbidly obese. She is ill-appearing. She is not toxic-appearing.   HENT:      Head: Normocephalic.      Nose: Nose normal.      Mouth/Throat:      Mouth: Mucous membranes are dry.     Eyes:      General: No visual field deficit or scleral icterus.     Conjunctiva/sclera: Conjunctivae normal.       Cardiovascular:      Rate and Rhythm: Normal rate.       Pulses: Normal pulses.           Radial pulses are 2+ on the right side and 2+ on the left side.      Heart sounds: Normal heart sounds.   Pulmonary:      Effort: Pulmonary effort is normal.      Breath sounds: Normal breath sounds.   Abdominal:      General: Bowel sounds are normal. There is no distension.      Palpations: Abdomen is soft.      Tenderness: There is no abdominal tenderness.     Musculoskeletal:         General: No tenderness.     Skin:     General: Skin is dry.      Coloration: Skin is not jaundiced.     Neurological:      Mental Status: She is alert and oriented to person, place, and time. Mental status is at baseline.      Cranial Nerves: Cranial nerves 2-12 are intact. No dysarthria or facial asymmetry.      Sensory: Sensory deficit present.      Motor: Weakness present.      Gait: Gait abnormal.      Comments: Bicep Strength  R - 3/5  L - 5/5    Finger Squeeze  3/5  5/5    Hip Flexor  3/5  5/5   Psychiatric:         Mood and Affect: Mood normal.         Behavior: Behavior normal. Behavior is cooperative.        Lines/Drains:            Lab Results: I have reviewed the following results:  Results from last 7 days   Lab Units 05/21/25  1602 05/21/25  1202   WBC Thousand/uL  --  8.41   HEMOGLOBIN g/dL  --  14.8   HEMATOCRIT %  --  43.9   PLATELETS Thousands/uL 247 235   SEGS PCT %  --  73   LYMPHO PCT %  --  18   MONO PCT %  --  6   EOS PCT %  --  2     Results from last 7 days   Lab Units 05/21/25  1202   SODIUM mmol/L 131*   POTASSIUM mmol/L 3.8   CHLORIDE mmol/L 95*   CO2 mmol/L 25   BUN mg/dL 9   CREATININE mg/dL 0.86   ANION GAP mmol/L 11   CALCIUM mg/dL 9.4   ALBUMIN g/dL 4.0   TOTAL BILIRUBIN mg/dL 0.69   ALK PHOS U/L 126*   ALT U/L 15   AST U/L 54*   GLUCOSE RANDOM mg/dL 598*         Results from last 7 days   Lab Units 05/21/25  1543 05/21/25  1453 05/21/25  1124   POC GLUCOSE mg/dl 326* 441* 555*     Lab Results   Component Value Date    HGBA1C 9.7 (H) 01/28/2025    HGBA1C 6.3 (H)  05/17/2024    HGBA1C 6.3 (H) 11/20/2023           Imaging Results Review: I reviewed radiology reports from this admission including: CT head.  Other Study Results Review: No additional pertinent studies reviewed.    Administrative Statements       ** Please Note: This note has been constructed using a voice recognition system. **         [1]   Past Medical History:  Diagnosis Date    History of MI (myocardial infarction)     History of TIA (transient ischemic attack)     Hypertension     Hypothyroidism     hypothyroidism    Morbid obesity with BMI of 45.0-49.9, adult (HCC)     TIA (transient ischemic attack)    [2]   Past Surgical History:  Procedure Laterality Date    BREAST SURGERY Left     TUBAL LIGATION     [3]   Social History  Tobacco Use    Smoking status: Every Day     Current packs/day: 0.50     Average packs/day: 0.5 packs/day for 30.0 years (15.0 ttl pk-yrs)     Types: Cigarettes    Smokeless tobacco: Never   Vaping Use    Vaping status: Never Used   Substance and Sexual Activity    Alcohol use: Not Currently     Comment: occasional    Drug use: No    Sexual activity: Yes     Partners: Male     Birth control/protection: None   [4]   Family History  Problem Relation Name Age of Onset    Heart disease Mother      Hypertension Mother      Diabetes Father

## 2025-05-21 NOTE — ASSESSMENT & PLAN NOTE
Lab Results   Component Value Date    HGBA1C 9.7 (H) 01/28/2025     Recent Labs     05/21/25  1124 05/21/25  1202 05/21/25  1453 05/21/25  1543   POCGLU 555*  --  441* 326*   GLUC  --  598*  --   --      Hold home regimen while inpatient and resume on discharge  Reportedly was on 20 u lantus HS and no short acting insulin  Diabetic diet  Insulin regimen  Non DKA insulin drip until able to transition to SQ dosing  Glucose checks q2h while on insulin drip  Goal -180 while admitted, adjusting insulin regimen as appropriate  Monitor for hypoglycemia and treat per protocol

## 2025-05-21 NOTE — ASSESSMENT & PLAN NOTE
Recommend incorporating a more whole foods plant-predominant diet along with decreasing consumption of red meats and processed foods  Per AHA guidelines, recommend moderate-vigorous intensity exercise for 30 minutes a day for 5 days a week or a total of 150 min/week

## 2025-05-21 NOTE — ASSESSMENT & PLAN NOTE
Blood Pressure: 122/77    Continue home meds  Monitor blood pressure  PRN IV Labetalol and Hydralazine for SBP > 160

## 2025-05-21 NOTE — ED PROVIDER NOTES
ED Disposition       None          Assessment & Plan       Medical Decision Making  She is a 49-year-old female past medical history of diabetes, hypertension, hyperlipidemia, migraine, hypothyroid, TIA, MI presenting for numbness, chest pain.  Patient is well-appearing at bedside with stable vitals though currently hypertensive but in no acute distress with no gross abnormalities on neurologic exam with the exception of subjectively decreased sensation to the right face and right arm including the forehead, no motor deficits, equal radial pulses and no other significant physical exam findings.  I have not called stroke alert as patient is outside tPA window, wake up at 7 AM and also has headache concerning for possible complex migraine with no motor deficits however will obtain CT CTA of the head and neck to rule out mass, hemorrhage, edema, occlusion, labs to assess for DKA, electrolyte abnormalities, anemia, STAS, EKG and troponin to assess for ACS or arrhythmia, chest x-ray to assess for pneumonia, pulmonary edema, pneumothorax, give symptomatic management and reassess.  Will consider antihypertensive medication if patient is persistently hypertensive after symptomatic management.    Amount and/or Complexity of Data Reviewed  Labs: ordered.  Radiology: ordered and independent interpretation performed.    Risk  Prescription drug management.        ED Course as of 05/21/25 1659   Wed May 21, 2025   1355 Patient originally noted mild headache which she states resolved however blurred vision and numbness and tingling to the right arm are still there.  Workup has failed to show any cause of patient's symptoms however hypertension has improved without antihypertensive medications are without change of symptoms.  Will discuss with medicine.       Medications   sodium chloride 0.9 % bolus 1,000 mL (has no administration in time range)   acetaminophen (Ofirmev) injection 1,000 mg (has no administration in time range)  "  metoclopramide (REGLAN) injection 10 mg (has no administration in time range)   magnesium sulfate 2 g/50 mL IVPB (premix) 2 g (has no administration in time range)   diphenhydrAMINE (BENADRYL) injection 25 mg (has no administration in time range)       ED Risk Strat Scores                    No data recorded                            History of Present Illness       Chief Complaint   Patient presents with   • Numbness     States she woke up with R hand numbness today, is newly diabetic and glucometer read high today, states she didn't give herself insulin        Past Medical History:   Diagnosis Date   • History of MI (myocardial infarction)    • History of TIA (transient ischemic attack)    • Hypertension    • Hypothyroidism     hypothyroidism   • Morbid obesity with BMI of 45.0-49.9, adult (HCC)    • TIA (transient ischemic attack)       Past Surgical History:   Procedure Laterality Date   • BREAST SURGERY Left    • TUBAL LIGATION        Family History   Problem Relation Age of Onset   • Heart disease Mother    • Hypertension Mother    • Diabetes Father       Social History[1]   E-Cigarette/Vaping   • E-Cigarette Use Never User       E-Cigarette/Vaping Substances   • Nicotine No    • THC No    • CBD No    • Flavoring No    • Other No    • Unknown No       I have reviewed and agree with the history as documented.     Patient is a 49-year-old female past medical history of diabetes, hypertension, hyperlipidemia, migraine, hypothyroid, CVA, MI presenting with numbness.  Patient notes numbness to the right hand which she states she woke up with at 7 AM and has been constant since.  She notes a sensation of pins-and-needles and stated \"feels weird\".  She states she can feel herself touching it.  She states that her glucometer was high today and notes compliance with her insulin, takes 20 units at night but no short acting insulin.  She denies any nausea or vomiting, dizziness but does note blurred vision in same " timeframe as well as mild right-sided headache in same timeframe.  She notes chest pain under her left breast which is intermittent and worse with going from sitting to standing over the course of the last hour.  Denies any shortness of breath, abdominal pain.  Patient also states that she had her  shave her pubic area 1 week ago and has noticed increased itching and whiteness to the area without discharge since that time        Review of Systems   All other systems reviewed and are negative.          Objective       ED Triage Vitals [05/21/25 1122]   Temperature Pulse Blood Pressure Respirations SpO2 Patient Position - Orthostatic VS   98.6 °F (37 °C) 88 (!) 192/120 20 100 % Sitting      Temp Source Heart Rate Source BP Location FiO2 (%) Pain Score    Temporal Monitor Left arm -- --      Vitals      Date and Time Temp Pulse SpO2 Resp BP Pain Score FACES Pain Rating User   05/21/25 1122 98.6 °F (37 °C) 88 100 % 20 192/120 -- -- AS            Physical Exam  Vitals reviewed.   Constitutional:       General: She is not in acute distress.     Appearance: Normal appearance. She is not ill-appearing.   HENT:      Mouth/Throat:      Mouth: Mucous membranes are moist.     Eyes:      Extraocular Movements: Extraocular movements intact.      Conjunctiva/sclera: Conjunctivae normal.      Pupils: Pupils are equal, round, and reactive to light.       Cardiovascular:      Rate and Rhythm: Normal rate and regular rhythm.      Pulses: Normal pulses.      Heart sounds: Normal heart sounds.   Pulmonary:      Effort: Pulmonary effort is normal.      Breath sounds: Normal breath sounds.   Abdominal:      General: Abdomen is flat.      Palpations: Abdomen is soft.      Tenderness: There is no abdominal tenderness.   Genitourinary:     Comments: Diffuse mild erythema and skin flaking over pubic area, no tenderness or increased warmth    Musculoskeletal:         General: No swelling. Normal range of motion.      Cervical back: Neck  supple.      Right lower leg: No edema.      Left lower leg: No edema.     Skin:     General: Skin is warm and dry.     Neurological:      General: No focal deficit present.      Mental Status: She is alert.      Cranial Nerves: No cranial nerve deficit.      Sensory: Sensory deficit present.      Motor: No weakness.      Coordination: Coordination normal.     Psychiatric:         Mood and Affect: Mood normal.         Results Reviewed       Procedure Component Value Units Date/Time    Fingerstick Glucose (POCT) [[id number]]  (Abnormal) Collected: 05/21/25 1124    Lab Status: Final result Specimen: Blood Updated: 05/21/25 1125     POC Glucose 555 mg/dl             No orders to display       ECG 12 Lead Documentation Only    Date/Time: 5/21/2025 12:17 PM    Performed by: Aarti Davenport DO  Authorized by: Aarti Davenport DO    Patient location:  ED  Previous ECG:     Previous ECG:  Compared to current    Comparison ECG info:  Unspecific changes in T wave abnormality in lateral leads from prior  Interpretation:     Interpretation: non-specific    Rate:     ECG rate assessment: normal    Rhythm:     Rhythm: sinus rhythm    Ectopy:     Ectopy: none    QRS:     QRS axis:  Left    QRS intervals:  Normal  Conduction:     Conduction: normal    ST segments:     ST segments:  Normal  T waves:     T waves: non-specific        ED Medication and Procedure Management   Prior to Admission Medications   Prescriptions Last Dose Informant Patient Reported? Taking?   albuterol (ProAir HFA) 90 mcg/act inhaler  Self No No   Sig: Inhale 2 puffs every 6 (six) hours as needed for wheezing   aspirin 81 mg chewable tablet  Self No No   Sig: Chew 1 tablet (81 mg total) daily   levothyroxine 125 mcg tablet  Self No No   Sig: Take 1 tablet (125 mcg total) by mouth daily in the early morning   losartan (COZAAR) 100 MG tablet  Self No No   Sig: Take 1 tablet (100 mg total) by mouth daily   metFORMIN (GLUCOPHAGE) 500 mg tablet  Self No No    Sig: Take 1 tablet (500 mg total) by mouth 2 (two) times a day with meals   metoprolol tartrate (LOPRESSOR) 50 mg tablet  Self No No   Sig: Take 1 tablet (50 mg total) by mouth every 12 (twelve) hours   naproxen (NAPROSYN) 500 mg tablet   No No   Sig: take 1 tablet by mouth twice a day with meals   rosuvastatin (CRESTOR) 10 MG tablet  Self No No   Sig: Take 1 tablet (10 mg total) by mouth daily   topiramate (Topamax) 25 mg tablet  Self No No   Sig: Take 1 tablet (25 mg total) by mouth daily      Facility-Administered Medications: None     Patient's Medications   Discharge Prescriptions    No medications on file     No discharge procedures on file.  ED SEPSIS DOCUMENTATION                  [1]  Social History  Tobacco Use   • Smoking status: Every Day     Current packs/day: 0.50     Average packs/day: 0.5 packs/day for 30.0 years (15.0 ttl pk-yrs)     Types: Cigarettes   • Smokeless tobacco: Never   Vaping Use   • Vaping status: Never Used   Substance Use Topics   • Alcohol use: Not Currently     Comment: occasional   • Drug use: No      Aarti Davenport DO  05/21/25 2503

## 2025-05-21 NOTE — PLAN OF CARE
Problem: PAIN - ADULT  Goal: Verbalizes/displays adequate comfort level or baseline comfort level  Description: Interventions:  - Encourage patient to monitor pain and request assistance  - Assess pain using appropriate pain scale  - Administer analgesics as ordered based on type and severity of pain and evaluate response  - Implement non-pharmacological measures as appropriate and evaluate response  - Consider cultural and social influences on pain and pain management  - Notify physician/advanced practitioner if interventions unsuccessful or patient reports new pain  - Educate patient/family on pain management process including their role and importance of  reporting pain   - Provide non-pharmacologic/complimentary pain relief interventions  Outcome: Progressing     Problem: INFECTION - ADULT  Goal: Absence or prevention of progression during hospitalization  Description: INTERVENTIONS:  - Assess and monitor for signs and symptoms of infection  - Monitor lab/diagnostic results  - Monitor all insertion sites, i.e. indwelling lines, tubes, and drains  - Monitor endotracheal if appropriate and nasal secretions for changes in amount and color  - [place] appropriate cooling/warming therapies per order  - Administer medications as ordered  - Instruct and encourage patient and family to use good hand hygiene technique  - Identify and instruct in appropriate isolation precautions for identified infection/condition  Outcome: Progressing  Goal: Absence of fever/infection during neutropenic period  Description: INTERVENTIONS:  - Monitor WBC  - Perform strict hand hygiene  - Limit to healthy visitors only  - No plants, dried, fresh or silk flowers with sanchez in patient room  Outcome: Progressing     Problem: SAFETY ADULT  Goal: Patient will remain free of falls  Description: INTERVENTIONS:  - Educate patient/family on patient safety including physical limitations  - Instruct patient to call for assistance with activity   -  Consider consulting OT/PT to assist with strengthening/mobility based on AM PAC & JH-HLM score  - Consult OT/PT to assist with strengthening/mobility   - Keep Call bell within reach  - Keep bed low and locked with side rails adjusted as appropriate  - Keep care items and personal belongings within reach  - Initiate and maintain comfort rounds  - Make Fall Risk Sign visible to staff  - Offer Toileting every 2 Hours, in advance of need  - Initiate/Maintain bed alarm  - Obtain necessary fall risk management equipment  - Apply yellow socks and bracelet for high fall risk patients  - Consider moving patient to room near nurses station  Outcome: Progressing  Goal: Maintain or return to baseline ADL function  Description: INTERVENTIONS:  -  Assess patient's ability to carry out ADLs; assess patient's baseline for ADL function and identify physical deficits which impact ability to perform ADLs (bathing, care of mouth/teeth, toileting, grooming, dressing, etc.)  - Assess/evaluate cause of self-care deficits   - Assess range of motion  - Assess patient's mobility; develop plan if impaired  - Assess patient's need for assistive devices and provide as appropriate  - Encourage maximum independence but intervene and supervise when necessary  - Involve family in performance of ADLs  - Assess for home care needs following discharge   - Consider OT consult to assist with ADL evaluation and planning for discharge  - Provide patient education as appropriate  - Monitor functional capacity and physical performance, use of AM PAC & JH-HLM   - Monitor gait, balance and fatigue with ambulation    Outcome: Progressing  Goal: Maintains/Returns to pre admission functional level  Description: INTERVENTIONS:  - Perform AM-PAC 6 Click Basic Mobility/ Daily Activity assessment daily.  - Set and communicate daily mobility goal to care team and patient/family/caregiver.   - Collaborate with rehabilitation services on mobility goals if consulted  -  Perform Range of Motion 4 times a day.  - Reposition patient every 4 hours.  - Dangle patient 4 times a day  - Stand patient 4 times a day  - Ambulate patient 4 times a day  - Out of bed to chair 4 times a day   - Out of bed for meals 4 times a day  - Out of bed for toileting  - Record patient progress and toleration of activity level   Outcome: Progressing     Problem: DISCHARGE PLANNING  Goal: Discharge to home or other facility with appropriate resources  Description: INTERVENTIONS:  - Identify barriers to discharge w/patient and caregiver  - Arrange for needed discharge resources and transportation as appropriate  - Identify discharge learning needs (meds, wound care, etc.)  - Arrange for interpretive services to assist at discharge as needed  - Refer to Case Management Department for coordinating discharge planning if the patient needs post-hospital services based on physician/advanced practitioner order or complex needs related to functional status, cognitive ability, or social support system  Outcome: Progressing     Problem: Knowledge Deficit  Goal: Patient/family/caregiver demonstrates understanding of disease process, treatment plan, medications, and discharge instructions  Description: Complete learning assessment and assess knowledge base.  Interventions:  - Provide teaching at level of understanding  - Provide teaching via preferred learning methods  Outcome: Progressing

## 2025-05-21 NOTE — ASSESSMENT & PLAN NOTE
Lab Results   Component Value Date    CHOLESTEROL 252 (H) 05/17/2024    TRIG 240 (H) 05/17/2024    HDL 34 (L) 05/17/2024    LDLCALC 170 (H) 05/17/2024     Statin  Obtained recheck lipid panel

## 2025-05-22 ENCOUNTER — APPOINTMENT (OUTPATIENT)
Dept: MRI IMAGING | Facility: HOSPITAL | Age: 50
End: 2025-05-22
Payer: COMMERCIAL

## 2025-05-22 ENCOUNTER — APPOINTMENT (OUTPATIENT)
Dept: NON INVASIVE DIAGNOSTICS | Facility: HOSPITAL | Age: 50
End: 2025-05-22
Payer: COMMERCIAL

## 2025-05-22 LAB
ALBUMIN SERPL BCG-MCNC: 3.4 G/DL (ref 3.5–5)
ALP SERPL-CCNC: 103 U/L (ref 34–104)
ALT SERPL W P-5'-P-CCNC: 19 U/L (ref 7–52)
ANION GAP SERPL CALCULATED.3IONS-SCNC: 7 MMOL/L (ref 4–13)
AORTIC ROOT: 3.1 CM
ASCENDING AORTA: 2.9 CM
AST SERPL W P-5'-P-CCNC: 68 U/L (ref 13–39)
ATRIAL RATE: 89 BPM
AV REGURGITATION PRESSURE HALF TIME: 703 MS
BASOPHILS # BLD AUTO: 0.03 THOUSANDS/ÂΜL (ref 0–0.1)
BASOPHILS NFR BLD AUTO: 0 % (ref 0–1)
BILIRUB SERPL-MCNC: 0.52 MG/DL (ref 0.2–1)
BSA FOR ECHO PROCEDURE: 2.29 M2
BUN SERPL-MCNC: 8 MG/DL (ref 5–25)
CALCIUM ALBUM COR SERPL-MCNC: 8.8 MG/DL (ref 8.3–10.1)
CALCIUM SERPL-MCNC: 8.3 MG/DL (ref 8.4–10.2)
CHLORIDE SERPL-SCNC: 104 MMOL/L (ref 96–108)
CHOLEST SERPL-MCNC: 165 MG/DL (ref ?–200)
CO2 SERPL-SCNC: 24 MMOL/L (ref 21–32)
CREAT SERPL-MCNC: 0.53 MG/DL (ref 0.6–1.3)
E WAVE DECELERATION TIME: 274 MS
E/A RATIO: 0.78
EOSINOPHIL # BLD AUTO: 0.23 THOUSAND/ÂΜL (ref 0–0.61)
EOSINOPHIL NFR BLD AUTO: 3 % (ref 0–6)
ERYTHROCYTE [DISTWIDTH] IN BLOOD BY AUTOMATED COUNT: 13.6 % (ref 11.6–15.1)
FRACTIONAL SHORTENING: 42 (ref 28–44)
GFR SERPL CREATININE-BSD FRML MDRD: 113 ML/MIN/1.73SQ M
GLUCOSE P FAST SERPL-MCNC: 137 MG/DL (ref 65–99)
GLUCOSE SERPL-MCNC: 124 MG/DL (ref 65–140)
GLUCOSE SERPL-MCNC: 128 MG/DL (ref 65–140)
GLUCOSE SERPL-MCNC: 137 MG/DL (ref 65–140)
GLUCOSE SERPL-MCNC: 142 MG/DL (ref 65–140)
GLUCOSE SERPL-MCNC: 145 MG/DL (ref 65–140)
GLUCOSE SERPL-MCNC: 163 MG/DL (ref 65–140)
GLUCOSE SERPL-MCNC: 178 MG/DL (ref 65–140)
GLUCOSE SERPL-MCNC: 201 MG/DL (ref 65–140)
GLUCOSE SERPL-MCNC: 267 MG/DL (ref 65–140)
GLUCOSE SERPL-MCNC: 329 MG/DL (ref 65–140)
HCT VFR BLD AUTO: 41.1 % (ref 34.8–46.1)
HDLC SERPL-MCNC: 27 MG/DL
HGB BLD-MCNC: 13.2 G/DL (ref 11.5–15.4)
IMM GRANULOCYTES # BLD AUTO: 0.07 THOUSAND/UL (ref 0–0.2)
IMM GRANULOCYTES NFR BLD AUTO: 1 % (ref 0–2)
INTERVENTRICULAR SEPTUM IN DIASTOLE (PARASTERNAL SHORT AXIS VIEW): 1.3 CM
INTERVENTRICULAR SEPTUM: 1.3 CM (ref 0.6–1.1)
LAAS-AP4: 13.8 CM2
LDLC SERPL CALC-MCNC: 85 MG/DL (ref 0–100)
LEFT ATRIUM AREA SYSTOLE SINGLE PLANE A4C: 13.8 CM2
LEFT ATRIUM SIZE: 3.7 CM
LEFT INTERNAL DIMENSION IN SYSTOLE: 2.8 CM (ref 2.1–4)
LEFT VENTRICULAR INTERNAL DIMENSION IN DIASTOLE: 4.8 CM (ref 3.5–6)
LEFT VENTRICULAR POSTERIOR WALL IN END DIASTOLE: 1.2 CM
LEFT VENTRICULAR STROKE VOLUME: 78 ML
LV EF US.2D.A4C+ESTIMATED: 57 %
LVSV (TEICH): 78 ML
LYMPHOCYTES # BLD AUTO: 2.69 THOUSANDS/ÂΜL (ref 0.6–4.47)
LYMPHOCYTES NFR BLD AUTO: 33 % (ref 14–44)
MAGNESIUM SERPL-MCNC: 1.9 MG/DL (ref 1.9–2.7)
MCH RBC QN AUTO: 28.4 PG (ref 26.8–34.3)
MCHC RBC AUTO-ENTMCNC: 32.1 G/DL (ref 31.4–37.4)
MCV RBC AUTO: 88 FL (ref 82–98)
MONOCYTES # BLD AUTO: 0.44 THOUSAND/ÂΜL (ref 0.17–1.22)
MONOCYTES NFR BLD AUTO: 5 % (ref 4–12)
MV E'TISSUE VEL-LAT: 6 CM/S
MV E'TISSUE VEL-SEP: 7 CM/S
MV PEAK A VEL: 1.05 M/S
MV PEAK E VEL: 82 CM/S
MV STENOSIS PRESSURE HALF TIME: 79 MS
MV VALVE AREA P 1/2 METHOD: 2.78
NEUTROPHILS # BLD AUTO: 4.78 THOUSANDS/ÂΜL (ref 1.85–7.62)
NEUTS SEG NFR BLD AUTO: 58 % (ref 43–75)
NRBC BLD AUTO-RTO: 0 /100 WBCS
P AXIS: 15 DEGREES
PLATELET # BLD AUTO: 219 THOUSANDS/UL (ref 149–390)
PMV BLD AUTO: 12.3 FL (ref 8.9–12.7)
POTASSIUM SERPL-SCNC: 3 MMOL/L (ref 3.5–5.3)
PR INTERVAL: 130 MS
PROT SERPL-MCNC: 6.5 G/DL (ref 6.4–8.4)
QRS AXIS: 19 DEGREES
QRSD INTERVAL: 92 MS
QT INTERVAL: 382 MS
QTC INTERVAL: 464 MS
RBC # BLD AUTO: 4.65 MILLION/UL (ref 3.81–5.12)
RIGHT ATRIUM AREA SYSTOLE A4C: 11.6 CM2
RIGHT VENTRICLE ID DIMENSION: 3.6 CM
SL CV AV DECELERATION TIME RETROGRADE: 2423 MS
SL CV AV PEAK GRADIENT RETROGRADE: 77 MMHG
SL CV LV EF: 65
SL CV PED ECHO LEFT VENTRICLE DIASTOLIC VOLUME (MOD BIPLANE) 2D: 108 ML
SL CV PED ECHO LEFT VENTRICLE SYSTOLIC VOLUME (MOD BIPLANE) 2D: 30 ML
SODIUM SERPL-SCNC: 135 MMOL/L (ref 135–147)
T WAVE AXIS: 21 DEGREES
TR MAX PG: 32 MMHG
TR PEAK VELOCITY: 2.8 M/S
TRICUSPID ANNULAR PLANE SYSTOLIC EXCURSION: 2.7 CM
TRICUSPID VALVE PEAK REGURGITATION VELOCITY: 2.82 M/S
TRIGL SERPL-MCNC: 264 MG/DL (ref ?–150)
VENTRICULAR RATE: 89 BPM
WBC # BLD AUTO: 8.24 THOUSAND/UL (ref 4.31–10.16)

## 2025-05-22 PROCEDURE — 80061 LIPID PANEL: CPT | Performed by: INTERNAL MEDICINE

## 2025-05-22 PROCEDURE — 93306 TTE W/DOPPLER COMPLETE: CPT | Performed by: INTERNAL MEDICINE

## 2025-05-22 PROCEDURE — 85025 COMPLETE CBC W/AUTO DIFF WBC: CPT | Performed by: INTERNAL MEDICINE

## 2025-05-22 PROCEDURE — 97166 OT EVAL MOD COMPLEX 45 MIN: CPT

## 2025-05-22 PROCEDURE — 99232 SBSQ HOSP IP/OBS MODERATE 35: CPT | Performed by: INTERNAL MEDICINE

## 2025-05-22 PROCEDURE — 80053 COMPREHEN METABOLIC PANEL: CPT | Performed by: INTERNAL MEDICINE

## 2025-05-22 PROCEDURE — 83735 ASSAY OF MAGNESIUM: CPT | Performed by: INTERNAL MEDICINE

## 2025-05-22 PROCEDURE — 99205 OFFICE O/P NEW HI 60 MIN: CPT | Performed by: PSYCHIATRY & NEUROLOGY

## 2025-05-22 PROCEDURE — 70551 MRI BRAIN STEM W/O DYE: CPT

## 2025-05-22 PROCEDURE — 93010 ELECTROCARDIOGRAM REPORT: CPT | Performed by: INTERNAL MEDICINE

## 2025-05-22 PROCEDURE — 97162 PT EVAL MOD COMPLEX 30 MIN: CPT

## 2025-05-22 PROCEDURE — 93306 TTE W/DOPPLER COMPLETE: CPT

## 2025-05-22 PROCEDURE — 82948 REAGENT STRIP/BLOOD GLUCOSE: CPT

## 2025-05-22 RX ORDER — INSULIN GLARGINE 100 [IU]/ML
20 INJECTION, SOLUTION SUBCUTANEOUS ONCE
Status: COMPLETED | OUTPATIENT
Start: 2025-05-22 | End: 2025-05-22

## 2025-05-22 RX ORDER — ESCITALOPRAM OXALATE 10 MG/1
10 TABLET ORAL EVERY MORNING
Status: DISCONTINUED | OUTPATIENT
Start: 2025-05-22 | End: 2025-05-23 | Stop reason: HOSPADM

## 2025-05-22 RX ORDER — INSULIN LISPRO 100 [IU]/ML
2-12 INJECTION, SOLUTION INTRAVENOUS; SUBCUTANEOUS
Status: DISCONTINUED | OUTPATIENT
Start: 2025-05-22 | End: 2025-05-23 | Stop reason: HOSPADM

## 2025-05-22 RX ORDER — INSULIN LISPRO 100 [IU]/ML
10 INJECTION, SOLUTION INTRAVENOUS; SUBCUTANEOUS ONCE
Status: COMPLETED | OUTPATIENT
Start: 2025-05-22 | End: 2025-05-22

## 2025-05-22 RX ORDER — INSULIN GLARGINE 100 [IU]/ML
30 INJECTION, SOLUTION SUBCUTANEOUS
Status: DISCONTINUED | OUTPATIENT
Start: 2025-05-22 | End: 2025-05-23

## 2025-05-22 RX ORDER — LORAZEPAM 2 MG/ML
1 INJECTION INTRAMUSCULAR ONCE AS NEEDED
Status: DISCONTINUED | OUTPATIENT
Start: 2025-05-22 | End: 2025-05-23 | Stop reason: HOSPADM

## 2025-05-22 RX ORDER — INSULIN LISPRO 100 [IU]/ML
5 INJECTION, SOLUTION INTRAVENOUS; SUBCUTANEOUS
Status: DISCONTINUED | OUTPATIENT
Start: 2025-05-22 | End: 2025-05-22

## 2025-05-22 RX ORDER — INSULIN LISPRO 100 [IU]/ML
12 INJECTION, SOLUTION INTRAVENOUS; SUBCUTANEOUS
Status: DISCONTINUED | OUTPATIENT
Start: 2025-05-23 | End: 2025-05-23 | Stop reason: HOSPADM

## 2025-05-22 RX ADMIN — INSULIN LISPRO 8 UNITS: 100 INJECTION, SOLUTION INTRAVENOUS; SUBCUTANEOUS at 17:00

## 2025-05-22 RX ADMIN — INSULIN GLARGINE 30 UNITS: 100 INJECTION, SOLUTION SUBCUTANEOUS at 21:21

## 2025-05-22 RX ADMIN — ASPIRIN 81 MG: 81 TABLET, CHEWABLE ORAL at 09:15

## 2025-05-22 RX ADMIN — TOPIRAMATE 25 MG: 25 TABLET, FILM COATED ORAL at 09:16

## 2025-05-22 RX ADMIN — ATORVASTATIN CALCIUM 40 MG: 40 TABLET, FILM COATED ORAL at 17:46

## 2025-05-22 RX ADMIN — ENOXAPARIN SODIUM 40 MG: 40 INJECTION SUBCUTANEOUS at 09:15

## 2025-05-22 RX ADMIN — NYSTATIN: 100000 POWDER TOPICAL at 17:48

## 2025-05-22 RX ADMIN — INSULIN LISPRO 4 UNITS: 100 INJECTION, SOLUTION INTRAVENOUS; SUBCUTANEOUS at 12:30

## 2025-05-22 RX ADMIN — LEVOTHYROXINE SODIUM 125 MCG: 125 TABLET ORAL at 05:01

## 2025-05-22 RX ADMIN — ENOXAPARIN SODIUM 40 MG: 40 INJECTION SUBCUTANEOUS at 21:21

## 2025-05-22 RX ADMIN — ACETAMINOPHEN 650 MG: 325 TABLET ORAL at 21:21

## 2025-05-22 RX ADMIN — ESCITALOPRAM OXALATE 10 MG: 10 TABLET ORAL at 11:23

## 2025-05-22 RX ADMIN — INSULIN LISPRO 10 UNITS: 100 INJECTION, SOLUTION INTRAVENOUS; SUBCUTANEOUS at 18:31

## 2025-05-22 RX ADMIN — INSULIN LISPRO 6 UNITS: 100 INJECTION, SOLUTION INTRAVENOUS; SUBCUTANEOUS at 21:24

## 2025-05-22 RX ADMIN — INSULIN LISPRO 5 UNITS: 100 INJECTION, SOLUTION INTRAVENOUS; SUBCUTANEOUS at 17:00

## 2025-05-22 RX ADMIN — DOCUSATE SODIUM 100 MG: 100 CAPSULE, LIQUID FILLED ORAL at 09:15

## 2025-05-22 RX ADMIN — DOCUSATE SODIUM 100 MG: 100 CAPSULE, LIQUID FILLED ORAL at 17:51

## 2025-05-22 RX ADMIN — NYSTATIN: 100000 POWDER TOPICAL at 09:16

## 2025-05-22 RX ADMIN — INSULIN GLARGINE 20 UNITS: 100 INJECTION, SOLUTION SUBCUTANEOUS at 11:22

## 2025-05-22 RX ADMIN — NICOTINE 1 PATCH: 21 PATCH, EXTENDED RELEASE TRANSDERMAL at 17:00

## 2025-05-22 NOTE — UTILIZATION REVIEW
Initial Clinical Review    Admission: Date/Time/Statement:   Admission Orders (From admission, onward)       Ordered        05/21/25 1406  Place in Observation  Once                          Orders Placed This Encounter   Procedures    Place in Observation     Standing Status:   Standing     Number of Occurrences:   1     Level of Care:   Med Surg [16]     ED Arrival Information       Expected   -    Arrival   5/21/2025 11:17    Acuity   Emergent              Means of arrival   Walk-In    Escorted by   Self    Service   Hospitalist    Admission type   Emergency              Arrival complaint   HAND NUMBNESS + BLURRED VISION             Chief Complaint   Patient presents with    Numbness     States she woke up with R hand numbness today, is newly diabetic and glucometer read high today, states she didn't give herself insulin        Initial Presentation: 49 y.o. female with a PMH of hypothyroidism, morbid obesity, HTN, HLD, DM2 who presents with worsening numbness that began earlier in the morning which was described as pins-and-needles. Also had associated blurred vision and noted to have mild headache. Family at bedside also reported that patient's gait was abnormal and she had difficulty ambulating for extended period of time. Presented to the ED for further workup. Upon further evaluation, patient noted that patient is on insulin regimen and remains compliant to recommended regimen. CT obtained in the ED showed no large vessel occlusion, hemorrhage, edema, or mass. Noted to be significantly hyperglycemic with elevated beta hydroxybutyrate but no presence of acidosis and blood gases. On my evaluation, patient complaining of right upper and lower extremity weakness compared to left along with decreased sensation on right side of face along with right upper and lower extremities. Plan: Observation for stroke like symptoms, DM, HTN, hypothyroidism, morbid obesity, HLD, tobacco abuse: stroke pathway, MRI brain, TTE,  telemetry, lipid panel and HgbA1c, ASA 81mg, atorvastatin 40 mg, Neurology consult, PT/OT/ST eval, permissive HTN, neuro checks, diabetic diet, insulin drip, continue home antihypertensives, NRT.     5/22:    Internal medicine: continue stroke pathway. MRI brain. TTE. Telemetry. Continue ASA and atorvastatin. Neurology consult pending. PT/OT/ST evals. Permissive HTN. Neuro checks. Non DKA insulin drip until able to transition to SQ dosing. Diabetic diet. Continue home meds.     Neurology consult: continue stroke pathway. MRI brain. Echo. Telemetry. Continue ASA and atorvastatin. PT/OT/ST evals. Normotension. Neuro checks. Patient will need follow-up with neuromuscular specialist for her right hand numbness and consideration of possible EMG/NCS if MRI unrevealing as to potential contribution.  Would conservatively treat with neutral position wrist splint HS in the meantime.     ED Treatment-Medication Administration from 05/21/2025 1117 to 05/21/2025 1528         Date/Time Order Dose Route Action     05/21/2025 1210 sodium chloride 0.9 % bolus 1,000 mL 1,000 mL Intravenous New Bag     05/21/2025 1239 acetaminophen (Ofirmev) injection 1,000 mg 1,000 mg Intravenous New Bag     05/21/2025 1213 metoclopramide (REGLAN) injection 10 mg 10 mg Intravenous Given     05/21/2025 1251 magnesium sulfate 2 g/50 mL IVPB (premix) 2 g 2 g Intravenous New Bag     05/21/2025 1212 diphenhydrAMINE (BENADRYL) injection 25 mg 25 mg Intravenous Given     05/21/2025 1236 iohexol (OMNIPAQUE) 350 MG/ML injection (MULTI-DOSE) 100 mL 100 mL Intravenous Given     05/21/2025 1459 insulin regular (HumuLIN R,NovoLIN R) 1 Units/mL in sodium chloride 0.9 % 100 mL infusion 10 Units/hr Intravenous New Bag            Scheduled Medications:  aspirin, 81 mg, Oral, Daily  atorvastatin, 40 mg, Oral, QPM  docusate sodium, 100 mg, Oral, BID  enoxaparin, 40 mg, Subcutaneous, Q12H MARYA  levothyroxine, 125 mcg, Oral, Early Morning  [Held by provider] losartan, 100  mg, Oral, Daily  [Held by provider] metoprolol tartrate, 50 mg, Oral, Q12H MARYA  nicotine, 1 patch, Transdermal, Daily  nystatin, , Topical, BID  topiramate, 25 mg, Oral, Daily      Continuous IV Infusions:  insulin regular (HumuLIN R,NovoLIN R) 1 Units/mL in sodium chloride 0.9 % 100 mL infusion, 0.3-21 Units/hr, Intravenous, Titrated  sodium chloride, 100 mL/hr, Intravenous, Continuous      PRN Meds:  acetaminophen, 650 mg, Oral, Q4H PRN  albuterol, 2 puff, Inhalation, Q6H PRN  aluminum-magnesium hydroxide-simethicone, 30 mL, Oral, Q6H PRN  hydrALAZINE, 10 mg, Intravenous, Q6H PRN  hydrOXYzine HCL, 50 mg, Oral, Q6H PRN  labetalol, 10 mg, Intravenous, Q6H PRN  LORazepam, 0.5 mg, Intravenous, Q6H PRN  nicotine polacrilex, 2 mg, Oral, Q2H PRN  ondansetron, 4 mg, Intravenous, Q6H PRN  simethicone, 80 mg, Oral, 4x Daily PRN      ED Triage Vitals   Temperature Pulse Respirations Blood Pressure SpO2 Pain Score   05/21/25 1122 05/21/25 1122 05/21/25 1122 05/21/25 1122 05/21/25 1122 05/21/25 1727   98.6 °F (37 °C) 88 20 (!) 192/120 100 % No Pain     Weight (last 2 days)       Date/Time Weight    05/21/25 1759 122 (268.52)    05/21/25 1754 122 (268.52)            Vital Signs (last 3 days)       Date/Time Temp Pulse Resp BP MAP (mmHg) SpO2 O2 Device Patient Position - Orthostatic VS Latoya Coma Scale Score Pain    05/22/25 0830 -- -- -- -- -- -- -- -- -- 6    05/22/25 0700 -- 76 18 152/82 105 97 % -- -- -- --    05/22/25 0300 98.2 °F (36.8 °C) 73 18 144/81 77 96 % None (Room air) Lying 15 --    05/22/25 0100 98.2 °F (36.8 °C) 78 18 117/57 77 96 % None (Room air) Lying 15 --    05/22/25 00:47:08 -- -- -- -- -- 93 % -- -- -- --    05/21/25 2300 98.2 °F (36.8 °C) 85 18 132/66 88 95 % None (Room air) Lying 15 --    05/21/25 2100 98.1 °F (36.7 °C) 83 17 139/68 92 97 % None (Room air) Lying 15 --    05/21/25 2013 -- -- -- -- -- -- None (Room air) -- 15 No Pain    05/21/25 19:59:35 98.3 °F (36.8 °C) 79 -- 140/85 103 97 % -- -- --  No Pain    05/21/25 1900 98 °F (36.7 °C) 80 16 125/62 -- 98 % None (Room air) Lying 15 --    05/21/25 18:59:38 -- -- -- -- -- 98 % None (Room air) -- -- --    05/21/25 1800 -- -- -- -- -- -- -- -- 15 --    05/21/25 1759 97.5 °F (36.4 °C) 87 18 131/76 -- 97 % -- -- -- --    05/21/25 1727 -- -- -- -- -- -- -- -- -- No Pain    05/21/25 1700 97.3 °F (36.3 °C) 85 18 131/64 -- 98 % None (Room air) Lying 15 --    05/21/25 1631 97.5 °F (36.4 °C) 68 18 122/77 -- 98 % -- -- -- --    05/21/25 1600 97.5 °F (36.4 °C) 76 18 122/77 -- 96 % None (Room air) Lying 15 --    05/21/25 1542 -- -- -- -- -- 98 % -- -- -- --    05/21/25 15:37:26 97.5 °F (36.4 °C) 68 18 122/77 -- 98 % -- -- -- --    05/21/25 1430 -- 73 20 153/72 103 96 % None (Room air) Lying -- --    05/21/25 1330 -- 66 18 148/90 114 94 % None (Room air) Lying -- --    05/21/25 1300 -- 70 18 144/98 116 95 % None (Room air) Lying -- --    05/21/25 1215 -- 71 18 140/91 111 100 % None (Room air) Lying 15 --    05/21/25 1122 98.6 °F (37 °C) 88 20 192/120 -- 100 % None (Room air) Sitting -- --              Pertinent Labs/Diagnostic Test Results:   Radiology:  CTA head and neck with and without contrast   Final Interpretation by Chiquita Charles MD (05/21 1329)      CT Brain: No acute intracranial CT abnormality.      CT Angiography:      1.  Patent major vessels of the [place] IRA of lopez without high-grade stenosis.   2.  Atherosclerotic change in bilateral cervical carotid arteries without hemodynamically significant stenosis (less than 50%). Interval ulceration of the atherosclerotic plaque along the lateral wall of the left carotid bifurcation/proximal ICA.   3.  No significant stenosis in the cervical vertebral arteries. Left vertebral artery arises from the aortic arch immediately beyond the origin of the left subclavian artery (developmental variant).   4.  Stable 2.5 mm bilateral P-comm origin infundibuli.      Other:   Redemonstrated thyromegaly extending to the superior  mediastinum on the left. The right lobe extends superiorly in the retropharyngeal space to the level of C3 (likely anatomic variant). Recommend correlation with thyroid function tests and thyroid    ultrasound.                                          Workstation performed: [id number]         XR chest 2 views   ED Interpretation by Aarti Davenport DO (05/21 1201)   NAD      Final Interpretation by Adrien Butler MD (05/21 1224)      No acute cardiopulmonary disease.            Workstation performed: [id number]         MRI brain wo contrast    (Results Pending)     Cardiology:  ECG 12 lead   Final Result by Alex Bonilla MD (05/22 0624)   Normal sinus rhythm   Poor R wave progression   Nonspecific ST and T wave abnormality   Abnormal ECG   When compared with ECG of 21-May-2025 12:08,   No significant change since prior tracing      Confirmed by Alex Bonilla ([id number]) on 5/22/2025 6:57:51 AM      ECG 12 lead   Final Result by Radha Leavitt MD (05/21 0986)   Normal sinus rhythm   Left axis deviation   Septal infarct (cited on or before 07-Nov-2018)   Abnormal ECG   Confirmed by Radha Leavitt ([id number]) on 5/21/2025 3:36:25 PM        GI:  No orders to display           Results from last 7 days   Lab Units 05/22/25  0456 05/21/25  1602 05/21/25  1202   WBC Thousand/uL 8.24  --  8.41   HEMOGLOBIN g/dL 13.2  --  14.8   HEMATOCRIT % 41.1  --  43.9   PLATELETS Thousands/uL 219 247 235   TOTAL NEUT ABS Thousands/µL 4.78  --  6.15         Results from last 7 days   Lab Units 05/22/25  0456 05/21/25  1202   SODIUM mmol/L 135 131*   POTASSIUM mmol/L 3.0* 3.8   CHLORIDE mmol/L 104 95*   CO2 mmol/L 24 25   ANION GAP mmol/L 7 11   BUN mg/dL 8 9   CREATININE mg/dL 0.53* 0.86   EGFR ml/min/1.73sq m 113 80   CALCIUM mg/dL 8.3* 9.4   MAGNESIUM mg/dL 1.9  --      Results from last 7 days   Lab Units 05/22/25  0456 05/21/25  1202   AST U/L 68* 54*   ALT U/L 19 15   ALK PHOS U/L 103 126*   TOTAL PROTEIN g/dL 6.5 7.8    ALBUMIN g/dL 3.4* 4.0   TOTAL BILIRUBIN mg/dL 0.52 0.69     Results from last 7 days   Lab Units 05/22/25  0910 05/22/25  0728 05/22/25  0659 05/22/25  0455 05/22/25  0300 05/22/25  0044 05/21/25  2253 05/21/25  2059 05/21/25  1757 05/21/25  1543 05/21/25  1453 05/21/25  1124   POC GLUCOSE mg/dl 178* 128 124 142* 145* 163* 333* 275* 155* 326* 441* 555*     Results from last 7 days   Lab Units 05/22/25  0456 05/21/25  1202   GLUCOSE RANDOM mg/dL 137 598*         Results from last 7 days   Lab Units 05/21/25  1602   HEMOGLOBIN A1C % 16.6*   EAG mg/dl 430     Beta- Hydroxybutyrate   Date Value Ref Range Status   05/21/2025 1.41 (H) 0.20 - 0.60 mmol/L Final   03/19/2025 0.22 0.02 - 0.27 mmol/L Final   01/28/2025 0.06 0.02 - 0.27 mmol/L Final          Results from last 7 days   Lab Units 05/21/25  1202   PH NICK  7.408*   PCO2 NICK mm Hg 39.1*   PO2 NICK mm Hg 36.0   HCO3 NICK mmol/L 24.1   BASE EXC NICK mmol/L -0.3   O2 CONTENT NICK ml/dL 15.6   O2 HGB, VENOUS % 70.5             Results from last 7 days   Lab Units 05/21/25  1602 05/21/25  1421 05/21/25  1202   HS TNI 0HR ng/L  --   --  10   HS TNI 2HR ng/L  --  9  --    HSTNI D2 ng/L  --  -1  --    HS TNI 4HR ng/L 10  --   --    HSTNI D4 ng/L 0  --   --            Results from last 7 days   Lab Units 05/21/25  1749   CLARITY UA  Clear   COLOR UA  Light Yellow   SPEC GRAV UA  >=1.050*   PH UA  5.0   GLUCOSE UA mg/dl >=1000 (1%)*   KETONES UA mg/dl 20 (1+)*   BLOOD UA  Negative   PROTEIN UA mg/dl Trace*   NITRITE UA  Negative   BILIRUBIN UA  Negative   UROBILINOGEN UA (BE) mg/dl <2.0   LEUKOCYTES UA  Trace*   WBC UA /hpf None Seen   RBC UA /hpf None Seen   BACTERIA UA /hpf None Seen   EPITHELIAL CELLS WET PREP /hpf Moderate*         Past Medical History[1]  Present on Admission:   Mixed hyperlipidemia   Essential hypertension   Acquired hypothyroidism      Admitting Diagnosis: Numbness [R20.0]  Paresthesias [R20.2]  Hyperglycemia [R73.9]  Age/Sex: 49 y.o. female    Network  Utilization Review Department  ATTENTION: Please call with any questions or concerns to [phone] and carefully listen to the prompts so that you are directed to the right person. All voicemails are confidential.   For Discharge needs, contact Care Management DC Support Team at [phone] opt. 2  Send all requests for admission clinical reviews, approved or denied determinations and any other requests to dedicated fax number below belonging to the campus where the patient is receiving treatment. List of dedicated fax numbers for the Facilities:  FACILITY NAME UR FAX NUMBER   ADMISSION DENIALS (Administrative/Medical Necessity) [phone]   DISCHARGE SUPPORT TEAM (NETWORK) [phone]   PARENT CHILD HEALTH (Maternity/NICU/Pediatrics) [phone]   [facility] [phone]   [facility] [phone]   [facility] [phone]   [facility] [phone]   [facility] [phone]   [facility] [phone]   [facility] [phone]   [facility] [phone]   [facility] [phone]   [facility] [phone]   [facility] [phone]   [facility] [phone]              [1]   Past Medical History:  Diagnosis Date    History of MI (myocardial infarction)     History of TIA (transient ischemic attack)     Hypertension     Hypothyroidism     hypothyroidism    Morbid obesity with BMI of 45.0-49.9, adult (HCC)     TIA (transient ischemic attack)     Tobacco use 05/21/2025

## 2025-05-22 NOTE — PLAN OF CARE
Problem: PAIN - ADULT  Goal: Verbalizes/displays adequate comfort level or baseline comfort level  Description: Interventions:  - Encourage patient to monitor pain and request assistance  - Assess pain using appropriate pain scale  - Administer analgesics as ordered based on type and severity of pain and evaluate response  - Implement non-pharmacological measures as appropriate and evaluate response  - Consider cultural and social influences on pain and pain management  - Notify physician/advanced practitioner if interventions unsuccessful or patient reports new pain  - Educate patient/family on pain management process including their role and importance of  reporting pain   - Provide non-pharmacologic/complimentary pain relief interventions  Outcome: Progressing     Problem: INFECTION - ADULT  Goal: Absence or prevention of progression during hospitalization  Description: INTERVENTIONS:  - Assess and monitor for signs and symptoms of infection  - Monitor lab/diagnostic results  - Monitor all insertion sites, i.e. indwelling lines, tubes, and drains  - Monitor endotracheal if appropriate and nasal secretions for changes in amount and color  - [place] appropriate cooling/warming therapies per order  - Administer medications as ordered  - Instruct and encourage patient and family to use good hand hygiene technique  - Identify and instruct in appropriate isolation precautions for identified infection/condition  Outcome: Progressing  Goal: Absence of fever/infection during neutropenic period  Description: INTERVENTIONS:  - Monitor WBC  - Perform strict hand hygiene  - Limit to healthy visitors only  - No plants, dried, fresh or silk flowers with sanchez in patient room  Outcome: Progressing     Problem: SAFETY ADULT  Goal: Patient will remain free of falls  Description: INTERVENTIONS:  - Educate patient/family on patient safety including physical limitations  - Instruct patient to call for assistance with activity   -  Consider consulting OT/PT to assist with strengthening/mobility based on AM PAC & JH-HLM score  - Consult OT/PT to assist with strengthening/mobility   - Keep Call bell within reach  - Keep bed low and locked with side rails adjusted as appropriate  - Keep care items and personal belongings within reach  - Initiate and maintain comfort rounds  - Make Fall Risk Sign visible to staff  - Offer Toileting every    Hours, in advance of need  - Initiate/Maintain   alarm  - Obtain necessary fall risk management equipment:     - Apply yellow socks and bracelet for high fall risk patients  - Consider moving patient to room near nurses station  Outcome: Progressing  Goal: Maintain or return to baseline ADL function  Description: INTERVENTIONS:  -  Assess patient's ability to carry out ADLs; assess patient's baseline for ADL function and identify physical deficits which impact ability to perform ADLs (bathing, care of mouth/teeth, toileting, grooming, dressing, etc.)  - Assess/evaluate cause of self-care deficits   - Assess range of motion  - Assess patient's mobility; develop plan if impaired  - Assess patient's need for assistive devices and provide as appropriate  - Encourage maximum independence but intervene and supervise when necessary  - Involve family in performance of ADLs  - Assess for home care needs following discharge   - Consider OT consult to assist with ADL evaluation and planning for discharge  - Provide patient education as appropriate  - Monitor functional capacity and physical performance, use of AM PAC & JH-HLM   - Monitor gait, balance and fatigue with ambulation    Outcome: Progressing  Goal: Maintains/Returns to pre admission functional level  Description: INTERVENTIONS:  - Perform AM-PAC 6 Click Basic Mobility/ Daily Activity assessment daily.  - Set and communicate daily mobility goal to care team and patient/family/caregiver.   - Collaborate with rehabilitation services on mobility goals if  consulted  - Perform Range of Motion    times a day.  - Reposition patient every    hours.  - Dangle patient      times a day  - Stand patient    times a day  - Ambulate patient    times a day  - Out of bed to chair    times a day   - Out of bed for meals  times a day  - Out of bed for toileting  - Record patient progress and toleration of activity level   Outcome: Progressing     Problem: DISCHARGE PLANNING  Goal: Discharge to home or other facility with appropriate resources  Description: INTERVENTIONS:  - Identify barriers to discharge w/patient and caregiver  - Arrange for needed discharge resources and transportation as appropriate  - Identify discharge learning needs (meds, wound care, etc.)  - Arrange for interpretive services to assist at discharge as needed  - Refer to Case Management Department for coordinating discharge planning if the patient needs post-hospital services based on physician/advanced practitioner order or complex needs related to functional status, cognitive ability, or social support system  Outcome: Progressing     Problem: Knowledge Deficit  Goal: Patient/family/caregiver demonstrates understanding of disease process, treatment plan, medications, and discharge instructions  Description: Complete learning assessment and assess knowledge base.  Interventions:  - Provide teaching at level of understanding  - Provide teaching via preferred learning methods  Outcome: Progressing

## 2025-05-22 NOTE — PLAN OF CARE
Problem: OCCUPATIONAL THERAPY ADULT  Goal: Performs self-care activities at highest level of function for planned discharge setting.  See evaluation for individualized goals.  Description: Treatment Interventions: ADL retraining, Functional transfer training, Endurance training, Patient/family training, Equipment evaluation/education, Fine motor coordination activities, Compensatory technique education, Continued evaluation, Energy conservation, Activityengagement          See flowsheet documentation for full assessment, interventions and recommendations.   Note: Limitation: Decreased ADL status, Decreased UE strength, Decreased endurance, Decreased self-care trans, Decreased high-level ADLs  Prognosis: Good  Assessment: Patient is a 49 y.o. female seen for OT evaluation s/p admit to [facility] on 5/21/2025 w/Stroke-like symptoms. Commorbidities affecting patient's functional performance at time of assessment include: essential hypertension, acquired hypothyroidism, morbid obesity, mixed hyperlipidemia, type 2 DM, and tobacco use   Orders placed for OT evaluation and treatment.  Performed at least two patient identifiers during session including name and wristband.  Prior to admission, Patient reported she lives with family in a 2 story house, 6 [address]; flight of stairs to 2nd floor bedroom and bathroom acces. patien twas independent with ADLs/ IADLs and ambulatory without an AD.  Personal factors affecting patient at time of initial evaluation include: steps to enter, difficulty performing ADLs, and difficulty performing IADLs. Upon evaluation, patient requires supervision and set up assist for UB ADLs, minimal  assist for LB ADLs.  Occupational performance is affected by the following deficits: decreased functional use of BUEs, decreased muscle strength, dynamic sit/ stand balance deficit with poor standing tolerance time for self care and functional mobility, decreased activity tolerance, decreased  safety awareness, increased pain, and postural control and postural alignment deficit, requiring external assistance to complete transitional movements.  Patient to benefit from continued Occupational Therapy treatment while in the hospital to address deficits as defined above and maximize level of functional independence with ADLs and functional mobility. Occupational Performance areas to address include: bathing/ shower, dressing, toilet hygiene, transfer to all surfaces, functional mobility, health maintenance, IADLs: safety procedures, IADLs: meal prep/ clean up, and Leisure Participation. From OT standpoint, recommendation at time of d/c would be Level 3.     Rehab Resource Intensity Level, OT: III (Minimum Resource Intensity)

## 2025-05-22 NOTE — PLAN OF CARE
Problem: PAIN - ADULT  Goal: Verbalizes/displays adequate comfort level or baseline comfort level  Description: Interventions:  - Encourage patient to monitor pain and request assistance  - Assess pain using appropriate pain scale  - Administer analgesics as ordered based on type and severity of pain and evaluate response  - Implement non-pharmacological measures as appropriate and evaluate response  - Consider cultural and social influences on pain and pain management  - Notify physician/advanced practitioner if interventions unsuccessful or patient reports new pain  - Educate patient/family on pain management process including their role and importance of  reporting pain   - Provide non-pharmacologic/complimentary pain relief interventions  Outcome: Progressing     Problem: INFECTION - ADULT  Goal: Absence or prevention of progression during hospitalization  Description: INTERVENTIONS:  - Assess and monitor for signs and symptoms of infection  - Monitor lab/diagnostic results  - Monitor all insertion sites, i.e. indwelling lines, tubes, and drains  - Monitor endotracheal if appropriate and nasal secretions for changes in amount and color  - [place] appropriate cooling/warming therapies per order  - Administer medications as ordered  - Instruct and encourage patient and family to use good hand hygiene technique  - Identify and instruct in appropriate isolation precautions for identified infection/condition  Outcome: Progressing  Goal: Absence of fever/infection during neutropenic period  Description: INTERVENTIONS:  - Monitor WBC  - Perform strict hand hygiene  - Limit to healthy visitors only  - No plants, dried, fresh or silk flowers with sanchez in patient room  Outcome: Progressing     Problem: SAFETY ADULT  Goal: Patient will remain free of falls  Description: INTERVENTIONS:  - Educate patient/family on patient safety including physical limitations  - Instruct patient to call for assistance with activity   -  Consider consulting OT/PT to assist with strengthening/mobility based on AM PAC & JH-HLM score  - Consult OT/PT to assist with strengthening/mobility   - Keep Call bell within reach  - Keep bed low and locked with side rails adjusted as appropriate  - Keep care items and personal belongings within reach  - Initiate and maintain comfort rounds  - Make Fall Risk Sign visible to staff  - Offer Toileting every  Hours, in advance of need  - Initiate/Maintain alarm  - Obtain necessary fall risk management equipment:   - Apply yellow socks and bracelet for high fall risk patients  - Consider moving patient to room near nurses station  Outcome: Progressing  Goal: Maintain or return to baseline ADL function  Description: INTERVENTIONS:  -  Assess patient's ability to carry out ADLs; assess patient's baseline for ADL function and identify physical deficits which impact ability to perform ADLs (bathing, care of mouth/teeth, toileting, grooming, dressing, etc.)  - Assess/evaluate cause of self-care deficits   - Assess range of motion  - Assess patient's mobility; develop plan if impaired  - Assess patient's need for assistive devices and provide as appropriate  - Encourage maximum independence but intervene and supervise when necessary  - Involve family in performance of ADLs  - Assess for home care needs following discharge   - Consider OT consult to assist with ADL evaluation and planning for discharge  - Provide patient education as appropriate  - Monitor functional capacity and physical performance, use of AM PAC & JH-HLM   - Monitor gait, balance and fatigue with ambulation    Outcome: Progressing  Goal: Maintains/Returns to pre admission functional level  Description: INTERVENTIONS:  - Perform AM-PAC 6 Click Basic Mobility/ Daily Activity assessment daily.  - Set and communicate daily mobility goal to care team and patient/family/caregiver.   - Collaborate with rehabilitation services on mobility goals if consulted  -  Perform Range of Motion  times a day.  - Reposition patient every  hours.  - Dangle patient  times a day  - Stand patient times a day  - Ambulate patient  times a day  - Out of bed to chair  times a day   - Out of bed for meals  times a day  - Out of bed for toileting  - Record patient progress and toleration of activity level   Outcome: Progressing     Problem: DISCHARGE PLANNING  Goal: Discharge to home or other facility with appropriate resources  Description: INTERVENTIONS:  - Identify barriers to discharge w/patient and caregiver  - Arrange for needed discharge resources and transportation as appropriate  - Identify discharge learning needs (meds, wound care, etc.)  - Arrange for interpretive services to assist at discharge as needed  - Refer to Case Management Department for coordinating discharge planning if the patient needs post-hospital services based on physician/advanced practitioner order or complex needs related to functional status, cognitive ability, or social support system  Outcome: Progressing     Problem: Knowledge Deficit  Goal: Patient/family/caregiver demonstrates understanding of disease process, treatment plan, medications, and discharge instructions  Description: Complete learning assessment and assess knowledge base.  Interventions:  - Provide teaching at level of understanding  - Provide teaching via preferred learning methods  Outcome: Progressing

## 2025-05-22 NOTE — CONSULTS
Consultation - Neurology   Name: Sadaf Chaves 49 y.o. female I MRN: [id number]  Unit/Bed#: 2 E 275-01 I Date of Admission: 5/21/2025   Date of Service: 5/22/2025 I Hospital Day: 0   Inpatient consult to Neurology  Consult performed by: Mojgan Simmons PA-C  Consult ordered by: Bennett Zendejas DO        Physician Requesting Evaluation: Bennett Zendejas DO   Reason for Evaluation / Principal Problem: right-sided weakness, right hand numbness    Assessment & Plan  Stroke-like symptoms  49-year-old female with diabetes, dyslipidemia, hypertension, hypothyroidism, migraines, history of TIA, history of MI, tobacco use, morbid obesity, who presents with right-sided weakness x 1 week and right hand numbness x 1 day with associated headache.    Workup:  -CT head on presentation demonstrated no acute changes.  -CTA head/neck demonstrated less than 50% bilateral ICA stenosis, plaque with interval ulceration of the atherosclerotic plaque along the lateral wall of the left carotid bifurcation/proximal ICA.  Stable 2.5 mm bilateral P-comm origin infundibuli also noted.  -Blood pressure on presentation 192/120, which quickly dropped to 140/91 spontaneously.  -Hemoglobin A1c notably high at 16.6.  TSH also notably high at 138.764 on 5/12/2025.  LDL 85.  -Patient is on aspirin 81 mg daily at baseline.    Right hand numbness is confined to a median nerve distribution and likely represents carpal tunnel syndrome.  Patient is less concerned about the right sided weakness which has been present for 1 week, but this may have been the result of a small stroke.    Plan:  -Continue stroke pathway.  -MRI brain without contrast.  -2D echocardiogram.  -Continue 81 mg aspirin daily.  -Continue atorvastatin 40 mg.  -Telemetry.  -PT/OT/speech therapy.  -Goal normotension.  -Frequent neuro checks.  -Continue to monitor and notify with changes.  -Smoking cessation counseling.  -Patient will need follow-up with neuromuscular specialist  "for her right hand numbness and consideration of possible EMG/NCS if MRI unrevealing as to potential contribution.  Would conservatively treat with neutral position wrist splint HS in the meantime.    Recommendations for outpatient neurological follow up have yet to be determined.    History of Present Illness   Sadaf Chaves is a 49 y.o. right handed female with diabetes, dyslipidemia, hypertension, hypothyroidism, migraines, history of TIA, history of MI, tobacco use, morbid obesity, who presents with right-sided weakness x 1 week and right hand numbness x 1 day with associated headache.    Patient states she came to the hospital as she was most concerned about the numbness in the first 3 fingers and half of the fourth finger of the right hand, which she noticed immediately upon awakening the morning of 5/21/2025.  She also had a constant pressure-type right sided headache with photophobia which was unlike her typical migraines (typically she would have a throbbing headache with more significant photophobia).  She also has ill-defined vision complaint with far vision bilaterally.  Upon questioning, she also admits to weakness of the right upper and right lower extremities, which she believes has been present for the past week or so, but this has not been as bothersome to her as the hand numbness.    She states she has been compliant with her medications \"for the past couple of months\", as her daughter has been helping her fill her pillbox weekly.  She states she is on aspirin 81 mg daily and also takes her levothyroxine and her insulin, as well as several other medications.  She is not sure if she is still taking the topiramate which was used for migraine prophylaxis.    CT head on presentation demonstrated no acute changes.  CTA head/neck demonstrated less than 50% bilateral ICA stenosis, plaque with interval ulceration of the atherosclerotic plaque along the lateral wall of the left carotid bifurcation/proximal " ICA.  Stable 2.5 mm bilateral P-comm origin infundibuli also noted.  Blood pressure on presentation 192/120, which quickly dropped to 140/91 spontaneously.  Hemoglobin A1c notably high at 16.6.  TSH also notably high at 138.764 on 5/12/2025.    On exam, patient has perhaps very subtle weakness of the right upper extremity and diminished sensation in the first 3 fingers and half of the fourth finger of the right hand, as well as a positive Phalen's test but negative Tinel's test.    Review of Systems   Constitutional: Negative.    HENT: Negative.     Eyes: Negative.    Respiratory: Negative.     Cardiovascular: Negative.    Gastrointestinal: Negative.    Endocrine: Negative.    Genitourinary: Negative.    Musculoskeletal: Negative.    Skin:  Negative for rash.   Allergic/Immunologic: Negative.    Neurological:  Positive for weakness, numbness and headaches.        As above.   Hematological: Negative.    Psychiatric/Behavioral:  Positive for dysphoric mood.       Objective :  Temp:  [97.3 °F (36.3 °C)-98.3 °F (36.8 °C)] 98.2 °F (36.8 °C)  HR:  [66-87] 84  BP: (117-173)/(57-98) 173/92  Resp:  [16-20] 18  SpO2:  [93 %-100 %] 98 %  O2 Device: None (Room air)    Physical Exam  General:  Patient is well-developed, morbidly obese BMI 42.06, and in no acute distress.  HEENT:  Head normocephalic.  Eyes anicteric.    Cardiovascular:  With regular rhythm.  Lungs:  Normal effort. Nonlabored breathing.  Extremities:  With no significant edema.    Skin: No rashes.    Neurological Exam  Neurologic:  Patient is alert, pleasantly interactive, and appropriately conversational.  No obvious symbolic language difficulty or dysarthria, and the patient is fully oriented.      Gait deferred for safety.    Cranial Nerves:   II: Visual fields full to confrontation. Cannot visualize optic fundi.  III,IV,VI: extraocular movements intact with no nystagmus.   V: Sensation in the V1 through V3 distributions intact to light touch bilaterally.    VII: Face is symmetric with no weakness noted.   XII: Tongue midline with no atrophy or fasciculations with appropriate movement.     Coordination:  Accurate with finger-to-nose maneuvers bilaterally.    Motor testing with 5-/5 right upper extremity strength, but strength otherwise full.    Sensory testing with diminished light touch appreciation throughout the right upper extremity but most pronounced in the median nerve distribution on the right.    Phalen's test positive on the right but Tinel's test negative.    Reflexes 2 bilateral upper extremities, absent bilateral knees and ankles.    Toe responses are downgoing bilaterally.      Lab Results: I have reviewed the following results:CBC:   Results from last 7 days   Lab Units 05/22/25  0456 05/21/25  1602 05/21/25  1202   WBC Thousand/uL 8.24  --  8.41   RBC Million/uL 4.65  --  5.05   HEMOGLOBIN g/dL 13.2  --  14.8   HEMATOCRIT % 41.1  --  43.9   MCV fL 88  --  87   PLATELETS Thousands/uL 219 247 235   , BMP/CMP:   Results from last 7 days   Lab Units 05/22/25  0456 05/21/25  1202   SODIUM mmol/L 135 131*   POTASSIUM mmol/L 3.0* 3.8   CHLORIDE mmol/L 104 95*   CO2 mmol/L 24 25   BUN mg/dL 8 9   CREATININE mg/dL 0.53* 0.86   CALCIUM mg/dL 8.3* 9.4   AST U/L 68* 54*   ALT U/L 19 15   ALK PHOS U/L 103 126*   EGFR ml/min/1.73sq m 113 80   , HgBA1C:   Results from last 7 days   Lab Units 05/21/25  1602   HEMOGLOBIN A1C % 16.6*   , TSH:   , Lipid Profile:   Results from last 7 days   Lab Units 05/22/25  0456   HDL mg/dL 27*   LDL CALC mg/dL 85   TRIGLYCERIDES mg/dL 264*   , Urinalysis:   Results from last 7 days   Lab Units 05/21/25  1749   COLOR UA  Light Yellow   CLARITY UA  Clear   SPEC GRAV UA  >=1.050*   PH UA  5.0   LEUKOCYTES UA  Trace*   NITRITE UA  Negative   GLUCOSE UA mg/dl >=1000 (1%)*   KETONES UA mg/dl 20 (1+)*   BILIRUBIN UA  Negative   BLOOD UA  Negative     Recent Labs     05/22/25  0456   WBC 8.24   HGB 13.2   HCT 41.1      SODIUM 135   K  3.0*      CO2 24   BUN 8   CREATININE 0.53*   GLUC 137   MG 1.9     Imaging Results Review: I personally reviewed the following image studies in PACS and associated radiology reports: CT head and CT A h/n. My interpretation of the radiology images/reports is: consistent with [facility].  Other Study Results Review: No additional pertinent studies reviewed.    VTE Prophylaxis: VTE covered by:  enoxaparin, Subcutaneous, 40 mg at 05/22/25 0915

## 2025-05-22 NOTE — OCCUPATIONAL THERAPY NOTE
Occupational Therapy Evaluation        Patient Name: Sadaf Chaves  Today's Date: 5/22/2025 05/22/25 0853   OT Last Visit   OT Visit Date 05/22/25   Note Type   Note type Evaluation   Pain Assessment   Pain Assessment Tool 0-10   Pain Score 6   Pain Location/Orientation Orientation: Right;Location: Arm   Pain Onset/Description Onset: Ongoing   Hospital Pain Intervention(s) Repositioned;Ambulation/increased activity   Multiple Pain Sites Yes   Pain 2   Pain Score 2 5   Pain Location/Orientation 2 Orientation: Right;Location: Leg   Pain Onset/Description 2 Onset: Ongoing   Hospital Pain Intervention(s) 2 Repositioned;Ambulation/increased activity   Restrictions/Precautions   Weight Bearing Precautions Per Order No   Other Precautions Chair Alarm;Bed Alarm;Telemetry;Multiple lines;Fall Risk;Pain   Home Living   Type of Home House   Home Layout Stairs to enter with rails;Two level;Bed/bath upstairs  (6 [address]; flight of stairs to 2nd floor bedroom and bathroom acces)   Bathroom Shower/Tub Tub/shower unit   Bathroom Toilet Standard   Bathroom Equipment Other (Comment)  (none reported)   Bathroom Accessibility Accessible   Home Equipment Other (Comment)  (none per patient)   Additional Comments Pt ambulates without an AD.   Prior Function   Level of [place] Independent with functional mobility;Independent with ADLs;Independent with IADLS   Lives With Family   Receives Help From Family   IADLs Independent with driving;Independent with meal prep;Independent with medication management   Falls in the last 6 months 0   Lifestyle   Autonomy Patient reported she lives with family in a 2 story house, 6 [address]; flight of stairs to 2nd floor bedroom and bathroom acces. patien twas independent  with ADLs/ IADLs and ambulatory without an AD.   Reciprocal Relationships Supportive Family   General   Family/Caregiver Present No   ADL   Where Assessed Edge of bed   Eating Assistance 7  Independent   Grooming Assistance 5   Supervision/Setup   UB Bathing Assistance 4  Minimal Assistance   LB Bathing Assistance 3  Moderate Assistance   UB Dressing Assistance 4  Minimal Assistance   LB Dressing Assistance 3  Moderate Assistance   Toileting Assistance  4  Minimal Assistance   Functional Assistance 4  Minimal Assistance   Bed Mobility   Supine to Sit 5  Supervision   Additional items Assist x 1;HOB elevated;Bedrails;Increased time required;Verbal cues   Additional Comments Pt reported dizziness upon sitting at EOB; but reported a resolve with a seated rest break.   Transfers   Sit to Stand   (contact guard)   Additional items Assist x 1;Increased time required;Verbal cues   Stand to Sit   (contact guard)   Additional items Assist x 1;Armrests;Increased time required;Verbal cues   Functional Mobility   Functional Mobility   (contact guard assist)   Additional Comments assist of 1 with hand hold assist or device   Balance   Static Sitting Good   Dynamic Sitting Fair +   Static Standing Fair -   Dynamic Standing Poor +   Ambulatory Poor +   Activity Tolerance   Activity Tolerance Patient tolerated treatment well;Patient limited by fatigue   Medical Staff Made Aware Co-evaluation performed with PT secondary to complex medical condition of patient and regression of functional status from baseline.   RUE Assessment   RUE Assessment X   RUE Strength   RUE Overall Strength Deficits  (3/5)   LUE Assessment   LUE Assessment WFL   Hand Function   Gross Motor Coordination Functional   Fine Motor Coordination Functional   Sensation   Light Touch Partial deficits in the RUE;Partial deficits in the RLE   Vision-Basic Assessment   Current Vision Does not wear glasses   Patient Visual Report Blurring of print when reading   Vision - Complex Assessment   Visual Fields Difficulty detecting stimulus with OD LUQ;Difficulty detecting stimulus with OD LLQ   Acuity   (bluring of vision)   Visual Screen Results Visual processing deficits   Psychosocial    Psychosocial (WDL) WDL   Cognition   Overall Cognitive Status WFL   Arousal/Participation Alert;Responsive;Cooperative   Attention Within functional limits   Orientation Level Oriented X4   Memory Within functional limits   Following Commands Follows all commands and directions without difficulty   Assessment   Limitation Decreased ADL status;Decreased UE strength;Decreased endurance;Decreased self-care trans;Decreased high-level ADLs   Prognosis Good   Assessment Patient is a 49 y.o. female seen for OT evaluation s/p admit to [facility] on 5/21/2025 w/Stroke-like symptoms. Commorbidities affecting patient's functional performance at time of assessment include: essential hypertension, acquired hypothyroidism, morbid obesity, mixed hyperlipidemia, type 2 DM, and tobacco use   Orders placed for OT evaluation and treatment.  Performed at least two patient identifiers during session including name and wristband.  Prior to admission, Patient reported she lives with family in a 2 story house, 6 [address]; flight of stairs to 2nd floor bedroom and bathroom acces. patien twas independent with ADLs/ IADLs and ambulatory without an AD.  Personal factors affecting patient at time of initial evaluation include: steps to enter, difficulty performing ADLs, and difficulty performing IADLs. Upon evaluation, patient requires supervision and set up assist for UB ADLs, minimal  assist for LB ADLs.  Occupational performance is affected by the following deficits: decreased functional use of BUEs, decreased muscle strength, dynamic sit/ stand balance deficit with poor standing tolerance time for self care and functional mobility, decreased activity tolerance, decreased safety awareness, increased pain, and postural control and postural alignment deficit, requiring external assistance to complete transitional movements.  Patient to benefit from continued Occupational Therapy treatment while in the hospital to address deficits as  defined above and maximize level of functional independence with ADLs and functional mobility. Occupational Performance areas to address include: bathing/ shower, dressing, toilet hygiene, transfer to all surfaces, functional mobility, health maintenance, IADLs: safety procedures, IADLs: meal prep/ clean up, and Leisure Participation. From OT standpoint, recommendation at time of d/c would be Level 3.   Plan   Treatment Interventions ADL retraining;Functional transfer training;Endurance training;Patient/family training;Equipment evaluation/education;Fine motor coordination activities;Compensatory technique education;Continued evaluation;Energy conservation;Activityengagement   Goal Expiration Date 06/05/25   OT Frequency 3-5x/wk   Discharge Recommendation   Rehab Resource Intensity Level, OT III (Minimum Resource Intensity)   AM-PAC Daily Activity Inpatient   Lower Body Dressing 3   Bathing 3   Toileting 3   Upper Body Dressing 3   Grooming 3   Eating 4   Daily Activity Raw Score 19   Daily Activity Standardized Score (Calc for Raw Score >=11) 40.22   AM-PAC Applied Cognition Inpatient   Following a Speech/Presentation 4   Understanding Ordinary Conversation 4   Taking Medications 4   Remembering Where Things Are Placed or Put Away 4   Remembering List of 4-5 Errands 4   Taking Care of Complicated Tasks 4   Applied Cognition Raw Score 24   Applied Cognition Standardized Score 62.21   Barthel Index   Feeding 5   Bathing 0   Grooming Score 0   Dressing Score 5   Bladder Score 10   Bowels Score 10   Toilet Use Score 5   Transfers (Bed/Chair) Score 10   Mobility (Level Surface) Score 0   Stairs Score 0   Barthel Index Score 45       Patient will engage in ongoing visual perceptual assessments, screens and activities to r/o visual perceptual impairments affecting functional performance.     Patient will increase standing tolerance time to 5 minutes with  Unilateral UE support to complete sink level ADLs @  Mod I   level    Patient will verbalize 3  safety awareness/ body mechanics principles to  prevent falls in the home setting.     Patient will complete UB/LB ADLs @ Mod I level.      Patient will complete transfers to all surfaces @ Mod I level with AD as indicated.

## 2025-05-22 NOTE — ASSESSMENT & PLAN NOTE
Blood Pressure: 152/82    Continue home meds  Monitor blood pressure  PRN IV Labetalol and Hydralazine for SBP > 160

## 2025-05-22 NOTE — ASSESSMENT & PLAN NOTE
49-year-old female with diabetes, dyslipidemia, hypertension, hypothyroidism, migraines, history of TIA, history of MI, tobacco use, morbid obesity, who presents with right-sided weakness x 1 week and right hand numbness x 1 day with associated headache.    Workup:  -CT head on presentation demonstrated no acute changes.  -CTA head/neck demonstrated less than 50% bilateral ICA stenosis, plaque with interval ulceration of the atherosclerotic plaque along the lateral wall of the left carotid bifurcation/proximal ICA.  Stable 2.5 mm bilateral P-comm origin infundibuli also noted.  -Blood pressure on presentation 192/120, which quickly dropped to 140/91 spontaneously.  -Hemoglobin A1c notably high at 16.6.  TSH also notably high at 138.764 on 5/12/2025.  LDL 85.  -Patient is on aspirin 81 mg daily at baseline.    Right hand numbness is confined to a median nerve distribution and likely represents carpal tunnel syndrome.  Patient is less concerned about the right sided weakness which has been present for 1 week, but this may have been the result of a small stroke.    Plan:  -Continue stroke pathway.  -MRI brain without contrast.  -2D echocardiogram.  -Continue 81 mg aspirin daily.  -Continue atorvastatin 40 mg.  -Telemetry.  -PT/OT/speech therapy.  -Goal normotension.  -Frequent neuro checks.  -Continue to monitor and notify with changes.  -Smoking cessation counseling.  -Patient will need follow-up with neuromuscular specialist for her right hand numbness and consideration of possible EMG/NCS if MRI unrevealing as to potential contribution.  Would conservatively treat with neutral position wrist splint HS in the meantime.

## 2025-05-22 NOTE — ASSESSMENT & PLAN NOTE
Lab Results   Component Value Date    HGBA1C 16.6 (H) 05/21/2025     Recent Labs     05/22/25  0455 05/22/25  0456 05/22/25  0659 05/22/25  0728   POCGLU 142*  --  124 128   GLUC  --  137  --   --      Hold home regimen while inpatient and resume on discharge  Reportedly was on 20 u lantus HS and no short acting insulin  Diabetic diet  Insulin regimen  Non DKA insulin drip until able to transition to SQ dosing  Glucose checks q2h while on insulin drip  Goal -180 while admitted, adjusting insulin regimen as appropriate  Monitor for hypoglycemia and treat per protocol

## 2025-05-22 NOTE — PHYSICAL THERAPY NOTE
Physical Therapy Evaluation     Patient's Name: Sadaf Chaves    Admitting Diagnosis  Numbness [R20.0]  Paresthesias [R20.2]  Hyperglycemia [R73.9]    Problem List  Patient Active Problem List  Diagnosis    Chest pain    Hypertensive urgency    Headache    Dependence on nicotine from cigarettes    Obesity    Thyroid disease    Essential hypertension    Breast abscess    Acquired hypothyroidism    Morbid obesity with BMI of 45.0-49.9, adult (HCC)    Syncope    Acute hip pain, left    Mixed hyperlipidemia    SOB (shortness of breath)    Hypokalemia    Leukocytosis    SIRS (systemic inflammatory response syndrome) (HCC)    Abnormal CT scan    Migraine headache    Hyperglycemia    Acute pain of right knee    Type 2 diabetes mellitus with hyperglycemia, with long-term current use of insulin (HCC)    Stroke-like symptoms    Tobacco use     Past Medical History  Past Medical History:  Diagnosis Date    History of MI (myocardial infarction)     History of TIA (transient ischemic attack)     Hypertension     Hypothyroidism     hypothyroidism    Morbid obesity with BMI of 45.0-49.9, adult (HCC)     TIA (transient ischemic attack)     Tobacco use 05/21/2025     Past Surgical History  Past Surgical History:  Procedure Laterality Date    BREAST SURGERY Left     TUBAL LIGATION        05/22/25 0830   PT Last Visit   PT Visit Date 05/22/25   Note Type   Note type Evaluation   Pain Assessment   Pain Assessment Tool 0-10   Pain Score 6   Pain Location/Orientation Orientation: Right;Location: Arm   Pain Onset/Description Onset: Ongoing   Hospital Pain Intervention(s) Repositioned;Ambulation/increased activity   Multiple Pain Sites Yes   Pain 2   Pain Score 2 5   Pain Location/Orientation 2 Orientation: Right;Location: Leg   Pain Onset/Description 2 Onset: Ongoing   Hospital Pain Intervention(s) 2 Repositioned;Ambulation/increased activity   Restrictions/Precautions   Weight Bearing Precautions Per Order No   Other Precautions Chair  "Alarm;Bed Alarm;Telemetry;Multiple lines;Fall Risk;Pain   Home Living   Type of Home House   Home Layout Stairs to enter with rails;Two level;Bed/bath upstairs  (6 [address]; flight of stairs to 2nd floor bedroom and bathroom acces)   Bathroom Shower/Tub Tub/shower unit   Bathroom Toilet Standard   Bathroom Equipment Other (Comment)  (none per patient)   Bathroom Accessibility Accessible   Home Equipment Other (Comment)  (none per patient)   Additional Comments Pt ambulates without an AD.   Prior Function   Level of [place] Independent with functional mobility;Independent with ADLs;Independent with IADLS   Lives With Family   Receives Help From Family   IADLs Independent with driving;Independent with meal prep;Independent with medication management   Falls in the last 6 months 0   General   Family/Caregiver Present No   Cognition   Overall Cognitive Status WFL   Arousal/Participation Alert   Orientation Level Oriented X4   Memory Within functional limits   Following Commands Follows all commands and directions without difficulty   Comments Pt agreeable to PT.   Subjective   Subjective \"I can walk, but then need to sit.\"   RLE Assessment   RLE Assessment X   Strength RLE   R Hip Flexion 4-/5   R Knee Extension 4-/5   R Ankle Dorsiflexion 4-/5   LLE Assessment   LLE Assessment X   Strength LLE   L Hip Flexion 4/5   L Knee Extension 4/5   L Ankle Dorsiflexion 4/5   Vision-Basic Assessment   Patient Visual Report Blurring of print when reading   Light Touch   RLE Light Touch Impaired   RLE Light Touch Comments diminished compared to L LE   LLE Light Touch Grossly intact   Bed Mobility   Supine to Sit 5  Supervision   Additional items Assist x 1;HOB elevated;Bedrails;Increased time required;Verbal cues   Additional Comments Pt reported dizziness upon sitting at EOB; but reported a resolve with a seated rest break.   Transfers   Sit to Stand   (CG assist)   Additional items Assist x 1;Increased time required;Verbal cues "   Stand to Sit   (CG assist)   Additional items Assist x 1;Armrests;Increased time required;Verbal cues   Ambulation/Elevation   Gait pattern Decreased toe off;Decreased heel strike;Decreased hip extension;Short stride;Decreased R stance   Gait Assistance 4  Minimal assist   Additional items Assist x 1;Verbal cues   Assistive Device None   Distance 100 feet   Balance   Static Sitting Good   Dynamic Sitting Fair +   Static Standing Fair -   Dynamic Standing Poor +   Ambulatory Poor +   Endurance Deficit   Endurance Deficit Yes   Endurance Deficit Description decreased activity tolerance   Activity Tolerance   Activity Tolerance Patient tolerated treatment well;Patient limited by fatigue   Medical Staff Made Aware OT Lisette  (Co-evaluation performed with OT secondary to complex medical condition of patient and regression of functional status from baseline. PT/OT goals were addressed separately.)   Assessment   Prognosis Good   Problem List Decreased strength;Impaired balance;Decreased endurance;Decreased mobility;Impaired sensation;Pain   Assessment Pt is 49 year old female seen for PT evaluation s/p admit to [facility] on 5/21/2025 with Stroke-like symptoms. PT consulted to assess pt's functional mobility and discharge needs. Order placed for PT evaluation and treatment, with activity as tolerated order. Comorbidities affecting pt's physical performance at time of assessment include essential hypertension, acquired hypothyroidism, morbid obesity, mixed hyperlipidemia, type 2 DM, and tobacco use. Prior to hospitalization, pt was independent with all functional mobility without an AD. Pt ambulates unrestricted distances on all terrain and elevations. Pt resides with her family, in a two level house with six steps to enter and a flight of stairs to the 2nd floor. Personal factors affecting pt at time of initial evaluation include lives in a two story house, stairs to enter home and reach 2nd floor, difficulty  ambulating household distances, difficulty ambulating community distances, difficulty navigating level surfaces without external assistance, difficulty performing dynamic tasks in the community, difficulty performing ADLs, and inability to perform IADLs. Please find objective findings from PT assessment regarding body systems outlined above with impairments and limitations including weakness, impaired balance, decreased endurance, gait deviations, decreased activity tolerance, decreased functional mobility tolerance, altered sensation, and fall risk. The following objective measures were performed on initial evaluation Barthel Index: 45/100, Modified [place]: 4 (moderate/severe disability), and AM-PAC 6-Clicks: 18/24. Pt's clinical presentation is currently evolving seen in pt's presentation of need for ongoing medical management/monitoring, pt is a fall risk, and pt requires cues and assist for safety with functional mobility. Pt to benefit from continued PT treatment to address deficits as defined above and maximize pt's level of function and independence with mobility. From a PT standpoint, recommendation at time of discharge would be level 3, minimal resource intensity in order to facilitate return to prior level of function.   Barriers to Discharge Inaccessible home environment;Other (Comment)  (decline in functional mobility)   Goals   STG Expiration Date 06/01/25   Short Term Goal #1 In 10 days: Increase bilateral LE strength 1/2 grade to facilitate independent mobility, Perform all bed mobility tasks modified independent to decrease caregiver burden, Perform all transfers modified independent to improve independence, Ambulate > 250 ft. with least restrictive assistive device modified independent w/o LOB and w/ normalized gait pattern 100% of the time, Navigate 12 stair(s) modified independent with unilateral handrail to facilitate return to previous living environment, and Increase all balance 1/2 grade to  decrease risk for falls   Plan   Treatment/Interventions Functional transfer training;LE strengthening/ROM;Elevations;Therapeutic exercise;Endurance training;Patient/family training;Bed mobility;Gait training;OT   PT Frequency 2-3x/wk   Discharge Recommendation   Rehab Resource Intensity Level, PT III (Minimum Resource Intensity)   AM-PAC Basic Mobility Inpatient   Turning in Flat Bed Without Bedrails 3   Lying on Back to Sitting on Edge of Flat Bed Without Bedrails 3   Moving Bed to Chair 3   Standing Up From Chair Using Arms 3   Walk in Room 3   Climb 3-5 Stairs With Railing 3   Basic Mobility Inpatient Raw Score 18   Basic Mobility Standardized Score 41.05   [facility] Highest Level Of Mobility   -HLM Goal 6: Walk 10 steps or more   -HLM Achieved 7: Walk 25 feet or more   Modified [place] Scale   Modified [place] Scale 4   Barthel Index   Feeding 5   Bathing 0   Grooming Score 0   Dressing Score 5   Bladder Score 10   Bowels Score 10   Toilet Use Score 5   Transfers (Bed/Chair) Score 10   Mobility (Level Surface) Score 0   Stairs Score 0   Barthel Index Score 45     PT Evaluation Time: 0163-2662    Parisa Recio, PT, DPT

## 2025-05-22 NOTE — ASSESSMENT & PLAN NOTE
Lab Results   Component Value Date    [id number] 138.764 (H) 05/12/2025    FREET4 0.46 (L) 05/12/2025     Continue home meds

## 2025-05-22 NOTE — ASSESSMENT & PLAN NOTE
Episode of right-sided facial, upper and lower extremity numbness along with weakness starting at approximately 8 to 9 AM on day of admission.  Neuro History: Prior TIA    Blood Pressure: 152/82  EKG NSR  CT head negative for acute abnormality at this time  CTA showing atherosclerotic change in bilateral cervical carotid arteries (less than 50%)  Given history of TIA and sudden onset numbness and tingling with presence of poorly controlled diabetes, high suspicion for TIA/stroke  Stroke pathway  MRI brain, TTE, monitor on tele  Lipid Panel, HbA1c  Atorvastatin 40 mg q.d., Aspirin 81 q.d.  Neurology consult- low suspicion for TIA/stroke  PT/OT/Speech eval  Allow for permissive hypertension with -210 for 48 hours  Neuro checks per protocol

## 2025-05-22 NOTE — CASE MANAGEMENT
Case Management Assessment & Discharge Planning Note    Patient name Sadaf Chaves  Location [address] [place] [address]/2 [address] 275[address]01 MRN [id number]  : 1975 Date 2025       Current Admission Date: 2025  Current Admission Diagnosis:Stroke-like symptoms   Patient Active Problem List    Diagnosis Date Noted    Type 2 diabetes mellitus with hyperglycemia, with long-term current use of insulin (HCC) 2025    Stroke-like symptoms 2025    Tobacco use 2025    Acute pain of right knee 2025    Hyperglycemia 2023    Abnormal CT scan 2022    Migraine headache 2022    SIRS (systemic inflammatory response syndrome) (HCC) 07/15/2021    SOB (shortness of breath) 2020    Hypokalemia 2020    Leukocytosis 2020    Acute hip pain, left 2020    Mixed hyperlipidemia 2020    Syncope 2020    Acquired hypothyroidism     Morbid obesity with BMI of 45.0-49.9, adult ([facility])     Breast abscess 2019    Essential hypertension     Dependence on nicotine from cigarettes 2018    Obesity 2018    Thyroid disease 2018    Chest pain 2016    Hypertensive urgency 2016    Headache 2016      LOS (days): 0  Geometric Mean LOS (GMLOS) (days):   Days to GMLOS:     OBJECTIVE:              Current admission status: Observation       Preferred Pharmacy:   [id number] - [place] - [address]  [address]  [facility] [place] [address]  Phone: [phone] Fax: [phone]    Primary Care Provider: Marko العراقي MD    Primary Insurance: [employer] MIRANDA  Secondary Insurance:     ASSESSMENT:  Active Health Care Proxies       Li Chidi Health Care Representative - Spouse   Primary Phone: [phone] (Mobile)                 Advance Directives  Does patient have a Health Care POA?: No  Was patient offered paperwork?: Yes (accepted to review and complete)  Does patient currently have a Health Care decision maker?: Yes, please see  Health Care Proxy section  Does patient have Advance Directives?: No  Was patient offered paperwork?: Yes (accepted)  Primary Contact: Chidi Jefferson  Spouse  [phone]  Health Care Representative  Notify on admission         Readmission Root Cause  30 Day Readmission: No    Patient Information  Admitted from:: Home  Mental Status: Alert  During Assessment patient was accompanied by: Daughter  Assessment information provided by:: Patient  Primary Caregiver: Self  Support Systems: Self, Spouse/significant other, Children  County of Residence: [place]  What city do you live in?: [place] [address] [employer] [facility] [address]  Home entry access options. Select all that apply.: Stairs  Number of steps to enter home.: 6  Do the steps have railings?: Yes  Type of Current Residence: 2 story home  Upon entering residence, is there a bedroom on the main floor (no further steps)?: No  A bedroom is located on the following floor levels of residence (select all that apply):: 2nd Floor  Upon entering residence, is there a bathroom on the main floor (no further steps)?: No  Indicate which floors of current residence have a bathroom (select all the apply):: 2nd Floor  Living Arrangements: Lives w/ Spouse/significant other, Lives w/ Children  Is patient a ?: No    Activities of Daily Living Prior to Admission  Functional Status: Independent  Completes ADLs independently?: Yes  Ambulates independently?: Yes  Does patient use assisted devices?: No  Does patient currently own DME?: No  Does patient have a history of Outpatient Therapy (PT/OT)?: No  Does the patient have a history of Short-Term Rehab?: No  Does patient have a history of HHC?: No  Does patient currently have HHC?: No         Patient Information Continued  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)  Does patient have prescription coverage?: Yes  Can the patient afford their medications and any related supplies (such as glucometers or test strips)?:  Yes  Does patient receive dialysis treatments?: No  Does patient have a history of substance abuse?: No  Does patient have a history of Mental Health Diagnosis?: No         Means of Transportation  Means of Transport to Appts:: Family transport      Social Determinants of Health (SDOH)      Flowsheet Row Most Recent Value   Housing Stability    In the last 12 months, was there a time when you were not able to pay the mortgage or rent on time? N   In the past 12 months, how many times have you moved where you were living? 0   At any time in the past 12 months, were you homeless or living in a shelter (including now)? N   Transportation Needs    In the past 12 months, has lack of transportation kept you from medical appointments or from getting medications? no   Food Insecurity    Within the past 12 months, you worried that your food would run out before you got the money to buy more. Never true   Utilities    In the past 12 months has the electric, gas, oil, or water company threatened to shut off services in your home? No            DISCHARGE DETAILS:    Discharge planning discussed with:: Patient and dtr at bedside  Freedom of Choice: Yes  Comments - Freedom of Choice: Consullt for stroke pathway. CM introduced self and role- FOC maintained throughout the conversation. Therapy recs for Level III- discussed with patient and she wants to go to OP therapy. CM gave her list of locations for review. Patient states her supportive dtr/famiy lives with her and her other dtr is in [place]. Her family takes her to appointments and provides assist with household tasks and shared expensies. She has active health insurance and connection to a PCP. The patient  was receptive to AD/POA paerwork- given. She states she has no identified needs for CM  CM contacted family/caregiver?: Yes  Were Treatment Team discharge recommendations reviewed with patient/caregiver?: Yes     Were patient/caregiver advised of the risks associated with  not following Treatment Team discharge recommendations?: Yes    Contacts  Patient Contacts: Chidi Jefferson (Spouse)  [phone] (Mobile)  Relationship to Patient:: Family  Reason/Outcome: Emergency Contact    Requested Home Health Care         Is the patient interested in HHC at discharge?: No    DME Referral Provided  Referral made for DME?: No    Other Referral/Resources/Interventions Provided:  Interventions: Advanced Directives, FindHelp, Outpatient PT, Outpatient OT  Referral Comments: See FOC for status of assessemnt/referrals. Patient plans to go to OP therapy- List of locations given to patient  and she will review with family as they will be providing transport.    Would you like to participate in our [facility] service program?  : No - Declined    Treatment Team Recommendation: Outpatient Rehab  Discharge Destination Plan:: Outpatient Rehab  Transport at Discharge : Family

## 2025-05-22 NOTE — PROGRESS NOTES
Progress Note - Hospitalist   Name: Sadaf Chaves 49 y.o. female I MRN: [id number]  Unit/Bed#: 2 E 275-01 I Date of Admission: 5/21/2025   Date of Service: 5/22/2025 I Hospital Day: 0    Assessment & Plan  Stroke-like symptoms  Episode of right-sided facial, upper and lower extremity numbness along with weakness starting at approximately 8 to 9 AM on day of admission.  Neuro History: Prior TIA    Blood Pressure: 152/82  EKG NSR  CT head negative for acute abnormality at this time  CTA showing atherosclerotic change in bilateral cervical carotid arteries (less than 50%)  Given history of TIA and sudden onset numbness and tingling with presence of poorly controlled diabetes, high suspicion for TIA/stroke  Stroke pathway  MRI brain, TTE, monitor on tele  Lipid Panel, HbA1c  Atorvastatin 40 mg q.d., Aspirin 81 q.d.  Neurology consult- low suspicion for TIA/stroke  PT/OT/Speech eval  Allow for permissive hypertension with -210 for 48 hours  Neuro checks per protocol    Type 2 diabetes mellitus with hyperglycemia, with long-term current use of insulin (HCC)  Lab Results   Component Value Date    HGBA1C 16.6 (H) 05/21/2025     Recent Labs     05/22/25  0455 05/22/25  0456 05/22/25  0659 05/22/25  0728   POCGLU 142*  --  124 128   GLUC  --  137  --   --      Hold home regimen while inpatient and resume on discharge  Reportedly was on 20 u lantus HS and no short acting insulin  Diabetic diet  Insulin regimen  Non DKA insulin drip until able to transition to SQ dosing  Glucose checks q2h while on insulin drip  Goal -180 while admitted, adjusting insulin regimen as appropriate  Monitor for hypoglycemia and treat per protocol  Essential hypertension  Blood Pressure: 152/82    Continue home meds  Monitor blood pressure  PRN IV Labetalol and Hydralazine for SBP > 160  Acquired hypothyroidism  Lab Results   Component Value Date    [id number] 138.764 (H) 05/12/2025    FREET4 0.46 (L) 05/12/2025     Continue home  meds  Morbid obesity with BMI of 45.0-49.9, adult (HCC)    Recommend incorporating a more whole foods plant-predominant diet along with decreasing consumption of red meats and processed foods  Per AHA guidelines, recommend moderate-vigorous intensity exercise for 30 minutes a day for 5 days a week or a total of 150 min/week    Mixed hyperlipidemia  Lab Results   Component Value Date    CHOLESTEROL 165 05/22/2025    TRIG 264 (H) 05/22/2025    HDL 27 (L) 05/22/2025    LDLCALC 85 05/22/2025     Statin  Tobacco use  Tobacco Use: High Risk (5/12/2025)    Patient History     Smoking Tobacco Use: Every Day     Smokeless Tobacco Use: Never     Passive Exposure: Not on file     Tobacco cessation counseling provided for > 3 min  NRT ordered with patch and PRN nicorette gum      VTE Pharmacologic Prophylaxis: VTE Score: 3 Moderate Risk (Score 3-4) - Pharmacological DVT Prophylaxis Ordered: enoxaparin (Lovenox).    Mobility:   Basic Mobility Inpatient Raw Score: 18  JH-HLM Goal: 6: Walk 10 steps or more  JH-HLM Achieved: 6: Walk 10 steps or more  JH-HLM Goal achieved. Continue to encourage appropriate mobility.    Patient Centered Rounds: I performed bedside rounds with nursing staff today.   Discussions with Specialists or Other Care Team Provider: Neurology consult.      Current Length of Stay: 0 day(s)  Current Patient Status: Observation   Certification Statement: The patient will continue to require additional inpatient hospital stay due to potential TIA/stroke and glucose monitoring.  Discharge Plan: Anticipate discharge in 24-48 hrs to home.    Code Status: Level 1 - Full Code    Subjective   Patient seen and examined. Endorses weakness in right leg and arm. Endorses numbness on right arm, leg, face. Says when walking she supports herself on the left side and walks quicker to get somewhere she can sit down. Was put on metformin & insulin when diagnosed with diabetes a couple months ago, doesn't think it's working because  her sugars at home are consistently >400.    Objective :  Temp:  [97.3 °F (36.3 °C)-98.6 °F (37 °C)] 98.2 °F (36.8 °C)  HR:  [66-88] 76  BP: (117-192)/() 152/82  Resp:  [16-20] 18  SpO2:  [93 %-100 %] 97 %  O2 Device: None (Room air)    Body mass index is 42.06 kg/m².     Input and Output Summary (last 24 hours):     Intake/Output Summary (Last 24 hours) at 5/22/2025 0835  Last data filed at 5/21/2025 1759  Gross per 24 hour   Intake 1270 ml   Output 200 ml   Net 1070 ml       Physical Exam  Vitals and nursing note reviewed.   Constitutional:       General: She is not in acute distress.     Appearance: She is well-developed.   HENT:      Head: Normocephalic and atraumatic.     Eyes:      Conjunctiva/sclera: Conjunctivae normal.       Cardiovascular:      Rate and Rhythm: Normal rate and regular rhythm.      Heart sounds: No murmur heard.  Pulmonary:      Effort: Pulmonary effort is normal. No respiratory distress.      Breath sounds: Normal breath sounds.   Abdominal:      Palpations: Abdomen is soft.      Tenderness: There is no abdominal tenderness.     Musculoskeletal:         General: No swelling.      Cervical back: Neck supple.     Skin:     General: Skin is warm and dry.      Capillary Refill: Capillary refill takes less than 2 seconds.     Neurological:      Mental Status: She is alert and oriented to person, place, and time.      Cranial Nerves: Cranial nerves 2-12 are intact.      Sensory: Sensory deficit present.      Motor: No weakness or pronator drift.      Coordination: Coordination is intact. Finger-Nose-Finger Test normal.      Deep Tendon Reflexes:      Reflex Scores:       Tricep reflexes are 2+ on the right side and 2+ on the left side.       Patellar reflexes are 2+ on the right side and 2+ on the left side.     Comments: Numbness and tingling on R face, arm, leg. 5/5 strength arms & legs bilaterally. Reflexes 2+.   Psychiatric:         Mood and Affect: Mood normal.          Telemetry:  Telemetry Orders (From admission, onward)               24 Hour Telemetry Monitoring  Continuous x 24 Hours (Telem)        Expiring   Question:  Reason for 24 Hour Telemetry  Answer:  TIA/Suspected CVA/ Confirmed CVA                     Telemetry Reviewed: Normal Sinus Rhythm  Indication for Continued Telemetry Use: No indication for continued use. Will discontinue.                Lab Results: I have reviewed the following results:   Results from last 7 days   Lab Units 05/22/25  0456   WBC Thousand/uL 8.24   HEMOGLOBIN g/dL 13.2   HEMATOCRIT % 41.1   PLATELETS Thousands/uL 219   SEGS PCT % 58   LYMPHO PCT % 33   MONO PCT % 5   EOS PCT % 3     Results from last 7 days   Lab Units 05/22/25  0456   SODIUM mmol/L 135   POTASSIUM mmol/L 3.0*   CHLORIDE mmol/L 104   CO2 mmol/L 24   BUN mg/dL 8   CREATININE mg/dL 0.53*   ANION GAP mmol/L 7   CALCIUM mg/dL 8.3*   ALBUMIN g/dL 3.4*   TOTAL BILIRUBIN mg/dL 0.52   ALK PHOS U/L 103   ALT U/L 19   AST U/L 68*   GLUCOSE RANDOM mg/dL 137         Results from last 7 days   Lab Units 05/22/25  0728 05/22/25  0659 05/22/25  0455 05/22/25  0300 05/22/25  0044 05/21/25  2253 05/21/25  2059 05/21/25  1757 05/21/25  1543 05/21/25  1453 05/21/25  1124   POC GLUCOSE mg/dl 128 124 142* 145* 163* 333* 275* 155* 326* 441* 555*     Results from last 7 days   Lab Units 05/21/25  1602   HEMOGLOBIN A1C % 16.6*           Recent Cultures (last 7 days):         Imaging Results Review: I reviewed radiology reports from this admission including: chest xray and CT head.  Other Study Results Review: EKG was reviewed.     Last 24 Hours Medication List:     Current Facility-Administered Medications:     acetaminophen (TYLENOL) tablet 650 mg, Q4H PRN    albuterol (PROVENTIL HFA,VENTOLIN HFA) inhaler 2 puff, Q6H PRN    aluminum-magnesium hydroxide-simethicone (MAALOX) oral suspension 30 mL, Q6H PRN    aspirin chewable tablet 81 mg, Daily    atorvastatin (LIPITOR) tablet 40 mg, QPM     docusate sodium (COLACE) capsule 100 mg, BID    enoxaparin (LOVENOX) subcutaneous injection 40 mg, Q12H MARYA    hydrALAZINE (APRESOLINE) injection 10 mg, Q6H PRN    hydrOXYzine HCL (ATARAX) tablet 50 mg, Q6H PRN    insulin regular (HumuLIN R,NovoLIN R) 1 Units/mL in sodium chloride 0.9 % 100 mL infusion, Titrated, Last Rate: 1.5 Units/hr (05/22/25 0702)    labetalol (NORMODYNE) injection 10 mg, Q6H PRN    levothyroxine tablet 125 mcg, Early Morning    LORazepam (ATIVAN) injection 0.5 mg, Q6H PRN    [Held by provider] losartan (COZAAR) tablet 100 mg, Daily    [Held by provider] metoprolol tartrate (LOPRESSOR) tablet 50 mg, Q12H MARYA    nicotine (NICODERM CQ) 21 mg/24 hr TD 24 hr patch 1 patch, Daily    nicotine polacrilex (NICORETTE) gum 2 mg, Q2H PRN    nystatin (MYCOSTATIN) powder, BID    ondansetron (ZOFRAN) injection 4 mg, Q6H PRN    simethicone (MYLICON) chewable tablet 80 mg, 4x Daily PRN    sodium chloride 0.9 % infusion, Continuous, Last Rate: 100 mL/hr (05/21/25 1738)    topiramate (TOPAMAX) tablet 25 mg, Daily    Administrative Statements   Today, Patient Was Seen By: Nivia Jhaveri      **Please Note: This note may have been constructed using a voice recognition system.**

## 2025-05-22 NOTE — ASSESSMENT & PLAN NOTE
Lab Results   Component Value Date    CHOLESTEROL 165 05/22/2025    TRIG 264 (H) 05/22/2025    HDL 27 (L) 05/22/2025    LDLCALC 85 05/22/2025     Statin

## 2025-05-22 NOTE — PLAN OF CARE
Problem: PHYSICAL THERAPY ADULT  Goal: Performs mobility at highest level of function for planned discharge setting.  See evaluation for individualized goals.  Description: Treatment/Interventions: Functional transfer training, LE strengthening/ROM, Elevations, Therapeutic exercise, Endurance training, Patient/family training, Bed mobility, Gait training, OT          See flowsheet documentation for full assessment, interventions and recommendations.  Note: Prognosis: Good  Problem List: Decreased strength, Impaired balance, Decreased endurance, Decreased mobility, Impaired sensation, Pain  Assessment: Pt is 49 year old female seen for PT evaluation s/p admit to [facility] on 5/21/2025 with Stroke-like symptoms. PT consulted to assess pt's functional mobility and discharge needs. Order placed for PT evaluation and treatment, with activity as tolerated order. Comorbidities affecting pt's physical performance at time of assessment include essential hypertension, acquired hypothyroidism, morbid obesity, mixed hyperlipidemia, type 2 DM, and tobacco use. Prior to hospitalization, pt was independent with all functional mobility without an AD. Pt ambulates unrestricted distances on all terrain and elevations. Pt resides with her family, in a two level house with six steps to enter and a flight of stairs to the 2nd floor. Personal factors affecting pt at time of initial evaluation include lives in a two story house, stairs to enter home and reach 2nd floor, difficulty ambulating household distances, difficulty ambulating community distances, difficulty navigating level surfaces without external assistance, difficulty performing dynamic tasks in the community, difficulty performing ADLs, and inability to perform IADLs. Please find objective findings from PT assessment regarding body systems outlined above with impairments and limitations including weakness, impaired balance, decreased endurance, gait deviations,  decreased activity tolerance, decreased functional mobility tolerance, altered sensation, and fall risk. The following objective measures were performed on initial evaluation Barthel Index: 45/100, Modified [place]: 4 (moderate/severe disability), and AM-PAC 6-Clicks: 18/24. Pt's clinical presentation is currently evolving seen in pt's presentation of need for ongoing medical management/monitoring, pt is a fall risk, and pt requires cues and assist for safety with functional mobility. Pt to benefit from continued PT treatment to address deficits as defined above and maximize pt's level of function and independence with mobility. From a PT standpoint, recommendation at time of discharge would be level 3, minimal resource intensity in order to facilitate return to prior level of function.    Barriers to Discharge: Inaccessible home environment, Other (Comment) (decline in functional mobility)     Rehab Resource Intensity Level, PT: III (Minimum Resource Intensity)    See flowsheet documentation for full assessment.

## 2025-05-22 NOTE — SPEECH THERAPY NOTE
Speech Language/Pathology      Patient passed nursing dysphagia screen; presently tolerating oral diet.  Need for formal evaluation of swallow function is not indicated at this time.  Please re-order should status change or concerns arise.     Moni Paul MS, CCC-SLP  Speech-Language Pathologist  [place] #[id number]  [place] #[phone]

## 2025-05-23 ENCOUNTER — TELEPHONE (OUTPATIENT)
Age: 50
End: 2025-05-23

## 2025-05-23 VITALS
OXYGEN SATURATION: 98 % | TEMPERATURE: 98.5 F | RESPIRATION RATE: 17 BRPM | HEART RATE: 73 BPM | HEIGHT: 67 IN | BODY MASS INDEX: 42.21 KG/M2 | WEIGHT: 268.96 LBS | SYSTOLIC BLOOD PRESSURE: 166 MMHG | DIASTOLIC BLOOD PRESSURE: 86 MMHG

## 2025-05-23 PROBLEM — L29.9 ITCHING: Status: ACTIVE | Noted: 2025-05-23

## 2025-05-23 LAB
GLUCOSE SERPL-MCNC: 275 MG/DL (ref 65–140)
GLUCOSE SERPL-MCNC: 309 MG/DL (ref 65–140)
GLUCOSE SERPL-MCNC: 342 MG/DL (ref 65–140)

## 2025-05-23 PROCEDURE — 97116 GAIT TRAINING THERAPY: CPT

## 2025-05-23 PROCEDURE — 99406 BEHAV CHNG SMOKING 3-10 MIN: CPT | Performed by: INTERNAL MEDICINE

## 2025-05-23 PROCEDURE — 97110 THERAPEUTIC EXERCISES: CPT

## 2025-05-23 PROCEDURE — 82948 REAGENT STRIP/BLOOD GLUCOSE: CPT

## 2025-05-23 PROCEDURE — 99239 HOSP IP/OBS DSCHRG MGMT >30: CPT | Performed by: INTERNAL MEDICINE

## 2025-05-23 RX ORDER — BLOOD-GLUCOSE METER
KIT MISCELLANEOUS
Qty: 1 KIT | Refills: 0 | Status: SHIPPED | OUTPATIENT
Start: 2025-05-23

## 2025-05-23 RX ORDER — PEN NEEDLE, DIABETIC 32GX 5/32"
NEEDLE, DISPOSABLE MISCELLANEOUS
Qty: 100 EACH | Refills: 0 | Status: SHIPPED | OUTPATIENT
Start: 2025-05-23

## 2025-05-23 RX ORDER — NYSTATIN 100000 [USP'U]/G
POWDER TOPICAL 2 TIMES DAILY
Qty: 15 G | Refills: 0 | Status: SHIPPED | OUTPATIENT
Start: 2025-05-23

## 2025-05-23 RX ORDER — GLUCOSAMINE HCL/CHONDROITIN SU 500-400 MG
CAPSULE ORAL
Qty: 100 EACH | Refills: 0 | Status: SHIPPED | OUTPATIENT
Start: 2025-05-23

## 2025-05-23 RX ORDER — INSULIN ASPART 100 [IU]/ML
12 INJECTION, SOLUTION INTRAVENOUS; SUBCUTANEOUS 2 TIMES DAILY WITH MEALS
Qty: 15 ML | Refills: 0 | Status: SHIPPED | OUTPATIENT
Start: 2025-05-23

## 2025-05-23 RX ORDER — LANCETS 33 GAUGE
EACH MISCELLANEOUS
Qty: 100 EACH | Refills: 0 | Status: SHIPPED | OUTPATIENT
Start: 2025-05-23

## 2025-05-23 RX ORDER — BLOOD SUGAR DIAGNOSTIC
STRIP MISCELLANEOUS
Qty: 100 EACH | Refills: 0 | Status: SHIPPED | OUTPATIENT
Start: 2025-05-23

## 2025-05-23 RX ORDER — NICOTINE 21 MG/24HR
1 PATCH, TRANSDERMAL 24 HOURS TRANSDERMAL DAILY
Qty: 28 PATCH | Refills: 0 | Status: SHIPPED | OUTPATIENT
Start: 2025-05-23

## 2025-05-23 RX ORDER — INSULIN GLARGINE 100 [IU]/ML
36 INJECTION, SOLUTION SUBCUTANEOUS
Status: DISCONTINUED | OUTPATIENT
Start: 2025-05-23 | End: 2025-05-23 | Stop reason: HOSPADM

## 2025-05-23 RX ORDER — INSULIN GLARGINE 100 [IU]/ML
36 INJECTION, SOLUTION SUBCUTANEOUS
Qty: 15 ML | Refills: 0 | Status: SHIPPED | OUTPATIENT
Start: 2025-05-23

## 2025-05-23 RX ADMIN — INSULIN LISPRO 6 UNITS: 100 INJECTION, SOLUTION INTRAVENOUS; SUBCUTANEOUS at 09:01

## 2025-05-23 RX ADMIN — Medication 6 MG: at 00:07

## 2025-05-23 RX ADMIN — TOPIRAMATE 25 MG: 25 TABLET, FILM COATED ORAL at 09:02

## 2025-05-23 RX ADMIN — LEVOTHYROXINE SODIUM 125 MCG: 125 TABLET ORAL at 05:18

## 2025-05-23 RX ADMIN — INSULIN LISPRO 12 UNITS: 100 INJECTION, SOLUTION INTRAVENOUS; SUBCUTANEOUS at 09:02

## 2025-05-23 RX ADMIN — DOCUSATE SODIUM 100 MG: 100 CAPSULE, LIQUID FILLED ORAL at 09:02

## 2025-05-23 RX ADMIN — NYSTATIN: 100000 POWDER TOPICAL at 09:03

## 2025-05-23 RX ADMIN — ASPIRIN 81 MG: 81 TABLET, CHEWABLE ORAL at 09:02

## 2025-05-23 RX ADMIN — ENOXAPARIN SODIUM 40 MG: 40 INJECTION SUBCUTANEOUS at 09:01

## 2025-05-23 RX ADMIN — ESCITALOPRAM OXALATE 10 MG: 10 TABLET ORAL at 09:02

## 2025-05-23 NOTE — ASSESSMENT & PLAN NOTE
Lab Results   Component Value Date    [id number] 138.764 (H) 05/12/2025    FREET4 0.46 (L) 05/12/2025     Continue home meds

## 2025-05-23 NOTE — PLAN OF CARE
Problem: PAIN - ADULT  Goal: Verbalizes/displays adequate comfort level or baseline comfort level  Description: Interventions:  - Encourage patient to monitor pain and request assistance  - Assess pain using appropriate pain scale  - Administer analgesics as ordered based on type and severity of pain and evaluate response  - Implement non-pharmacological measures as appropriate and evaluate response  - Consider cultural and social influences on pain and pain management  - Notify physician/advanced practitioner if interventions unsuccessful or patient reports new pain  - Educate patient/family on pain management process including their role and importance of  reporting pain   - Provide non-pharmacologic/complimentary pain relief interventions  Outcome: Progressing     Problem: INFECTION - ADULT  Goal: Absence or prevention of progression during hospitalization  Description: INTERVENTIONS:  - Assess and monitor for signs and symptoms of infection  - Monitor lab/diagnostic results  - Monitor all insertion sites, i.e. indwelling lines, tubes, and drains  - Monitor endotracheal if appropriate and nasal secretions for changes in amount and color  - [place] appropriate cooling/warming therapies per order  - Administer medications as ordered  - Instruct and encourage patient and family to use good hand hygiene technique  - Identify and instruct in appropriate isolation precautions for identified infection/condition  Outcome: Progressing  Goal: Absence of fever/infection during neutropenic period  Description: INTERVENTIONS:  - Monitor WBC  - Perform strict hand hygiene  - Limit to healthy visitors only  - No plants, dried, fresh or silk flowers with sanchez in patient room  Outcome: Progressing     Problem: SAFETY ADULT  Goal: Patient will remain free of falls  Description: INTERVENTIONS:  - Educate patient/family on patient safety including physical limitations  - Instruct patient to call for assistance with activity   -  Consider consulting OT/PT to assist with strengthening/mobility based on AM PAC & JH-HLM score  - Consult OT/PT to assist with strengthening/mobility   - Keep Call bell within reach  - Keep bed low and locked with side rails adjusted as appropriate  - Keep care items and personal belongings within reach  - Initiate and maintain comfort rounds  - Make Fall Risk Sign visible to staff  - Offer Toileting every  Hours, in advance of need  - Initiate/Maintain alarm  - Obtain necessary fall risk management equipment:   - Apply yellow socks and bracelet for high fall risk patients  - Consider moving patient to room near nurses station  Outcome: Progressing  Goal: Maintain or return to baseline ADL function  Description: INTERVENTIONS:  -  Assess patient's ability to carry out ADLs; assess patient's baseline for ADL function and identify physical deficits which impact ability to perform ADLs (bathing, care of mouth/teeth, toileting, grooming, dressing, etc.)  - Assess/evaluate cause of self-care deficits   - Assess range of motion  - Assess patient's mobility; develop plan if impaired  - Assess patient's need for assistive devices and provide as appropriate  - Encourage maximum independence but intervene and supervise when necessary  - Involve family in performance of ADLs  - Assess for home care needs following discharge   - Consider OT consult to assist with ADL evaluation and planning for discharge  - Provide patient education as appropriate  - Monitor functional capacity and physical performance, use of AM PAC & JH-HLM   - Monitor gait, balance and fatigue with ambulation    Outcome: Progressing  Goal: Maintains/Returns to pre admission functional level  Description: INTERVENTIONS:  - Perform AM-PAC 6 Click Basic Mobility/ Daily Activity assessment daily.  - Set and communicate daily mobility goal to care team and patient/family/caregiver.   - Collaborate with rehabilitation services on mobility goals if consulted  -  Perform Range of Motion  times a day.  - Reposition patient every  hours.  - Dangle patient  times a day  - Stand patient  times a day  - Ambulate patient  times a day  - Out of bed to chair  times a day   - Out of bed for meals  times a day  - Out of bed for toileting  - Record patient progress and toleration of activity level   Outcome: Progressing     Problem: DISCHARGE PLANNING  Goal: Discharge to home or other facility with appropriate resources  Description: INTERVENTIONS:  - Identify barriers to discharge w/patient and caregiver  - Arrange for needed discharge resources and transportation as appropriate  - Identify discharge learning needs (meds, wound care, etc.)  - Arrange for interpretive services to assist at discharge as needed  - Refer to Case Management Department for coordinating discharge planning if the patient needs post-hospital services based on physician/advanced practitioner order or complex needs related to functional status, cognitive ability, or social support system  Outcome: Progressing     Problem: Knowledge Deficit  Goal: Patient/family/caregiver demonstrates understanding of disease process, treatment plan, medications, and discharge instructions  Description: Complete learning assessment and assess knowledge base.  Interventions:  - Provide teaching at level of understanding  - Provide teaching via preferred learning methods  Outcome: Progressing

## 2025-05-23 NOTE — ASSESSMENT & PLAN NOTE
Episode of right-sided facial, upper and lower extremity numbness along with weakness starting at approximately 8 to 9 AM on day of admission.  Neuro History: Prior TIA    Blood Pressure: 166/86  EKG NSR  CT head negative for acute abnormality at this time  CTA showing atherosclerotic change in bilateral cervical carotid arteries (less than 50%)  Stroke pathway  MRI brain showed no acute intracranial pathology  Lipid Panel high TGs  HbA1c 16.6  Atorvastatin 40 mg q.d., Aspirin 81 q.d.  Neurology consult- low suspicion for TIA/stroke, f/u outpatient neuromuscular clinic

## 2025-05-23 NOTE — PLAN OF CARE
Problem: PHYSICAL THERAPY ADULT  Goal: Performs mobility at highest level of function for planned discharge setting.  See evaluation for individualized goals.  Description: Treatment/Interventions: Functional transfer training, LE strengthening/ROM, Elevations, Therapeutic exercise, Endurance training, Patient/family training, Bed mobility, Gait training, OT          See flowsheet documentation for full assessment, interventions and recommendations.  Outcome: Progressing  Note: Prognosis: Good  Problem List: Impaired sensation, Pain, Decreased strength, Decreased endurance, Impaired balance, Decreased mobility  Assessment: Chart review and two person identifiers were completed. Pt seen for PT treatment session this date, consisting of ther ex focused on strengthening and gt training on level surfaces to improve pt safety in household environment. Pt was greeted supine in bed and performed supine to sit and STS transfer with supervision Ax1. Pt ambulated 180' with CGA and no AD into the hallway and back to her chair. Pt experienced 1 LOB and reported feeling unsteady requring min Ax1 to correct and required handheld assist throughout the rest of ambulation. Pt performed stand to sit transfer in bedside chair with supervision Ax1 and performed therapeutic exercise to improve LE strengthening and core stability. Pt performed 6 STS with supervision x1 for LE strengthening and for initiation of standing exercise including ankle pumps. Pt  Since previous session, pt has made good progress in terms of ambulation tolerance and functional mobility.  Pertinent barriers during this session include pain. Current goals and POC established on IE remain appropriate. Pt prognosis for achieving goals is good, pending pt progress with hospitalization/medical status improvements, and indicated by ability to follow directions, motivation, responsive to cues/strategies, supportive family/caregivers, and ability to self-monitor and/or  self-correct. Pt limited d/t fear of falling. Pt continues to be functioning below baseline level, and remains limited due to factors listed above. PT will continue to see pt during current hospitalization in order to address limitations and functional deficits listed above and provide interventions consistent w/ POC in effort to achieve STGs. PT recommends level 3, minimal resource intensity upon discharge. Pt ended the session seated EOB with daughter present and all needs within reach.  Barriers to Discharge: Inaccessible home environment, Other (Comment) (decline from pts baseline mobility)     Rehab Resource Intensity Level, PT: III (Minimum Resource Intensity)    See flowsheet documentation for full assessment.     Cris Wiggins, SPT

## 2025-05-23 NOTE — PHYSICAL THERAPY NOTE
"                                                                                  PHYSICAL THERAPY NOTE          Patient Name: Sadaf Chaves  Today's Date: 5/23/2025 05/23/25 1056   PT Last Visit   PT Visit Date 05/23/25   Note Type   Note Type Treatment   Pain Assessment   Pain Assessment Tool 0-10   Pain Score 8   Pain Location/Orientation Location: Head   Pain Onset/Description Descriptor: Headache   Restrictions/Precautions   Weight Bearing Precautions Per Order No   Other Precautions Fall Risk;Pain   General   Chart Reviewed Yes   Family/Caregiver Present Yes  (daughter)   Cognition   Overall Cognitive Status WFL   Arousal/Participation Alert;Cooperative   Attention Within functional limits   Orientation Level Oriented X4   Memory Within functional limits   Following Commands Follows all commands and directions without difficulty   Comments pt agreeable to PT   Subjective   Subjective \"I want to go home\"   Bed Mobility   Supine to Sit 5  Supervision   Additional items Assist x 1;Bedrails;Increased time required;Verbal cues   Transfers   Sit to Stand 5  Supervision   Additional items Assist x 1;Armrests;Increased time required;Verbal cues   Stand to Sit 5  Supervision   Additional items Assist x 1;Armrests;Increased time required;Verbal cues   Ambulation/Elevation   Gait pattern Decreased heel strike;Decreased toe off;Decreased hip extension;Wide LILIANA;Decreased foot clearance;Short stride   Gait Assistance   (contact guard)   Additional items Assist x 1;Verbal cues   Assistive Device None   Distance 180'   Stair Management Assistance Not tested   Ambulation/Elevation Additional Comments 1 LOB requiring min Ax1 to correct, continued ambulation with handhold assist   Balance   Static Sitting Good   Dynamic Sitting Fair +   Static Standing Fair   Dynamic Standing Fair -   Ambulatory Fair -   Endurance Deficit   Endurance Deficit Yes   Endurance Deficit Description endurance deficit and decreased activity " tolerance due to current medical status   Activity Tolerance   Activity Tolerance Patient tolerated treatment well   Medical Staff Made Aware PT Sylvain Hunter   Exercises   Quad Sets 10 reps;Sitting;Bilateral   Heelslides 10 reps;Sitting;AROM;Bilateral   Hip Abduction 10 reps;Sitting;AROM;Bilateral   Hip Adduction 10 reps;Sitting;Bilateral;AROM  (against pillow)   Knee AROM Long Arc Quad 10 reps;Sitting;Bilateral;AROM   Ankle Pumps Standing;20 reps;Bilateral   Squat 5 reps;Standing  (STS)   Assessment   Prognosis Good   Problem List Impaired sensation;Pain;Decreased strength;Decreased endurance;Impaired balance;Decreased mobility   Assessment Chart review and two person identifiers were completed. Pt seen for PT treatment session this date, consisting of ther ex focused on strengthening and gt training on level surfaces to improve pt safety in household environment. Pt was greeted supine in bed and performed supine to sit and STS transfer with supervision Ax1. Pt ambulated 180' with CGA and no AD into the hallway and back to her chair. Pt experienced 1 LOB and reported feeling unsteady requring min Ax1 to correct and required handheld assist throughout the rest of ambulation. Pt performed stand to sit transfer in bedside chair with supervision Ax1 and performed therapeutic exercise to improve LE strengthening and core stability. Pt performed 6 STS with supervision x1 for LE strengthening and for initiation of standing exercise including ankle pumps. Pt  Since previous session, pt has made good progress in terms of ambulation tolerance and functional mobility.  Pertinent barriers during this session include pain. Current goals and POC established on IE remain appropriate. Pt prognosis for achieving goals is good, pending pt progress with hospitalization/medical status improvements, and indicated by ability to follow directions, motivation, responsive to cues/strategies, supportive family/caregivers, and ability to  self-monitor and/or self-correct. Pt limited d/t fear of falling. Pt continues to be functioning below baseline level, and remains limited due to factors listed above. PT will continue to see pt during current hospitalization in order to address limitations and functional deficits listed above and provide interventions consistent w/ POC in effort to achieve STGs. PT recommends level 3, minimal resource intensity upon discharge. Pt ended the session seated EOB with daughter present and all needs within reach.   Barriers to Discharge Inaccessible home environment;Other (Comment)  (decline from pts baseline mobility)   Goals   STG Expiration Date 06/01/25   PT Treatment Day 1   Plan   Treatment/Interventions Functional transfer training;LE strengthening/ROM;Therapeutic exercise;Endurance training;Patient/family training;Gait training;OT   Progress Progressing toward goals   PT Frequency 2-3x/wk   Discharge Recommendation   Rehab Resource Intensity Level, PT III (Minimum Resource Intensity)   AM-PAC Basic Mobility Inpatient   Turning in Flat Bed Without Bedrails 4   Lying on Back to Sitting on Edge of Flat Bed Without Bedrails 3   Moving Bed to Chair 3   Standing Up From Chair Using Arms 3   Walk in Room 3   Climb 3-5 Stairs With Railing 3   Basic Mobility Inpatient Raw Score 19   Basic Mobility Standardized Score 42.48   [facility] Highest Level Of Mobility   JH-HLM Goal 6: Walk 10 steps or more   JH-HLM Achieved 7: Walk 25 feet or more   Education   Education Provided Mobility training;Home exercise program   Patient Demonstrates acceptance/verbal understanding   End of Consult   Patient Position at End of Consult Seated edge of bed;All needs within reach  (daughter present)     Cris Wiggins, SPT

## 2025-05-23 NOTE — CASE MANAGEMENT
Case Management Discharge Planning Note    Patient name Sadaf Chaves  Location 2 EAST 275/2 E 275-01 MRN [id number]  : 1975 Date 2025       Current Admission Date: 2025  Current Admission Diagnosis:Stroke-like symptoms   Patient Active Problem List    Diagnosis Date Noted    Itching 2025    Type 2 diabetes mellitus with hyperglycemia, with long-term current use of insulin (HCC) 2025    Stroke-like symptoms 2025    Tobacco use 2025    Acute pain of right knee 2025    Hyperglycemia 2023    Abnormal CT scan 2022    Migraine headache 2022    SIRS (systemic inflammatory response syndrome) (HCC) 07/15/2021    SOB (shortness of breath) 2020    Hypokalemia 2020    Leukocytosis 2020    Acute hip pain, left 2020    Mixed hyperlipidemia 2020    Syncope 2020    Acquired hypothyroidism     Morbid obesity with BMI of 45.0-49.9, adult (HCC)     Breast abscess 2019    Essential hypertension     Dependence on nicotine from cigarettes 2018    Obesity 2018    Thyroid disease 2018    Chest pain 2016    Hypertensive urgency 2016    Headache 2016      LOS (days): 0  Geometric Mean LOS (GMLOS) (days):   Days to GMLOS:     OBJECTIVE:            Current admission status: Observation   Preferred Pharmacy:   [id number] [place] - [address]  [address]  [facility] [place] [address]  Phone: [phone] Fax: [phone]    [id number] - [facility] [place] - [address]  [address]  [facility] [address]  Phone: [phone] Fax: [phone]    Primary Care Provider: Marko العراقي MD    Primary Insurance: NIRU PEREZ  Secondary Insurance:     DISCHARGE DETAILS:  LATE ENTRY    CM did a price check for new scripts for insulin both lantus and Humalog. Scripts were sent it to her [facility] pharmacy     [facility] Pharmacist stated there was no co-pay for  her  Humalog and she is not eligible for new lantus refill ,per info sent over from her [facility] pharmacy.  They were exploring further.    CM updated the patient ,who states she would f/u with both pharmacies regarding Lantus ,as the new script is almost double and she most likely will not have enough at home.    Patient's  dtr transported her home and would f/u with the Pharmacy.

## 2025-05-23 NOTE — QUICK NOTE
Apolonia Grider is a 55 year old female presenting for yearly gyn physical.     Denies known Latex allergy or symptoms of Latex sensitivity.  Medications verified, no changes.     Patient seen with attending neurologist. AP acting as scribe  Patient reports continued paresthesias to R hand, no change in condition.   Neurology attending discussed MRI results with patient and family member at the bedside. Discussed monitoring and modifying risk factors, including blood pressure, glucose, medication compliance, diet, exercise. Answered all patient questions.    No further inpatient neurology recommendations and patient will need 6-week OP neurology follow-up for Stroke, Neurovascular, & Neuromuscular management.       Message sent to OP neurology scheduling pool to call patient to schedule appointment, and local office information added to patient's discharge instructions.

## 2025-05-23 NOTE — ASSESSMENT & PLAN NOTE
Episode of right-sided facial, upper and lower extremity numbness along with weakness starting at approximately 8 to 9 AM on day of admission.  Neuro History: Prior TIA    Blood Pressure: 166/86  EKG NSR  CT head negative for acute abnormality at this time  CTA showing atherosclerotic change in bilateral cervical carotid arteries (less than 50%)  MRI brain showed no acute intracranial pathology  TTE, monitor on tele  Stroke pathway  Lipid Panel high TGs   HbA1c 16.6  Atorvastatin 40 mg q.d., Aspirin 81 q.d.  Neurology consult- strokelike symptoms, f/u with neuromuscular specialist  PT/OT/Speech eval  Allow for permissive hypertension with -210 for 48 hours  Neuro checks per protocol

## 2025-05-23 NOTE — DISCHARGE SUMMARY
Discharge Summary - Hospitalist   Name: Sadaf Chaves 49 y.o. female I MRN: [id number]  Unit/Bed#: 2 E 275-01 I Date of Admission: 5/21/2025   Date of Service: 5/23/2025 I Hospital Day: 0     Assessment & Plan  Stroke-like symptoms  Episode of right-sided facial, upper and lower extremity numbness along with weakness starting at approximately 8 to 9 AM on day of admission.  Neuro History: Prior TIA    Blood Pressure: 166/86  EKG NSR  CT head negative for acute abnormality at this time  CTA showing atherosclerotic change in bilateral cervical carotid arteries (less than 50%)  Stroke pathway  MRI brain showed no acute intracranial pathology  Lipid Panel high TGs  HbA1c 16.6  Atorvastatin 40 mg q.d., Aspirin 81 q.d.  Neurology consult- low suspicion for TIA/stroke, f/u outpatient neuromuscular clinic  Type 2 diabetes mellitus with hyperglycemia, with long-term current use of insulin ([facility])  Lab Results   Component Value Date    HGBA1C 16.6 (H) 05/21/2025     Recent Labs     05/22/25  0456 05/22/25  0659 05/23/25  0706 05/23/25  1108 05/23/25  1137   POCGLU  --    < > 275* 342* 309*   GLUC 137  --   --   --   --     < > = values in this interval not displayed.     Hold home regimen while inpatient and resume on discharge  Reportedly was on 20 u lantus HS and no short acting insulin  Diabetic diet  Insulin regimen  Basal dosage to 36 units  Humalog 12 u TID AC  Goal -180 while admitted, adjusting insulin regimen as appropriate  Monitor for hypoglycemia and treat per protocol  Essential hypertension  Blood Pressure: 166/86    Continue home meds  Monitor blood pressure  PRN IV Labetalol and Hydralazine for SBP > 160  Acquired hypothyroidism  Lab Results   Component Value Date    [id number] 138.764 (H) 05/12/2025    FREET4 0.46 (L) 05/12/2025     Continue home meds  Morbid obesity with BMI of 45.0-49.9, adult ([facility])    Recommend incorporating a more whole foods plant-predominant diet along with decreasing consumption  of red meats and processed foods  Per AHA guidelines, recommend moderate-vigorous intensity exercise for 30 minutes a day for 5 days a week or a total of 150 min/week    Mixed hyperlipidemia  Lab Results   Component Value Date    CHOLESTEROL 165 05/22/2025    TRIG 264 (H) 05/22/2025    HDL 27 (L) 05/22/2025    LDLCALC 85 05/22/2025     Statin  Tobacco use  Tobacco Use: High Risk (5/12/2025)    Patient History     Smoking Tobacco Use: Every Day     Smokeless Tobacco Use: Never     Passive Exposure: Not on file     Tobacco cessation counseling provided for > 3 min  NRT ordered with patch and PRN nicorette gum    Itching       Medical Problems       Resolved Problems  Date Reviewed: 5/18/2024   None       Discharging Physician / Practitioner: Bennett Zendejas DO  PCP: Marko العراقي MD  Admission Date:   Admission Orders (From admission, onward)       Ordered        05/21/25 1406  Place in Observation  Once                          Discharge Date: 05/23/25    Next Steps for Physician/AP Assuming Care:  Diabetic control    Test Results Pending at Discharge (will require follow up):  None    Medication Changes for Discharge & Rationale:   None  See after visit summary for reconciled discharge medications provided to patient and/or family.     Consultations During Hospital Stay:  Neurology- low suspicion for stroke/TIA    Procedures Performed:   None    Significant Findings / Test Results:   None    Incidental Findings:   None     Hospital Course:   Sadaf Chaves is a 49 y.o. female patient who originally presented to the hospital on 5/21/2025 due to stroke-like symptoms with right sided face, arm, and leg sensory deficits. Stroke protocol initiated, glucose monitoring. Patient discharged with low suspicion of stroke, symptoms likely secondary to high glucose. Recommendation for outpatient f/u with endocrine for diabetes monitoring and neuromuscular specialist as per neurology.   Also provided with amb referral for  "endocrinology follow-up.  Lantus was updated to 36 units at bedtime along with Humalog 12 units 3 times daily.  Recommended to patient follow-up with outpatient PCP within 1 to 2 weeks after discharge.        The patient, initially admitted to the hospital as inpatient, was discharged earlier than expected given the following: MRI negative for pathological changes, low stroke suspicion.  Please see above list of diagnoses and related plan for additional information.     Discharge Day Visit / Exam:   Subjective: Offers no new complaints at this time.  Understanding of plan.  All questions answered  Vitals: Blood Pressure: 166/86 (05/23/25 0702)  Pulse: 73 (05/23/25 0702)  Temperature: 98.5 °F (36.9 °C) (05/23/25 0702)  Temp Source: Oral (05/23/25 0227)  Respirations: 17 (05/23/25 0702)  Height: 5' 7\" (170.2 cm) (05/22/25 1251)  Weight - Scale: 122 kg (268 lb 15.4 oz) (05/22/25 1251)  SpO2: 98 % (05/23/25 0702)  Physical Exam  Vitals and nursing note reviewed.   Constitutional:       General: She is not in acute distress.     Appearance: She is morbidly obese. She is ill-appearing. She is not toxic-appearing.   HENT:      Head: Normocephalic.      Nose: Nose normal.      Mouth/Throat:      Mouth: Mucous membranes are dry.     Eyes:      General: No scleral icterus.     Conjunctiva/sclera: Conjunctivae normal.       Cardiovascular:      Rate and Rhythm: Normal rate.      Pulses: Normal pulses.           Radial pulses are 2+ on the right side and 2+ on the left side.      Heart sounds: Normal heart sounds.   Pulmonary:      Effort: Pulmonary effort is normal.      Breath sounds: Normal breath sounds.   Abdominal:      General: Bowel sounds are normal. There is no distension.      Palpations: Abdomen is soft.      Tenderness: There is no abdominal tenderness.     Musculoskeletal:         General: No tenderness.     Skin:     General: Skin is dry.      Coloration: Skin is not jaundiced.     Neurological:      Mental " Status: She is alert.     Psychiatric:         Mood and Affect: Mood normal.         Behavior: Behavior normal. Behavior is cooperative.          Discussion with Family: Patient declined call to .     Discharge instructions/Information to patient and family:   See after visit summary for information provided to patient and family.      Provisions for Follow-Up Care:  See after visit summary for information related to follow-up care and any pertinent home health orders.      Mobility at time of Discharge:   Basic Mobility Inpatient Raw Score: 19  JH-HLM Goal: 6: Walk 10 steps or more  JH-HLM Achieved: 7: Walk 25 feet or more  HLM Goal achieved. Continue to encourage appropriate mobility.     Disposition:   Home    Planned Readmission: none    Administrative Statements   Discharge Statement:  I have spent a total time of 39 minutes in caring for this patient on the day of the visit/encounter including Diagnostic results, Prognosis, Risks and benefits of tx options, Instructions for management, Patient and family education, Importance of tx compliance, Risk factor reductions, Impressions, Counseling / Coordination of care, Documenting in the medical record, Reviewing/placing orders in the medical record (including tests, medications, and/or procedures), Obtaining or reviewing history  , and Communicating with other healthcare professionals .    **Please Note: This note may have been constructed using a voice recognition system**

## 2025-05-23 NOTE — ASSESSMENT & PLAN NOTE
Lab Results   Component Value Date    HGBA1C 16.6 (H) 05/21/2025     Recent Labs     05/22/25  0456 05/22/25  0659 05/22/25  1644 05/22/25  2041 05/23/25  0706   POCGLU  --    < > 329* 267* 275*   GLUC 137  --   --   --   --     < > = values in this interval not displayed.     Hold home regimen while inpatient and resume on discharge  Reportedly was on 20 u lantus HS and no short acting insulin  Diabetic diet  Insulin regimen  Non DKA insulin drip until able to transition to SQ dosing  Glucose checks q2h while on insulin drip  Increased basal dose to 36 units as glucose still uncontrolled  Goal -180 while admitted, adjusting insulin regimen as appropriate  Monitor for hypoglycemia and treat per protocol

## 2025-05-23 NOTE — ASSESSMENT & PLAN NOTE
Lab Results   Component Value Date    [id number] 138.764 (H) 05/12/2025    FREET4 0.46 (L) 05/12/2025     Continue home meds

## 2025-05-23 NOTE — TELEPHONE ENCOUNTER
[facility]/ [facility]NER/ Stroke-like symptoms     DC- 5/23/2025 HOME      ------ Message from ALVIN Sanders sent at 5/23/2025  3:00 PM EDT -----  Regarding: hfu  Sadaf Field will need follow-up in in 6 weeks with neuromuscular team for possible median nerve palsy, neurovascular team for findings on CTA, and stroke clinic in 60 minute appointment. They will not require outpatient neurological testing

## 2025-05-23 NOTE — ASSESSMENT & PLAN NOTE
Lab Results   Component Value Date    HGBA1C 16.6 (H) 05/21/2025     Recent Labs     05/22/25  0456 05/22/25  0659 05/23/25  0706 05/23/25  1108 05/23/25  1137   POCGLU  --    < > 275* 342* 309*   GLUC 137  --   --   --   --     < > = values in this interval not displayed.     Hold home regimen while inpatient and resume on discharge  Reportedly was on 20 u lantus HS and no short acting insulin  Diabetic diet  Insulin regimen  Basal dosage to 36 units  Humalog 12 u TID AC  Goal -180 while admitted, adjusting insulin regimen as appropriate  Monitor for hypoglycemia and treat per protocol

## 2025-05-23 NOTE — ASSESSMENT & PLAN NOTE
Blood Pressure: 166/86    Continue home meds  Monitor blood pressure  PRN IV Labetalol and Hydralazine for SBP > 160

## 2025-07-23 ENCOUNTER — TELEPHONE (OUTPATIENT)
Age: 50
End: 2025-07-23